# Patient Record
Sex: FEMALE | Race: WHITE | NOT HISPANIC OR LATINO | Employment: FULL TIME | URBAN - METROPOLITAN AREA
[De-identification: names, ages, dates, MRNs, and addresses within clinical notes are randomized per-mention and may not be internally consistent; named-entity substitution may affect disease eponyms.]

---

## 2017-01-02 ENCOUNTER — OFFICE VISIT (OUTPATIENT)
Dept: FAMILY MEDICINE | Facility: CLINIC | Age: 22
End: 2017-01-02
Payer: COMMERCIAL

## 2017-01-02 VITALS
HEART RATE: 100 BPM | WEIGHT: 128.3 LBS | SYSTOLIC BLOOD PRESSURE: 121 MMHG | HEIGHT: 62 IN | DIASTOLIC BLOOD PRESSURE: 77 MMHG | BODY MASS INDEX: 23.61 KG/M2 | TEMPERATURE: 99.3 F

## 2017-01-02 DIAGNOSIS — N92.1 BREAKTHROUGH BLEEDING ON DEPO PROVERA: Primary | ICD-10-CM

## 2017-01-02 DIAGNOSIS — F90.0 ATTENTION DEFICIT HYPERACTIVITY DISORDER (ADHD), PREDOMINANTLY INATTENTIVE TYPE: ICD-10-CM

## 2017-01-02 DIAGNOSIS — A08.4 VIRAL GASTROENTERITIS: ICD-10-CM

## 2017-01-02 PROCEDURE — 99213 OFFICE O/P EST LOW 20 MIN: CPT | Performed by: PHYSICIAN ASSISTANT

## 2017-01-02 RX ORDER — DEXTROAMPHETAMINE SACCHARATE, AMPHETAMINE ASPARTATE, DEXTROAMPHETAMINE SULFATE AND AMPHETAMINE SULFATE 1.25; 1.25; 1.25; 1.25 MG/1; MG/1; MG/1; MG/1
5 TABLET ORAL 2 TIMES DAILY
Qty: 60 TABLET | Refills: 0 | Status: SHIPPED | OUTPATIENT
Start: 2017-02-06 | End: 2017-03-06

## 2017-01-02 NOTE — PROGRESS NOTES
"SUBJECTIVE:                                                    Nargis Ruelas is a 21 year old female who presents to clinic today for the following health issues:    *  Concerns about depo, has been having bleeding and more cramping and she is not due for her next injection till 1/13/2017 - 1/27/2017.    On depo >1 year. Has had unexplained bleeding a handful of times. Last a couple days. Unsure if it was maybe this summer  Last shot Oct 28  Now started bleeding/cramping yesterday - pretty heavy yesterday, now has slowed down - changed pad/tampon twice today  But cramping is worse than usual. Suspect due to combination with bowel cramping  Also having diarrhea/stomach bug  With nausea/vomiting last few days, starting to improve  Did take negative pregnancy test  She has been doing depo closer to 2.5 months due to bleeding near when she is due      Problem list and histories reviewed & adjusted, as indicated.  Additional history: none    Problem list, Medication list, Allergies, and Medical/Social/Surgical histories reviewed in EPIC and updated as appropriate.    ROS:  Other than noted above, general, HEENT, respiratory, cardiac and gastrointestinal systems are negative.     OBJECTIVE:                                                    /77 mmHg  Pulse 100  Temp(Src) 99.3  F (37.4  C) (Tympanic)  Ht 5' 2\" (1.575 m)  Wt 128 lb 4.8 oz (58.196 kg)  BMI 23.46 kg/m2 Body mass index is 23.46 kg/(m^2).   GENERAL: healthy, alert, well nourished, well hydrated, no distress  RESP: lungs clear to auscultation - no rales, no rhonchi, no wheezes  CV: regular rates and rhythm, normal S1 S2, no S3 or S4 and no murmur, no click or rub -  ABDOMEN: soft, no tenderness, no  hepatosplenomegaly, no masses, normal bowel sounds  - female: deferred        ASSESSMENT/PLAN:                                                      ASSESSMENT/PLAN:      ICD-10-CM    1. Breakthrough bleeding on depo provera N92.1 US Pelvic Complete w " Transvaginal   2. Attention deficit hyperactivity disorder (ADHD), predominantly inattentive type F90.0 amphetamine-dextroamphetamine (ADDERALL) 5 MG per tablet   3. Viral gastroenteritis A08.4      Discussed r/o pathology as she has breakthrough bleeding with depo. Patient feels that if she can make sure nothing is wrong then she would like to continue depo, she likes it otherwise and doesn't mind the bleeding very much. She is too nervous to try nexplanon at this point. She is comfortable with this plan.  She is doing well on adderall. She has 1 prescription at pharmacy, 1 more for February is printed out and given to patient to bring to pharmacy.    Patient Instructions   Pelvic ultrasound - For [place] imaging department: call [phone] to schedule     Carol Jennings PA-C   [facility]

## 2017-01-02 NOTE — NURSING NOTE
"Chief Complaint   Patient presents with     Contraception     Having bad cramping and bleeding       Initial /77 mmHg  Pulse 100  Temp(Src) 99.3  F (37.4  C) (Tympanic)  Ht 5' 2\" (1.575 m)  Wt 128 lb 4.8 oz (58.196 kg)  BMI 23.46 kg/m2 Estimated body mass index is 23.46 kg/(m^2) as calculated from the following:    Height as of this encounter: 5' 2\" (1.575 m).    Weight as of this encounter: 128 lb 4.8 oz (58.196 kg).  BP completed using cuff size: regular  Renea Alas CMA  "

## 2017-01-02 NOTE — MR AVS SNAPSHOT
After Visit Summary   1/2/2017    Nargis Ruelas    MRN: [id number]           Patient Information     Date Of Birth          1995        Visit Information        Provider Department      1/2/2017 4:20 PM Carol Jennings PA-C [facility]        Today's Diagnoses     Breakthrough bleeding on depo provera    -  1     Attention deficit hyperactivity disorder (ADHD), predominantly inattentive type           Care Instructions    Pelvic ultrasound - For [place] imaging department: call [phone] to schedule         Follow-ups after your visit        Future tests that were ordered for you today     Open Future Orders        Priority Expected Expires Ordered    US Pelvic Complete w Transvaginal Routine  1/2/2018 1/2/2017            Who to contact     Normal or non-critical lab and imaging results will be communicated to you by [employer], letter or phone within 4 business days after the clinic has received the results. If you do not hear from us within 7 days, please contact the clinic through Tailt or phone. If you have a critical or abnormal lab result, we will notify you by phone as soon as possible.  Submit refill requests through 37coins or call your pharmacy and they will forward the refill request to us. Please allow 3 business days for your refill to be completed.          If you need to speak with a  for additional information , please call: [phone]             Additional Information About Your Visit        37coins Information     [employer] gives you secure access to your electronic health record. If you see a primary care provider, you can also send messages to your care team and make appointments. If you have questions, please call your primary care clinic.  If you do not have a primary care provider, please call [phone] and they will assist you.        Your Vitals Were     Pulse Temperature Height BMI (Body Mass Index)          100 99.3  F (37.4  C)  "(Tympanic) 5' 2\" (1.575 m) 23.46 kg/m2         Blood Pressure from Last 3 Encounters:   01/02/17 121/77   12/08/16 112/59   11/07/16 113/51    Weight from Last 3 Encounters:   01/02/17 128 lb 4.8 oz (58.196 kg)   12/08/16 134 lb 1.6 oz (60.827 kg)   11/07/16 133 lb 4.8 oz (60.464 kg)                 Where to get your medicines      Some of these will need a paper prescription and others can be bought over the counter.  Ask your nurse if you have questions.     Bring a paper prescription for each of these medications    - amphetamine-dextroamphetamine 5 MG per tablet       Primary Care Provider Office Phone #    [facility] [phone]       No address on file        Thank you!     Thank you for choosing [facility]  for your care. Our goal is always to provide you with excellent care. Hearing back from our patients is one way we can continue to improve our services. Please take a few minutes to complete the written survey that you may receive in the mail after your visit with us. Thank you!             Your Updated Medication List - Protect others around you: Learn how to safely use, store and throw away your medicines at www.disposemymeds.org.          This list is accurate as of: 1/2/17  5:43 PM.  Always use your most recent med list.                   Brand Name Dispense Instructions for use    * amphetamine-dextroamphetamine 5 MG per tablet   Start taking on:  1/8/2017    ADDERALL    60 tablet    Take 1 tablet (5 mg) by mouth 2 times daily       * amphetamine-dextroamphetamine 5 MG per tablet   Start taking on:  2/6/2017    ADDERALL    60 tablet    Take 1 tablet (5 mg) by mouth 2 times daily       medroxyPROGESTERone 150 MG/ML injection    DEPO-PROVERA    3 mL    Inject 1 mL (150 mg) into the muscle every 2 months       tretinoin 0.025 % cream    RETIN-A    45 g    Spread a pea size amount into affected area topically at bedtime.  Use sunscreen SPF>20.       * Notice:  This list has 2 " medication(s) that are the same as other medications prescribed for you. Read the directions carefully, and ask your doctor or other care provider to review them with you.

## 2017-01-25 ENCOUNTER — ALLIED HEALTH/NURSE VISIT (OUTPATIENT)
Dept: FAMILY MEDICINE | Facility: CLINIC | Age: 22
End: 2017-01-25
Payer: COMMERCIAL

## 2017-01-25 DIAGNOSIS — Z30.42 ENCOUNTER FOR SURVEILLANCE OF INJECTABLE CONTRACEPTIVE: Primary | ICD-10-CM

## 2017-01-25 PROCEDURE — 99207 ZZC NO CHARGE NURSE ONLY: CPT

## 2017-01-25 PROCEDURE — 96372 THER/PROPH/DIAG INJ SC/IM: CPT

## 2017-01-25 RX ORDER — MEDROXYPROGESTERONE ACETATE 150 MG/ML
150 INJECTION, SUSPENSION INTRAMUSCULAR
Qty: 1 ML | Refills: 4 | OUTPATIENT
Start: 2017-01-25 | End: 2017-12-08

## 2017-03-06 DIAGNOSIS — F90.0 ATTENTION DEFICIT HYPERACTIVITY DISORDER (ADHD), PREDOMINANTLY INATTENTIVE TYPE: ICD-10-CM

## 2017-03-06 RX ORDER — DEXTROAMPHETAMINE SACCHARATE, AMPHETAMINE ASPARTATE, DEXTROAMPHETAMINE SULFATE AND AMPHETAMINE SULFATE 1.25; 1.25; 1.25; 1.25 MG/1; MG/1; MG/1; MG/1
5 TABLET ORAL 2 TIMES DAILY
Qty: 60 TABLET | Refills: 0 | Status: SHIPPED | OUTPATIENT
Start: 2017-03-06 | End: 2017-04-10

## 2017-03-06 NOTE — TELEPHONE ENCOUNTER
Adderall      Last Written Prescription Date: 2/6/17  Last Fill Quantity: 60,  # refills: 0   Last Office Visit with G, [facility] or [facility] prescribing provider: 1/2/17                                             Janessa Heard, PharmD  [facility]  [phone]

## 2017-03-06 NOTE — TELEPHONE ENCOUNTER
Unable to reach [facility].  Phone not in service.  Script walked to pharmacy..Melonie Quinteros

## 2017-04-10 ENCOUNTER — OFFICE VISIT (OUTPATIENT)
Dept: FAMILY MEDICINE | Facility: CLINIC | Age: 22
End: 2017-04-10
Payer: COMMERCIAL

## 2017-04-10 ENCOUNTER — TELEPHONE (OUTPATIENT)
Dept: FAMILY MEDICINE | Facility: CLINIC | Age: 22
End: 2017-04-10

## 2017-04-10 VITALS
HEIGHT: 62 IN | BODY MASS INDEX: 22.36 KG/M2 | HEART RATE: 92 BPM | TEMPERATURE: 97.9 F | DIASTOLIC BLOOD PRESSURE: 78 MMHG | WEIGHT: 121.5 LBS | SYSTOLIC BLOOD PRESSURE: 125 MMHG

## 2017-04-10 DIAGNOSIS — F90.0 ATTENTION DEFICIT HYPERACTIVITY DISORDER (ADHD), PREDOMINANTLY INATTENTIVE TYPE: Primary | ICD-10-CM

## 2017-04-10 DIAGNOSIS — J98.01 ACUTE BRONCHOSPASM: ICD-10-CM

## 2017-04-10 DIAGNOSIS — Z30.42 ENCOUNTER FOR SURVEILLANCE OF INJECTABLE CONTRACEPTIVE: ICD-10-CM

## 2017-04-10 PROCEDURE — 99213 OFFICE O/P EST LOW 20 MIN: CPT | Mod: 25 | Performed by: PHYSICIAN ASSISTANT

## 2017-04-10 PROCEDURE — 96372 THER/PROPH/DIAG INJ SC/IM: CPT | Performed by: PHYSICIAN ASSISTANT

## 2017-04-10 RX ORDER — ALBUTEROL SULFATE 90 UG/1
2 AEROSOL, METERED RESPIRATORY (INHALATION) EVERY 6 HOURS PRN
Qty: 1 INHALER | Refills: 0 | Status: SHIPPED | OUTPATIENT
Start: 2017-04-10 | End: 2017-09-07 | Stop reason: DRUGHIGH

## 2017-04-10 RX ORDER — DEXTROAMPHETAMINE SACCHARATE, AMPHETAMINE ASPARTATE, DEXTROAMPHETAMINE SULFATE AND AMPHETAMINE SULFATE 1.25; 1.25; 1.25; 1.25 MG/1; MG/1; MG/1; MG/1
5 TABLET ORAL 2 TIMES DAILY
Qty: 60 TABLET | Refills: 0 | Status: SHIPPED | OUTPATIENT
Start: 2017-04-10 | End: 2017-08-07

## 2017-04-10 RX ORDER — DEXTROAMPHETAMINE SACCHARATE, AMPHETAMINE ASPARTATE, DEXTROAMPHETAMINE SULFATE AND AMPHETAMINE SULFATE 1.25; 1.25; 1.25; 1.25 MG/1; MG/1; MG/1; MG/1
5 TABLET ORAL 2 TIMES DAILY
Qty: 60 TABLET | Refills: 0 | Status: SHIPPED | OUTPATIENT
Start: 2017-05-10 | End: 2017-08-07

## 2017-04-10 ASSESSMENT — ANXIETY QUESTIONNAIRES
5. BEING SO RESTLESS THAT IT IS HARD TO SIT STILL: NOT AT ALL
GAD7 TOTAL SCORE: 5
6. BECOMING EASILY ANNOYED OR IRRITABLE: SEVERAL DAYS
7. FEELING AFRAID AS IF SOMETHING AWFUL MIGHT HAPPEN: NOT AT ALL
1. FEELING NERVOUS, ANXIOUS, OR ON EDGE: SEVERAL DAYS
3. WORRYING TOO MUCH ABOUT DIFFERENT THINGS: SEVERAL DAYS
2. NOT BEING ABLE TO STOP OR CONTROL WORRYING: SEVERAL DAYS

## 2017-04-10 ASSESSMENT — PATIENT HEALTH QUESTIONNAIRE - PHQ9: 5. POOR APPETITE OR OVEREATING: SEVERAL DAYS

## 2017-04-10 NOTE — PROGRESS NOTES
"  SUBJECTIVE:                                                    Nargis Ruelas is a 22 year old female who presents to clinic today for the following health issues:    Medication Followup of Adderall    Taking Medication as prescribed: yes    Side Effects:  None    Medication Helping Symptoms:  yes     Feels she is doing well with depression/anxiety  Insurance has not covered, she pays out of pocket    Eats lunch, dinner, snacks. 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  Additional history: as documented    Patient Active Problem List   Diagnosis     History of other specified conditions presenting hazards to health     Major depressive disorder, single episode     Childhood hyperkinetic syndrome     Anxiety state     History of syncope     Tobacco abuse     Attention deficit hyperactivity disorder (ADHD), predominantly inattentive type     Chronic pain of both knees     Past Surgical History:   Procedure Laterality Date     TONSILLECTOMY         Social History   Substance Use Topics     Smoking status: Current Some Day Smoker     Packs/day: 0.50     Types: Cigarettes     Smokeless tobacco: Not on file     Alcohol use 0.0 oz/week     0 Standard drinks or equivalent per week      Comment: 4 drinks per month     Family History   Problem Relation Age of Onset     DIABETES Maternal Grandfather      Coronary Artery Disease Maternal " "Grandfather      Other Cancer No family hx of            Reviewed and updated as needed this visit by clinical staff  Tobacco  Allergies  Meds  Problems  Med Hx  Surg Hx  Fam Hx  Soc Hx        Reviewed and updated as needed this visit by Provider  Tobacco  Allergies  Meds  Problems  Med Hx  Surg Hx  Fam Hx  Soc Hx          ROS:  Other than noted above, general, HEENT, respiratory, cardiac and gastrointestinal systems are negative.     OBJECTIVE:                                                    /78  Pulse 92  Temp 97.9  F (36.6  C) (Tympanic)  Ht 5' 2\" (1.575 m)  Wt 121 lb 8 oz (55.1 kg)  BMI 22.22 kg/m2  Body mass index is 22.22 kg/(m^2).  GENERAL: healthy, alert and no distress  HENT: ear canals and TM's normal, nose and mouth without ulcers or lesions  NECK: no adenopathy, no asymmetry, masses, or scars and thyroid normal to palpation  RESP: lungs clear to auscultation - no rales, rhonchi or wheezes  CV: regular rate and rhythm, normal S1 S2, no S3 or S4, no murmur, click or rub, no peripheral edema and peripheral pulses strong  ABDOMEN: soft, nontender, no hepatosplenomegaly, no masses and bowel sounds normal  MS: no gross musculoskeletal defects noted, no edema  PSYCH: Alert and oriented times 3; speech- coherent , normal rate and volume; able to articulate logical thoughts, able to abstract reason, no tangential thoughts, no hallucinations or delusions, affect- normal      ASSESSMENT/PLAN:                                                      ASSESSMENT/PLAN:      ICD-10-CM    1. Attention deficit hyperactivity disorder (ADHD), predominantly inattentive type F90.0 amphetamine-dextroamphetamine (ADDERALL) 5 MG per tablet     amphetamine-dextroamphetamine (ADDERALL) 5 MG per tablet   2. Encounter for surveillance of injectable contraceptive Z30.42 C Medroxyprogesterone inj/1mg     INJECTION INTRAMUSCULAR OR SUB-Q   3. Acute bronchospasm J98.01 albuterol (PROAIR HFA/PROVENTIL HFA/VENTOLIN " HFA) 108 (90 BASE) MCG/ACT Inhaler       Patient Instructions   Albuterol inhaler as needed    Continue adderall 5 mg two times daily    Eat breakfast in the morning    Follow up in 2 months, sooner if needed    Carol Jennings PA-C  [facility]

## 2017-04-10 NOTE — TELEPHONE ENCOUNTER
Prior Authorization Required on: Generic Adderall 5mg  Insurance: [employer]  Insurance Phone: [phone]  Patient ID: [id number]  Please Contact the Pharmacy with Prior Auth Status (approval/denial).   Thank You!    See Mariusz  Pharmacy Technician  [facility]  [phone]

## 2017-04-10 NOTE — LETTER
My Depression Action Plan  Name: Nargis Ruelas   Date of Birth 1995  Date: 4/10/2017    My doctor: Danette Montero   My clinic: [facility]  [address] [facility] [id number] [phone]          GREEN    ZONE   Good Control    What it looks like:     Things are going generally well. You have normal up s and down s. You may even feel depressed from time to time, but bad moods usually last less than a day.   What you need to do:  1. Continue to care for yourself (see self care plan)  2. Check your depression survival kit and update it as needed  3. Follow your physician s recommendations including any medication.  4. Do not stop taking medication unless you consult with your physician first.           YELLOW         ZONE Getting Worse    What it looks like:     Depression is starting to interfere with your life.     It may be hard to get out of bed; you may be starting to isolate yourself from others.    Symptoms of depression are starting to last most all day and this has happened for several days.     You may have suicidal thoughts but they are not constant.   What you need to do:     1. Call your care team, your response to treatment will improve if you keep your care team informed of your progress. Yellow periods are signs an adjustment may need to be made.     2. Continue your self-care, even if you have to fake it!    3. Talk to someone in your support network    4. Open up your depression survival kit           RED    ZONE Medical Alert - Get Help    What it looks like:     Depression is seriously interfering with your life.     You may experience these or other symptoms: You can t get out of bed most days, can t work or engage in other necessary activities, you have trouble taking care of basic hygiene, or basic responsibilities, thoughts of suicide or death that will not go away, self-injurious behavior.     What you need to do:  1. Call your care team and  request a same-day appointment. If they are not available (weekends or after hours) call your local crisis line, emergency room or 911.      Electronically signed by: Carol Jennings, April 10, 2017    Depression Self Care Plan / Survival Kit    Self-Care for Depression  Here s the deal. Your body and mind are really not as separate as most people think.  What you do and think affects how you feel and how you feel influences what you do and think. This means if you do things that people who feel good do, it will help you feel better.  Sometimes this is all it takes.  There is also a place for medication and therapy depending on how severe your depression is, so be sure to consult with your medical provider and/ or Behavioral Health Consultant if your symptoms are worsening or not improving.     In order to better manage my stress, I will:    Exercise  Get some form of exercise, every day. This will help reduce pain and release endorphins, the  feel good  chemicals in your brain. This is almost as good as taking antidepressants!  This is not the same as joining a gym and then never going! (they count on that by the way ) It can be as simple as just going for a walk or doing some gardening, anything that will get you moving.      Hygiene   Maintain good hygiene (Get out of bed in the morning, Make your bed, Brush your teeth, Take a shower, and Get dressed like you were going to work, even if you are unemployed).  If your clothes don't fit try to get ones that do.    Diet  I will strive to eat foods that are good for me, drink plenty of water, and avoid excessive sugar, caffeine, alcohol, and other mood-altering substances.  Some foods that are helpful in depression are: complex carbohydrates, B vitamins, flaxseed, fish or fish oil, fresh fruits and vegetables.    Psychotherapy  I agree to participate in Individual Therapy (if recommended).    Medication  If prescribed medications, I agree to take them.  Missing doses  can result in serious side effects.  I understand that drinking alcohol, or other illicit drug use, may cause potential side effects.  I will not stop my medication abruptly without first discussing it with my provider.    Staying Connected With Others  I will stay in touch with my friends, family members, and my primary care provider/team.    Use your imagination  Be creative.  We all have a creative side; it doesn t matter if it s oil painting, sand castles, or mud pies! This will also kick up the endorphins.    Witness Beauty  (AKA stop and smell the roses) Take a look outside, even in mid-winter. Notice colors, textures. Watch the squirrels and birds.     Service to others  Be of service to others.  There is always someone else in need.  By helping others we can  get out of ourselves  and remember the really important things.  This also provides opportunities for practicing all the other parts of the program.    Humor  Laugh and be silly!  Adjust your TV habits for less news and crime-drama and more comedy.    Control your stress  Try breathing deep, massage therapy, biofeedback, and meditation. Find time to relax each day.     My support system    Clinic Contact:  Phone number:    Contact 1:  Phone number:    Contact 2:  Phone number:    Protestant/:  Phone number:    Therapist:  Phone number:    Local crisis center:    Phone number:    Other community support:  Phone number:

## 2017-04-10 NOTE — MR AVS SNAPSHOT
After Visit Summary   4/10/2017    Nargis Ruelas    MRN: [id number]           Patient Information     Date Of Birth          1995        Visit Information        Provider Department      4/10/2017 3:00 PM Carol Jennings PA-C [facility]        Today's Diagnoses     Contraception    -  1    Acute bronchospasm        Attention deficit hyperactivity disorder (ADHD), predominantly inattentive type          Care Instructions    Albuterol inhaler as needed    Continue adderall 5 mg two times daily    Eat breakfast in the morning    Follow up in 2 months, sooner if needed        Follow-ups after your visit        Who to contact     Normal or non-critical lab and imaging results will be communicated to you by [employer], letter or phone within 4 business days after the clinic has received the results. If you do not hear from us within 7 days, please contact the clinic through wireWAXt or phone. If you have a critical or abnormal lab result, we will notify you by phone as soon as possible.  Submit refill requests through [email or web address] or call your pharmacy and they will forward the refill request to us. Please allow 3 business days for your refill to be completed.          If you need to speak with a  for additional information , please call: [phone]             Additional Information About Your Visit        HydrelisharParachute Information     [email or web address] gives you secure access to your electronic health record. If you see a primary care provider, you can also send messages to your care team and make appointments. If you have questions, please call your primary care clinic.  If you do not have a primary care provider, please call [phone] and they will assist you.        Care EveryWhere ID     This is your Care EveryWhere ID. This could be used by other organizations to access your [place] medical records  [id number]        Your Vitals Were     Pulse Temperature Height BMI (Body Mass Index)  "         92 97.9  F (36.6  C) (Tympanic) 5' 2\" (1.575 m) 22.22 kg/m2         Blood Pressure from Last 3 Encounters:   04/10/17 125/78   01/02/17 121/77   12/08/16 112/59    Weight from Last 3 Encounters:   04/10/17 121 lb 8 oz (55.1 kg)   01/02/17 128 lb 4.8 oz (58.2 kg)   12/08/16 134 lb 1.6 oz (60.8 kg)              We Performed the Following     C Medroxyprogesterone inj/1mg     DEPRESSION ACTION PLAN (DAP)     INJECTION INTRAMUSCULAR OR SUB-Q          Today's Medication Changes          These changes are accurate as of: 4/10/17  3:57 PM.  If you have any questions, ask your nurse or doctor.               Start taking these medicines.        Dose/Directions    albuterol 108 (90 BASE) MCG/ACT Inhaler   Commonly known as:  PROAIR HFA/PROVENTIL HFA/VENTOLIN HFA   Used for:  Acute bronchospasm   Started by:  Carol Jennings PA-C        Dose:  2 puff   Inhale 2 puffs into the lungs every 6 hours as needed for shortness of breath / dyspnea or wheezing   Quantity:  1 Inhaler   Refills:  0            Where to get your medicines      These medications were sent to [facility] [address]   [address] [facility] [address]     Phone:  [phone]     albuterol 108 (90 BASE) MCG/ACT Inhaler         Some of these will need a paper prescription and others can be bought over the counter.  Ask your nurse if you have questions.     Bring a paper prescription for each of these medications     amphetamine-dextroamphetamine 5 MG per tablet    amphetamine-dextroamphetamine 5 MG per tablet                Primary Care Provider Office Phone #    [facility] [phone]       No address on file        Thank you!     Thank you for choosing [facility]  for your care. Our goal is always to provide you with excellent care. Hearing back from our patients is one way we can continue to improve our services. Please take a few minutes to complete the written survey that you may " receive in the mail after your visit with us. Thank you!             Your Updated Medication List - Protect others around you: Learn how to safely use, store and throw away your medicines at www.disposemymeds.org.          This list is accurate as of: 4/10/17  3:57 PM.  Always use your most recent med list.                   Brand Name Dispense Instructions for use    albuterol 108 (90 BASE) MCG/ACT Inhaler    PROAIR HFA/PROVENTIL HFA/VENTOLIN HFA    1 Inhaler    Inhale 2 puffs into the lungs every 6 hours as needed for shortness of breath / dyspnea or wheezing       * amphetamine-dextroamphetamine 5 MG per tablet    ADDERALL    60 tablet    Take 1 tablet (5 mg) by mouth 2 times daily       * amphetamine-dextroamphetamine 5 MG per tablet   Start taking on:  5/10/2017    ADDERALL    60 tablet    Take 1 tablet (5 mg) by mouth 2 times daily       * medroxyPROGESTERone 150 MG/ML injection    DEPO-PROVERA    3 mL    Inject 1 mL (150 mg) into the muscle every 2 months       * medroxyPROGESTERone 150 MG/ML injection    DEPO-PROVERA    1 mL    Inject 1 mL (150 mg) into the muscle every 3 months       tretinoin 0.025 % cream    RETIN-A    45 g    Spread a pea size amount into affected area topically at bedtime.  Use sunscreen SPF>20.       * Notice:  This list has 4 medication(s) that are the same as other medications prescribed for you. Read the directions carefully, and ask your doctor or other care provider to review them with you.

## 2017-04-10 NOTE — PROGRESS NOTES
BLOOD PRESSURE: 125/78    DATE OF LAST PAP or ANNUAL EXAM:   Lab Results   Component Value Date    PAP NIL 07/18/2016     URINE HCG:not indicated    The following medication was given:     MEDICATION: Depo Provera 150mg  ROUTE: IM  SITE: LUQ - Gluteus  : [employer]  LOT #: [id number]  EXPIRATION:11/2019  NEXT INJECTION DUE: June 25-July 10  Provider: Marah Flynn CMA

## 2017-04-10 NOTE — PATIENT INSTRUCTIONS
Albuterol inhaler as needed    Continue adderall 5 mg two times daily    Eat breakfast in the morning    Follow up in 2 months, sooner if needed

## 2017-04-11 ASSESSMENT — ANXIETY QUESTIONNAIRES: GAD7 TOTAL SCORE: 5

## 2017-04-11 ASSESSMENT — PATIENT HEALTH QUESTIONNAIRE - PHQ9: SUM OF ALL RESPONSES TO PHQ QUESTIONS 1-9: 3

## 2017-04-11 NOTE — TELEPHONE ENCOUNTER
[facility] denied prior authorization request.  State medication is excluded for coverage under patient's benefit plan and is not reviewable.    DAJA DEE

## 2017-04-13 ENCOUNTER — RADIANT APPOINTMENT (OUTPATIENT)
Dept: GENERAL RADIOLOGY | Facility: CLINIC | Age: 22
End: 2017-04-13
Attending: NURSE PRACTITIONER
Payer: COMMERCIAL

## 2017-04-13 ENCOUNTER — OFFICE VISIT (OUTPATIENT)
Dept: FAMILY MEDICINE | Facility: CLINIC | Age: 22
End: 2017-04-13
Payer: COMMERCIAL

## 2017-04-13 VITALS
BODY MASS INDEX: 22.28 KG/M2 | WEIGHT: 121.8 LBS | HEART RATE: 92 BPM | TEMPERATURE: 98.2 F | SYSTOLIC BLOOD PRESSURE: 120 MMHG | DIASTOLIC BLOOD PRESSURE: 72 MMHG | OXYGEN SATURATION: 100 %

## 2017-04-13 DIAGNOSIS — R07.89 CHEST TIGHTNESS: ICD-10-CM

## 2017-04-13 DIAGNOSIS — J20.9 ACUTE BRONCHITIS WITH SYMPTOMS > 10 DAYS: Primary | ICD-10-CM

## 2017-04-13 DIAGNOSIS — R06.02 SOB (SHORTNESS OF BREATH): ICD-10-CM

## 2017-04-13 DIAGNOSIS — J41.0 SIMPLE CHRONIC BRONCHITIS (H): ICD-10-CM

## 2017-04-13 DIAGNOSIS — R05.9 COUGH: ICD-10-CM

## 2017-04-13 PROCEDURE — 99213 OFFICE O/P EST LOW 20 MIN: CPT | Performed by: NURSE PRACTITIONER

## 2017-04-13 PROCEDURE — 71020 XR CHEST 2 VW: CPT

## 2017-04-13 RX ORDER — AZITHROMYCIN 250 MG/1
TABLET, FILM COATED ORAL
Qty: 6 TABLET | Refills: 0 | Status: SHIPPED | OUTPATIENT
Start: 2017-04-13 | End: 2017-06-12

## 2017-04-13 NOTE — MR AVS SNAPSHOT
After Visit Summary   4/13/2017    Nargis Ruelas    MRN: [id number]           Patient Information     Date Of Birth          1995        Visit Information        Provider Department      4/13/2017 1:40 PM Sandy Harrington APRN [facility]        Today's Diagnoses     Acute bronchitis with symptoms > 10 days    -  1    Cough        Simple chronic bronchitis (H)        Chest tightness        SOB (shortness of breath)          Care Instructions    Increase rest and fluids. Tylenol and/or Ibuprofen for comfort. Cool mist vaporizer. If your symptoms worsen or do not resolve follow up with your primary care provider in 2 weeks and sooner if needed.        Indications for emergent return to emergency department discussed with patient, who verbalized good understanding and agreement.  Patient understands the limitations of today's evaluation.           Bronchitis, Antibiotic Treatment (Adult)    Bronchitis is an infection of the air passages (bronchial tubes) in your lungs. It often occurs when you have a cold. This illness is contagious during the first few days and is spread through the air by coughing and sneezing, or by direct contact (touching the sick person and then touching your own eyes, nose, or mouth).  Symptoms of bronchitis include cough with mucus (phlegm) and low-grade fever. Bronchitis usually lasts 7 to 14 days. Mild cases can be treated with simple home remedies. More severe infection is treated with an antibiotic.  Home care  Follow these guidelines when caring for yourself at home:    If your symptoms are severe, rest at home for the first 2 to 3 days. When you go back to your usual activities, don't let yourself get too tired.    Do not smoke. Also avoid being exposed to secondhand smoke.    You may use over-the-counter medicines to control fever or pain, unless another medicine was prescribed. (Note: If you have chronic liver or kidney disease or have ever  had a stomach ulcer or gastrointestinal bleeding, talk with your healthcare provider before using these medicines. Also talk to your provider if you are taking medicine to prevent blood clots.) Aspirin should never be given to anyone younger than 18 years of age who is ill with a viral infection or fever. It may cause severe liver or brain damage.    Your appetite may be poor, so a light diet is fine. Avoid dehydration by drinking 6 to 8 glasses of fluids per day (such as water, soft drinks, sports drinks, juices, tea, or soup). Extra fluids will help loosen secretions in the nose and lungs.    Over-the-counter cough, cold, and sore-throat medicines will not shorten the length of the illness, but they may be helpful to reduce symptoms. (Note: Do not use decongestants if you have high blood pressure.)    Finish all antibiotic medicine. Do this even if you are feeling better after only a few days.  Follow-up care  Follow up with your healthcare provider, or as advised. If you had an X-ray or ECG (electrocardiogram), a specialist will review it. You will be notified of any new findings that may affect your care.  Note: If you are age 65 or older, or if you have a chronic lung disease or condition that affects your immune system, or you smoke, talk to your healthcare provider about having pneumococcal vaccinations and a yearly influenza vaccination (flu shot).  When to seek medical advice  Call your healthcare provider right away if any of these occur:    Fever of 100.4 F (38 C) or higher    Coughing up increased amounts of colored sputum    Weakness, drowsiness, headache, facial pain, ear pain, or a stiff neck   Call 911, or get immediate medical care  Contact emergency services right away if any of these occur.    Coughing up blood    Worsening weakness, drowsiness, headache, or stiff neck    Trouble breathing, wheezing, or pain with breathing    1863-5864 [employer]. [address] [facility], [place]  [address]. All rights reserved. This information is not intended as a substitute for professional medical care. Always follow your healthcare professional's instructions.              Follow-ups after your visit        Follow-up notes from your care team     See patient instructions section of the AVS Return in about 2 weeks (around 4/27/2017), or if symptoms worsen or fail to improve, for Follow up with your primary care provider.      Who to contact     If you have questions or need follow up information about today's clinic visit or your schedule please contact [facility] directly at [phone].  Normal or non-critical lab and imaging results will be communicated to you by [employer], letter or phone within 4 business days after the clinic has received the results. If you do not hear from us within 7 days, please contact the clinic through S[employer] or phone. If you have a critical or abnormal lab result, we will notify you by phone as soon as possible.  Submit refill requests through [employer] or call your pharmacy and they will forward the refill request to us. Please allow 3 business days for your refill to be completed.          Additional Information About Your Visit        MyChart Information     [employer] gives you secure access to your electronic health record. If you see a primary care provider, you can also send messages to your care team and make appointments. If you have questions, please call your primary care clinic.  If you do not have a primary care provider, please call [phone] and they will assist you.        Care EveryWhere ID     This is your Care EveryWhere ID. This could be used by other organizations to access your [place] medical records  [id number]        Your Vitals Were     Pulse Temperature Pulse Oximetry BMI (Body Mass Index)          92 98.2  F (36.8  C) (Tympanic) 100% 22.28 kg/m2         Blood Pressure from Last 3 Encounters:   04/13/17 120/72   04/10/17 125/78    01/02/17 121/77    Weight from Last 3 Encounters:   04/13/17 121 lb 12.8 oz (55.2 kg)   04/10/17 121 lb 8 oz (55.1 kg)   01/02/17 128 lb 4.8 oz (58.2 kg)                 Today's Medication Changes          These changes are accurate as of: 4/13/17  2:58 PM.  If you have any questions, ask your nurse or doctor.               Start taking these medicines.        Dose/Directions    azithromycin 250 MG tablet   Commonly known as:  ZITHROMAX Z-ALEX   Used for:  Acute bronchitis with symptoms > 10 days   Started by:  Sandy Harrington APRN CNP        Take 2 tablets on day 1 and then take 1 tablet days 2-5   Quantity:  6 tablet   Refills:  0            Where to get your medicines      These medications were sent to [facility] [address] [address]     Phone:  [phone]     azithromycin 250 MG tablet                Primary Care Provider Office Phone #    [place] [phone]       No address on file        Thank you!     Thank you for choosing [facility]  for your care. Our goal is always to provide you with excellent care. Hearing back from our patients is one way we can continue to improve our services. Please take a few minutes to complete the written survey that you may receive in the mail after your visit with us. Thank you!             Your Updated Medication List - Protect others around you: Learn how to safely use, store and throw away your medicines at www.disposemymeds.org.          This list is accurate as of: 4/13/17  2:58 PM.  Always use your most recent med list.                   Brand Name Dispense Instructions for use    albuterol 108 (90 BASE) MCG/ACT Inhaler    PROAIR HFA/PROVENTIL HFA/VENTOLIN HFA    1 Inhaler    Inhale 2 puffs into the lungs every 6 hours as needed for shortness of breath / dyspnea or wheezing       * amphetamine-dextroamphetamine 5 MG per tablet    ADDERALL    60  tablet    Take 1 tablet (5 mg) by mouth 2 times daily       * amphetamine-dextroamphetamine 5 MG per tablet   Start taking on:  5/10/2017    ADDERALL    60 tablet    Take 1 tablet (5 mg) by mouth 2 times daily       azithromycin 250 MG tablet    ZITHROMAX Z-ALEX    6 tablet    Take 2 tablets on day 1 and then take 1 tablet days 2-5       * medroxyPROGESTERone 150 MG/ML injection    DEPO-PROVERA    3 mL    Inject 1 mL (150 mg) into the muscle every 2 months       * medroxyPROGESTERone 150 MG/ML injection    DEPO-PROVERA    1 mL    Inject 1 mL (150 mg) into the muscle every 3 months       tretinoin 0.025 % cream    RETIN-A    45 g    Spread a pea size amount into affected area topically at bedtime.  Use sunscreen SPF>20.       * Notice:  This list has 4 medication(s) that are the same as other medications prescribed for you. Read the directions carefully, and ask your doctor or other care provider to review them with you.

## 2017-04-13 NOTE — PATIENT INSTRUCTIONS
Increase rest and fluids. Tylenol and/or Ibuprofen for comfort. Cool mist vaporizer. If your symptoms worsen or do not resolve follow up with your primary care provider in 2 weeks and sooner if needed.        Indications for emergent return to emergency department discussed with patient, who verbalized good understanding and agreement.  Patient understands the limitations of today's evaluation.           Bronchitis, Antibiotic Treatment (Adult)    Bronchitis is an infection of the air passages (bronchial tubes) in your lungs. It often occurs when you have a cold. This illness is contagious during the first few days and is spread through the air by coughing and sneezing, or by direct contact (touching the sick person and then touching your own eyes, nose, or mouth).  Symptoms of bronchitis include cough with mucus (phlegm) and low-grade fever. Bronchitis usually lasts 7 to 14 days. Mild cases can be treated with simple home remedies. More severe infection is treated with an antibiotic.  Home care  Follow these guidelines when caring for yourself at home:    If your symptoms are severe, rest at home for the first 2 to 3 days. When you go back to your usual activities, don't let yourself get too tired.    Do not smoke. Also avoid being exposed to secondhand smoke.    You may use over-the-counter medicines to control fever or pain, unless another medicine was prescribed. (Note: If you have chronic liver or kidney disease or have ever had a stomach ulcer or gastrointestinal bleeding, talk with your healthcare provider before using these medicines. Also talk to your provider if you are taking medicine to prevent blood clots.) Aspirin should never be given to anyone younger than 18 years of age who is ill with a viral infection or fever. It may cause severe liver or brain damage.    Your appetite may be poor, so a light diet is fine. Avoid dehydration by drinking 6 to 8 glasses of fluids per day (such as water, soft  drinks, sports drinks, juices, tea, or soup). Extra fluids will help loosen secretions in the nose and lungs.    Over-the-counter cough, cold, and sore-throat medicines will not shorten the length of the illness, but they may be helpful to reduce symptoms. (Note: Do not use decongestants if you have high blood pressure.)    Finish all antibiotic medicine. Do this even if you are feeling better after only a few days.  Follow-up care  Follow up with your healthcare provider, or as advised. If you had an X-ray or ECG (electrocardiogram), a specialist will review it. You will be notified of any new findings that may affect your care.  Note: If you are age 65 or older, or if you have a chronic lung disease or condition that affects your immune system, or you smoke, talk to your healthcare provider about having pneumococcal vaccinations and a yearly influenza vaccination (flu shot).  When to seek medical advice  Call your healthcare provider right away if any of these occur:    Fever of 100.4 F (38 C) or higher    Coughing up increased amounts of colored sputum    Weakness, drowsiness, headache, facial pain, ear pain, or a stiff neck   Call 911, or get immediate medical care  Contact emergency services right away if any of these occur.    Coughing up blood    Worsening weakness, drowsiness, headache, or stiff neck    Trouble breathing, wheezing, or pain with breathing    1412-7928 [employer]. [address] [place] [address]. All rights reserved. This information is not intended as a substitute for professional medical care. Always follow your healthcare professional's instructions.

## 2017-04-13 NOTE — PROGRESS NOTES
SUBJECTIVE:                                                    Nargis Ruelas is a 22 year old female who presents to clinic today for the following health issues:      Walking pneumonia/ Chest tightness       Duration: 2 months plus     Description (location/character/radiation): worried it might be walking pneumonia     Intensity:  mild, moderate    Accompanying signs and symptoms: gets low grade fevers out no where, feel faint a lot, chest tightness, coughing up mucus- dark green, clear jelly, is a smoker, have been feeling off, times of difficulty breathing, has asthma- hasn't been an issue recently, loose bowel movements- had one and was feeling faint during it, wheezing     History (similar episodes/previous evaluation): history of feeling faint and has had many tests for it     Precipitating or alleviating factors: None    Therapies tried and outcome: tylenol            Problem list and histories reviewed & adjusted, as indicated.  Additional history: as documented    Patient Active Problem List   Diagnosis     History of other specified conditions presenting hazards to health     Major depressive disorder, single episode     Childhood hyperkinetic syndrome     Anxiety state     History of syncope     Tobacco abuse     Attention deficit hyperactivity disorder (ADHD), predominantly inattentive type     Chronic pain of both knees     Past Surgical History:   Procedure Laterality Date     TONSILLECTOMY         Social History   Substance Use Topics     Smoking status: Current Some Day Smoker     Packs/day: 0.50     Types: Cigarettes     Smokeless tobacco: Not on file     Alcohol use 0.0 oz/week     0 Standard drinks or equivalent per week      Comment: 4 drinks per month     Family History   Problem Relation Age of Onset     DIABETES Maternal Grandfather      Coronary Artery Disease Maternal Grandfather      Other Cancer No family hx of          Current Outpatient Prescriptions   Medication Sig Dispense Refill      azithromycin (ZITHROMAX Z-ALEX) 250 MG tablet Take 2 tablets on day 1 and then take 1 tablet days 2-5 6 tablet 0     amphetamine-dextroamphetamine (ADDERALL) 5 MG per tablet Take 1 tablet (5 mg) by mouth 2 times daily 60 tablet 0     [START ON 5/10/2017] amphetamine-dextroamphetamine (ADDERALL) 5 MG per tablet Take 1 tablet (5 mg) by mouth 2 times daily 60 tablet 0     medroxyPROGESTERone (DEPO-PROVERA) 150 MG/ML injection Inject 1 mL (150 mg) into the muscle every 3 months 1 mL 4     medroxyPROGESTERone (DEPO-PROVERA) 150 MG/ML injection Inject 1 mL (150 mg) into the muscle every 2 months 3 mL 4     tretinoin (RETIN-A) 0.025 % cream Spread a pea size amount into affected area topically at bedtime.  Use sunscreen SPF>20. 45 g 11     albuterol (PROAIR HFA/PROVENTIL HFA/VENTOLIN HFA) 108 (90 BASE) MCG/ACT Inhaler Inhale 2 puffs into the lungs every 6 hours as needed for shortness of breath / dyspnea or wheezing (Patient not taking: Reported on 4/13/2017) 1 Inhaler 0     No Known Allergies  Labs reviewed in EPIC    Reviewed and updated as needed this visit by clinical staff  Tobacco  Allergies  Meds  Problems  Med Hx  Surg Hx  Fam Hx  Soc Hx        Reviewed and updated as needed this visit by Provider  Allergies  Meds  Problems         ROS:  Constitutional, HEENT, cardiovascular, pulmonary, GI, , musculoskeletal, neuro, skin, endocrine and psych systems are negative, except as otherwise noted.    OBJECTIVE:                                                    /72  Pulse 92  Temp 98.2  F (36.8  C) (Tympanic)  Wt 121 lb 12.8 oz (55.2 kg)  SpO2 100%  BMI 22.28 kg/m2  Body mass index is 22.28 kg/(m^2).  GENERAL: healthy, alert and no distress, nontoxic in appearance  EYES: Eyes grossly normal to inspection, PERRL and conjunctivae and sclerae normal  HENT: ear canals and TM's normal, nose and mouth without ulcers or lesions  NECK: no adenopathy, supple with full ROM  RESP: lungs clear to auscultation  - no rales, rhonchi or wheezes  CV: regular rate and rhythm, normal S1 S2, no S3 or S4, no murmur, click or rub, no peripheral edema   ABDOMEN: soft, nontender, no hepatosplenomegaly, no masses and bowel sounds normal  MS: no gross musculoskeletal defects noted, no edema  No rash    Diagnostic Test ResultsXR CHEST 2 VW 4/13/2017 2:10 PM     COMPARISON: None.     HISTORY: Cough, shortness of breath and chest pain.         IMPRESSION: Cardiac silhouette and pulmonary vasculature are within  normal limits. No focal airspace disease, pleural effusion or  pneumothorax.     ADAIR CONNORS:  No results found for this or any previous visit (from the past 24 hour(s)).     ASSESSMENT/PLAN:                                                      Problem List Items Addressed This Visit     None      Visit Diagnoses     Acute bronchitis with symptoms > 10 days    -  Primary    Relevant Medications    azithromycin (ZITHROMAX Z-ALEX) 250 MG tablet    Cough        Relevant Orders    XR Chest 2 Views    Simple chronic bronchitis (H)        Relevant Orders    XR Chest 2 Views    Chest tightness        Relevant Orders    XR Chest 2 Views    SOB (shortness of breath)        Relevant Orders    XR Chest 2 Views               Patient Instructions   Increase rest and fluids. Tylenol and/or Ibuprofen for comfort. Cool mist vaporizer. If your symptoms worsen or do not resolve follow up with your primary care provider in 2 weeks and sooner if needed.        Indications for emergent return to emergency department discussed with patient, who verbalized good understanding and agreement.  Patient understands the limitations of today's evaluation.           Bronchitis, Antibiotic Treatment (Adult)    Bronchitis is an infection of the air passages (bronchial tubes) in your lungs. It often occurs when you have a cold. This illness is contagious during the first few days and is spread through the air by coughing and sneezing, or by direct contact (touching  the sick person and then touching your own eyes, nose, or mouth).  Symptoms of bronchitis include cough with mucus (phlegm) and low-grade fever. Bronchitis usually lasts 7 to 14 days. Mild cases can be treated with simple home remedies. More severe infection is treated with an antibiotic.  Home care  Follow these guidelines when caring for yourself at home:    If your symptoms are severe, rest at home for the first 2 to 3 days. When you go back to your usual activities, don't let yourself get too tired.    Do not smoke. Also avoid being exposed to secondhand smoke.    You may use over-the-counter medicines to control fever or pain, unless another medicine was prescribed. (Note: If you have chronic liver or kidney disease or have ever had a stomach ulcer or gastrointestinal bleeding, talk with your healthcare provider before using these medicines. Also talk to your provider if you are taking medicine to prevent blood clots.) Aspirin should never be given to anyone younger than 18 years of age who is ill with a viral infection or fever. It may cause severe liver or brain damage.    Your appetite may be poor, so a light diet is fine. Avoid dehydration by drinking 6 to 8 glasses of fluids per day (such as water, soft drinks, sports drinks, juices, tea, or soup). Extra fluids will help loosen secretions in the nose and lungs.    Over-the-counter cough, cold, and sore-throat medicines will not shorten the length of the illness, but they may be helpful to reduce symptoms. (Note: Do not use decongestants if you have high blood pressure.)    Finish all antibiotic medicine. Do this even if you are feeling better after only a few days.  Follow-up care  Follow up with your healthcare provider, or as advised. If you had an X-ray or ECG (electrocardiogram), a specialist will review it. You will be notified of any new findings that may affect your care.  Note: If you are age 65 or older, or if you have a chronic lung disease or  condition that affects your immune system, or you smoke, talk to your healthcare provider about having pneumococcal vaccinations and a yearly influenza vaccination (flu shot).  When to seek medical advice  Call your healthcare provider right away if any of these occur:    Fever of 100.4 F (38 C) or higher    Coughing up increased amounts of colored sputum    Weakness, drowsiness, headache, facial pain, ear pain, or a stiff neck   Call 911, or get immediate medical care  Contact emergency services right away if any of these occur.    Coughing up blood    Worsening weakness, drowsiness, headache, or stiff neck    Trouble breathing, wheezing, or pain with breathing    2107-5446 [employer]. [address] [place] [address], [place] [address]. All rights reserved. This information is not intended as a substitute for professional medical care. Always follow your healthcare professional's instructions.            SUDHEER [facility]

## 2017-04-13 NOTE — NURSING NOTE
"Chief Complaint   Patient presents with     URI     /72  Pulse 92  Temp 98.2  F (36.8  C) (Tympanic)  Wt 121 lb 12.8 oz (55.2 kg)  SpO2 100%  BMI 22.28 kg/m2 Estimated body mass index is 22.28 kg/(m^2) as calculated from the following:    Height as of 4/10/17: 5' 2\" (1.575 m).    Weight as of this encounter: 121 lb 12.8 oz (55.2 kg).  bp completed using cuff size: regular      Health Maintenance that is potentially due pending provider review:  none        "

## 2017-06-12 ENCOUNTER — TELEPHONE (OUTPATIENT)
Dept: FAMILY MEDICINE | Facility: CLINIC | Age: 22
End: 2017-06-12

## 2017-06-12 ENCOUNTER — OFFICE VISIT (OUTPATIENT)
Dept: FAMILY MEDICINE | Facility: CLINIC | Age: 22
End: 2017-06-12
Payer: COMMERCIAL

## 2017-06-12 VITALS
BODY MASS INDEX: 22.82 KG/M2 | DIASTOLIC BLOOD PRESSURE: 68 MMHG | HEIGHT: 62 IN | HEART RATE: 76 BPM | WEIGHT: 124 LBS | SYSTOLIC BLOOD PRESSURE: 116 MMHG | TEMPERATURE: 98.9 F

## 2017-06-12 DIAGNOSIS — F90.0 ATTENTION DEFICIT HYPERACTIVITY DISORDER (ADHD), PREDOMINANTLY INATTENTIVE TYPE: ICD-10-CM

## 2017-06-12 DIAGNOSIS — F41.9 ANXIETY: Primary | ICD-10-CM

## 2017-06-12 PROCEDURE — 99213 OFFICE O/P EST LOW 20 MIN: CPT | Performed by: PHYSICIAN ASSISTANT

## 2017-06-12 RX ORDER — DEXTROAMPHETAMINE SACCHARATE, AMPHETAMINE ASPARTATE, DEXTROAMPHETAMINE SULFATE AND AMPHETAMINE SULFATE 1.25; 1.25; 1.25; 1.25 MG/1; MG/1; MG/1; MG/1
5 TABLET ORAL 2 TIMES DAILY
Qty: 60 TABLET | Refills: 0 | Status: SHIPPED | OUTPATIENT
Start: 2017-08-13 | End: 2017-08-07

## 2017-06-12 RX ORDER — DEXTROAMPHETAMINE SACCHARATE, AMPHETAMINE ASPARTATE, DEXTROAMPHETAMINE SULFATE AND AMPHETAMINE SULFATE 1.25; 1.25; 1.25; 1.25 MG/1; MG/1; MG/1; MG/1
5 TABLET ORAL 2 TIMES DAILY
Qty: 60 TABLET | Refills: 0 | Status: SHIPPED | OUTPATIENT
Start: 2017-06-12 | End: 2017-07-12

## 2017-06-12 RX ORDER — DEXTROAMPHETAMINE SACCHARATE, AMPHETAMINE ASPARTATE, DEXTROAMPHETAMINE SULFATE AND AMPHETAMINE SULFATE 1.25; 1.25; 1.25; 1.25 MG/1; MG/1; MG/1; MG/1
5 TABLET ORAL 2 TIMES DAILY
Qty: 60 TABLET | Refills: 0 | Status: CANCELLED | OUTPATIENT
Start: 2017-06-12

## 2017-06-12 RX ORDER — METHYLPHENIDATE HYDROCHLORIDE 10 MG/1
10 TABLET ORAL 2 TIMES DAILY
Qty: 60 TABLET | Refills: 0 | Status: SHIPPED | OUTPATIENT
Start: 2017-06-12 | End: 2017-06-13

## 2017-06-12 RX ORDER — DEXTROAMPHETAMINE SACCHARATE, AMPHETAMINE ASPARTATE, DEXTROAMPHETAMINE SULFATE AND AMPHETAMINE SULFATE 1.25; 1.25; 1.25; 1.25 MG/1; MG/1; MG/1; MG/1
5 TABLET ORAL 2 TIMES DAILY
Qty: 60 TABLET | Refills: 0 | Status: SHIPPED | OUTPATIENT
Start: 2017-07-13 | End: 2017-08-12

## 2017-06-12 RX ORDER — HYDROXYZINE HYDROCHLORIDE 25 MG/1
25-50 TABLET, FILM COATED ORAL EVERY 6 HOURS PRN
Qty: 60 TABLET | Refills: 1 | Status: SHIPPED | OUTPATIENT
Start: 2017-06-12 | End: 2017-08-07

## 2017-06-12 ASSESSMENT — PATIENT HEALTH QUESTIONNAIRE - PHQ9: 5. POOR APPETITE OR OVEREATING: MORE THAN HALF THE DAYS

## 2017-06-12 ASSESSMENT — ANXIETY QUESTIONNAIRES
2. NOT BEING ABLE TO STOP OR CONTROL WORRYING: SEVERAL DAYS
7. FEELING AFRAID AS IF SOMETHING AWFUL MIGHT HAPPEN: NOT AT ALL
GAD7 TOTAL SCORE: 7
1. FEELING NERVOUS, ANXIOUS, OR ON EDGE: SEVERAL DAYS
6. BECOMING EASILY ANNOYED OR IRRITABLE: SEVERAL DAYS
3. WORRYING TOO MUCH ABOUT DIFFERENT THINGS: SEVERAL DAYS
5. BEING SO RESTLESS THAT IT IS HARD TO SIT STILL: SEVERAL DAYS

## 2017-06-12 NOTE — PATIENT INSTRUCTIONS
Continue adderall 5 mg two times daily    Use hydroxyzine as needed for panic. If not working let me know.    Consider lexapro  Can look into therapy through the [facility]    Follow up in 1-2 months    When you're feeling overwhelmed:  1. Deep breathing from your diaphragm. Put your hand over your belly if that helps. Breathe in through your nose, hold, out through your mouth  2. Muscle tension/relaxation - squeeze your fists/forearms then release them to release tension  3. Grounding yourself. 5-4-3-2-1. 5- things you see, 4- things you feel, 3- things you hear, 2- things you taste/smell, 1- how am I feeling? What's one good thing about myself?  4. Thoughts turn to feelings turn to actions. You'll notice this when a negative thought can make you feel anxious or down, and in turn you act differently.  5. So turn around the negative thought by counteracting it with a positive one. What can the positive side of you say to that negative thought?    Visit www.helpguide.org for stress tools  Visit www.stressremedy.[employer]brain for guided meditation

## 2017-06-12 NOTE — NURSING NOTE
"Chief Complaint   Patient presents with     Recheck Medication     Anxiety       Initial /68 (BP Location: Right arm, Patient Position: Chair, Cuff Size: Adult Regular)  Pulse 76  Temp 98.9  F (37.2  C) (Tympanic)  Ht 5' 2\" (1.575 m)  Wt 124 lb (56.2 kg)  BMI 22.68 kg/m2 Estimated body mass index is 22.68 kg/(m^2) as calculated from the following:    Height as of this encounter: 5' 2\" (1.575 m).    Weight as of this encounter: 124 lb (56.2 kg).  Medication Reconciliation: complete     Arthur Enriquez CMA    "

## 2017-06-12 NOTE — PROGRESS NOTES
"  SUBJECTIVE:                                                    Nargis Ruelas is a 22 year old female who presents to clinic today for the following health issues:    Medication Followup of Adderall 5mg     Taking Medication as prescribed: yes    Side Effects:  None    Medication Helping Symptoms:  yes     Anxiety Follow-Up    Status since last visit: Worsened, she thinks she just needs something for on hand, does not want to take it all the time     Other associated symptoms:None    Complicating factors:   Significant life event: Yes- sometimes get stressed and overwhelmed    Current substance abuse: None  Depression symptoms: No  JERMAIN-7 SCORE 11/7/2016 12/8/2016 4/10/2017   Total Score 13 4 5        GAD7     She would like something to take PRN   Has taken wellbutrin, lexapro. Did not try celexa    Doesn't feel like anxiety is triggered from adderall  Would like to increase adderall sometimes she thinks but financially doesn't work -   Working but some days could work better.     Is having panic attacks, can't really find triggers. However it is circumstantial  Can't really stop them, nothing helps.  \"overwhelming feeling of anxiety\"  Happens about twice a week  She really doesn't feel she can afford therapy now (time, money). Discussed options,s she will let me know    Problem list and histories reviewed & adjusted, as indicated.  Additional history: as documented  According to [facility], last controlled prescription was:  Only adderall prescriptions from me in past year    Reviewed and updated as needed this visit by clinical staff  Tobacco  Allergies  Meds  Problems  Med Hx  Surg Hx  Fam Hx  Soc Hx        Reviewed and updated as needed this visit by Provider  Allergies  Meds  Problems         ROS:  Other than noted above, general, HEENT, respiratory, cardiac, MS, and gastrointestinal systems are negative.     OBJECTIVE:                                                    /68 (BP Location: " "Right arm, Patient Position: Chair, Cuff Size: Adult Regular)  Pulse 76  Temp 98.9  F (37.2  C) (Tympanic)  Ht 5' 2\" (1.575 m)  Wt 124 lb (56.2 kg)  BMI 22.68 kg/m2  Body mass index is 22.68 kg/(m^2).  GENERAL: healthy, alert and no distress  RESP: lungs clear to auscultation - no rales, rhonchi or wheezes  CV: regular rate and rhythm, normal S1 S2, no S3 or S4, no murmur, click or rub, no peripheral edema and peripheral pulses strong  MS: no gross musculoskeletal defects noted, no edema  PSYCH: Alert and oriented times 3; speech- coherent , normal rate and volume; able to articulate logical thoughts, able to abstract reason, no tangential thoughts, no hallucinations or delusions, affect- normal      ASSESSMENT/PLAN:                                                      ASSESSMENT/PLAN:      ICD-10-CM    1. Anxiety F41.9 hydrOXYzine (ATARAX) 25 MG tablet   2. Attention deficit hyperactivity disorder (ADHD), predominantly inattentive type F90.0 amphetamine-dextroamphetamine (ADDERALL) 5 MG per tablet     amphetamine-dextroamphetamine (ADDERALL) 5 MG per tablet     amphetamine-dextroamphetamine (ADDERALL) 5 MG per tablet     methylphenidate (RITALIN) 10 MG tablet     Talked to pharmacy - ran test claim would not coverall adderall IR 10 mg or 5 mg; may work with prior auth    We discussed various medications for anxiety. We discussed daily vs PRN medications. For now she would like to try PRN. We discussed risks/benefits/side effects of hydroxyzine. Also discussed risks/benefits/side effects of ativan/benzos - if hydroxyzine does not work. Discussed concerns for long term use, abuse, etc.  We will follow up and discuss lexapro as next step if needed    Patient Instructions   Continue adderall 5 mg two times daily    Use hydroxyzine as needed for panic. If not working let me know.    Consider lexapro  Can look into therapy through the [facility]    Follow up in 1-2 months    When you're feeling overwhelmed:  1. Deep " breathing from your diaphragm. Put your hand over your belly if that helps. Breathe in through your nose, hold, out through your mouth  2. Muscle tension/relaxation - squeeze your fists/forearms then release them to release tension  3. Grounding yourself. 5-4-3-2-1. 5- things you see, 4- things you feel, 3- things you hear, 2- things you taste/smell, 1- how am I feeling? What's one good thing about myself?  4. Thoughts turn to feelings turn to actions. You'll notice this when a negative thought can make you feel anxious or down, and in turn you act differently.  5. So turn around the negative thought by counteracting it with a positive one. What can the positive side of you say to that negative thought?    Visit www.helpguide.org for stress tools  Visit www.stressremedy.GotaCopy for guided meditation     Carol Jennings PA-C  [facility]

## 2017-06-12 NOTE — MR AVS SNAPSHOT
After Visit Summary   6/12/2017    Nargis Ruelas    MRN: [id number]           Patient Information     Date Of Birth          1995        Visit Information        Provider Department      6/12/2017 3:20 PM Carol Jennings PA-C [facility]        Today's Diagnoses     Anxiety    -  1    Attention deficit hyperactivity disorder (ADHD), predominantly inattentive type          Care Instructions    Continue adderall 5 mg two times daily    Use hydroxyzine as needed for panic. If not working let me know.    Consider lexapro  Can look into therapy through the [facility]    Follow up in 1-2 months    When you're feeling overwhelmed:  1. Deep breathing from your diaphragm. Put your hand over your belly if that helps. Breathe in through your nose, hold, out through your mouth  2. Muscle tension/relaxation - squeeze your fists/forearms then release them to release tension  3. Grounding yourself. 5-4-3-2-1. 5- things you see, 4- things you feel, 3- things you hear, 2- things you taste/smell, 1- how am I feeling? What's one good thing about myself?  4. Thoughts turn to feelings turn to actions. You'll notice this when a negative thought can make you feel anxious or down, and in turn you act differently.  5. So turn around the negative thought by counteracting it with a positive one. What can the positive side of you say to that negative thought?    Visit www.helpguide.org for stress tools  Visit www.stressremedy.Urbful for guided meditation           Follow-ups after your visit        Who to contact     Normal or non-critical lab and imaging results will be communicated to you by TrelliSofthart, letter or phone within 4 business days after the clinic has received the results. If you do not hear from us within 7 days, please contact the clinic through Mobile Tracing Servicest or phone. If you have a critical or abnormal lab result, we will notify you by phone as soon as possible.  Submit refill requests through [employer] or call  "your pharmacy and they will forward the refill request to us. Please allow 3 business days for your refill to be completed.          If you need to speak with a  for additional information , please call: [phone]             Additional Information About Your Visit        avVentahart Information     [employer] gives you secure access to your electronic health record. If you see a primary care provider, you can also send messages to your care team and make appointments. If you have questions, please call your primary care clinic.  If you do not have a primary care provider, please call [phone] and they will assist you.        Care EveryWhere ID     This is your Care EveryWhere ID. This could be used by other organizations to access your [facility] medical records  [id number]        Your Vitals Were     Pulse Temperature Height BMI (Body Mass Index)          76 98.9  F (37.2  C) (Tympanic) 5' 2\" (1.575 m) 22.68 kg/m2         Blood Pressure from Last 3 Encounters:   06/12/17 116/68   04/13/17 120/72   04/10/17 125/78    Weight from Last 3 Encounters:   06/12/17 124 lb (56.2 kg)   04/13/17 121 lb 12.8 oz (55.2 kg)   04/10/17 121 lb 8 oz (55.1 kg)              Today, you had the following     No orders found for display         Today's Medication Changes          These changes are accurate as of: 6/12/17  4:36 PM.  If you have any questions, ask your nurse or doctor.               Start taking these medicines.        Dose/Directions    hydrOXYzine 25 MG tablet   Commonly known as:  ATARAX   Used for:  Anxiety   Started by:  Carol Jennings PA-C        Dose:  25-50 mg   Take 1-2 tablets (25-50 mg) by mouth every 6 hours as needed for anxiety   Quantity:  60 tablet   Refills:  1         These medicines have changed or have updated prescriptions.        Dose/Directions    * amphetamine-dextroamphetamine 5 MG per tablet   Commonly known as:  ADDERALL   This may have changed:  Another medication with the " same name was added. Make sure you understand how and when to take each.   Used for:  Attention deficit hyperactivity disorder (ADHD), predominantly inattentive type   Changed by:  Carol Jennings PA-C        Dose:  5 mg   Take 1 tablet (5 mg) by mouth 2 times daily   Quantity:  60 tablet   Refills:  0       * amphetamine-dextroamphetamine 5 MG per tablet   Commonly known as:  ADDERALL   This may have changed:  Another medication with the same name was added. Make sure you understand how and when to take each.   Used for:  Attention deficit hyperactivity disorder (ADHD), predominantly inattentive type   Changed by:  Carol Jennings PA-C        Dose:  5 mg   Take 1 tablet (5 mg) by mouth 2 times daily   Quantity:  60 tablet   Refills:  0       * amphetamine-dextroamphetamine 5 MG per tablet   Commonly known as:  ADDERALL   This may have changed:  You were already taking a medication with the same name, and this prescription was added. Make sure you understand how and when to take each.   Used for:  Attention deficit hyperactivity disorder (ADHD), predominantly inattentive type   Changed by:  Carol Jennings PA-C        Dose:  5 mg   Take 1 tablet (5 mg) by mouth 2 times daily   Quantity:  60 tablet   Refills:  0       * amphetamine-dextroamphetamine 5 MG per tablet   Commonly known as:  ADDERALL   This may have changed:  You were already taking a medication with the same name, and this prescription was added. Make sure you understand how and when to take each.   Used for:  Attention deficit hyperactivity disorder (ADHD), predominantly inattentive type   Changed by:  Carol Jennings PA-C        Dose:  5 mg   Start taking on:  7/13/2017   Take 1 tablet (5 mg) by mouth 2 times daily   Quantity:  60 tablet   Refills:  0       * amphetamine-dextroamphetamine 5 MG per tablet   Commonly known as:  ADDERALL   This may have changed:  You were already taking a medication with the same name, and this prescription  was added. Make sure you understand how and when to take each.   Used for:  Attention deficit hyperactivity disorder (ADHD), predominantly inattentive type   Changed by:  Carol Jennings PA-C        Dose:  5 mg   Start taking on:  8/13/2017   Take 1 tablet (5 mg) by mouth 2 times daily   Quantity:  60 tablet   Refills:  0       * Notice:  This list has 5 medication(s) that are the same as other medications prescribed for you. Read the directions carefully, and ask your doctor or other care provider to review them with you.         Where to get your medicines      These medications were sent to [facility] [place], [place] - [address]  [address] [address] [place] [address]     Phone:  [phone]     hydrOXYzine 25 MG tablet         Some of these will need a paper prescription and others can be bought over the counter.  Ask your nurse if you have questions.     Bring a paper prescription for each of these medications     amphetamine-dextroamphetamine 5 MG per tablet    amphetamine-dextroamphetamine 5 MG per tablet    amphetamine-dextroamphetamine 5 MG per tablet                Primary Care Provider Office Phone #    [facility] [phone]       No address on file        Thank you!     Thank you for choosing [facility]  for your care. Our goal is always to provide you with excellent care. Hearing back from our patients is one way we can continue to improve our services. Please take a few minutes to complete the written survey that you may receive in the mail after your visit with us. Thank you!             Your Updated Medication List - Protect others around you: Learn how to safely use, store and throw away your medicines at www.disposemymeds.org.          This list is accurate as of: 6/12/17  4:36 PM.  Always use your most recent med list.                   Brand Name Dispense Instructions for use    albuterol 108 (90 BASE) MCG/ACT Inhaler    PROAIR HFA/PROVENTIL  HFA/VENTOLIN HFA    1 Inhaler    Inhale 2 puffs into the lungs every 6 hours as needed for shortness of breath / dyspnea or wheezing       * amphetamine-dextroamphetamine 5 MG per tablet    ADDERALL    60 tablet    Take 1 tablet (5 mg) by mouth 2 times daily       * amphetamine-dextroamphetamine 5 MG per tablet    ADDERALL    60 tablet    Take 1 tablet (5 mg) by mouth 2 times daily       * amphetamine-dextroamphetamine 5 MG per tablet    ADDERALL    60 tablet    Take 1 tablet (5 mg) by mouth 2 times daily       * amphetamine-dextroamphetamine 5 MG per tablet   Start taking on:  7/13/2017    ADDERALL    60 tablet    Take 1 tablet (5 mg) by mouth 2 times daily       * amphetamine-dextroamphetamine 5 MG per tablet   Start taking on:  8/13/2017    ADDERALL    60 tablet    Take 1 tablet (5 mg) by mouth 2 times daily       hydrOXYzine 25 MG tablet    ATARAX    60 tablet    Take 1-2 tablets (25-50 mg) by mouth every 6 hours as needed for anxiety       * medroxyPROGESTERone 150 MG/ML injection    DEPO-PROVERA    3 mL    Inject 1 mL (150 mg) into the muscle every 2 months       * medroxyPROGESTERone 150 MG/ML injection    DEPO-PROVERA    1 mL    Inject 1 mL (150 mg) into the muscle every 3 months       tretinoin 0.025 % cream    RETIN-A    45 g    Spread a pea size amount into affected area topically at bedtime.  Use sunscreen SPF>20.       * Notice:  This list has 7 medication(s) that are the same as other medications prescribed for you. Read the directions carefully, and ask your doctor or other care provider to review them with you.

## 2017-06-12 NOTE — TELEPHONE ENCOUNTER
Prior Authorization Required on: adderall 5mg tablets   Insurance: advance PCS   Insurance Phone: [phone]  Patient ID: [id number]  Please Contact the Pharmacy with Prior Auth Status (approval/denial).     Patient paid out of pocket so When submitting PA please date for 6/12/17     Thank You!    Jade Arevalo  Pharmacy Technician  [facility]  [phone]    Marah  NOTE: Nargis was not comfortable switching to Ritalin so we filled the Adderall 5mg and deleted the Ritalin rx so it is NOT on file in pharmacy. She paid out of pocket today for the adderall.

## 2017-06-13 ASSESSMENT — ANXIETY QUESTIONNAIRES: GAD7 TOTAL SCORE: 7

## 2017-06-19 NOTE — TELEPHONE ENCOUNTER
Insurance has denied Adderall 5 mg tabs.  This class of medications is excluded from patient's plan and is not eligible for coverage or review.    DAJA DEE

## 2017-07-15 ENCOUNTER — TELEPHONE (OUTPATIENT)
Dept: FAMILY MEDICINE | Facility: CLINIC | Age: 22
End: 2017-07-15

## 2017-07-16 NOTE — TELEPHONE ENCOUNTER
Patient is scheduled for depo shot on 7/17. Last shot was 4/10/17. She is past due for her shot. She is scheduled to see a nurse only for the depo shot. If this is not okay, please call patient to discuss.    Phuong JAVED  Central Scheduler

## 2017-07-17 ENCOUNTER — ALLIED HEALTH/NURSE VISIT (OUTPATIENT)
Dept: FAMILY MEDICINE | Facility: CLINIC | Age: 22
End: 2017-07-17
Payer: COMMERCIAL

## 2017-07-17 DIAGNOSIS — Z30.42 ENCOUNTER FOR SURVEILLANCE OF INJECTABLE CONTRACEPTIVE: Primary | ICD-10-CM

## 2017-07-17 LAB — BETA HCG QUAL IFA URINE: NEGATIVE

## 2017-07-17 PROCEDURE — 96372 THER/PROPH/DIAG INJ SC/IM: CPT

## 2017-07-17 PROCEDURE — 84703 CHORIONIC GONADOTROPIN ASSAY: CPT | Performed by: PHYSICIAN ASSISTANT

## 2017-07-17 PROCEDURE — 99207 ZZC NO CHARGE NURSE ONLY: CPT

## 2017-07-17 NOTE — TELEPHONE ENCOUNTER
Spoke with Arthur the CMA and she reviewed and said it would be ok just that patient would need to take a pregnancy test prior to depo shot, will address when patient comes in, so it is ok.  MARGUERITE Medina

## 2017-08-07 ENCOUNTER — OFFICE VISIT (OUTPATIENT)
Dept: FAMILY MEDICINE | Facility: CLINIC | Age: 22
End: 2017-08-07
Payer: COMMERCIAL

## 2017-08-07 VITALS
SYSTOLIC BLOOD PRESSURE: 123 MMHG | DIASTOLIC BLOOD PRESSURE: 80 MMHG | HEIGHT: 62 IN | WEIGHT: 118 LBS | HEART RATE: 102 BPM | TEMPERATURE: 99.3 F | BODY MASS INDEX: 21.71 KG/M2

## 2017-08-07 DIAGNOSIS — F32.1 MODERATE SINGLE CURRENT EPISODE OF MAJOR DEPRESSIVE DISORDER (H): ICD-10-CM

## 2017-08-07 DIAGNOSIS — F41.0 PANIC ATTACK: ICD-10-CM

## 2017-08-07 DIAGNOSIS — F90.0 ATTENTION DEFICIT HYPERACTIVITY DISORDER (ADHD), PREDOMINANTLY INATTENTIVE TYPE: Primary | ICD-10-CM

## 2017-08-07 PROCEDURE — 99214 OFFICE O/P EST MOD 30 MIN: CPT | Performed by: PHYSICIAN ASSISTANT

## 2017-08-07 RX ORDER — DEXTROAMPHETAMINE SACCHARATE, AMPHETAMINE ASPARTATE, DEXTROAMPHETAMINE SULFATE AND AMPHETAMINE SULFATE 1.25; 1.25; 1.25; 1.25 MG/1; MG/1; MG/1; MG/1
5 TABLET ORAL 2 TIMES DAILY
Qty: 60 TABLET | Refills: 0 | Status: SHIPPED | OUTPATIENT
Start: 2017-08-07 | End: 2017-09-06

## 2017-08-07 RX ORDER — LORAZEPAM 0.5 MG/1
0.5 TABLET ORAL EVERY 8 HOURS PRN
Qty: 20 TABLET | Refills: 0 | Status: SHIPPED | OUTPATIENT
Start: 2017-08-07 | End: 2017-09-07

## 2017-08-07 RX ORDER — DEXTROAMPHETAMINE SACCHARATE, AMPHETAMINE ASPARTATE, DEXTROAMPHETAMINE SULFATE AND AMPHETAMINE SULFATE 1.25; 1.25; 1.25; 1.25 MG/1; MG/1; MG/1; MG/1
5 TABLET ORAL 2 TIMES DAILY
Qty: 60 TABLET | Refills: 0 | Status: SHIPPED | OUTPATIENT
Start: 2017-09-07 | End: 2017-09-07 | Stop reason: DRUGHIGH

## 2017-08-07 RX ORDER — FLUOXETINE 10 MG/1
10 CAPSULE ORAL DAILY
Qty: 60 CAPSULE | Refills: 0 | Status: SHIPPED | OUTPATIENT
Start: 2017-08-07 | End: 2017-09-07

## 2017-08-07 RX ORDER — DEXTROAMPHETAMINE SACCHARATE, AMPHETAMINE ASPARTATE, DEXTROAMPHETAMINE SULFATE AND AMPHETAMINE SULFATE 1.25; 1.25; 1.25; 1.25 MG/1; MG/1; MG/1; MG/1
5 TABLET ORAL 2 TIMES DAILY
Qty: 60 TABLET | Refills: 0 | Status: SHIPPED | OUTPATIENT
Start: 2017-10-08 | End: 2017-09-07 | Stop reason: DRUGHIGH

## 2017-08-07 ASSESSMENT — ANXIETY QUESTIONNAIRES
GAD7 TOTAL SCORE: 15
3. WORRYING TOO MUCH ABOUT DIFFERENT THINGS: NEARLY EVERY DAY
5. BEING SO RESTLESS THAT IT IS HARD TO SIT STILL: SEVERAL DAYS
2. NOT BEING ABLE TO STOP OR CONTROL WORRYING: NEARLY EVERY DAY
1. FEELING NERVOUS, ANXIOUS, OR ON EDGE: NEARLY EVERY DAY
7. FEELING AFRAID AS IF SOMETHING AWFUL MIGHT HAPPEN: SEVERAL DAYS
6. BECOMING EASILY ANNOYED OR IRRITABLE: MORE THAN HALF THE DAYS

## 2017-08-07 ASSESSMENT — PATIENT HEALTH QUESTIONNAIRE - PHQ9
5. POOR APPETITE OR OVEREATING: MORE THAN HALF THE DAYS
SUM OF ALL RESPONSES TO PHQ QUESTIONS 1-9: 19

## 2017-08-07 NOTE — PROGRESS NOTES
"SUBJECTIVE:                                                    Nargis Ruelas is a 22 year old female who presents to clinic today for the following health issues:    Medication Followup of Adderall 5 mg    Taking Medication as prescribed: yes    Side Effects:  None    Medication Helping Symptoms: No needing to adjust, and get on some anxiety medication     Work is stressful, they don't have enough help  No SI/HI - states she would never hurt herself. Sometimes ays it to herself sometimes when she's angry  In past has tried wellbutrin (side effect), lexapro (Not sure lexapro helped at the time)  Money is a big concern  Didn't try hydroxyzine, insurance wouldn't cover  - I spoke to pharmacy: $48 for 60. Ativan is $7. Needs to hit $400 deductible.  - prozac and celexa are cheaper, zoloft more expensive    Would really like to treat the depression most, feels down  Also upset easily, frequent panic attacks  She gets more depression from the anxiety,    Problem list and histories reviewed & adjusted, as indicated.  Additional history: none    ROS:  Other than noted above, general, HEENT, respiratory, cardiac, MS, and gastrointestinal systems are negative.     OBJECTIVE:                                                    /80 (BP Location: Right arm, Patient Position: Sitting, Cuff Size: Adult Regular)  Pulse 102  Temp 99.3  F (37.4  C) (Tympanic)  Ht 5' 2\" (1.575 m)  Wt 118 lb (53.5 kg)  BMI 21.58 kg/m2 Body mass index is 21.58 kg/(m^2).   GENERAL: healthy, alert, well nourished, well hydrated, no distress  PSYCH: Alert and oriented times 3; speech- coherent , normal rate and volume; able to articulate logical thoughts, able to abstract reason, no tangential thoughts, no hallucinations or delusions, affect- POSITIVE tearful at times       ASSESSMENT/PLAN:                                                      ASSESSMENT/PLAN:      ICD-10-CM    1. Attention deficit hyperactivity disorder (ADHD), predominantly " inattentive type F90.0 amphetamine-dextroamphetamine (ADDERALL) 5 MG per tablet     amphetamine-dextroamphetamine (ADDERALL) 5 MG per tablet     amphetamine-dextroamphetamine (ADDERALL) 5 MG per tablet   2. Moderate single current episode of major depressive disorder (H) F32.1 FLUoxetine (PROZAC) 10 MG capsule     FLUoxetine (PROZAC) 20 MG capsule   3. Panic attack F41.0 LORazepam (ATIVAN) 0.5 MG tablet     Encouraged therapy again. She finds journaling really helps  Patient interested in starting prozac. Discussed potential risks/benefits/side effects including black box warning of SI. Discussed most benefit from dual treatment with medication and therapy, and need for close follow up.     Patient Instructions   Start prozac. Take 10 mg for 1 week then increase to 20 mg. If 10 mg is too expensive, start at 20 mg  Let me know if side effects.  Follow up in 1 month, sooner if needed. Call to set up E-visit or telephone visit if needed.    Look online for cognitive behavioral therapy  Www.helpguide.org    Carol Jennings PA-C   [facility]

## 2017-08-07 NOTE — NURSING NOTE
"Chief Complaint   Patient presents with     Anxiety       Initial /80 (BP Location: Right arm, Patient Position: Sitting, Cuff Size: Adult Regular)  Pulse 102  Temp 99.3  F (37.4  C) (Tympanic)  Ht 5' 2\" (1.575 m)  Wt 118 lb (53.5 kg)  BMI 21.58 kg/m2 Estimated body mass index is 21.58 kg/(m^2) as calculated from the following:    Height as of this encounter: 5' 2\" (1.575 m).    Weight as of this encounter: 118 lb (53.5 kg).  Medication Reconciliation: complete   Renea Alas CMA  "

## 2017-08-07 NOTE — PATIENT INSTRUCTIONS
Start prozac. Take 10 mg for 1 week then increase to 20 mg. If 10 mg is too expensive, start at 20 mg  Let me know if side effects.  Follow up in 1 month, sooner if needed. Call to set up E-visit or telephone visit if needed.    Look online for cognitive behavioral therapy  Www.helpCirca.org    MY DEPRESSION SURVIVAL KIT    If you feel you are a danger to yourself or others, and you have a plan to hurt yourself: please call 911, go to ER, or call suicide hotline 1-824.290.4010    My Support System    My Support System     Friend 1     Friend 2     Friend 3     Family 1     Family 2     Christian/     Therapist     24 Hr Suicide prevention hotline Toll free 4-295-722-TALK(4160)        Resource         A  feel good  movie: (something that makes you laugh, gives you hope).  Power music: (tunes that make you feel energized).   A list of things that make you feel good and relaxed (buying flowers, getting a massage, playing with puppies or kittens )      DEPRESSION: PATIENT SELF CARE PLAN  Here are some suggestions that have helped others with depression and may be helpful for you.    Medication    Take your medication as prescribed.  Missing doses can result in serious side effects. Do not stop your medication abruptly without first discussing it with your provider.  If you have side effects, or do not like the medication call your provider.    Get Quality Sleep    Control your stress    Try breathing deep, Take a deep breath while counting to 4, hold your breath for 3 and then slowly let it out counting to 4.      Read reputable self-help books and websites   www.helpguide.org  http://www.helpguide.org/mental/stress_relief_meditation_yoga_relaxation.htm  Several websites, such as MoodLightwave Power, Blue MaxxAthlete, and NUVIA, have been shown in randomized trials to help improve depressive symptoms    Hygiene   Maintain good hygiene (Get out of bed in the morning, Make your bed, brush your teeth, shave, take a shower,  "and get dressed like you were going to work, even if you are unemployed).  If your clothes don't fit try to get ones that do.    Exercise  Get some form of exercise, every day. This will help reduce pain and release endorphins, the  feel good  chemicals in your brain. This is almost as good as taking antidepressants! The hardest part about exercising is getting started.  It can be as simple as just going for a walk or doing some gardening, dancing, biking, anything that will get you moving.  It is easy to find excuses not to exercise especially when you are depressed, so make a point of at least starting to exercise no matter what.     Staying Connected With Others  Stay in touch with friends, family members, and your primary care provider/team.  Have a \"check in\" with someone every day.  Take turns talking about the best and worst part of your day, and something you are proud of you did that day.    Get out of the house regularly, such as a simple trip to the library, grocery store, take in a presentation at your [employer], community education, or go to a school event such as high school basketball game or play.    Use your imagination Be creative.  We all have a creative side; it doesn t matter if it s oil painting, sand castles, or mud pies! This will also kick up the endorphins.      Witness Beauty  (AKA stop and smell the roses) Take a look outside, even in mid-winter. Notice colors, textures. Watch the squirrels and birds.  Notice and look for the goodness in others.      Counseling  [facility] [phone]    Service to others  Be of service to others.  There is always someone else in need.  By helping others we can  get out of ourselves  and remember the really important things.  This also provides opportunities for practicing all the other parts of the program.    Humor  Laugh and be silly!  Adjust your TV habits for less news and crime-drama and more comedy.    Diet  Some foods that are " helpful in depression are: fish or fish oil, fresh fruits and vegetables. Blueberries and other berries are comfort foods that are good for you.      Consider writing in a journal    Journaling can improve mood by allowing you to express pain, anger, fear or other emotions.     Join a support group  Connecting with others facing similar challenges can help you cope. Local support groups for depression are available in many communities, and support groups for depression are also offered online.     Don't make important decisions when you're down  Avoid decision making when you're feeling very depressed, since you may not be thinking clearly.

## 2017-08-07 NOTE — MR AVS SNAPSHOT
After Visit Summary   8/7/2017    Nargis Ruelas    MRN: [id number]           Patient Information     Date Of Birth          1995        Visit Information        Provider Department      8/7/2017 3:20 PM Carol Jennings PA-C [facility]        Today's Diagnoses     Attention deficit hyperactivity disorder (ADHD), predominantly inattentive type    -  1    Moderate single current episode of major depressive disorder (H)        Panic attack          Care Instructions    Start prozac. Take 10 mg for 1 week then increase to 20 mg. If 10 mg is too expensive, start at 20 mg  Let me know if side effects.  Follow up in 1 month, sooner if needed. Call to set up E-visit or telephone visit if needed.    Look online for cognitive behavioral therapy  Www.helpPodclass.org    MY DEPRESSION SURVIVAL KIT    If you feel you are a danger to yourself or others, and you have a plan to hurt yourself: please call 911, go to ER, or call suicide hotline 1-977.655.5410    My Support System    My Support System     Friend 1     Friend 2     Friend 3     Family 1     Family 2     Islam/     Therapist     24 Hr Suicide prevention hotline Toll free 8-868-992-TALK(1973)        Resource         A  feel good  movie: (something that makes you laugh, gives you hope).  Power music: (tunes that make you feel energized).   A list of things that make you feel good and relaxed (buying flowers, getting a massage, playing with puppies or kittens )      DEPRESSION: PATIENT SELF CARE PLAN  Here are some suggestions that have helped others with depression and may be helpful for you.    Medication    Take your medication as prescribed.  Missing doses can result in serious side effects. Do not stop your medication abruptly without first discussing it with your provider.  If you have side effects, or do not like the medication call your provider.    Get Quality Sleep    Control your stress    Try breathing deep, Take  "a deep breath while counting to 4, hold your breath for 3 and then slowly let it out counting to 4.      Read reputable self-help books and websites   www.helpguide.org  http://www.helpguide.org/mental/stress_relief_meditation_yoga_relaxation.htm  Several websites, such as [employer], and [place], have been shown in randomized trials to help improve depressive symptoms    Hygiene   Maintain good hygiene (Get out of bed in the morning, Make your bed, brush your teeth, shave, take a shower, and get dressed like you were going to work, even if you are unemployed).  If your clothes don't fit try to get ones that do.    Exercise  Get some form of exercise, every day. This will help reduce pain and release endorphins, the  feel good  chemicals in your brain. This is almost as good as taking antidepressants! The hardest part about exercising is getting started.  It can be as simple as just going for a walk or doing some gardening, dancing, biking, anything that will get you moving.  It is easy to find excuses not to exercise especially when you are depressed, so make a point of at least starting to exercise no matter what.     Staying Connected With Others  Stay in touch with friends, family members, and your primary care provider/team.  Have a \"check in\" with someone every day.  Take turns talking about the best and worst part of your day, and something you are proud of you did that day.    Get out of the house regularly, such as a simple trip to the library, grocery store, take in a presentation at your [employer], community education, or go to a school event such as high school basketball game or play.    Use your imagination Be creative.  We all have a creative side; it doesn t matter if it s oil painting, sand castles, or mud pies! This will also kick up the endorphins.      Witness Beauty  (AKA stop and smell the roses) Take a look outside, even in mid-winter. Notice colors, textures. Watch the squirrels and birds. "  Notice and look for the goodness in others.      Counseling  [facility] [phone]    Service to others  Be of service to others.  There is always someone else in need.  By helping others we can  get out of ourselves  and remember the really important things.  This also provides opportunities for practicing all the other parts of the program.    Humor  Laugh and be silly!  Adjust your TV habits for less news and crime-drama and more comedy.    Diet  Some foods that are helpful in depression are: fish or fish oil, fresh fruits and vegetables. Blueberries and other berries are comfort foods that are good for you.      Consider writing in a journal    Journaling can improve mood by allowing you to express pain, anger, fear or other emotions.     Join a support group  Connecting with others facing similar challenges can help you cope. Local support groups for depression are available in many communities, and support groups for depression are also offered online.     Don't make important decisions when you're down  Avoid decision making when you're feeling very depressed, since you may not be thinking clearly.            Follow-ups after your visit        Who to contact     Normal or non-critical lab and imaging results will be communicated to you by Granify[employer], letter or phone within 4 business days after the clinic has received the results. If you do not hear from us within 7 days, please contact the clinic through [employer] or phone. If you have a critical or abnormal lab result, we will notify you by phone as soon as possible.  Submit refill requests through [employer] or call your pharmacy and they will forward the refill request to us. Please allow 3 business days for your refill to be completed.          If you need to speak with a  for additional information , please call: [phone]             Additional Information About Your Visit        Zatt[employer]ka Information     Z[employer]ka gives you  "secure access to your electronic health record. If you see a primary care provider, you can also send messages to your care team and make appointments. If you have questions, please call your primary care clinic.  If you do not have a primary care provider, please call [phone] and they will assist you.        Care EveryWhere ID     This is your Care EveryWhere ID. This could be used by other organizations to access your [facility] medical records  [id number]        Your Vitals Were     Pulse Temperature Height BMI (Body Mass Index)          102 99.3  F (37.4  C) (Tympanic) 5' 2\" (1.575 m) 21.58 kg/m2         Blood Pressure from Last 3 Encounters:   08/07/17 123/80   06/12/17 116/68   04/13/17 120/72    Weight from Last 3 Encounters:   08/07/17 118 lb (53.5 kg)   06/12/17 124 lb (56.2 kg)   04/13/17 121 lb 12.8 oz (55.2 kg)              Today, you had the following     No orders found for display         Today's Medication Changes          These changes are accurate as of: 8/7/17  4:12 PM.  If you have any questions, ask your nurse or doctor.               Start taking these medicines.        Dose/Directions    * FLUoxetine 10 MG capsule   Commonly known as:  PROzac   Used for:  Moderate single current episode of major depressive disorder (H)   Started by:  Carol Jennings PA-C        Dose:  10 mg   Take 1 capsule (10 mg) by mouth daily For 1 week then take 20 mg (2 capsules) daily   Quantity:  60 capsule   Refills:  0       * FLUoxetine 20 MG capsule   Commonly known as:  PROzac   Used for:  Moderate single current episode of major depressive disorder (H)   Started by:  Carol Jennings PA-C        Dose:  20 mg   Take 1 capsule (20 mg) by mouth daily   Quantity:  30 capsule   Refills:  1       LORazepam 0.5 MG tablet   Commonly known as:  ATIVAN   Used for:  Panic attack   Started by:  Carol Jennings PA-C        Dose:  0.5 mg   Take 1 tablet (0.5 mg) by mouth every 8 hours as needed for anxiety "   Quantity:  20 tablet   Refills:  0       * Notice:  This list has 2 medication(s) that are the same as other medications prescribed for you. Read the directions carefully, and ask your doctor or other care provider to review them with you.      These medicines have changed or have updated prescriptions.        Dose/Directions    * amphetamine-dextroamphetamine 5 MG per tablet   Commonly known as:  ADDERALL   This may have changed:  Another medication with the same name was added. Make sure you understand how and when to take each.   Used for:  Attention deficit hyperactivity disorder (ADHD), predominantly inattentive type   Changed by:  Carol Jennings PA-C        Dose:  5 mg   Take 1 tablet (5 mg) by mouth 2 times daily   Quantity:  60 tablet   Refills:  0       * amphetamine-dextroamphetamine 5 MG per tablet   Commonly known as:  ADDERALL   This may have changed:  Another medication with the same name was added. Make sure you understand how and when to take each.   Used for:  Attention deficit hyperactivity disorder (ADHD), predominantly inattentive type   Changed by:  Carol Jennings PA-C        Dose:  5 mg   Take 1 tablet (5 mg) by mouth 2 times daily   Quantity:  60 tablet   Refills:  0       * amphetamine-dextroamphetamine 5 MG per tablet   Commonly known as:  ADDERALL   This may have changed:  You were already taking a medication with the same name, and this prescription was added. Make sure you understand how and when to take each.   Used for:  Attention deficit hyperactivity disorder (ADHD), predominantly inattentive type   Changed by:  Carol Jennings PA-C        Dose:  5 mg   Start taking on:  9/7/2017   Take 1 tablet (5 mg) by mouth 2 times daily   Quantity:  60 tablet   Refills:  0       * amphetamine-dextroamphetamine 5 MG per tablet   Commonly known as:  ADDERALL   This may have changed:  You were already taking a medication with the same name, and this prescription was added. Make sure you  understand how and when to take each.   Used for:  Attention deficit hyperactivity disorder (ADHD), predominantly inattentive type   Changed by:  Carol Jennings PA-C        Dose:  5 mg   Start taking on:  10/8/2017   Take 1 tablet (5 mg) by mouth 2 times daily   Quantity:  60 tablet   Refills:  0       * Notice:  This list has 4 medication(s) that are the same as other medications prescribed for you. Read the directions carefully, and ask your doctor or other care provider to review them with you.      Stop taking these medicines if you haven't already. Please contact your care team if you have questions.     hydrOXYzine 25 MG tablet   Commonly known as:  ATARAX   Stopped by:  Carol Jennings PA-C                Where to get your medicines      These medications were sent to [facility] [place], [place] - [address]  [address] [facility] [address], [facility] [address]     Phone:  [phone]     FLUoxetine 10 MG capsule    FLUoxetine 20 MG capsule         Some of these will need a paper prescription and others can be bought over the counter.  Ask your nurse if you have questions.     Bring a paper prescription for each of these medications     amphetamine-dextroamphetamine 5 MG per tablet    amphetamine-dextroamphetamine 5 MG per tablet    amphetamine-dextroamphetamine 5 MG per tablet    LORazepam 0.5 MG tablet                Primary Care Provider Office Phone #    [facility] [phone]       No address on file        Equal Access to Services     CHARLEEN DARNELL AH: Hadii keith acevedo hadasho Soomaali, waaxda luqadaha, qaybta kaalmada adeegyada, beatris pickens. So [facility] [phone].    ATENCIÓN: Si habla español, tiene a garcia disposición servicios gratuitos de asistencia lingüística. Llame al [phone].    We comply with applicable federal civil rights laws and [place] laws. We do not discriminate on the basis of race, color, national origin, age, disability sex, sexual  orientation or gender identity.            Thank you!     Thank you for choosing [facility]  for your care. Our goal is always to provide you with excellent care. Hearing back from our patients is one way we can continue to improve our services. Please take a few minutes to complete the written survey that you may receive in the mail after your visit with us. Thank you!             Your Updated Medication List - Protect others around you: Learn how to safely use, store and throw away your medicines at www.disposemymeds.org.          This list is accurate as of: 8/7/17  4:12 PM.  Always use your most recent med list.                   Brand Name Dispense Instructions for use Diagnosis    albuterol 108 (90 BASE) MCG/ACT Inhaler    PROAIR HFA/PROVENTIL HFA/VENTOLIN HFA    1 Inhaler    Inhale 2 puffs into the lungs every 6 hours as needed for shortness of breath / dyspnea or wheezing    Acute bronchospasm       * amphetamine-dextroamphetamine 5 MG per tablet    ADDERALL    60 tablet    Take 1 tablet (5 mg) by mouth 2 times daily    Attention deficit hyperactivity disorder (ADHD), predominantly inattentive type       * amphetamine-dextroamphetamine 5 MG per tablet    ADDERALL    60 tablet    Take 1 tablet (5 mg) by mouth 2 times daily    Attention deficit hyperactivity disorder (ADHD), predominantly inattentive type       * amphetamine-dextroamphetamine 5 MG per tablet   Start taking on:  9/7/2017    ADDERALL    60 tablet    Take 1 tablet (5 mg) by mouth 2 times daily    Attention deficit hyperactivity disorder (ADHD), predominantly inattentive type       * amphetamine-dextroamphetamine 5 MG per tablet   Start taking on:  10/8/2017    ADDERALL    60 tablet    Take 1 tablet (5 mg) by mouth 2 times daily    Attention deficit hyperactivity disorder (ADHD), predominantly inattentive type       * FLUoxetine 10 MG capsule    PROzac    60 capsule    Take 1 capsule (10 mg) by mouth daily For 1 week then take 20 mg (2  capsules) daily    Moderate single current episode of major depressive disorder (H)       * FLUoxetine 20 MG capsule    PROzac    30 capsule    Take 1 capsule (20 mg) by mouth daily    Moderate single current episode of major depressive disorder (H)       LORazepam 0.5 MG tablet    ATIVAN    20 tablet    Take 1 tablet (0.5 mg) by mouth every 8 hours as needed for anxiety    Panic attack       medroxyPROGESTERone 150 MG/ML injection    DEPO-PROVERA    1 mL    Inject 1 mL (150 mg) into the muscle every 3 months    Encounter for surveillance of injectable contraceptive       tretinoin 0.025 % cream    RETIN-A    45 g    Spread a pea size amount into affected area topically at bedtime.  Use sunscreen SPF>20.    Acne       * Notice:  This list has 6 medication(s) that are the same as other medications prescribed for you. Read the directions carefully, and ask your doctor or other care provider to review them with you.

## 2017-08-08 ASSESSMENT — ANXIETY QUESTIONNAIRES: GAD7 TOTAL SCORE: 15

## 2017-09-07 ENCOUNTER — OFFICE VISIT (OUTPATIENT)
Dept: FAMILY MEDICINE | Facility: CLINIC | Age: 22
End: 2017-09-07
Payer: COMMERCIAL

## 2017-09-07 VITALS
DIASTOLIC BLOOD PRESSURE: 75 MMHG | SYSTOLIC BLOOD PRESSURE: 116 MMHG | TEMPERATURE: 99 F | HEART RATE: 109 BPM | WEIGHT: 119.5 LBS | HEIGHT: 62 IN | BODY MASS INDEX: 21.99 KG/M2

## 2017-09-07 DIAGNOSIS — F41.1 ANXIETY STATE: ICD-10-CM

## 2017-09-07 DIAGNOSIS — F90.0 ATTENTION DEFICIT HYPERACTIVITY DISORDER (ADHD), PREDOMINANTLY INATTENTIVE TYPE: Primary | ICD-10-CM

## 2017-09-07 DIAGNOSIS — F32.1 MODERATE SINGLE CURRENT EPISODE OF MAJOR DEPRESSIVE DISORDER (H): ICD-10-CM

## 2017-09-07 DIAGNOSIS — F41.0 PANIC ATTACK: ICD-10-CM

## 2017-09-07 PROCEDURE — 99214 OFFICE O/P EST MOD 30 MIN: CPT | Performed by: PHYSICIAN ASSISTANT

## 2017-09-07 RX ORDER — DEXTROAMPHETAMINE SACCHARATE, AMPHETAMINE ASPARTATE, DEXTROAMPHETAMINE SULFATE AND AMPHETAMINE SULFATE 5; 5; 5; 5 MG/1; MG/1; MG/1; MG/1
10 TABLET ORAL 2 TIMES DAILY
Qty: 30 TABLET | Refills: 0 | Status: SHIPPED | OUTPATIENT
Start: 2017-09-07 | End: 2017-10-06

## 2017-09-07 RX ORDER — LORAZEPAM 0.5 MG/1
.5-1 TABLET ORAL EVERY 8 HOURS PRN
Qty: 20 TABLET | Refills: 0 | Status: SHIPPED | OUTPATIENT
Start: 2017-09-07 | End: 2017-10-06

## 2017-09-07 ASSESSMENT — ANXIETY QUESTIONNAIRES
3. WORRYING TOO MUCH ABOUT DIFFERENT THINGS: SEVERAL DAYS
1. FEELING NERVOUS, ANXIOUS, OR ON EDGE: NEARLY EVERY DAY
6. BECOMING EASILY ANNOYED OR IRRITABLE: MORE THAN HALF THE DAYS
7. FEELING AFRAID AS IF SOMETHING AWFUL MIGHT HAPPEN: SEVERAL DAYS
5. BEING SO RESTLESS THAT IT IS HARD TO SIT STILL: SEVERAL DAYS
2. NOT BEING ABLE TO STOP OR CONTROL WORRYING: MORE THAN HALF THE DAYS
GAD7 TOTAL SCORE: 11

## 2017-09-07 ASSESSMENT — PATIENT HEALTH QUESTIONNAIRE - PHQ9
5. POOR APPETITE OR OVEREATING: SEVERAL DAYS
SUM OF ALL RESPONSES TO PHQ QUESTIONS 1-9: 7

## 2017-09-07 NOTE — MR AVS SNAPSHOT
After Visit Summary   9/7/2017    Nargis Ruelas    MRN: [id number]           Patient Information     Date Of Birth          1995        Visit Information        Provider Department      9/7/2017 11:20 AM Carol Jennings PA-C [facility]        Today's Diagnoses     Attention deficit hyperactivity disorder (ADHD), predominantly inattentive type    -  1    Moderate single current episode of major depressive disorder (H)        Panic attack        Anxiety state          Care Instructions    Continue prozac 20 mg daily  Ativan - use 1 tablet, 2 tablets (=1mg) if a bad panic attack.   Adderall: increase dose to 1/2 tablet (10 mg) two times daily   Follow up in 1 month, sooner if needed -  E-visit of telephone visit if that works better    - Thoughts turn to feelings turn to actions. You'll notice this when a negative thought can make you feel anxious or down, and in turn you act differently. So turn around the negative thought by counteracting it with a positive one. What can the positive side of you say to that negative thought?  - Think: when I worry I feel like I'm accomplishing something but I'm not. When is a good time to worry, when is a bad time?            Follow-ups after your visit        Who to contact     Normal or non-critical lab and imaging results will be communicated to you by MyChart, letter or phone within 4 business days after the clinic has received the results. If you do not hear from us within 7 days, please contact the clinic through MyChart or phone. If you have a critical or abnormal lab result, we will notify you by phone as soon as possible.  Submit refill requests through [employer] or call your pharmacy and they will forward the refill request to us. Please allow 3 business days for your refill to be completed.          If you need to speak with a  for additional information , please call: [phone]             Additional Information About Your  "Visit        Sara [facility] Information     [employer] My [employer] gives you secure access to your electronic health record. If you see a primary care provider, you can also send messages to your care team and make appointments. If you have questions, please call your primary care clinic.  If you do not have a primary care provider, please call [phone] and they will assist you.        Care EveryWhere ID     This is your Care EveryWhere ID. This could be used by other organizations to access your [facility] medical records  [id number]        Your Vitals Were     Pulse Temperature Height BMI (Body Mass Index)          109 99  F (37.2  C) (Tympanic) 5' 2\" (1.575 m) 21.86 kg/m2         Blood Pressure from Last 3 Encounters:   09/07/17 116/75   08/07/17 123/80   06/12/17 116/68    Weight from Last 3 Encounters:   09/07/17 119 lb 8 oz (54.2 kg)   08/07/17 118 lb (53.5 kg)   06/12/17 124 lb (56.2 kg)              Today, you had the following     No orders found for display         Today's Medication Changes          These changes are accurate as of: 9/7/17 12:31 PM.  If you have any questions, ask your nurse or doctor.               Start taking these medicines.        Dose/Directions    amphetamine-dextroamphetamine 20 MG per tablet   Commonly known as:  ADDERALL   Used for:  Attention deficit hyperactivity disorder (ADHD), predominantly inattentive type   Replaces:  amphetamine-dextroamphetamine 5 MG per tablet   Started by:  Carol Jennings PA-C        Dose:  10 mg   Take 0.5 tablets (10 mg) by mouth 2 times daily   Quantity:  30 tablet   Refills:  0         These medicines have changed or have updated prescriptions.        Dose/Directions    FLUoxetine 20 MG capsule   Commonly known as:  PROzac   This may have changed:    - medication strength  - how much to take  - additional instructions   Used for:  Moderate single current episode of major depressive disorder (H)   Changed by:  Carol Jennings PA-C        Dose:  20 mg   Take 1 " capsule (20 mg) by mouth daily   Quantity:  30 capsule   Refills:  1       LORazepam 0.5 MG tablet   Commonly known as:  ATIVAN   This may have changed:  how much to take   Used for:  Panic attack   Changed by:  Carol Jennings PA-C        Dose:  0.5-1 mg   Take 1-2 tablets (0.5-1 mg) by mouth every 8 hours as needed for anxiety   Quantity:  20 tablet   Refills:  0         Stop taking these medicines if you haven't already. Please contact your care team if you have questions.     albuterol 108 (90 BASE) MCG/ACT Inhaler   Commonly known as:  PROAIR HFA/PROVENTIL HFA/VENTOLIN HFA   Stopped by:  Carol Jennings PA-C           amphetamine-dextroamphetamine 5 MG per tablet   Commonly known as:  ADDERALL   Replaced by:  amphetamine-dextroamphetamine 20 MG per tablet   Stopped by:  Carol Jennings PA-C                Where to get your medicines      These medications were sent to [facility] [place], [place] - [address]  [address] [address] [facility] [address]     Phone:  [phone]     FLUoxetine 20 MG capsule         Some of these will need a paper prescription and others can be bought over the counter.  Ask your nurse if you have questions.     Bring a paper prescription for each of these medications     amphetamine-dextroamphetamine 20 MG per tablet    LORazepam 0.5 MG tablet                Primary Care Provider Office Phone #    [facility] [phone]       No address on file        Equal Access to Services     CHARLEEN DARNELL AH: Hadii keith ku hadasho Soomaali, waaxda luqadaha, qaybta kaalmada adeegyada, beatris britton haylisa pearson . So [facility] [phone].    ATENCIÓN: Si habla español, tiene a garcia disposición servicios gratuitos de asistencia lingüística. Domingo al [phone].    We comply with applicable federal civil rights laws and [place] laws. We do not discriminate on the basis of race, color, national origin, age, disability sex, sexual orientation or gender  identity.            Thank you!     Thank you for choosing [facility]  for your care. Our goal is always to provide you with excellent care. Hearing back from our patients is one way we can continue to improve our services. Please take a few minutes to complete the written survey that you may receive in the mail after your visit with us. Thank you!             Your Updated Medication List - Protect others around you: Learn how to safely use, store and throw away your medicines at www.disposemymeds.org.          This list is accurate as of: 9/7/17 12:31 PM.  Always use your most recent med list.                   Brand Name Dispense Instructions for use Diagnosis    amphetamine-dextroamphetamine 20 MG per tablet    ADDERALL    30 tablet    Take 0.5 tablets (10 mg) by mouth 2 times daily    Attention deficit hyperactivity disorder (ADHD), predominantly inattentive type       FLUoxetine 20 MG capsule    PROzac    30 capsule    Take 1 capsule (20 mg) by mouth daily    Moderate single current episode of major depressive disorder (H)       LORazepam 0.5 MG tablet    ATIVAN    20 tablet    Take 1-2 tablets (0.5-1 mg) by mouth every 8 hours as needed for anxiety    Panic attack       medroxyPROGESTERone 150 MG/ML injection    DEPO-PROVERA    1 mL    Inject 1 mL (150 mg) into the muscle every 3 months    Encounter for surveillance of injectable contraceptive       tretinoin 0.025 % cream    RETIN-A    45 g    Spread a pea size amount into affected area topically at bedtime.  Use sunscreen SPF>20.    Acne

## 2017-09-07 NOTE — PATIENT INSTRUCTIONS
Continue prozac 20 mg daily  Ativan - use 1 tablet, 2 tablets (=1mg) if a bad panic attack.   Adderall: increase dose to 1/2 tablet (10 mg) two times daily   Follow up in 1 month, sooner if needed -  E-visit of telephone visit if that works better    - Thoughts turn to feelings turn to actions. You'll notice this when a negative thought can make you feel anxious or down, and in turn you act differently. So turn around the negative thought by counteracting it with a positive one. What can the positive side of you say to that negative thought?  - Think: when I worry I feel like I'm accomplishing something but I'm not. When is a good time to worry, when is a bad time?

## 2017-09-07 NOTE — PROGRESS NOTES
"SUBJECTIVE:                                                    Nargis Ruelas is a 22 year old female who presents to clinic today for the following health issues:    Medication Followup of Adderall    Taking Medication as prescribed: yes    Side Effects:  None    Medication Helping Symptoms:  Talk about increasing dose of adderall     She feels anxiety/depression is doing better. They hired more people at work so that is going better.  She was down, upset easily, panic attacks    Ativan: was using more frequently, now starting to use less. She does feel that the prozac is slowly starting to help anxiety. \"not so discombobulated\"  prozac 20 mg about 3 weeks    She takes adderall 5 mg two times daily - has had issues with insurance coverage. They will cover IR adderall 10 mg.  She has been cutting 10 mg tablets in half per pharmacy as they denied 5 mg tablets in June    She notices that it wears off too early in the afternoon, even with twice daily dosing. Both wear off too early.  Takes at 6:30, wearing off after 4 hours. Takes at lunch, lasts til mid afternoon    Problem list and histories reviewed & adjusted, as indicated.  Additional history:   According to [facility] Database, regular fills from myself only    I spoke to pharmacy due to difficulties with insurance coverage.  Patient is paying out of pocket with discount card for adderall. She pays about $37/month for 1/2 10 mg tablets (=30 tablets), would double if she started taking 10 mg two times daily. If she does 20mg 1/2 tablets it would be $37.  They will cover:  Methylphenidate IR 5/10 mg = $12.78/month  adderall 5 two times daily = 10 two times daily ritalin based on pharmacy conversion. He recommended try this dose first as they are different medications  strattera 40 mg = $90    Vital Signs 4/10/2017 4/13/2017 6/12/2017 8/7/2017 9/7/2017   Weight (LB) 121 lb 8 oz 121 lb 12.8 oz 124 lb 118 lb 119 lb 8 oz       ROS:  Other than noted above, general, HEENT, " "respiratory, cardiac, MS, and gastrointestinal systems are negative.     OBJECTIVE:                                                    /75 (BP Location: Right arm, Patient Position: Sitting, Cuff Size: Adult Regular)  Pulse 109  Temp 99  F (37.2  C) (Tympanic)  Ht 5' 2\" (1.575 m)  Wt 119 lb 8 oz (54.2 kg)  BMI 21.86 kg/m2 Body mass index is 21.86 kg/(m^2).   GENERAL: healthy, alert, well nourished, well hydrated, no distress  RESP: lungs clear to auscultation - no rales, no rhonchi, no wheezes  CV: regular rates and rhythm, normal S1 S2, no S3 or S4 and no murmur, no click or rub -  PSYCH: Alert and oriented times 3; speech- coherent , normal rate and volume; able to articulate logical thoughts, able to abstract reason, no tangential thoughts, no hallucinations or delusions, affect- normal       ASSESSMENT/PLAN:                                                      ASSESSMENT/PLAN:      ICD-10-CM    1. Attention deficit hyperactivity disorder (ADHD), predominantly inattentive type F90.0 amphetamine-dextroamphetamine (ADDERALL) 20 MG per tablet   2. Moderate single current episode of major depressive disorder (H) F32.1 FLUoxetine (PROZAC) 20 MG capsule   3. Panic attack F41.0 LORazepam (ATIVAN) 0.5 MG tablet   4. Anxiety state F41.1      We discussed medication changes. She would like to continue adderall despite price concern, at higher dose, rather than switch to ritalin. She feels overall adderall works well and she is hesitant to change. However will consider ritalin in future for price decrease.    Patient Instructions   Continue prozac 20 mg daily  Ativan - use 1 tablet, 2 tablets (=1mg) if a bad panic attack.   Adderall: increase dose to 1/2 tablet (10 mg) two times daily   Follow up in 1 month, sooner if needed -  E-visit of telephone visit if that works better    - Thoughts turn to feelings turn to actions. You'll notice this when a negative thought can make you feel anxious or down, and in turn " you act differently. So turn around the negative thought by counteracting it with a positive one. What can the positive side of you say to that negative thought?  - Think: when I worry I feel like I'm accomplishing something but I'm not. When is a good time to worry, when is a bad time?    Carol Jennings PA-C   [facility]

## 2017-09-08 ASSESSMENT — ANXIETY QUESTIONNAIRES: GAD7 TOTAL SCORE: 11

## 2017-09-22 ENCOUNTER — ALLIED HEALTH/NURSE VISIT (OUTPATIENT)
Dept: FAMILY MEDICINE | Facility: CLINIC | Age: 22
End: 2017-09-22
Payer: COMMERCIAL

## 2017-09-22 DIAGNOSIS — Z30.42 ENCOUNTER FOR SURVEILLANCE OF INJECTABLE CONTRACEPTIVE: Primary | ICD-10-CM

## 2017-09-22 PROCEDURE — 99207 ZZC NO CHARGE NURSE ONLY: CPT

## 2017-09-22 PROCEDURE — 96372 THER/PROPH/DIAG INJ SC/IM: CPT

## 2017-10-06 DIAGNOSIS — F41.0 PANIC ATTACK: ICD-10-CM

## 2017-10-06 DIAGNOSIS — F90.0 ATTENTION DEFICIT HYPERACTIVITY DISORDER (ADHD), PREDOMINANTLY INATTENTIVE TYPE: ICD-10-CM

## 2017-10-06 RX ORDER — DEXTROAMPHETAMINE SACCHARATE, AMPHETAMINE ASPARTATE, DEXTROAMPHETAMINE SULFATE AND AMPHETAMINE SULFATE 5; 5; 5; 5 MG/1; MG/1; MG/1; MG/1
10 TABLET ORAL 2 TIMES DAILY
Qty: 30 TABLET | Refills: 0 | Status: SHIPPED | OUTPATIENT
Start: 2017-10-06 | End: 2017-11-05

## 2017-10-06 RX ORDER — LORAZEPAM 0.5 MG/1
.5-1 TABLET ORAL EVERY 8 HOURS PRN
Qty: 20 TABLET | Refills: 0 | Status: SHIPPED | OUTPATIENT
Start: 2017-10-06 | End: 2017-12-11

## 2017-10-06 NOTE — TELEPHONE ENCOUNTER
No early refills - following with 1 provider  Will refill today and forward to PCP so she is aware  Maria Elena

## 2017-10-06 NOTE — TELEPHONE ENCOUNTER
Adderall and Ativan      Last Written Prescription Date:  09/07/2017  Last Fill Quantity: 30 and 20,   # refills: 0  Last Office Visit with [facility] or [facility] prescribing provider: 09/07/2017  Future Office visit:       Routing refill request to provider for review/approval because:  Drug not on the [facility], [facility] or [facility] refill protocol or controlled substance    See Mariusz  Pharmacy Technician, [facility]  Phone: [phone]  Fax: [phone]

## 2017-10-06 NOTE — TELEPHONE ENCOUNTER
Brought both script to pharmacy    See Mariusz  Pharmacy Technician, [place]  [facility]  Phone: [phone]  Fax: [phone]

## 2017-10-30 ENCOUNTER — OFFICE VISIT (OUTPATIENT)
Dept: FAMILY MEDICINE | Facility: CLINIC | Age: 22
End: 2017-10-30
Payer: COMMERCIAL

## 2017-10-30 VITALS
WEIGHT: 125.7 LBS | SYSTOLIC BLOOD PRESSURE: 129 MMHG | BODY MASS INDEX: 23.13 KG/M2 | TEMPERATURE: 98.9 F | HEART RATE: 177 BPM | DIASTOLIC BLOOD PRESSURE: 83 MMHG | HEIGHT: 62 IN

## 2017-10-30 DIAGNOSIS — M76.52 PATELLAR TENDINITIS, LEFT KNEE: ICD-10-CM

## 2017-10-30 DIAGNOSIS — M70.52 PATELLAR BURSITIS OF LEFT KNEE: ICD-10-CM

## 2017-10-30 DIAGNOSIS — R00.0 TACHYCARDIA: ICD-10-CM

## 2017-10-30 DIAGNOSIS — E04.9 THYROID ENLARGEMENT: Primary | ICD-10-CM

## 2017-10-30 DIAGNOSIS — E06.9 THYROIDITIS: ICD-10-CM

## 2017-10-30 PROCEDURE — 84443 ASSAY THYROID STIM HORMONE: CPT | Performed by: FAMILY MEDICINE

## 2017-10-30 PROCEDURE — 86800 THYROGLOBULIN ANTIBODY: CPT | Performed by: FAMILY MEDICINE

## 2017-10-30 PROCEDURE — 99214 OFFICE O/P EST MOD 30 MIN: CPT | Performed by: FAMILY MEDICINE

## 2017-10-30 PROCEDURE — 86376 MICROSOMAL ANTIBODY EACH: CPT | Performed by: FAMILY MEDICINE

## 2017-10-30 PROCEDURE — 36415 COLL VENOUS BLD VENIPUNCTURE: CPT | Performed by: FAMILY MEDICINE

## 2017-10-30 RX ORDER — HYDROCODONE BITARTRATE AND ACETAMINOPHEN 5; 325 MG/1; MG/1
1 TABLET ORAL EVERY 6 HOURS PRN
Qty: 15 TABLET | Refills: 0 | Status: SHIPPED | OUTPATIENT
Start: 2017-10-30 | End: 2017-12-08

## 2017-10-30 RX ORDER — ATENOLOL 25 MG/1
25 TABLET ORAL DAILY
Qty: 60 TABLET | Refills: 1 | Status: SHIPPED | OUTPATIENT
Start: 2017-10-30 | End: 2018-10-10

## 2017-10-30 NOTE — PROGRESS NOTES
SUBJECTIVE:                                                    Nargis Ruelas is a 22 year old female who presents to clinic today for the following health issues:      Fell Saturday put a lot of pressure on left knee, has history of runners knee, still very painful.               Symptoms: cc Present Absent Comment   Fever/Chills  x     Fatigue  x     Headache  x     Muscle or Body  Aches   x    Eye Irritation   x    Sneezing  x     Nasal Cali/Drg  x  mild   Sinus Pressure/Pain   x    Dental pain   x    Sore Throat   x    Swollen Glands   x    Ear Pain/Fullness   x    Cough  x  Intermittently    Wheeze  x     Chest Discomfort  x     Shortness of breath x      Abdominal pain  x  Nausea feeling   Emesis    x    Diarrhea  x  may   Other         Symptom duration:  1 week.   SOB, going up the stairs, even eating, cold sweats, chills for about a week.    Symptom severity:     Treatments tried:  tylenol,    Contacts:  yes, at work, she works at an assisted living.    She feels like she is having a harder time catching her breath and she is having an intermittent cough that is sometimes productive.   She has not felt anything like this before she has a hard time falling asleep   Has a history of  Asthma she has had sports induced and she has used an inhaler  She also smokes 1/2 ppd   About 2 weeks ago she was smoking a ppd  She also has been having pain in the left knee on the patella   She has been taking aleve and ibu and she still has pain   Had fallen directly on the kneecap       [facility]  Patient Active Problem List   Diagnosis     History of other specified conditions presenting hazards to health     Major depressive disorder, single episode     Childhood hyperkinetic syndrome     Anxiety state     History of syncope     Tobacco abuse     Attention deficit hyperactivity disorder (ADHD), predominantly inattentive type     Chronic pain of both knees     ROS: Constitutional, HEENT, cardiovascular, respiratory, GI, , and  "skin are otherwise negative except as noted above.    PHYSICAL EXAM:    /83 (BP Location: Right arm, Patient Position: Chair, Cuff Size: Adult Regular)  Pulse 177  Temp 98.9  F (37.2  C) (Tympanic)  Ht 5' 2\" (1.575 m)  Wt 125 lb 11.2 oz (57 kg)  BMI 22.99 kg/m2  GENERAL: Active, alert and no distress.  EYES: PERRL/EOMI.  Bilateral sclera/conjunctiva clear.  HEENT: Audible congestion with out nasal discharge.  TMs gray and translucent.  Oral mucosa moist and pink.  Posterior pharynx with increased erythema. Uvula midline.  NECK: Supple with full range of motion.  Bilateral shotty anterior cervical nodes. Enlarged thyroid bilaterally mildly tender   CV: tachycardic with normal rhythm no murmur  LUNGS: Clear to auscultation.  ABD: Soft, nontender, nondistended. No HSM or masses palpated.  SKIN:  No rash. Warm, pink. Capillary refill less than 2 seconds.  Ms tender over left patella and with distraction. Intact ligaments minor abrasion no effusion FROM  Painful with full extension and full flexion   1. Thyroid enlargement    - TSH with free T4 reflex  - Thyroid peroxidase antibody  - Anti thyroglobulin antibody  - atenolol (TENORMIN) 25 MG tablet; Take 1 tablet (25 mg) by mouth daily May increase to 2 tablets in 2-3 days if tolerated  Dispense: 60 tablet; Refill: 1  - ranitidine (ZANTAC) 150 MG tablet; Take 1 tablet (150 mg) by mouth 2 times daily  Dispense: 60 tablet; Refill: 1    2. Tachycardia    - TSH with free T4 reflex  - Thyroid peroxidase antibody  - Anti thyroglobulin antibody  - atenolol (TENORMIN) 25 MG tablet; Take 1 tablet (25 mg) by mouth daily May increase to 2 tablets in 2-3 days if tolerated  Dispense: 60 tablet; Refill: 1    3. Thyroiditis    - atenolol (TENORMIN) 25 MG tablet; Take 1 tablet (25 mg) by mouth daily May increase to 2 tablets in 2-3 days if tolerated  Dispense: 60 tablet; Refill: 1  - ranitidine (ZANTAC) 150 MG tablet; Take 1 tablet (150 mg) by mouth 2 times daily  Dispense: 60 " tablet; Refill: 1    4. Patellar tendinitis, left knee    - HYDROcodone-acetaminophen (NORCO) 5-325 MG per tablet; Take 1 tablet by mouth every 6 hours as needed for moderate to severe pain  Dispense: 15 tablet; Refill: 0    5. Patellar bursitis of left knee    - HYDROcodone-acetaminophen (NORCO) 5-325 MG per tablet; Take 1 tablet by mouth every 6 hours as needed for moderate to severe pain  Dispense: 15 tablet; Refill: 0  Will send labs. If neg please recheck in 1 week s  rec ice for the patella and some stretches and rest. Recheck 1 week.   Esme Diallo M.D.  [facility]

## 2017-10-30 NOTE — NURSING NOTE
"Chief Complaint   Patient presents with     Breathing Problem       Initial /83 (BP Location: Right arm, Patient Position: Chair, Cuff Size: Adult Regular)  Pulse 177  Temp 98.9  F (37.2  C) (Tympanic)  Ht 5' 2\" (1.575 m)  Wt 125 lb 11.2 oz (57 kg)  BMI 22.99 kg/m2 Estimated body mass index is 22.99 kg/(m^2) as calculated from the following:    Height as of this encounter: 5' 2\" (1.575 m).    Weight as of this encounter: 125 lb 11.2 oz (57 kg).  Medication Reconciliation: complete   Mei Monterroso CMA    "

## 2017-10-30 NOTE — MR AVS SNAPSHOT
"              After Visit Summary   10/30/2017    Nargis Ruelas    MRN: [id number]           Patient Information     Date Of Birth          1995        Visit Information        Provider Department      10/30/2017 3:30 PM Esme Diallo MD [facility]        Today's Diagnoses     Thyroid enlargement    -  1    Tachycardia        Thyroiditis        Patellar tendinitis, left knee        Patellar bursitis of left knee          Care Instructions    I will have your labs back tomorrow then we can decide what tests are done next           Follow-ups after your visit        Who to contact     Normal or non-critical lab and imaging results will be communicated to you by W[employer], letter or phone within 4 business days after the clinic has received the results. If you do not hear from us within 7 days, please contact the clinic through [employer] or phone. If you have a critical or abnormal lab result, we will notify you by phone as soon as possible.  Submit refill requests through [employer] or call your pharmacy and they will forward the refill request to us. Please allow 3 business days for your refill to be completed.          If you need to speak with a  for additional information , please call: [phone]             Additional Information About Your Visit        [employer] Information     [employer] gives you secure access to your electronic health record. If you see a primary care provider, you can also send messages to your care team and make appointments. If you have questions, please call your primary care clinic.  If you do not have a primary care provider, please call [phone] and they will assist you.        Care EveryWhere ID     This is your Care EveryWhere ID. This could be used by other organizations to access your [place] medical records  [id number]        Your Vitals Were     Pulse Temperature Height BMI (Body Mass Index)          177 98.9  F (37.2  C) (Tympanic) 5' 2\" (1.575 " m) 22.99 kg/m2         Blood Pressure from Last 3 Encounters:   10/30/17 129/83   09/07/17 116/75   08/07/17 123/80    Weight from Last 3 Encounters:   10/30/17 125 lb 11.2 oz (57 kg)   09/07/17 119 lb 8 oz (54.2 kg)   08/07/17 118 lb (53.5 kg)              We Performed the Following     Anti thyroglobulin antibody     Thyroid peroxidase antibody     TSH with free T4 reflex          Today's Medication Changes          These changes are accurate as of: 10/30/17  4:47 PM.  If you have any questions, ask your nurse or doctor.               Start taking these medicines.        Dose/Directions    atenolol 25 MG tablet   Commonly known as:  TENORMIN   Used for:  Thyroid enlargement, Tachycardia, Thyroiditis   Started by:  Esme Diallo MD        Dose:  25 mg   Take 1 tablet (25 mg) by mouth daily May increase to 2 tablets in 2-3 days if tolerated   Quantity:  60 tablet   Refills:  1       HYDROcodone-acetaminophen 5-325 MG per tablet   Commonly known as:  NORCO   Used for:  Patellar tendinitis, left knee, Patellar bursitis of left knee   Started by:  Esme Diallo MD        Dose:  1 tablet   Take 1 tablet by mouth every 6 hours as needed for moderate to severe pain   Quantity:  15 tablet   Refills:  0       ranitidine 150 MG tablet   Commonly known as:  ZANTAC   Used for:  Thyroiditis, Thyroid enlargement   Started by:  Esme Diallo MD        Dose:  150 mg   Take 1 tablet (150 mg) by mouth 2 times daily   Quantity:  60 tablet   Refills:  1            Where to get your medicines      These medications were sent [place] [facility] [place] [address]  [address] [place] [address]     Phone:  [phone]     atenolol 25 MG tablet    ranitidine 150 MG tablet         Some of these will need a paper prescription and others can be bought over the counter.  Ask your nurse if you have questions.     Bring a paper prescription for each of these medications      HYDROcodone-acetaminophen 5-325 MG per tablet                Primary Care Provider Office Phone # Fax #    [facility] [phone] [address] [facility] [address]        Equal Access to Services     CHARLEEN DARNELL : Urmila acevedo magalyo Soeleazarali, waaxda luqadaha, qaybta kaalmada adeegyada, beatris gomezn james cavazos laDonallisa pickens. So [facility] [phone].    ATENCIÓN: Si habla español, tiene a garcia disposición servicios gratuitos de asistencia lingüística. Llame al [phone].    We comply with applicable federal civil rights laws and [place] laws. We do not discriminate on the basis of race, color, national origin, age, disability, sex, sexual orientation, or gender identity.            Thank you!     Thank you for choosing [facility]  for your care. Our goal is always to provide you with excellent care. Hearing back from our patients is one way we can continue to improve our services. Please take a few minutes to complete the written survey that you may receive in the mail after your visit with us. Thank you!             Your Updated Medication List - Protect others around you: Learn how to safely use, store and throw away your medicines at www.disposemymeds.org.          This list is accurate as of: 10/30/17  4:47 PM.  Always use your most recent med list.                   Brand Name Dispense Instructions for use Diagnosis    amphetamine-dextroamphetamine 20 MG per tablet    ADDERALL    30 tablet    Take 0.5 tablets (10 mg) by mouth 2 times daily    Attention deficit hyperactivity disorder (ADHD), predominantly inattentive type       atenolol 25 MG tablet    TENORMIN    60 tablet    Take 1 tablet (25 mg) by mouth daily May increase to 2 tablets in 2-3 days if tolerated    Thyroid enlargement, Tachycardia, Thyroiditis       FLUoxetine 20 MG capsule    PROzac    30 capsule    Take 1 capsule (20 mg) by mouth daily    Moderate single current episode of major depressive  disorder (H)       HYDROcodone-acetaminophen 5-325 MG per tablet    NORCO    15 tablet    Take 1 tablet by mouth every 6 hours as needed for moderate to severe pain    Patellar tendinitis, left knee, Patellar bursitis of left knee       LORazepam 0.5 MG tablet    ATIVAN    20 tablet    Take 1-2 tablets (0.5-1 mg) by mouth every 8 hours as needed for anxiety    Panic attack       medroxyPROGESTERone 150 MG/ML injection    DEPO-PROVERA    1 mL    Inject 1 mL (150 mg) into the muscle every 3 months    Encounter for surveillance of injectable contraceptive       ranitidine 150 MG tablet    ZANTAC    60 tablet    Take 1 tablet (150 mg) by mouth 2 times daily    Thyroiditis, Thyroid enlargement       tretinoin 0.025 % cream    RETIN-A    45 g    Spread a pea size amount into affected area topically at bedtime.  Use sunscreen SPF>20.    Acne

## 2017-10-31 LAB
THYROGLOB AB SERPL IA-ACNC: <20 IU/ML (ref 0–40)
THYROPEROXIDASE AB SERPL-ACNC: <10 IU/ML
TSH SERPL DL<=0.005 MIU/L-ACNC: 0.89 MU/L (ref 0.4–4)

## 2017-11-01 ENCOUNTER — MYC MEDICAL ADVICE (OUTPATIENT)
Dept: FAMILY MEDICINE | Facility: CLINIC | Age: 22
End: 2017-11-01

## 2017-11-03 ENCOUNTER — TELEPHONE (OUTPATIENT)
Dept: FAMILY MEDICINE | Facility: CLINIC | Age: 22
End: 2017-11-03

## 2017-11-03 NOTE — TELEPHONE ENCOUNTER
Patient states she felt out of the blue at work and took pulse and it was below 60 twice, was lightheaded today at 11:30 and got better after she ate. Patient states not having chest pain but once in a while feels a sharpness of pain and felt this way last night but not now. B/P at work was 120/71, was nauseated and has had a cough with green mucous. Thyroid is still enlarged. Patient is able to swallow and notices when she eats she feels like she is choking and is happening more frequently. Patient is not having shortness of breath but has shortness of breath 4/7 days. Patient states she is very cold and needing a blanket to warm up. Advised to go to the ER for an evaluation due to symptoms of shortness of breath and choking when eating. Routing to provider to review.  MARGUERITE Medina

## 2017-11-03 NOTE — TELEPHONE ENCOUNTER
Reason for call:  Patient reporting a symptom    Symptom or request: Nargis LEFT MESSAGE:  She also left message on 11/2/17, however she did not leave a spelling of her name, birthdate or phone number to call back so she was not call back yesterday.  She wanted to speak with someone about symptoms she is having.  Please call and assess. Thank you..Melonie Quinteros    Duration (how long have symptoms been present): did not specify in message    Have you been treated for this before? Not known    Additional comments: none    Phone Number patient can be reached at:  Home number on file [phone] (home)    Best Time:  Did not specify    Can we leave a detailed message on this number:  did not specify    Call taken on 11/3/2017 at 1:26 PM by Melonie Quinteros

## 2017-11-05 ENCOUNTER — MYC REFILL (OUTPATIENT)
Dept: FAMILY MEDICINE | Facility: CLINIC | Age: 22
End: 2017-11-05

## 2017-11-05 DIAGNOSIS — M76.52 PATELLAR TENDINITIS, LEFT KNEE: ICD-10-CM

## 2017-11-05 DIAGNOSIS — M70.52 PATELLAR BURSITIS OF LEFT KNEE: ICD-10-CM

## 2017-11-05 DIAGNOSIS — F32.1 MODERATE SINGLE CURRENT EPISODE OF MAJOR DEPRESSIVE DISORDER (H): ICD-10-CM

## 2017-11-05 DIAGNOSIS — F90.0 ATTENTION DEFICIT HYPERACTIVITY DISORDER (ADHD), PREDOMINANTLY INATTENTIVE TYPE: ICD-10-CM

## 2017-11-05 RX ORDER — HYDROCODONE BITARTRATE AND ACETAMINOPHEN 5; 325 MG/1; MG/1
1 TABLET ORAL EVERY 6 HOURS PRN
Qty: 15 TABLET | Refills: 0 | Status: CANCELLED | OUTPATIENT
Start: 2017-11-05

## 2017-11-06 DIAGNOSIS — F32.1 MODERATE SINGLE CURRENT EPISODE OF MAJOR DEPRESSIVE DISORDER (H): ICD-10-CM

## 2017-11-06 NOTE — TELEPHONE ENCOUNTER
I would like to side effects Nargis back. SHe should decrease the metorpolol to 1/2 tablet. I am not going to refill the hydrocodone. I will leave the adderall for Maria Elena. Esme Diallo M.D.'

## 2017-11-07 DIAGNOSIS — M70.52 PATELLAR BURSITIS OF LEFT KNEE: ICD-10-CM

## 2017-11-07 DIAGNOSIS — M76.52 PATELLAR TENDINITIS, LEFT KNEE: ICD-10-CM

## 2017-11-07 RX ORDER — HYDROCODONE BITARTRATE AND ACETAMINOPHEN 5; 325 MG/1; MG/1
1 TABLET ORAL EVERY 6 HOURS PRN
Qty: 15 TABLET | Refills: 0 | Status: CANCELLED | OUTPATIENT
Start: 2017-11-07

## 2017-11-07 RX ORDER — DEXTROAMPHETAMINE SACCHARATE, AMPHETAMINE ASPARTATE, DEXTROAMPHETAMINE SULFATE AND AMPHETAMINE SULFATE 5; 5; 5; 5 MG/1; MG/1; MG/1; MG/1
10 TABLET ORAL 2 TIMES DAILY
Qty: 30 TABLET | Refills: 0 | Status: SHIPPED | OUTPATIENT
Start: 2017-11-07 | End: 2017-12-08

## 2017-11-07 NOTE — TELEPHONE ENCOUNTER
NORCO       Last Written Prescription Date:  10/30/2017  Last Fill Quantity: 15,   # refills: 0  Future Office visit:    Next 5 appointments (look out 90 days)     Nov 10, 2017  9:30 AM CST   Office Visit with Esme Diallo MD   [facility] ([facility])    [address] [facility] [address] [phone]                   Routing refill request to provider for review/approval because:  Drug not on the [facility], [facility] or  Health refill protocol or controlled substance      See Mariusz  Pharmacy Technician, [facility]  Phone: [phone]  Fax: [phone]

## 2017-11-07 NOTE — TELEPHONE ENCOUNTER
Message from FlightCaster:  Original authorizing provider: Esme Diallo MD    Nargis Ruelas would like a refill of the following medications:  HYDROcodone-acetaminophen (NORCO) 5-325 MG per tablet [Esme Diallo MD]    [place] [facility] [place] - [address]    Comment:  I am still having some pretty bad knee pain , the medication seemed to accommodate the pain !    Medication renewals requested in this message routed to other providers:  FLUoxetine (PROZAC) 20 MG capsule [Carol Jennings PA-C]  amphetamine-dextroamphetamine (ADDERALL) 20 MG per tablet [Maria Elena Banegas PA-C]

## 2017-11-07 NOTE — TELEPHONE ENCOUNTER
Per 11/1/17 MyChart encounter; Patient was sent a MyChart note on 11/7/17 advising her to be seen.  Navin Del Toro RN

## 2017-11-07 NOTE — TELEPHONE ENCOUNTER
Spoke with heather and appointment was made.    Script walked to [facility] for Adderall. Thank you..Melonie Quinteros

## 2017-11-07 NOTE — TELEPHONE ENCOUNTER
Message from Peel:  Original authorizing provider: Carol Jennings PA-C    Nargis Ruelas would like a refill of the following medications:  FLUoxetine (PROZAC) 20 MG capsule [Carol Jennings PA-C]    Preferred pharmacy: [facility] [place]  [place], [place] - [address]    Comment:  I am still having some pretty bad knee pain , the medication seemed to accommodate the pain !    Medication renewals requested in this message routed to other providers:  HYDROcodone-acetaminophen (NORCO) 5-325 MG per tablet [Esme Diallo MD]  amphetamine-dextroamphetamine (ADDERALL) 20 MG per tablet [Maria Elena Banegas PA-C]

## 2017-11-07 NOTE — TELEPHONE ENCOUNTER
Message from Linki:  Original authorizing provider: NASREEN Nokeri Ruelas would like a refill of the following medications:  amphetamine-dextroamphetamine (ADDERALL) 20 MG per tablet [Maria Elena Banegas PA-C]    Preferred pharmacy: [facility], [place] - [address]    Comment:  I am still having some pretty bad knee pain , the medication seemed to accommodate the pain !    Medication renewals requested in this message routed to other providers:  HYDROcodone-acetaminophen (NORCO) 5-325 MG per tablet [Esme Diallo MD]  FLUoxetine (PROZAC) 20 MG capsule [Carol Jennings PA-C]

## 2017-11-15 ENCOUNTER — OFFICE VISIT (OUTPATIENT)
Dept: FAMILY MEDICINE | Facility: CLINIC | Age: 22
End: 2017-11-15

## 2017-11-15 VITALS
HEIGHT: 62 IN | BODY MASS INDEX: 22.5 KG/M2 | DIASTOLIC BLOOD PRESSURE: 62 MMHG | HEART RATE: 69 BPM | WEIGHT: 122.3 LBS | SYSTOLIC BLOOD PRESSURE: 108 MMHG | TEMPERATURE: 98.8 F

## 2017-11-15 DIAGNOSIS — Z53.9 ERRONEOUS ENCOUNTER--DISREGARD: Primary | ICD-10-CM

## 2017-11-15 NOTE — NURSING NOTE
"Chief Complaint   Patient presents with     RECHECK       Initial /62 (BP Location: Right arm, Patient Position: Sitting, Cuff Size: Adult Regular)  Pulse 69  Temp 98.8  F (37.1  C) (Tympanic)  Ht 5' 2\" (1.575 m)  Wt 122 lb 4.8 oz (55.5 kg)  BMI 22.37 kg/m2 Estimated body mass index is 22.37 kg/(m^2) as calculated from the following:    Height as of this encounter: 5' 2\" (1.575 m).    Weight as of this encounter: 122 lb 4.8 oz (55.5 kg).  Medication Reconciliation: complete   Shalini Ambrose CMA    "

## 2017-11-15 NOTE — PROGRESS NOTES
SUBJECTIVE:                                                    Nargis Ruelas is a 22 year old female who presents to clinic today for the following health issues:    Chief Complaint   Patient presents with     RECHECK     **Here for a check up. Would not explain more.      Problem list and histories reviewed & adjusted, as indicated.  Additional history: ***    {HIST REVIEW/ LINKS 2:207563}    ROS:  {ROS COMP:042447}    OBJECTIVE:                                                    There were no vitals taken for this visit. There is no height or weight on file to calculate BMI.   {EXAM CHOICES:101056}       ASSESSMENT/PLAN:                                                      No diagnosis found.   reports that she has been smoking Cigarettes.  She has been smoking about 0.50 packs per day. She has never used smokeless tobacco.  {Tobacco Cessation needed for ACO -- Delete if patient is a non-smoker:934334}    {Weight Management Plan needed for ACO:421012}    ***  [facility]

## 2017-11-15 NOTE — MR AVS SNAPSHOT
"              After Visit Summary   11/15/2017    Nargis Ruelas    MRN: [id number]           Patient Information     Date Of Birth          1995        Visit Information        Provider Department      11/15/2017 3:30 PM Esme Diallo MD [facility]        Today's Diagnoses     ERRONEOUS ENCOUNTER--DISREGARD    -  1       Follow-ups after your visit        Who to contact     Normal or non-critical lab and imaging results will be communicated to you by [employer], letter or phone within 4 business days after the clinic has received the results. If you do not hear from us within 7 days, please contact the clinic through [employer] or phone. If you have a critical or abnormal lab result, we will notify you by phone as soon as possible.  Submit refill requests through [employer] or call your pharmacy and they will forward the refill request to us. Please allow 3 business days for your refill to be completed.          If you need to speak with a  for additional information , please call: [phone]             Additional Information About Your Visit        [employer] Information     [employer] gives you secure access to your electronic health record. If you see a primary care provider, you can also send messages to your care team and make appointments. If you have questions, please call your primary care clinic.  If you do not have a primary care provider, please call [phone] and they will assist you.        Care EveryWhere ID     This is your Care EveryWhere ID. This could be used by other organizations to access your [place] medical records  [id number]        Your Vitals Were     Pulse Temperature Height BMI (Body Mass Index)          69 98.8  F (37.1  C) (Tympanic) 5' 2\" (1.575 m) 22.37 kg/m2         Blood Pressure from Last 3 Encounters:   11/15/17 108/62   10/30/17 129/83   09/07/17 116/75    Weight from Last 3 Encounters:   11/15/17 122 lb 4.8 oz (55.5 kg)   10/30/17 125 lb 11.2 oz (57 " kg)   09/07/17 119 lb 8 oz (54.2 kg)              Today, you had the following     No orders found for display       Primary Care Provider Office Phone # Fax #    [facility] [phone] [address] [address]        Equal Access to Services     CHARLEEN DARNELL : Hadii aad ku hadasho Soomaali, waaxda luqadaha, qaybta kaalmada adeegyada, waxay idiin hayaan ademarlyn macy lasoheila pickens. So [facility] [phone].    ATENCIÓN: Si habla español, tiene a garcia disposición servicios gratuitos de asistencia lingüística. Jrame al [phone].    We comply with applicable federal civil rights laws and [place] laws. We do not discriminate on the basis of race, color, national origin, age, disability, sex, sexual orientation, or gender identity.            Thank you!     Thank you for choosing [facility]  for your care. Our goal is always to provide you with excellent care. Hearing back from our patients is one way we can continue to improve our services. Please take a few minutes to complete the written survey that you may receive in the mail after your visit with us. Thank you!             Your Updated Medication List - Protect others around you: Learn how to safely use, store and throw away your medicines at www.disposemymeds.org.          This list is accurate as of: 11/15/17  4:27 PM.  Always use your most recent med list.                   Brand Name Dispense Instructions for use Diagnosis    amphetamine-dextroamphetamine 20 MG per tablet    ADDERALL    30 tablet    Take 0.5 tablets (10 mg) by mouth 2 times daily    Attention deficit hyperactivity disorder (ADHD), predominantly inattentive type       atenolol 25 MG tablet    TENORMIN    60 tablet    Take 1 tablet (25 mg) by mouth daily May increase to 2 tablets in 2-3 days if tolerated    Thyroid enlargement, Tachycardia, Thyroiditis       FLUoxetine 20 MG capsule    PROzac    30 capsule    Take 1 capsule (20 mg) by mouth daily     Moderate single current episode of major depressive disorder (H)       HYDROcodone-acetaminophen 5-325 MG per tablet    NORCO    15 tablet    Take 1 tablet by mouth every 6 hours as needed for moderate to severe pain    Patellar tendinitis, left knee, Patellar bursitis of left knee       LORazepam 0.5 MG tablet    ATIVAN    20 tablet    Take 1-2 tablets (0.5-1 mg) by mouth every 8 hours as needed for anxiety    Panic attack       medroxyPROGESTERone 150 MG/ML injection    DEPO-PROVERA    1 mL    Inject 1 mL (150 mg) into the muscle every 3 months    Encounter for surveillance of injectable contraceptive       ranitidine 150 MG tablet    ZANTAC    60 tablet    Take 1 tablet (150 mg) by mouth 2 times daily    Thyroiditis, Thyroid enlargement       tretinoin 0.025 % cream    RETIN-A    45 g    Spread a pea size amount into affected area topically at bedtime.  Use sunscreen SPF>20.    Acne

## 2017-11-20 ENCOUNTER — MYC REFILL (OUTPATIENT)
Dept: FAMILY MEDICINE | Facility: CLINIC | Age: 22
End: 2017-11-20

## 2017-11-20 DIAGNOSIS — F90.0 ATTENTION DEFICIT HYPERACTIVITY DISORDER (ADHD), PREDOMINANTLY INATTENTIVE TYPE: ICD-10-CM

## 2017-11-20 RX ORDER — DEXTROAMPHETAMINE SACCHARATE, AMPHETAMINE ASPARTATE, DEXTROAMPHETAMINE SULFATE AND AMPHETAMINE SULFATE 5; 5; 5; 5 MG/1; MG/1; MG/1; MG/1
10 TABLET ORAL 2 TIMES DAILY
Qty: 30 TABLET | Refills: 0 | Status: CANCELLED | OUTPATIENT
Start: 2017-11-20

## 2017-11-20 NOTE — TELEPHONE ENCOUNTER
Message from [employer]:  Original authorizing provider: NASREEN Nogh Suraj would like a refill of the following medications:  amphetamine-dextroamphetamine (ADDERALL) 20 MG per tablet [Maria Elena Banegas PA-C]    Preferred pharmacy: [facility] [place]  [place], [place] - [address] [address]    Comment:  My prescription went missing. I have no idea what happened to it ! My work is doing an investigation because I think they went missing there .

## 2017-11-20 NOTE — TELEPHONE ENCOUNTER
Message from [employer]:  Original authorizing provider: NASREEN No would like a refill of the following medications:  amphetamine-dextroamphetamine (ADDERALL) 20 MG per tablet [Maria Elena Banegas PA-C]    Preferred pharmacy: [facility] [place], [place] - [address] [address]    Comment:  I had sent in a request but i ended up finding them !

## 2017-11-22 ENCOUNTER — TELEPHONE (OUTPATIENT)
Dept: FAMILY MEDICINE | Facility: CLINIC | Age: 22
End: 2017-11-22

## 2017-11-22 DIAGNOSIS — F32.9 MAJOR DEPRESSIVE DISORDER, SINGLE EPISODE: Primary | ICD-10-CM

## 2017-11-22 NOTE — TELEPHONE ENCOUNTER
Panel Management Review      Patient has the following on her problem list:     Depression / Dysthymia review    Measure:  Needs PHQ-9 score of 4 or less during index window.  Administer PHQ-9 and if score is 5 or more, send encounter to provider for next steps.    5 - 7 month window range: 3-19    PHQ-9 SCORE 4/10/2017 8/7/2017 9/7/2017   Total Score 3 19 7       If PHQ-9 recheck is 5 or more, route to provider for next steps.    Patient is due for:  DAP, should have another PHQ9 because her score is not at goal.        Composite cancer screening  Chart review shows that this patient is due/due soon for the following None  Summary:    Patient is due/failing the following:   DAP, PHQ9    Action needed:   Patient needs to do PHQ9 because score is not at goal.  Generate DAP for patient to view on [employer]    Type of outreach:    Sent [employer] message.    Questions for provider review:    None                                                                                                                                    Kristel SANDERS       Chart routed to none .

## 2017-11-22 NOTE — LETTER
My Depression Action Plan  Name: Nargis Ruelas   Date of Birth 1995  Date: 11/22/2017    My doctor: Danette Montero   My clinic: [facility] [place]  [address] [facility] [address] [phone]          GREEN    ZONE   Good Control    What it looks like:     Things are going generally well. You have normal up s and down s. You may even feel depressed from time to time, but bad moods usually last less than a day.   What you need to do:  1. Continue to care for yourself (see self care plan)  2. Check your depression survival kit and update it as needed  3. Follow your physician s recommendations including any medication.  4. Do not stop taking medication unless you consult with your physician first.           YELLOW         ZONE Getting Worse    What it looks like:     Depression is starting to interfere with your life.     It may be hard to get out of bed; you may be starting to isolate yourself from others.    Symptoms of depression are starting to last most all day and this has happened for several days.     You may have suicidal thoughts but they are not constant.   What you need to do:     1. Call your care team, your response to treatment will improve if you keep your care team informed of your progress. Yellow periods are signs an adjustment may need to be made.     2. Continue your self-care, even if you have to fake it!    3. Talk to someone in your support network    4. Open up your depression survival kit           RED    ZONE Medical Alert - Get Help    What it looks like:     Depression is seriously interfering with your life.     You may experience these or other symptoms: You can t get out of bed most days, can t work or engage in other necessary activities, you have trouble taking care of basic hygiene, or basic responsibilities, thoughts of suicide or death that will not go away, self-injurious behavior.     What you need to do:  1. Call your care team and request  a same-day appointment. If they are not available (weekends or after hours) call your local crisis line, emergency room or 911.          Depression Self Care Plan / Survival Kit    Self-Care for Depression  Here s the deal. Your body and mind are really not as separate as most people think.  What you do and think affects how you feel and how you feel influences what you do and think. This means if you do things that people who feel good do, it will help you feel better.  Sometimes this is all it takes.  There is also a place for medication and therapy depending on how severe your depression is, so be sure to consult with your medical provider and/ or Behavioral Health Consultant if your symptoms are worsening or not improving.     In order to better manage my stress, I will:    Exercise  Get some form of exercise, every day. This will help reduce pain and release endorphins, the  feel good  chemicals in your brain. This is almost as good as taking antidepressants!  This is not the same as joining a gym and then never going! (they count on that by the way ) It can be as simple as just going for a walk or doing some gardening, anything that will get you moving.      Hygiene   Maintain good hygiene (Get out of bed in the morning, Make your bed, Brush your teeth, Take a shower, and Get dressed like you were going to work, even if you are unemployed).  If your clothes don't fit try to get ones that do.    Diet  I will strive to eat foods that are good for me, drink plenty of water, and avoid excessive sugar, caffeine, alcohol, and other mood-altering substances.  Some foods that are helpful in depression are: complex carbohydrates, B vitamins, flaxseed, fish or fish oil, fresh fruits and vegetables.    Psychotherapy  I agree to participate in Individual Therapy (if recommended).    Medication  If prescribed medications, I agree to take them.  Missing doses can result in serious side effects.  I understand that drinking  alcohol, or other illicit drug use, may cause potential side effects.  I will not stop my medication abruptly without first discussing it with my provider.    Staying Connected With Others  I will stay in touch with my friends, family members, and my primary care provider/team.    Use your imagination  Be creative.  We all have a creative side; it doesn t matter if it s oil painting, sand castles, or mud pies! This will also kick up the endorphins.    Witness Beauty  (AKA stop and smell the roses) Take a look outside, even in mid-winter. Notice colors, textures. Watch the squirrels and birds.     Service to others  Be of service to others.  There is always someone else in need.  By helping others we can  get out of ourselves  and remember the really important things.  This also provides opportunities for practicing all the other parts of the program.    Humor  Laugh and be silly!  Adjust your TV habits for less news and crime-drama and more comedy.    Control your stress  Try breathing deep, massage therapy, biofeedback, and meditation. Find time to relax each day.     My support system    Clinic Contact:  Phone number:    Contact 1:  Phone number:    Contact 2:  Phone number:    Catholic/:  Phone number:    Therapist:  Phone number:    Local crisis center:    Phone number:    Other community support:  Phone number:

## 2017-11-27 NOTE — TELEPHONE ENCOUNTER
This has been over two weeks.. Is this a denial?    See Mariusz  Pharmacy Technician, [place]  [facility]  Phone: [phone]  Fax: [phone]

## 2017-12-08 ENCOUNTER — OFFICE VISIT (OUTPATIENT)
Dept: FAMILY MEDICINE | Facility: CLINIC | Age: 22
End: 2017-12-08
Payer: COMMERCIAL

## 2017-12-08 VITALS
HEART RATE: 86 BPM | TEMPERATURE: 99 F | DIASTOLIC BLOOD PRESSURE: 72 MMHG | SYSTOLIC BLOOD PRESSURE: 118 MMHG | BODY MASS INDEX: 22.63 KG/M2 | WEIGHT: 123.7 LBS

## 2017-12-08 DIAGNOSIS — M76.52 PATELLAR TENDINITIS, LEFT KNEE: ICD-10-CM

## 2017-12-08 DIAGNOSIS — F90.0 ATTENTION DEFICIT HYPERACTIVITY DISORDER (ADHD), PREDOMINANTLY INATTENTIVE TYPE: Primary | ICD-10-CM

## 2017-12-08 DIAGNOSIS — Z30.42 ENCOUNTER FOR SURVEILLANCE OF INJECTABLE CONTRACEPTIVE: ICD-10-CM

## 2017-12-08 DIAGNOSIS — G89.29 CHRONIC PAIN OF BOTH KNEES: ICD-10-CM

## 2017-12-08 DIAGNOSIS — M70.52 PATELLAR BURSITIS OF LEFT KNEE: ICD-10-CM

## 2017-12-08 DIAGNOSIS — L03.012 PARONYCHIA OF FINGER OF LEFT HAND: ICD-10-CM

## 2017-12-08 DIAGNOSIS — Z11.3 SCREEN FOR STD (SEXUALLY TRANSMITTED DISEASE): ICD-10-CM

## 2017-12-08 DIAGNOSIS — F32.1 MODERATE SINGLE CURRENT EPISODE OF MAJOR DEPRESSIVE DISORDER (H): ICD-10-CM

## 2017-12-08 DIAGNOSIS — E04.9 THYROID ENLARGEMENT: ICD-10-CM

## 2017-12-08 DIAGNOSIS — M25.562 CHRONIC PAIN OF BOTH KNEES: ICD-10-CM

## 2017-12-08 DIAGNOSIS — M25.561 CHRONIC PAIN OF BOTH KNEES: ICD-10-CM

## 2017-12-08 LAB
T4 FREE SERPL-MCNC: 0.82 NG/DL (ref 0.76–1.46)
TSH SERPL DL<=0.005 MIU/L-ACNC: 0.19 MU/L (ref 0.4–4)

## 2017-12-08 PROCEDURE — 84443 ASSAY THYROID STIM HORMONE: CPT | Performed by: PHYSICIAN ASSISTANT

## 2017-12-08 PROCEDURE — 36415 COLL VENOUS BLD VENIPUNCTURE: CPT | Performed by: PHYSICIAN ASSISTANT

## 2017-12-08 PROCEDURE — 84439 ASSAY OF FREE THYROXINE: CPT | Performed by: PHYSICIAN ASSISTANT

## 2017-12-08 PROCEDURE — 99214 OFFICE O/P EST MOD 30 MIN: CPT | Mod: 25 | Performed by: PHYSICIAN ASSISTANT

## 2017-12-08 PROCEDURE — 96372 THER/PROPH/DIAG INJ SC/IM: CPT | Performed by: PHYSICIAN ASSISTANT

## 2017-12-08 PROCEDURE — 87491 CHLMYD TRACH DNA AMP PROBE: CPT | Performed by: PHYSICIAN ASSISTANT

## 2017-12-08 PROCEDURE — 87591 N.GONORRHOEAE DNA AMP PROB: CPT | Performed by: PHYSICIAN ASSISTANT

## 2017-12-08 RX ORDER — DEXTROAMPHETAMINE SACCHARATE, AMPHETAMINE ASPARTATE, DEXTROAMPHETAMINE SULFATE AND AMPHETAMINE SULFATE 5; 5; 5; 5 MG/1; MG/1; MG/1; MG/1
10 TABLET ORAL 2 TIMES DAILY
Qty: 30 TABLET | Refills: 0 | Status: SHIPPED | OUTPATIENT
Start: 2017-12-08 | End: 2017-12-08

## 2017-12-08 RX ORDER — DEXTROAMPHETAMINE SACCHARATE, AMPHETAMINE ASPARTATE, DEXTROAMPHETAMINE SULFATE AND AMPHETAMINE SULFATE 5; 5; 5; 5 MG/1; MG/1; MG/1; MG/1
10 TABLET ORAL 2 TIMES DAILY
Qty: 30 TABLET | Refills: 0 | Status: SHIPPED | OUTPATIENT
Start: 2018-02-08 | End: 2018-03-02

## 2017-12-08 RX ORDER — HYDROCODONE BITARTRATE AND ACETAMINOPHEN 5; 325 MG/1; MG/1
1 TABLET ORAL EVERY 6 HOURS PRN
Qty: 15 TABLET | Refills: 0 | Status: SHIPPED | OUTPATIENT
Start: 2017-12-08 | End: 2018-05-09

## 2017-12-08 RX ORDER — MEDROXYPROGESTERONE ACETATE 150 MG/ML
150 INJECTION, SUSPENSION INTRAMUSCULAR
COMMUNITY
End: 2018-10-10

## 2017-12-08 RX ORDER — DEXTROAMPHETAMINE SACCHARATE, AMPHETAMINE ASPARTATE, DEXTROAMPHETAMINE SULFATE AND AMPHETAMINE SULFATE 5; 5; 5; 5 MG/1; MG/1; MG/1; MG/1
10 TABLET ORAL 2 TIMES DAILY
Qty: 30 TABLET | Refills: 0 | Status: SHIPPED | OUTPATIENT
Start: 2018-01-08 | End: 2018-03-02

## 2017-12-08 RX ORDER — DEXTROAMPHETAMINE SACCHARATE, AMPHETAMINE ASPARTATE, DEXTROAMPHETAMINE SULFATE AND AMPHETAMINE SULFATE 5; 5; 5; 5 MG/1; MG/1; MG/1; MG/1
10 TABLET ORAL 2 TIMES DAILY
Qty: 30 TABLET | Refills: 0 | Status: SHIPPED | OUTPATIENT
Start: 2017-12-08 | End: 2018-03-02

## 2017-12-08 RX ORDER — MEDROXYPROGESTERONE ACETATE 150 MG/ML
150 INJECTION, SUSPENSION INTRAMUSCULAR
Qty: 1 ML | Refills: 4 | OUTPATIENT
Start: 2017-12-08 | End: 2018-10-10

## 2017-12-08 ASSESSMENT — ANXIETY QUESTIONNAIRES
GAD7 TOTAL SCORE: 6
2. NOT BEING ABLE TO STOP OR CONTROL WORRYING: SEVERAL DAYS
1. FEELING NERVOUS, ANXIOUS, OR ON EDGE: SEVERAL DAYS
5. BEING SO RESTLESS THAT IT IS HARD TO SIT STILL: SEVERAL DAYS
7. FEELING AFRAID AS IF SOMETHING AWFUL MIGHT HAPPEN: NOT AT ALL
6. BECOMING EASILY ANNOYED OR IRRITABLE: SEVERAL DAYS
3. WORRYING TOO MUCH ABOUT DIFFERENT THINGS: SEVERAL DAYS

## 2017-12-08 ASSESSMENT — PATIENT HEALTH QUESTIONNAIRE - PHQ9
SUM OF ALL RESPONSES TO PHQ QUESTIONS 1-9: 6
5. POOR APPETITE OR OVEREATING: SEVERAL DAYS

## 2017-12-08 NOTE — PATIENT INSTRUCTIONS
Thyroid ultrasound: For [facility] ([facility], [facility]) imaging departments: call [phone] to schedule     Soak your finger  Let me know if not improving    Continue current medications - prozac, adderall    Follow up with ortho / sports medicine

## 2017-12-08 NOTE — PROGRESS NOTES
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    Significant life event: Yes-  Working alot     Current substance abuse: None    PHQ-9 Score and MyChart F/U Questions 4/10/2017 8/7/2017 9/7/2017   Total Score 3 19 7   Q9: Suicide Ideation Not at all Not at all Not at all     JERMAIN-7 SCORE 6/12/2017 8/7/2017 9/7/2017   Total Score 7 15 11     PHQ-9  English  PHQ-9   Any Language  GAD7  Suicide Assessment Five-step Evaluation and Treatment (SAFE-T)    She has been working long hours; staff will not show up, issues with boss  Very stressful as she is in charge of schedules  She is thinking about new job  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 These have improved except sometimes trouble sleeping. She gets heart pounding with panic attack. Denies symptoms such as palpitations, bowel changes, sweating.    Fell on left knee in October. She has had some knee pain before but this is a flare.  Really bad yesterday, had to miss work  She would like refill on norco - pain with working.    Problem list and histories reviewed & adjusted, as indicated.  Additional history: none    Patient Active Problem List   Diagnosis     History of other specified conditions presenting hazards to health     Major depressive disorder, single episode     Childhood hyperkinetic syndrome     Anxiety state     History of syncope     Tobacco abuse     Attention deficit hyperactivity disorder (ADHD), predominantly inattentive type     Chronic pain of both knees     Past Surgical History:   Procedure Laterality Date      TONSILLECTOMY         Social History   Substance Use Topics     Smoking status: Current Some Day Smoker     Packs/day: 0.50     Types: Cigarettes     Smokeless tobacco: Never Used     Alcohol use 0.0 oz/week     0 Standard drinks or equivalent per week      Comment: 4 drinks per month     Family History   Problem Relation Age of Onset     DIABETES Maternal Grandfather      Coronary Artery Disease Maternal Grandfather      Other Cancer No family hx of              ROS:Other than noted above, general, HEENT, respiratory, cardiac, MS, and gastrointestinal systems are negative.     OBJECTIVE:                                                    /72  Pulse 86  Temp 99  F (37.2  C) (Tympanic)  Wt 123 lb 11.2 oz (56.1 kg)  BMI 22.63 kg/m2 Body mass index is 22.63 kg/(m^2).   GENERAL: healthy, alert, well nourished, well hydrated, no distress  NECK: no tenderness, no adenopathy, no asymmetry, no masses, no stiffness; thyroid- POSITIVE enlarged, nontender, no nodules palpated  RESP: lungs clear to auscultation - no rales, no rhonchi, no wheezes  CV: regular rates and rhythm, normal S1 S2, no S3 or S4 and no murmur, no click or rub -  ABDOMEN: soft, no tenderness, no  hepatosplenomegaly, no masses, normal bowel sounds  MS: extremities- no gross deformities noted, no edema  Knee Exam: Inspection: No effusion bilaterally   Tender: generalized bilateral tenderness   Active Range of Motion: full flexion, pain with flexion, full extension, pain with extension  Strength: full  Special tests: normal Valgus stress test, normal Varus, negative Lachman's test, negative Jessica's, no apprehension with lateral stress of the patella, normal anterior and posterior drawer, normal medial and lateral ligaments      ASSESSMENT/PLAN:                                                      ASSESSMENT/PLAN:      ICD-10-CM    1. Attention deficit hyperactivity disorder (ADHD), predominantly inattentive type F90.0  amphetamine-dextroamphetamine (ADDERALL) 20 MG per tablet     amphetamine-dextroamphetamine (ADDERALL) 20 MG per tablet     amphetamine-dextroamphetamine (ADDERALL) 20 MG per tablet     T4 free     DISCONTINUED: amphetamine-dextroamphetamine (ADDERALL) 20 MG per tablet     DISCONTINUED: amphetamine-dextroamphetamine (ADDERALL) 20 MG per tablet     DISCONTINUED: amphetamine-dextroamphetamine (ADDERALL) 20 MG per tablet   2. Moderate single current episode of major depressive disorder (H) F32.1 FLUoxetine (PROZAC) 20 MG capsule   3. Patellar tendinitis, left knee M76.52    4. Patellar bursitis of left knee M70.52 HYDROcodone-acetaminophen (NORCO) 5-325 MG per tablet     ORTHO  REFERRAL   5. Encounter for surveillance of injectable contraceptive Z30.42 medroxyPROGESTERone (DEPO-PROVERA) 150 MG/ML injection     Medroxyprogesterone inj/1mg (Depo Provera J-Code)   6. Chronic pain of both knees M25.561 HYDROcodone-acetaminophen (NORCO) 5-325 MG per tablet    M25.562 ORTHO  REFERRAL    G89.29    7. Thyroid enlargement E04.9 TSH with free T4 reflex     US Thyroid   8. Screen for STD (sexually transmitted disease) Z11.3 Chlamydia trachomatis PCR     Neisseria gonorrhoeae PCR   9. Paronychia of finger of left hand L03.012      Left 3rd finger paronychia - will let us know if worsening signs such as spreading erythema/swelling.  Knees: she does not want to see physical therapy now, did not like it last year. She would like ortho referral. She deferred x-ray today. Did refill norco, discussed risks/side effects, not good for long term use.  Still has enlarged thryoid, will recheck TSH, symptoms are resolved. Recommended u/s. Suspect viral thyroiditis.    Patient Instructions   Thyroid ultrasound: For [facility] imaging departments: call [phone] to schedule     Soak your finger  Let me know if not improving    Continue current medications - prozac, adderall    Follow up with  ortho / sports medicine    30 minutes was spent face to face with the patient today discussing history, diagnosis, and follow-up plan as noted above. Over 50% of the visit was spent in counseling and coordination of care. Total Visit Time: 30 minutes.     Carol Jennings PA-C   [facility]

## 2017-12-08 NOTE — LETTER
[facility]  [address]  [facility] [address]  Phone: [phone]    December 8, 2017        Nargis Ruelas  [address] [place] [address]          To whom it may concern:    RE: Nargis Ruelas    Patient was seen and treated today at our clinic.  Patient may return to work 12/9/17 with the following:    When the patient returns to work, the following restrictions apply until 12/15/17:    Bend: Not at all (0 hours)  Squat: Not at all (0 hours)  Walk/Stand: Occasionally (1-3 hours)  Lift, carry, push, and pull no more than: 11-20 lbs.     Please contact me for questions or concerns.      Sincerely,        Carol Jennings PA-C

## 2017-12-08 NOTE — NURSING NOTE
"Chief Complaint   Patient presents with     Recheck Medication       Initial /72  Pulse 86  Temp 99  F (37.2  C) (Tympanic)  Wt 123 lb 11.2 oz (56.1 kg)  BMI 22.63 kg/m2 Estimated body mass index is 22.63 kg/(m^2) as calculated from the following:    Height as of 11/15/17: 5' 2\" (1.575 m).    Weight as of this encounter: 123 lb 11.2 oz (56.1 kg).  Medication Reconciliation: complete   Renea Alas CMA  "

## 2017-12-08 NOTE — MR AVS SNAPSHOT
After Visit Summary   12/8/2017    Nargis Ruelas    MRN: [id number]           Patient Information     Date Of Birth          1995        Visit Information        Provider Department      12/8/2017 11:00 AM Carol Jennings PA-C [facility]        Today's Diagnoses     Attention deficit hyperactivity disorder (ADHD), predominantly inattentive type    -  1    Moderate single current episode of major depressive disorder (H)        Patellar tendinitis, left knee        Patellar bursitis of left knee        Encounter for surveillance of injectable contraceptive        Chronic pain of both knees        Thyroid enlargement          Care Instructions    Thyroid ultrasound: For [facility] ([facility], [facility]) imaging departments: call [phone] to schedule     Soak your finger  Let me know if not improving    Continue current medications - prozac, adderall    Follow up with ortho / sports medicine          Follow-ups after your visit        Additional Services     ORTHO  REFERRAL       [facility] is referring you to the Orthopedic  Services at [facility].       The  Representative will assist you in the coordination of your Orthopedic and Musculoskeletal Care as prescribed by your physician.    The  Representative will call you within 1 business day to help schedule your appointment, or you may contact the  Representative at:    All areas ~ [phone]     Type of Referral : Non Surgical       Timeframe requested: within 1 week    Coverage of these services is subject to the terms and limitations of your health insurance plan.  Please call member services at your health plan with any benefit or coverage questions.      If X-rays, CT or MRI's have been performed, please contact the facility where they were done to arrange for , prior to your scheduled appointment.  Please bring this referral  request to your appointment and present it to your specialist.                  Future tests that were ordered for you today     Open Future Orders        Priority Expected Expires Ordered    US Thyroid Routine  12/8/2018 12/8/2017            Who to contact     Normal or non-critical lab and imaging results will be communicated to you by Network [employer] Interaction[employer], letter or phone within 4 business days after the clinic has received the results. If you do not hear from us within 7 days, please contact the clinic through [employer] or phone. If you have a critical or abnormal lab result, we will notify you by phone as soon as possible.  Submit refill requests through Dr. TATTOFF or call your pharmacy and they will forward the refill request to us. Please allow 3 business days for your refill to be completed.          If you need to speak with a  for additional information , please call: [phone]             Additional Information About Your Visit        Dr. TATTOFF Information     Dr. TATTOFF gives you secure access to your electronic health record. If you see a primary care provider, you can also send messages to your care team and make appointments. If you have questions, please call your primary care clinic.  If you do not have a primary care provider, please call [phone] and they will assist you.        Care EveryWhere ID     This is your Care EveryWhere ID. This could be used by other organizations to access your [facility] medical records  [id number]        Your Vitals Were     Pulse Temperature BMI (Body Mass Index)             86 99  F (37.2  C) (Tympanic) 22.63 kg/m2          Blood Pressure from Last 3 Encounters:   12/08/17 118/72   11/15/17 108/62   10/30/17 129/83    Weight from Last 3 Encounters:   12/08/17 123 lb 11.2 oz (56.1 kg)   11/15/17 122 lb 4.8 oz (55.5 kg)   10/30/17 125 lb 11.2 oz (57 kg)              We Performed the Following     ORTHO  REFERRAL     TSH with free T4 reflex          Today's  Medication Changes          These changes are accurate as of: 12/8/17 12:27 PM.  If you have any questions, ask your nurse or doctor.               These medicines have changed or have updated prescriptions.        Dose/Directions    * amphetamine-dextroamphetamine 20 MG per tablet   Commonly known as:  ADDERALL   This may have changed:  Another medication with the same name was added. Make sure you understand how and when to take each.   Used for:  Attention deficit hyperactivity disorder (ADHD), predominantly inattentive type   Changed by:  Maria Elena Banegas PA-C        Dose:  10 mg   Take 0.5 tablets (10 mg) by mouth 2 times daily   Quantity:  30 tablet   Refills:  0       * amphetamine-dextroamphetamine 20 MG per tablet   Commonly known as:  ADDERALL   This may have changed:  You were already taking a medication with the same name, and this prescription was added. Make sure you understand how and when to take each.   Used for:  Attention deficit hyperactivity disorder (ADHD), predominantly inattentive type   Changed by:  Carol Jennings PA-C        Dose:  10 mg   Take 0.5 tablets (10 mg) by mouth 2 times daily   Quantity:  30 tablet   Refills:  0       * amphetamine-dextroamphetamine 20 MG per tablet   Commonly known as:  ADDERALL   This may have changed:  You were already taking a medication with the same name, and this prescription was added. Make sure you understand how and when to take each.   Used for:  Attention deficit hyperactivity disorder (ADHD), predominantly inattentive type   Changed by:  Carol Jennings PA-C        Dose:  10 mg   Take 0.5 tablets (10 mg) by mouth 2 times daily   Quantity:  30 tablet   Refills:  0       * amphetamine-dextroamphetamine 20 MG per tablet   Commonly known as:  ADDERALL   This may have changed:  You were already taking a medication with the same name, and this prescription was added. Make sure you understand how and when to take each.   Used for:   Attention deficit hyperactivity disorder (ADHD), predominantly inattentive type   Changed by:  Carol Jennings PA-C        Dose:  10 mg   Take 0.5 tablets (10 mg) by mouth 2 times daily   Quantity:  30 tablet   Refills:  0       atenolol 25 MG tablet   Commonly known as:  TENORMIN   This may have changed:  additional instructions   Used for:  Thyroid enlargement, Tachycardia, Thyroiditis        Dose:  25 mg   Take 1 tablet (25 mg) by mouth daily May increase to 2 tablets in 2-3 days if tolerated   Quantity:  60 tablet   Refills:  1       * Notice:  This list has 4 medication(s) that are the same as other medications prescribed for you. Read the directions carefully, and ask your doctor or other care provider to review them with you.         Where to get your medicines      These medications were sent to [facility] [address] [facility]  [address] [facility] [address]     Phone:  [phone]     FLUoxetine 20 MG capsule         Some of these will need a paper prescription and others can be bought over the counter.  Ask your nurse if you have questions.     Bring a paper prescription for each of these medications     amphetamine-dextroamphetamine 20 MG per tablet    amphetamine-dextroamphetamine 20 MG per tablet    amphetamine-dextroamphetamine 20 MG per tablet    HYDROcodone-acetaminophen 5-325 MG per tablet       You don't need a prescription for these medications     medroxyPROGESTERone 150 MG/ML injection                Primary Care Provider Office Phone # Fax #    [facility] [phone] [address] [facility] [address]        Equal Access to Services     BRANDON DARNELL : Hadii keith ku hadasho Soomaali, waaxda luqadaha, qaybta kaalmada adeegyada, beatris pickens. So [facility] [phone].    ATENCIÓN: Si habla español, tiene a garcia disposición servicios gratuitos de asistencia lingüística. Llame al [phone].    We  comply with applicable federal civil rights laws and [place] laws. We do not discriminate on the basis of race, color, national origin, age, disability, sex, sexual orientation, or gender identity.            Thank you!     Thank you for choosing [facility]  for your care. Our goal is always to provide you with excellent care. Hearing back from our patients is one way we can continue to improve our services. Please take a few minutes to complete the written survey that you may receive in the mail after your visit with us. Thank you!             Your Updated Medication List - Protect others around you: Learn how to safely use, store and throw away your medicines at www.disposemymeds.org.          This list is accurate as of: 12/8/17 12:27 PM.  Always use your most recent med list.                   Brand Name Dispense Instructions for use Diagnosis    * amphetamine-dextroamphetamine 20 MG per tablet    ADDERALL    30 tablet    Take 0.5 tablets (10 mg) by mouth 2 times daily    Attention deficit hyperactivity disorder (ADHD), predominantly inattentive type       * amphetamine-dextroamphetamine 20 MG per tablet    ADDERALL    30 tablet    Take 0.5 tablets (10 mg) by mouth 2 times daily    Attention deficit hyperactivity disorder (ADHD), predominantly inattentive type       * amphetamine-dextroamphetamine 20 MG per tablet    ADDERALL    30 tablet    Take 0.5 tablets (10 mg) by mouth 2 times daily    Attention deficit hyperactivity disorder (ADHD), predominantly inattentive type       * amphetamine-dextroamphetamine 20 MG per tablet    ADDERALL    30 tablet    Take 0.5 tablets (10 mg) by mouth 2 times daily    Attention deficit hyperactivity disorder (ADHD), predominantly inattentive type       atenolol 25 MG tablet    TENORMIN    60 tablet    Take 1 tablet (25 mg) by mouth daily May increase to 2 tablets in 2-3 days if tolerated    Thyroid enlargement, Tachycardia, Thyroiditis       FLUoxetine 20 MG capsule     PROzac    30 capsule    Take 1 capsule (20 mg) by mouth daily    Moderate single current episode of major depressive disorder (H)       HYDROcodone-acetaminophen 5-325 MG per tablet    NORCO    15 tablet    Take 1 tablet by mouth every 6 hours as needed for moderate to severe pain    Patellar bursitis of left knee, Chronic pain of both knees       LORazepam 0.5 MG tablet    ATIVAN    20 tablet    Take 1-2 tablets (0.5-1 mg) by mouth every 8 hours as needed for anxiety    Panic attack       medroxyPROGESTERone 150 MG/ML injection    DEPO-PROVERA    1 mL    Inject 1 mL (150 mg) into the muscle every 3 months    Encounter for surveillance of injectable contraceptive       ranitidine 150 MG tablet    ZANTAC    60 tablet    Take 1 tablet (150 mg) by mouth 2 times daily    Thyroiditis, Thyroid enlargement       tretinoin 0.025 % cream    RETIN-A    45 g    Spread a pea size amount into affected area topically at bedtime.  Use sunscreen SPF>20.    Acne       * Notice:  This list has 4 medication(s) that are the same as other medications prescribed for you. Read the directions carefully, and ask your doctor or other care provider to review them with you.

## 2017-12-09 ASSESSMENT — ANXIETY QUESTIONNAIRES: GAD7 TOTAL SCORE: 6

## 2017-12-11 ENCOUNTER — MYC REFILL (OUTPATIENT)
Dept: FAMILY MEDICINE | Facility: CLINIC | Age: 22
End: 2017-12-11

## 2017-12-11 DIAGNOSIS — F41.0 PANIC ATTACK: ICD-10-CM

## 2017-12-11 LAB
C TRACH DNA SPEC QL NAA+PROBE: NEGATIVE
N GONORRHOEA DNA SPEC QL NAA+PROBE: NEGATIVE
SPECIMEN SOURCE: NORMAL
SPECIMEN SOURCE: NORMAL

## 2017-12-11 RX ORDER — LORAZEPAM 0.5 MG/1
.5-1 TABLET ORAL EVERY 8 HOURS PRN
Qty: 20 TABLET | Refills: 0 | Status: SHIPPED | OUTPATIENT
Start: 2017-12-11 | End: 2018-03-02

## 2017-12-11 NOTE — TELEPHONE ENCOUNTER
I have not seen patient since 3/2016, however she has seen several providers in this clinic for refills of pain meds, ativan, and adderall.   I would need to see her again to refill the ativan unless there is someone who has seen her more recently who would be willing to do this (she has seen Marah several times)  I would advise that she find one provider for all the medications    Maria Elena

## 2017-12-11 NOTE — TELEPHONE ENCOUNTER
Message from T[employer]:  Original authorizing provider: NASREEN No would like a refill of the following medications:  LORazepam (ATIVAN) 0.5 MG tablet [Maria Elena Banegas PA-C]    Preferred pharmacy: [facility] [place]  [place], [place] - [address] [address]    Comment:

## 2017-12-11 NOTE — TELEPHONE ENCOUNTER
I will refill. Please tell patient she needs to see me before she needs another refill.  Has she made the appointment for ortho?  Will sign and place at [facility] desk.  Carol Jennings PA-C

## 2017-12-12 NOTE — TELEPHONE ENCOUNTER
LEFT MESSAGE of need for appointment before any further refills.  Script walked to [facility]..Melonie Quinteros

## 2018-01-04 ENCOUNTER — TELEPHONE (OUTPATIENT)
Dept: FAMILY MEDICINE | Facility: CLINIC | Age: 23
End: 2018-01-04

## 2018-01-04 NOTE — TELEPHONE ENCOUNTER
I received a note from pharmacy that patient is requesting early refill of adderall (1/5 instead of 1/8).  Please see which doses she would like and why she would like an early refill.  Carol Jennings PA-C

## 2018-01-07 ENCOUNTER — MYC REFILL (OUTPATIENT)
Dept: FAMILY MEDICINE | Facility: CLINIC | Age: 23
End: 2018-01-07

## 2018-01-07 DIAGNOSIS — F32.1 MODERATE SINGLE CURRENT EPISODE OF MAJOR DEPRESSIVE DISORDER (H): ICD-10-CM

## 2018-01-07 DIAGNOSIS — F90.0 ATTENTION DEFICIT HYPERACTIVITY DISORDER (ADHD), PREDOMINANTLY INATTENTIVE TYPE: ICD-10-CM

## 2018-01-07 RX ORDER — DEXTROAMPHETAMINE SACCHARATE, AMPHETAMINE ASPARTATE, DEXTROAMPHETAMINE SULFATE AND AMPHETAMINE SULFATE 5; 5; 5; 5 MG/1; MG/1; MG/1; MG/1
10 TABLET ORAL 2 TIMES DAILY
Qty: 30 TABLET | Refills: 0 | Status: CANCELLED | OUTPATIENT
Start: 2018-01-07

## 2018-01-08 NOTE — TELEPHONE ENCOUNTER
Message from ALENTYt:  Original authorizing provider: Carol Jennings PA-C    Nargis Ruelas would like a refill of the following medications:  FLUoxetine (PROZAC) 20 MG capsule [Carol Jennings PA-C]    Preferred pharmacy: [facility] [place]  [place], [place] - [address] [address]    Comment:

## 2018-01-08 NOTE — TELEPHONE ENCOUNTER
Message from Oddslifehart:  Original authorizing provider: Carol Jennings PA-C    Nargis Ruelas would like a refill of the following medications:  amphetamine-dextroamphetamine (ADDERALL) 20 MG per tablet [Carol Jennings PA-C]    Preferred pharmacy: [facility] [place], [place] - [address] [address]    Comment:

## 2018-01-12 ENCOUNTER — MYC REFILL (OUTPATIENT)
Dept: FAMILY MEDICINE | Facility: CLINIC | Age: 23
End: 2018-01-12

## 2018-01-12 DIAGNOSIS — F32.1 MODERATE SINGLE CURRENT EPISODE OF MAJOR DEPRESSIVE DISORDER (H): ICD-10-CM

## 2018-01-12 NOTE — TELEPHONE ENCOUNTER
Message from VidBidt:  Original authorizing provider: Carol Jennings PA-C    Nargis Ruelas would like a refill of the following medications:  FLUoxetine (PROZAC) 20 MG capsule [Carol Jennings PA-C]    Preferred pharmacy: [facility] [place], [place] - [address] [address]    Comment:  Unfortunately I was unable to afford it at that time but I do now have the money to refill and get it.

## 2018-01-12 NOTE — TELEPHONE ENCOUNTER
Prescription approved per [facility] Refill Protocol.  Shivani Flores RN

## 2018-02-08 ENCOUNTER — MYC REFILL (OUTPATIENT)
Dept: FAMILY MEDICINE | Facility: CLINIC | Age: 23
End: 2018-02-08

## 2018-02-08 DIAGNOSIS — F90.0 ATTENTION DEFICIT HYPERACTIVITY DISORDER (ADHD), PREDOMINANTLY INATTENTIVE TYPE: ICD-10-CM

## 2018-02-08 RX ORDER — DEXTROAMPHETAMINE SACCHARATE, AMPHETAMINE ASPARTATE, DEXTROAMPHETAMINE SULFATE AND AMPHETAMINE SULFATE 5; 5; 5; 5 MG/1; MG/1; MG/1; MG/1
10 TABLET ORAL 2 TIMES DAILY
Qty: 30 TABLET | Refills: 0 | Status: CANCELLED | OUTPATIENT
Start: 2018-02-08

## 2018-02-09 NOTE — TELEPHONE ENCOUNTER
"According to [facility] Database, last controlled prescription was:  1/8/18 adderall from myself  I confirmed with pharmacy that they have the 2/8/18 prescription ready for her.  I sent mychart to patient.  \"Renny Barillas,  I just talked to pharmacy, they have a prescription ready for you already. You are not due for a refill til next month.   How have you been feeling? How are the medications going for you?  You are also due for a repeat thyroid test.  Marah Jennings PA-C \"  "

## 2018-02-09 NOTE — TELEPHONE ENCOUNTER
Message from MyChart:  Original authorizing provider: Carol Jennings PA-C    Nargis Ruelas would like a refill of the following medications:  amphetamine-dextroamphetamine (ADDERALL) 20 MG per tablet [Carol Jennings PA-C]  amphetamine-dextroamphetamine (ADDERALL) 20 MG per tablet [Carol Jennings PA-C]  amphetamine-dextroamphetamine (ADDERALL) 20 MG per tablet [Carol Jennings PA-C]    Preferred pharmacy: [facility] [place], [place] - [address] [address]    Comment:

## 2018-03-02 ENCOUNTER — OFFICE VISIT (OUTPATIENT)
Dept: FAMILY MEDICINE | Facility: CLINIC | Age: 23
End: 2018-03-02
Payer: COMMERCIAL

## 2018-03-02 VITALS
BODY MASS INDEX: 24.29 KG/M2 | HEART RATE: 77 BPM | WEIGHT: 132 LBS | TEMPERATURE: 99 F | HEIGHT: 62 IN | SYSTOLIC BLOOD PRESSURE: 129 MMHG | DIASTOLIC BLOOD PRESSURE: 83 MMHG

## 2018-03-02 DIAGNOSIS — F32.1 MODERATE SINGLE CURRENT EPISODE OF MAJOR DEPRESSIVE DISORDER (H): Primary | ICD-10-CM

## 2018-03-02 DIAGNOSIS — Z30.42 SURVEILLANCE FOR DEPO-PROVERA CONTRACEPTION: ICD-10-CM

## 2018-03-02 DIAGNOSIS — E04.9 THYROID ENLARGEMENT: ICD-10-CM

## 2018-03-02 DIAGNOSIS — F41.0 PANIC ATTACK: ICD-10-CM

## 2018-03-02 DIAGNOSIS — F90.0 ATTENTION DEFICIT HYPERACTIVITY DISORDER (ADHD), PREDOMINANTLY INATTENTIVE TYPE: ICD-10-CM

## 2018-03-02 PROCEDURE — 84443 ASSAY THYROID STIM HORMONE: CPT | Performed by: PHYSICIAN ASSISTANT

## 2018-03-02 PROCEDURE — 84439 ASSAY OF FREE THYROXINE: CPT | Performed by: PHYSICIAN ASSISTANT

## 2018-03-02 PROCEDURE — 36415 COLL VENOUS BLD VENIPUNCTURE: CPT | Performed by: PHYSICIAN ASSISTANT

## 2018-03-02 PROCEDURE — 96372 THER/PROPH/DIAG INJ SC/IM: CPT | Performed by: PHYSICIAN ASSISTANT

## 2018-03-02 PROCEDURE — 99214 OFFICE O/P EST MOD 30 MIN: CPT | Mod: 25 | Performed by: PHYSICIAN ASSISTANT

## 2018-03-02 RX ORDER — FLUOXETINE 40 MG/1
40 CAPSULE ORAL DAILY
Qty: 30 CAPSULE | Refills: 1 | Status: SHIPPED | OUTPATIENT
Start: 2018-03-02 | End: 2018-05-09

## 2018-03-02 RX ORDER — DEXTROAMPHETAMINE SACCHARATE, AMPHETAMINE ASPARTATE, DEXTROAMPHETAMINE SULFATE AND AMPHETAMINE SULFATE 5; 5; 5; 5 MG/1; MG/1; MG/1; MG/1
10 TABLET ORAL 2 TIMES DAILY
Qty: 30 TABLET | Refills: 0 | Status: SHIPPED | OUTPATIENT
Start: 2018-03-09 | End: 2018-05-09

## 2018-03-02 RX ORDER — DEXTROAMPHETAMINE SACCHARATE, AMPHETAMINE ASPARTATE, DEXTROAMPHETAMINE SULFATE AND AMPHETAMINE SULFATE 5; 5; 5; 5 MG/1; MG/1; MG/1; MG/1
10 TABLET ORAL 2 TIMES DAILY
Qty: 30 TABLET | Refills: 0 | Status: SHIPPED | OUTPATIENT
Start: 2018-04-09 | End: 2018-07-11

## 2018-03-02 RX ORDER — LORAZEPAM 0.5 MG/1
.5-1 TABLET ORAL EVERY 8 HOURS PRN
Qty: 20 TABLET | Refills: 0 | Status: SHIPPED | OUTPATIENT
Start: 2018-03-02 | End: 2018-05-09

## 2018-03-02 ASSESSMENT — ANXIETY QUESTIONNAIRES
7. FEELING AFRAID AS IF SOMETHING AWFUL MIGHT HAPPEN: SEVERAL DAYS
1. FEELING NERVOUS, ANXIOUS, OR ON EDGE: MORE THAN HALF THE DAYS
2. NOT BEING ABLE TO STOP OR CONTROL WORRYING: MORE THAN HALF THE DAYS
6. BECOMING EASILY ANNOYED OR IRRITABLE: SEVERAL DAYS
GAD7 TOTAL SCORE: 9
3. WORRYING TOO MUCH ABOUT DIFFERENT THINGS: MORE THAN HALF THE DAYS
5. BEING SO RESTLESS THAT IT IS HARD TO SIT STILL: NOT AT ALL

## 2018-03-02 ASSESSMENT — PATIENT HEALTH QUESTIONNAIRE - PHQ9: 5. POOR APPETITE OR OVEREATING: SEVERAL DAYS

## 2018-03-02 NOTE — PROGRESS NOTES
SUBJECTIVE:                                                    Nargis Ruelas is a 23 year old female who presents to clinic today for the following health issues:    Medication Followup of Prozac and talk about anxiety medication    Taking Medication as prescribed: yes    Side Effects:  None    Medication Helping Symptoms:  Would like to talk about different options for anxiety.      *  Recheck thyroid   *  Talk about medications prescribed for thyroid  Needs thyroid u/s. She says she started atenolol October but didn't refill    Broke up with boyfriend of 3 years about 1.5 months ago. She called in to work, then they fired her. Was living with boyfriend, had to move   Now working at [employer] in [place], early [email or web address] shift, is much better situation   lives with best friend who is with her today  Overall she is doing okay    Depression - improved  allt he time anxiety, some panic attacks  Trouble falling and staying asleep    Voice feels deeper, Thyroid feels enlarged  Hands are sweaty  Has depo - no periods  bMs stable    Component      Latest Ref Rng & Units 10/30/2017 12/8/2017   TSH      0.40 - 4.00 mU/L 0.89 0.19 (L)   T4 Free      0.76 - 1.46 ng/dL  0.82         Problem list and histories reviewed & adjusted, as indicated.  Additional history: none    Patient Active Problem List   Diagnosis     History of other specified conditions presenting hazards to health     Major depressive disorder, single episode     Childhood hyperkinetic syndrome     Anxiety state     History of syncope     Tobacco abuse     Attention deficit hyperactivity disorder (ADHD), predominantly inattentive type     Chronic pain of both knees     Past Surgical History:   Procedure Laterality Date     TONSILLECTOMY         Social History   Substance Use Topics     Smoking status: Current Some Day Smoker     Packs/day: 0.50     Types: Cigarettes     Smokeless tobacco: Never Used     Alcohol use 0.0 oz/week     0 Standard drinks or equivalent  "per week      Comment: 4 drinks per month     Family History   Problem Relation Age of Onset     DIABETES Maternal Grandfather      Coronary Artery Disease Maternal Grandfather      Other Cancer No family hx of          Labs reviewed in EPIC    ROS:  Other than noted above, general, HEENT, respiratory, cardiac, MS, and gastrointestinal systems are negative.     OBJECTIVE:                                                    /83 (BP Location: Right arm, Patient Position: Sitting, Cuff Size: Adult Regular)  Pulse 77  Temp 99  F (37.2  C) (Tympanic)  Ht 5' 2\" (1.575 m)  Wt 132 lb (59.9 kg)  BMI 24.14 kg/m2 Body mass index is 24.14 kg/(m^2).   GENERAL: healthy, alert, well nourished, well hydrated, no distress  HENT: ear canals- normal; TMs- normal; Nose- normal; Mouth- no ulcers, no lesions  NECK: no tenderness, no adenopathy, no asymmetry, no masses, no stiffness; thyroid- enlarged, nontender, no nodules palpated  RESP: lungs clear to auscultation - no rales, no rhonchi, no wheezes  CV: regular rates and rhythm, normal S1 S2, no S3 or S4 and no murmur, no click or rub -  ABDOMEN: soft, no tenderness, no  hepatosplenomegaly, no masses, normal bowel sounds  PSYCH: Alert and oriented times 3; speech- coherent , normal rate and volume; able to articulate logical thoughts, able to abstract reason, no tangential thoughts, no hallucinations or delusions, affect- normal       ASSESSMENT/PLAN:                                                      ASSESSMENT/PLAN:      ICD-10-CM    1. Moderate single current episode of major depressive disorder (H) F32.1 FLUoxetine (PROZAC) 40 MG capsule   2. Thyroid enlargement E04.9 **TSH with free T4 reflex FUTURE 1yr   3. Panic attack F41.0 LORazepam (ATIVAN) 0.5 MG tablet   4. Attention deficit hyperactivity disorder (ADHD), predominantly inattentive type F90.0 amphetamine-dextroamphetamine (ADDERALL) 20 MG per tablet     amphetamine-dextroamphetamine (ADDERALL) 20 MG per tablet "   5. Surveillance for Depo-Provera contraception Z30.42 Medroxyprogesterone inj/1mg (Depo Provera J-Code)     INJECTION INTRAMUSCULAR OR SUB-Q     Recommended that patient have ultrasound of thyroid performed.    Patient Instructions     Increase prozac to 40 mg daily  Let me know in a few weeks how you're doing    Good Sleep Hygiene: handout    Carol Jennings PA-C   [facility]

## 2018-03-02 NOTE — NURSING NOTE
"Chief Complaint   Patient presents with     Recheck Medication       Initial /83  Pulse 77  Temp 99  F (37.2  C) (Tympanic)  Ht 5' 2\" (1.575 m)  Wt 132 lb (59.9 kg)  BMI 24.14 kg/m2 Estimated body mass index is 24.14 kg/(m^2) as calculated from the following:    Height as of this encounter: 5' 2\" (1.575 m).    Weight as of this encounter: 132 lb (59.9 kg).  Medication Reconciliation: complete   Renea Alas CMA  "

## 2018-03-02 NOTE — PATIENT INSTRUCTIONS
Increase prozac to 40 mg daily  Let me know in a few weeks how you're doing    Good Sleep Hygiene:  ?Sleep as long as necessary to feel rested (usually 7 to 8 hours for adults) and then get out of bed  ?Maintain a regular sleep schedule  ?Try not to force sleep  ?Avoid caffeinated beverages after lunch  ?Avoid alcohol near bedtime (eg, late afternoon and evening)  ?Avoid smoking or other nicotine intake, particularly during the evening  ?Adjust the bedroom environment as needed to decrease stimuli (eg, reduce ambient light, turn off the television or radio)  ?Resolve concerns or worries before bedtime  ?Exercise regularly for at least 20 minutes, preferably more than four to five hours prior to bedtime  ?Avoid daytime naps, especially if they are longer than 20 to 30 minutes or occur late in the day    Stimulus control therapy rules   1. Go to bed only when sleepy.   2. Do not watch television, read, eat, or worry while in bed. Use bed only for sleep and sex.   3. Get out of bed if unable to fall asleep within twenty minutes and go to another room. Return to bed only when sleepy. Repeat this step as many times as necessary throughout the night.   4. Set an alarm clock to wake up at a fixed time each morning including weekends.   5. Do not take a nap during the day.   Progressive muscle relaxation: tensing and relaxing each muscle group  Deep breathing and abdominal breathing    Visit helpguide.org for insomnia tips    Other strategies:  Tell yourself that you are going to fall a sleep, Sleep all night and wake rested in the morning, Tell this to yourself at least 8 times before going to bed. Do your deep breathing Breath in to the count of 3, hold your breath to the count of 3 and out to the count of 3. Repeat this 2-3 times.   If you wake during the night repeat this.   You can use over the counter Melatonin for sleep for the next couple of weeks and then as needed. Deep breathing, breathe in to the count of 3,  hold it for the count of 3, then let out to the count of 3. Repeat this 3-4 times. If you are still not sleeping then tighten the muscles from the toes to the head. Hold and then release in the reverse order.

## 2018-03-02 NOTE — MR AVS SNAPSHOT
After Visit Summary   3/2/2018    Nargis Ruelas    MRN: [id number]           Patient Information     Date Of Birth          1995        Visit Information        Provider Department      3/2/2018 1:20 PM Carol Jennings PA-C [facility]        Today's Diagnoses     Thyroid enlargement        Panic attack        Moderate single current episode of major depressive disorder (H)        Attention deficit hyperactivity disorder (ADHD), predominantly inattentive type          Care Instructions    Increase prozac to 40 mg daily  Let me know in a few weeks how you're doing    Good Sleep Hygiene:  ?Sleep as long as necessary to feel rested (usually 7 to 8 hours for adults) and then get out of bed  ?Maintain a regular sleep schedule  ?Try not to force sleep  ?Avoid caffeinated beverages after lunch  ?Avoid alcohol near bedtime (eg, late afternoon and evening)  ?Avoid smoking or other nicotine intake, particularly during the evening  ?Adjust the bedroom environment as needed to decrease stimuli (eg, reduce ambient light, turn off the television or radio)  ?Resolve concerns or worries before bedtime  ?Exercise regularly for at least 20 minutes, preferably more than four to five hours prior to bedtime  ?Avoid daytime naps, especially if they are longer than 20 to 30 minutes or occur late in the day    Stimulus control therapy rules   1. Go to bed only when sleepy.   2. Do not watch television, read, eat, or worry while in bed. Use bed only for sleep and sex.   3. Get out of bed if unable to fall asleep within twenty minutes and go to another room. Return to bed only when sleepy. Repeat this step as many times as necessary throughout the night.   4. Set an alarm clock to wake up at a fixed time each morning including weekends.   5. Do not take a nap during the day.   Progressive muscle relaxation: tensing and relaxing each muscle group  Deep breathing and abdominal breathing    Visit helpguide.org for  insomnia tips    Other strategies:  Tell yourself that you are going to fall a sleep, Sleep all night and wake rested in the morning, Tell this to yourself at least 8 times before going to bed. Do your deep breathing Breath in to the count of 3, hold your breath to the count of 3 and out to the count of 3. Repeat this 2-3 times.   If you wake during the night repeat this.   You can use over the counter Melatonin for sleep for the next couple of weeks and then as needed. Deep breathing, breathe in to the count of 3, hold it for the count of 3, then let out to the count of 3. Repeat this 3-4 times. If you are still not sleeping then tighten the muscles from the toes to the head. Hold and then release in the reverse order.              Follow-ups after your visit        Who to contact     Normal or non-critical lab and imaging results will be communicated to you by [employer], letter or phone within 4 business days after the clinic has received the results. If you do not hear from us within 7 days, please contact the clinic through [employer] or phone. If you have a critical or abnormal lab result, we will notify you by phone as soon as possible.  Submit refill requests through [employer] or call your pharmacy and they will forward the refill request to us. Please allow 3 business days for your refill to be completed.          If you need to speak with a  for additional information , please call: [phone]             Additional Information About Your Visit        [employer] Information     [employer] gives you secure access to your electronic health record. If you see a primary care provider, you can also send messages to your care team and make appointments. If you have questions, please call your primary care clinic.  If you do not have a primary care provider, please call [phone] and they will assist you.        Care EveryWhere ID     This is your Care EveryWhere ID. This could be used by other  "organizations to access your [place] medical records  [id number]        Your Vitals Were     Pulse Temperature Height BMI (Body Mass Index)          77 99  F (37.2  C) (Tympanic) 5' 2\" (1.575 m) 24.14 kg/m2         Blood Pressure from Last 3 Encounters:   03/02/18 129/83   12/08/17 118/72   11/15/17 108/62    Weight from Last 3 Encounters:   03/02/18 132 lb (59.9 kg)   12/08/17 123 lb 11.2 oz (56.1 kg)   11/15/17 122 lb 4.8 oz (55.5 kg)              We Performed the Following     **TSH with free T4 reflex FUTURE 1yr          Today's Medication Changes          These changes are accurate as of 3/2/18  2:20 PM.  If you have any questions, ask your nurse or doctor.               These medicines have changed or have updated prescriptions.        Dose/Directions    * amphetamine-dextroamphetamine 20 MG per tablet   Commonly known as:  ADDERALL   This may have changed:  Another medication with the same name was added. Make sure you understand how and when to take each.   Used for:  Attention deficit hyperactivity disorder (ADHD), predominantly inattentive type   Changed by:  Carol Jennings PA-C        Dose:  10 mg   Start taking on:  3/9/2018   Take 0.5 tablets (10 mg) by mouth 2 times daily   Quantity:  30 tablet   Refills:  0       * amphetamine-dextroamphetamine 20 MG per tablet   Commonly known as:  ADDERALL   This may have changed:  You were already taking a medication with the same name, and this prescription was added. Make sure you understand how and when to take each.   Used for:  Attention deficit hyperactivity disorder (ADHD), predominantly inattentive type   Changed by:  Carol Jennings PA-C        Dose:  10 mg   Start taking on:  4/9/2018   Take 0.5 tablets (10 mg) by mouth 2 times daily   Quantity:  30 tablet   Refills:  0       atenolol 25 MG tablet   Commonly known as:  TENORMIN   This may have changed:  additional instructions   Used for:  Thyroid enlargement, Tachycardia, Thyroiditis        " Dose:  25 mg   Take 1 tablet (25 mg) by mouth daily May increase to 2 tablets in 2-3 days if tolerated   Quantity:  60 tablet   Refills:  1       FLUoxetine 40 MG capsule   Commonly known as:  PROzac   This may have changed:    - medication strength  - how much to take   Used for:  Moderate single current episode of major depressive disorder (H)   Changed by:  Carol Jennings PA-C        Dose:  40 mg   Take 1 capsule (40 mg) by mouth daily   Quantity:  30 capsule   Refills:  1       * Notice:  This list has 2 medication(s) that are the same as other medications prescribed for you. Read the directions carefully, and ask your doctor or other care provider to review them with you.         Where to get your medicines      These medications were sent to [facility] [address]  [address] [facility] [address]     Phone:  [phone]     FLUoxetine 40 MG capsule         Some of these will need a paper prescription and others can be bought over the counter.  Ask your nurse if you have questions.     Bring a paper prescription for each of these medications     amphetamine-dextroamphetamine 20 MG per tablet    amphetamine-dextroamphetamine 20 MG per tablet    LORazepam 0.5 MG tablet                Primary Care Provider Office Phone # Fax #    [facility] [phone] [address] [facility] [address]        Equal Access to Services     CHARLEEN DARNELL AH: Hadii keith mckenzieo Soomaali, waaxda luqadaha, qaybta kaalmada adeegyada, beatris britton haylisa pickens. So [facility] [phone].    ATENCIÓN: Si habla español, tiene a garcia disposición servicios gratuitos de asistencia lingüística. Llame al [phone].    We comply with applicable federal civil rights laws and [place] laws. We do not discriminate on the basis of race, color, national origin, age, disability, sex, sexual orientation, or gender identity.            Thank you!      Thank you for choosing [facility]  for your care. Our goal is always to provide you with excellent care. Hearing back from our patients is one way we can continue to improve our services. Please take a few minutes to complete the written survey that you may receive in the mail after your visit with us. Thank you!             Your Updated Medication List - Protect others around you: Learn how to safely use, store and throw away your medicines at www.disposemymeds.org.          This list is accurate as of 3/2/18  2:20 PM.  Always use your most recent med list.                   Brand Name Dispense Instructions for use Diagnosis    * amphetamine-dextroamphetamine 20 MG per tablet   Start taking on:  3/9/2018    ADDERALL    30 tablet    Take 0.5 tablets (10 mg) by mouth 2 times daily    Attention deficit hyperactivity disorder (ADHD), predominantly inattentive type       * amphetamine-dextroamphetamine 20 MG per tablet   Start taking on:  4/9/2018    ADDERALL    30 tablet    Take 0.5 tablets (10 mg) by mouth 2 times daily    Attention deficit hyperactivity disorder (ADHD), predominantly inattentive type       atenolol 25 MG tablet    TENORMIN    60 tablet    Take 1 tablet (25 mg) by mouth daily May increase to 2 tablets in 2-3 days if tolerated    Thyroid enlargement, Tachycardia, Thyroiditis       FLUoxetine 40 MG capsule    PROzac    30 capsule    Take 1 capsule (40 mg) by mouth daily    Moderate single current episode of major depressive disorder (H)       HYDROcodone-acetaminophen 5-325 MG per tablet    NORCO    15 tablet    Take 1 tablet by mouth every 6 hours as needed for moderate to severe pain    Patellar bursitis of left knee, Chronic pain of both knees       LORazepam 0.5 MG tablet    ATIVAN    20 tablet    Take 1-2 tablets (0.5-1 mg) by mouth every 8 hours as needed for anxiety    Panic attack       * DEPO-PROVERA 150 MG/ML injection   Generic drug:  medroxyPROGESTERone      Inject 1 mL (150 mg)  into the muscle every 3 months        * medroxyPROGESTERone 150 MG/ML injection    DEPO-PROVERA    1 mL    Inject 1 mL (150 mg) into the muscle every 3 months    Encounter for surveillance of injectable contraceptive       ranitidine 150 MG tablet    ZANTAC    60 tablet    Take 1 tablet (150 mg) by mouth 2 times daily    Thyroiditis, Thyroid enlargement       * Notice:  This list has 4 medication(s) that are the same as other medications prescribed for you. Read the directions carefully, and ask your doctor or other care provider to review them with you.

## 2018-03-03 LAB
T4 FREE SERPL-MCNC: 1.09 NG/DL (ref 0.76–1.46)
TSH SERPL DL<=0.005 MIU/L-ACNC: 0.25 MU/L (ref 0.4–4)

## 2018-03-03 ASSESSMENT — ANXIETY QUESTIONNAIRES: GAD7 TOTAL SCORE: 9

## 2018-03-03 ASSESSMENT — PATIENT HEALTH QUESTIONNAIRE - PHQ9: SUM OF ALL RESPONSES TO PHQ QUESTIONS 1-9: 5

## 2018-03-21 ENCOUNTER — OFFICE VISIT (OUTPATIENT)
Dept: FAMILY MEDICINE | Facility: CLINIC | Age: 23
End: 2018-03-21
Payer: COMMERCIAL

## 2018-03-21 VITALS
OXYGEN SATURATION: 100 % | DIASTOLIC BLOOD PRESSURE: 74 MMHG | WEIGHT: 132 LBS | RESPIRATION RATE: 14 BRPM | BODY MASS INDEX: 24.29 KG/M2 | SYSTOLIC BLOOD PRESSURE: 135 MMHG | HEART RATE: 88 BPM | TEMPERATURE: 97.5 F | HEIGHT: 62 IN

## 2018-03-21 DIAGNOSIS — R07.0 THROAT PAIN: ICD-10-CM

## 2018-03-21 DIAGNOSIS — J01.00 ACUTE NON-RECURRENT MAXILLARY SINUSITIS: Primary | ICD-10-CM

## 2018-03-21 DIAGNOSIS — R09.82 POSTNASAL DRIP: ICD-10-CM

## 2018-03-21 LAB
DEPRECATED S PYO AG THROAT QL EIA: NORMAL
SPECIMEN SOURCE: NORMAL

## 2018-03-21 PROCEDURE — 99213 OFFICE O/P EST LOW 20 MIN: CPT | Performed by: FAMILY MEDICINE

## 2018-03-21 PROCEDURE — 87081 CULTURE SCREEN ONLY: CPT | Performed by: FAMILY MEDICINE

## 2018-03-21 PROCEDURE — 87880 STREP A ASSAY W/OPTIC: CPT | Performed by: FAMILY MEDICINE

## 2018-03-21 RX ORDER — FLUTICASONE PROPIONATE 50 MCG
1-2 SPRAY, SUSPENSION (ML) NASAL DAILY
Qty: 1 BOTTLE | Refills: 0 | Status: SHIPPED | OUTPATIENT
Start: 2018-03-21 | End: 2018-05-09

## 2018-03-21 NOTE — PATIENT INSTRUCTIONS
Sinusitis (No Antibiotics)    The sinuses are air-filled spaces within the bones of the face. They connect to the inside of the nose. Sinusitis is an inflammation of the tissue lining the sinus cavity. Sinus inflammation can occur during a cold. It can also be due to allergies to pollens and other particles in the air. It can cause symptoms such as sinus congestion, headache, sore throat, facial swelling and fullness. It may also cause a low-grade fever. No infection is present, and no antibiotic treatment is needed.  Home care    Drink plenty of water, hot tea, and other liquids. This may help thin mucus. It also may promote sinus drainage.    Heat may help soothe painful areas of the face. Use a towel soaked in hot water. Or,  the shower and direct the hot spray onto your face. Using a vaporizer along with a menthol rub at night may also help.     An expectorant containing guaifenesin may help thin the mucus and promote drainage from the sinuses.    Over-the-counter decongestants may be used unless a similar medicine was prescribed. Nasal sprays work the fastest. Use one that contains phenylephrine or oxymetazoline. First blow the nose gently. Then use the spray. Do not use these medicines more often than directed on the label or symptoms may get worse. You may also use tablets containing pseudoephedrine. Avoid products that combine ingredients, because side effects may be increased. Read labels. You can also ask the pharmacist for help. (NOTE: Persons with high blood pressure should not use decongestants. They can raise blood pressure.)    Over-the-counter antihistamines may help if allergies contributed to your sinusitis.      Use acetaminophen or ibuprofen to control pain, unless another pain medicine was prescribed. (If you have chronic liver or kidney disease or ever had a stomach ulcer, talk with your doctor before using these medicines. Aspirin should never be used in anyone under 18 years of age  who is ill with a fever. It may cause severe liver damage.)    Use nasal rinses or irrigation as instructed by your health care provider.    Don't smoke. This can worsen symptoms.  Follow-up care  Follow up with your healthcare provider or our staff if you are not improving within the next week.  When to seek medical advice  Call your healthcare provider if any of these occur:    Green or yellow discharge from the nose or into the throat    Facial pain or headache becoming more severe    Stiff neck    Unusual drowsiness or confusion    Swelling of the forehead or eyelids    Vision problems, including blurred or double vision    Fever of 100.4 F (38 C) or higher, or as directed by your healthcare provider    Seizure    Breathing problems    Symptoms not resolving within 10 days  Date Last Reviewed: 4/13/2015 2000-2017 [employer]. [address] [place] [address], [place] [address]. All rights reserved. This information is not intended as a substitute for professional medical care. Always follow your healthcare professional's instructions.

## 2018-03-21 NOTE — MR AVS SNAPSHOT
After Visit Summary   3/21/2018    Nargis Ruelas    MRN: [id number]           Patient Information     Date Of Birth          1995        Visit Information        Provider Department      3/21/2018 2:00 PM Daily Chicas MD [facility]        Today's Diagnoses     Acute non-recurrent maxillary sinusitis    -  1    Postnasal drip        Throat pain          Care Instructions      Sinusitis (No Antibiotics)    The sinuses are air-filled spaces within the bones of the face. They connect to the inside of the nose. Sinusitis is an inflammation of the tissue lining the sinus cavity. Sinus inflammation can occur during a cold. It can also be due to allergies to pollens and other particles in the air. It can cause symptoms such as sinus congestion, headache, sore throat, facial swelling and fullness. It may also cause a low-grade fever. No infection is present, and no antibiotic treatment is needed.  Home care    Drink plenty of water, hot tea, and other liquids. This may help thin mucus. It also may promote sinus drainage.    Heat may help soothe painful areas of the face. Use a towel soaked in hot water. Or,  the shower and direct the hot spray onto your face. Using a vaporizer along with a menthol rub at night may also help.     An expectorant containing guaifenesin may help thin the mucus and promote drainage from the sinuses.    Over-the-counter decongestants may be used unless a similar medicine was prescribed. Nasal sprays work the fastest. Use one that contains phenylephrine or oxymetazoline. First blow the nose gently. Then use the spray. Do not use these medicines more often than directed on the label or symptoms may get worse. You may also use tablets containing pseudoephedrine. Avoid products that combine ingredients, because side effects may be increased. Read labels. You can also ask the pharmacist for help. (NOTE: Persons with high blood pressure should not use  decongestants. They can raise blood pressure.)    Over-the-counter antihistamines may help if allergies contributed to your sinusitis.      Use acetaminophen or ibuprofen to control pain, unless another pain medicine was prescribed. (If you have chronic liver or kidney disease or ever had a stomach ulcer, talk with your doctor before using these medicines. Aspirin should never be used in anyone under 18 years of age who is ill with a fever. It may cause severe liver damage.)    Use nasal rinses or irrigation as instructed by your health care provider.    Don't smoke. This can worsen symptoms.  Follow-up care  Follow up with your healthcare provider or our staff if you are not improving within the next week.  When to seek medical advice  Call your healthcare provider if any of these occur:    Green or yellow discharge from the nose or into the throat    Facial pain or headache becoming more severe    Stiff neck    Unusual drowsiness or confusion    Swelling of the forehead or eyelids    Vision problems, including blurred or double vision    Fever of 100.4 F (38 C) or higher, or as directed by your healthcare provider    Seizure    Breathing problems    Symptoms not resolving within 10 days  Date Last Reviewed: 4/13/2015 2000-2017 [employer]. [address] [place] [address]. All rights reserved. This information is not intended as a substitute for professional medical care. Always follow your healthcare professional's instructions.                Follow-ups after your visit        Follow-up notes from your care team     Return if symptoms worsen or fail to improve.      Who to contact     Normal or non-critical lab and imaging results will be communicated to you by MyChart, letter or phone within 4 business days after the clinic has received the results. If you do not hear from us within 7 days, please contact the clinic through MyChart or phone. If you have a critical or abnormal lab  "result, we will notify you by phone as soon as possible.  Submit refill requests through [employer] or call your pharmacy and they will forward the refill request to us. Please allow 3 business days for your refill to be completed.          If you need to speak with a  for additional information , please call: [phone]             Additional Information About Your Visit        [employer] Information     [employer] gives you secure access to your electronic health record. If you see a primary care provider, you can also send messages to your care team and make appointments. If you have questions, please call your primary care clinic.  If you do not have a primary care provider, please call [phone] and they will assist you.        Care EveryWhere ID     This is your Care EveryWhere ID. This could be used by other organizations to access your [place] medical records  [id number]        Your Vitals Were     Pulse Temperature Respirations Height Pulse Oximetry BMI (Body Mass Index)    88 97.5  F (36.4  C) (Oral) 14 5' 1.73\" (1.568 m) 100% 24.35 kg/m2       Blood Pressure from Last 3 Encounters:   03/21/18 135/74   03/02/18 129/83   12/08/17 118/72    Weight from Last 3 Encounters:   03/21/18 132 lb (59.9 kg)   03/02/18 132 lb (59.9 kg)   12/08/17 123 lb 11.2 oz (56.1 kg)              We Performed the Following     Beta strep group A culture     Strep, Rapid Screen          Today's Medication Changes          These changes are accurate as of 3/21/18  2:36 PM.  If you have any questions, ask your nurse or doctor.               Start taking these medicines.        Dose/Directions    fluticasone 50 MCG/ACT spray   Commonly known as:  FLONASE   Used for:  Acute non-recurrent maxillary sinusitis   Started by:  Daily Chicas MD        Dose:  1-2 spray   Spray 1-2 sprays into both nostrils daily   Quantity:  1 Bottle   Refills:  0            Where to get your medicines      These medications were sent to " [id number] - [place], [place] - [address]  [address] [facility], [facility] [address]     Phone:  [phone]     fluticasone 50 MCG/ACT spray                Primary Care Provider Office Phone # Fax #    [facility] [phone] [address] [facility] [address]        Equal Access to Services     CHARLEEN DARNELL : Hadii aad ku hadasho Soomaali, waaxda luqadaha, qaybta kaalmada adeegyada, waxay idiin hayaan adeeg kharash la'aan ah. So [facility] [phone].    ATENCIÓN: Si habla español, tiene a garcia disposición servicios gratuitos de asistencia lingüística. Llame al [phone].    We comply with applicable federal civil rights laws and [place] laws. We do not discriminate on the basis of race, color, national origin, age, disability, sex, sexual orientation, or gender identity.            Thank you!     Thank you for choosing [facility]  for your care. Our goal is always to provide you with excellent care. Hearing back from our patients is one way we can continue to improve our services. Please take a few minutes to complete the written survey that you may receive in the mail after your visit with us. Thank you!             Your Updated Medication List - Protect others around you: Learn how to safely use, store and throw away your medicines at www.disposemymeds.org.          This list is accurate as of 3/21/18  2:36 PM.  Always use your most recent med list.                   Brand Name Dispense Instructions for use Diagnosis    * amphetamine-dextroamphetamine 20 MG per tablet    ADDERALL    30 tablet    Take 0.5 tablets (10 mg) by mouth 2 times daily    Attention deficit hyperactivity disorder (ADHD), predominantly inattentive type       * amphetamine-dextroamphetamine 20 MG per tablet   Start taking on:  4/9/2018    ADDERALL    30 tablet    Take 0.5 tablets (10 mg) by mouth 2 times daily    Attention deficit hyperactivity disorder (ADHD), predominantly inattentive  type       atenolol 25 MG tablet    TENORMIN    60 tablet    Take 1 tablet (25 mg) by mouth daily May increase to 2 tablets in 2-3 days if tolerated    Thyroid enlargement, Tachycardia, Thyroiditis       FLUoxetine 40 MG capsule    PROzac    30 capsule    Take 1 capsule (40 mg) by mouth daily    Moderate single current episode of major depressive disorder (H)       fluticasone 50 MCG/ACT spray    FLONASE    1 Bottle    Spray 1-2 sprays into both nostrils daily    Acute non-recurrent maxillary sinusitis       HYDROcodone-acetaminophen 5-325 MG per tablet    NORCO    15 tablet    Take 1 tablet by mouth every 6 hours as needed for moderate to severe pain    Patellar bursitis of left knee, Chronic pain of both knees       LORazepam 0.5 MG tablet    ATIVAN    20 tablet    Take 1-2 tablets (0.5-1 mg) by mouth every 8 hours as needed for anxiety    Panic attack       * DEPO-PROVERA 150 MG/ML injection   Generic drug:  medroxyPROGESTERone      Inject 1 mL (150 mg) into the muscle every 3 months        * medroxyPROGESTERone 150 MG/ML injection    DEPO-PROVERA    1 mL    Inject 1 mL (150 mg) into the muscle every 3 months    Encounter for surveillance of injectable contraceptive       ranitidine 150 MG tablet    ZANTAC    60 tablet    Take 1 tablet (150 mg) by mouth 2 times daily    Thyroiditis, Thyroid enlargement       * Notice:  This list has 4 medication(s) that are the same as other medications prescribed for you. Read the directions carefully, and ask your doctor or other care provider to review them with you.

## 2018-03-21 NOTE — PROGRESS NOTES
"  SUBJECTIVE:  Nargis Ruelas is a 23 year old female who presents with the following concerns;              Symptoms: cc Present Absent Comment   Fever/Chills  X  99.9    Fatigue  X     Muscle Aches   X    Eye Irritation   X    Sneezing  X     Nasal Cali/Drg  X     Sinus Pressure/Pain  X  Only in the mornings   Loss of smell   X    Dental pain   X    Sore Throat  X  Feels like something is stuck in throat   Swollen Glands  X     Ear Pain/Fullness  X     Cough  X     Wheeze   X    Chest Pain  X  With coughing   Shortness of breath  X     Rash   X    Other   X Sweaty heads     Symptom duration:  started one weeks ago   Sympom severity:  moderate   Treatments tried:  cough drops, tea with honey, OTC cough medication.    Contacts:  Room mates son had strep about one month ago.        Medications updated and reviewed.  Current Outpatient Prescriptions   Medication     fluticasone (FLONASE) 50 MCG/ACT spray     LORazepam (ATIVAN) 0.5 MG tablet     FLUoxetine (PROZAC) 40 MG capsule     amphetamine-dextroamphetamine (ADDERALL) 20 MG per tablet     medroxyPROGESTERone (DEPO-PROVERA) 150 MG/ML injection     [START ON 4/9/2018] amphetamine-dextroamphetamine (ADDERALL) 20 MG per tablet     HYDROcodone-acetaminophen (NORCO) 5-325 MG per tablet     medroxyPROGESTERone (DEPO-PROVERA) 150 MG/ML injection     atenolol (TENORMIN) 25 MG tablet     ranitidine (ZANTAC) 150 MG tablet     No current facility-administered medications for this visit.        Past, family and surgical history is updated and reviewed in the record.    ROS:  Other than noted above, general, HEENT, respiratory, cardiac and gastrointestinal systems are negative.    OBJECTIVE:  /74  Pulse 88  Temp 97.5  F (36.4  C) (Oral)  Resp 14  Ht 5' 1.73\" (1.568 m)  Wt 132 lb (59.9 kg)  SpO2 100%  BMI 24.35 kg/m2  GENERAL:  Alert, no acute distress  EYES:  PERRL, EOM normal, conjunctiva and lids normal  HEENT:  Ears and TMs normal, oral mucosa and posterior " oropharynx normal. Bilateral boggy turbinate hypertrophy with maxillary sinus tenderness.   RESP:  Lungs clear to auscultation.  CV: normal rate, regular rhythm, no murmur or gallop.    DATA  Reviewed and discussed with patient prior to discharge.  Results for orders placed or performed in visit on 03/21/18   Strep, Rapid Screen   Result Value Ref Range    Specimen Description Throat     Rapid Strep A Screen       NEGATIVE: No Group A streptococcal antigen detected by immunoassay, await culture report.       Assessment/Plan:   Nargis was seen today for pharyngitis.    Diagnoses and all orders for this visit:    Acute non-recurrent maxillary sinusitis, likely viral  -     fluticasone (FLONASE) 50 MCG/ACT spray; Spray 1-2 sprays into both nostrils daily    Postnasal drip  -     fluticasone (FLONASE) 50 MCG/ACT spray; Spray 1-2 sprays into both nostrils daily    Throat pain  -     Strep, Rapid Screen  -     Beta strep group A culture  Throat lozenges or sprays (such as Chloraseptic) help reduce pain. Gargling with warm salt water will also reduce throat pain. Dissolve 1/2 teaspoon of salt in 1 glass of warm water. This may be useful just before meals.        Patient education and Handout with home care instructions given.       Follow up if symptoms fail to improve in 1 week or worsen.      The patient was in agreement with the plan today and had no questions or concerns prior to leaving the clinic.    Daily Chicas M.D    [facility]

## 2018-03-22 LAB
BACTERIA SPEC CULT: NORMAL
SPECIMEN SOURCE: NORMAL

## 2018-05-09 ENCOUNTER — OFFICE VISIT (OUTPATIENT)
Dept: FAMILY MEDICINE | Facility: CLINIC | Age: 23
End: 2018-05-09
Payer: COMMERCIAL

## 2018-05-09 VITALS
HEART RATE: 66 BPM | WEIGHT: 129.8 LBS | BODY MASS INDEX: 23.95 KG/M2 | TEMPERATURE: 98.1 F | SYSTOLIC BLOOD PRESSURE: 115 MMHG | DIASTOLIC BLOOD PRESSURE: 72 MMHG

## 2018-05-09 DIAGNOSIS — F32.1 MODERATE SINGLE CURRENT EPISODE OF MAJOR DEPRESSIVE DISORDER (H): ICD-10-CM

## 2018-05-09 DIAGNOSIS — F90.0 ATTENTION DEFICIT HYPERACTIVITY DISORDER (ADHD), PREDOMINANTLY INATTENTIVE TYPE: Primary | ICD-10-CM

## 2018-05-09 DIAGNOSIS — E04.9 THYROID ENLARGEMENT: ICD-10-CM

## 2018-05-09 DIAGNOSIS — F41.0 PANIC ATTACK: ICD-10-CM

## 2018-05-09 DIAGNOSIS — Z23 NEED FOR TDAP VACCINATION: ICD-10-CM

## 2018-05-09 LAB
T4 FREE SERPL-MCNC: 0.93 NG/DL (ref 0.76–1.46)
TSH SERPL DL<=0.005 MIU/L-ACNC: 0.32 MU/L (ref 0.4–4)

## 2018-05-09 PROCEDURE — 90715 TDAP VACCINE 7 YRS/> IM: CPT | Performed by: PHYSICIAN ASSISTANT

## 2018-05-09 PROCEDURE — 84443 ASSAY THYROID STIM HORMONE: CPT | Performed by: PHYSICIAN ASSISTANT

## 2018-05-09 PROCEDURE — 84439 ASSAY OF FREE THYROXINE: CPT | Performed by: PHYSICIAN ASSISTANT

## 2018-05-09 PROCEDURE — 99214 OFFICE O/P EST MOD 30 MIN: CPT | Mod: 25 | Performed by: PHYSICIAN ASSISTANT

## 2018-05-09 PROCEDURE — 36415 COLL VENOUS BLD VENIPUNCTURE: CPT | Performed by: PHYSICIAN ASSISTANT

## 2018-05-09 PROCEDURE — 90471 IMMUNIZATION ADMIN: CPT | Performed by: PHYSICIAN ASSISTANT

## 2018-05-09 RX ORDER — LORAZEPAM 0.5 MG/1
.5-1 TABLET ORAL EVERY 8 HOURS PRN
Qty: 20 TABLET | Refills: 0 | Status: SHIPPED | OUTPATIENT
Start: 2018-05-09 | End: 2018-07-18

## 2018-05-09 RX ORDER — DEXTROAMPHETAMINE SACCHARATE, AMPHETAMINE ASPARTATE, DEXTROAMPHETAMINE SULFATE AND AMPHETAMINE SULFATE 5; 5; 5; 5 MG/1; MG/1; MG/1; MG/1
10 TABLET ORAL 2 TIMES DAILY
Qty: 30 TABLET | Refills: 0 | Status: SHIPPED | OUTPATIENT
Start: 2018-06-09 | End: 2018-07-09

## 2018-05-09 RX ORDER — DEXTROAMPHETAMINE SACCHARATE, AMPHETAMINE ASPARTATE, DEXTROAMPHETAMINE SULFATE AND AMPHETAMINE SULFATE 5; 5; 5; 5 MG/1; MG/1; MG/1; MG/1
10 TABLET ORAL 2 TIMES DAILY
Qty: 30 TABLET | Refills: 0 | Status: SHIPPED | OUTPATIENT
Start: 2018-07-09 | End: 2018-07-11

## 2018-05-09 RX ORDER — DEXTROAMPHETAMINE SACCHARATE, AMPHETAMINE ASPARTATE, DEXTROAMPHETAMINE SULFATE AND AMPHETAMINE SULFATE 5; 5; 5; 5 MG/1; MG/1; MG/1; MG/1
10 TABLET ORAL 2 TIMES DAILY
Qty: 30 TABLET | Refills: 0 | Status: CANCELLED | OUTPATIENT
Start: 2018-05-09

## 2018-05-09 RX ORDER — DEXTROAMPHETAMINE SACCHARATE, AMPHETAMINE ASPARTATE, DEXTROAMPHETAMINE SULFATE AND AMPHETAMINE SULFATE 5; 5; 5; 5 MG/1; MG/1; MG/1; MG/1
10 TABLET ORAL 2 TIMES DAILY
Qty: 30 TABLET | Refills: 0 | Status: SHIPPED | OUTPATIENT
Start: 2018-05-09 | End: 2018-06-08

## 2018-05-09 RX ORDER — FLUOXETINE 40 MG/1
40 CAPSULE ORAL DAILY
Qty: 90 CAPSULE | Refills: 1 | Status: SHIPPED | OUTPATIENT
Start: 2018-05-09 | End: 2018-07-18

## 2018-05-09 ASSESSMENT — ANXIETY QUESTIONNAIRES
1. FEELING NERVOUS, ANXIOUS, OR ON EDGE: SEVERAL DAYS
2. NOT BEING ABLE TO STOP OR CONTROL WORRYING: NOT AT ALL
6. BECOMING EASILY ANNOYED OR IRRITABLE: SEVERAL DAYS
GAD7 TOTAL SCORE: 3
7. FEELING AFRAID AS IF SOMETHING AWFUL MIGHT HAPPEN: SEVERAL DAYS
3. WORRYING TOO MUCH ABOUT DIFFERENT THINGS: NOT AT ALL
5. BEING SO RESTLESS THAT IT IS HARD TO SIT STILL: NOT AT ALL

## 2018-05-09 ASSESSMENT — PATIENT HEALTH QUESTIONNAIRE - PHQ9: 5. POOR APPETITE OR OVEREATING: NOT AT ALL

## 2018-05-09 NOTE — PATIENT INSTRUCTIONS
Ultrasound of thyroid: For [facility] ([facility], [facility]) imaging departments: call [phone] to schedule     Continue current medications without change

## 2018-05-09 NOTE — MR AVS SNAPSHOT
After Visit Summary   5/9/2018    Nargis Ruelas    MRN: [id number]           Patient Information     Date Of Birth          1995        Visit Information        Provider Department      5/9/2018 10:00 AM Carol Jennings PA-C [facility]        Today's Diagnoses     Attention deficit hyperactivity disorder (ADHD), predominantly inattentive type        Moderate single current episode of major depressive disorder (H)        Panic attack          Care Instructions    Ultrasound of thyroid: For [facility] ([facility]) imaging departments: call [phone] to schedule     Continue current medications without change          Follow-ups after your visit        Who to contact     Normal or non-critical lab and imaging results will be communicated to you by [employer], letter or phone within 4 business days after the clinic has received the results. If you do not hear from us within 7 days, please contact the clinic through [employer] or phone. If you have a critical or abnormal lab result, we will notify you by phone as soon as possible.  Submit refill requests through [employer] or call your pharmacy and they will forward the refill request to us. Please allow 3 business days for your refill to be completed.          If you need to speak with a  for additional information , please call: [phone]             Additional Information About Your Visit        [employer] Information     [employer] gives you secure access to your electronic health record. If you see a primary care provider, you can also send messages to your care team and make appointments. If you have questions, please call your primary care clinic.  If you do not have a primary care provider, please call [phone] and they will assist you.        Care EveryWhere ID     This is your Care EveryWhere ID. This could be used by other organizations to access your [facility] medical records  [id number]         Your Vitals Were     Pulse Temperature BMI (Body Mass Index)             66 98.1  F (36.7  C) (Tympanic) 23.95 kg/m2          Blood Pressure from Last 3 Encounters:   05/09/18 115/72   03/21/18 135/74   03/02/18 129/83    Weight from Last 3 Encounters:   05/09/18 129 lb 12.8 oz (58.9 kg)   03/21/18 132 lb (59.9 kg)   03/02/18 132 lb (59.9 kg)              Today, you had the following     No orders found for display         Today's Medication Changes          These changes are accurate as of 5/9/18 10:55 AM.  If you have any questions, ask your nurse or doctor.               These medicines have changed or have updated prescriptions.        Dose/Directions    * amphetamine-dextroamphetamine 20 MG per tablet   Commonly known as:  ADDERALL   This may have changed:  Another medication with the same name was added. Make sure you understand how and when to take each.   Used for:  Attention deficit hyperactivity disorder (ADHD), predominantly inattentive type   Changed by:  Carol Jennings PA-C        Dose:  10 mg   Take 0.5 tablets (10 mg) by mouth 2 times daily   Quantity:  30 tablet   Refills:  0       * amphetamine-dextroamphetamine 20 MG per tablet   Commonly known as:  ADDERALL   This may have changed:  You were already taking a medication with the same name, and this prescription was added. Make sure you understand how and when to take each.   Used for:  Attention deficit hyperactivity disorder (ADHD), predominantly inattentive type   Changed by:  Carol Jennings PA-C        Dose:  10 mg   Take 0.5 tablets (10 mg) by mouth 2 times daily   Quantity:  30 tablet   Refills:  0       * amphetamine-dextroamphetamine 20 MG per tablet   Commonly known as:  ADDERALL   This may have changed:  You were already taking a medication with the same name, and this prescription was added. Make sure you understand how and when to take each.   Used for:  Attention deficit hyperactivity disorder (ADHD), predominantly inattentive  type   Changed by:  Carol Jennings PA-C        Dose:  10 mg   Start taking on:  6/9/2018   Take 0.5 tablets (10 mg) by mouth 2 times daily   Quantity:  30 tablet   Refills:  0       * amphetamine-dextroamphetamine 20 MG per tablet   Commonly known as:  ADDERALL   This may have changed:  You were already taking a medication with the same name, and this prescription was added. Make sure you understand how and when to take each.   Used for:  Attention deficit hyperactivity disorder (ADHD), predominantly inattentive type   Changed by:  Carol Jennings PA-C        Dose:  10 mg   Start taking on:  7/9/2018   Take 0.5 tablets (10 mg) by mouth 2 times daily   Quantity:  30 tablet   Refills:  0       * Notice:  This list has 4 medication(s) that are the same as other medications prescribed for you. Read the directions carefully, and ask your doctor or other care provider to review them with you.         Where to get your medicines      These medications were sent to [facility] [address] [facility]  [address] [facility] [facility] [address] [address]     Phone:  [phone]     FLUoxetine 40 MG capsule         Some of these will need a paper prescription and others can be bought over the counter.  Ask your nurse if you have questions.     Bring a paper prescription for each of these medications     amphetamine-dextroamphetamine 20 MG per tablet    amphetamine-dextroamphetamine 20 MG per tablet    amphetamine-dextroamphetamine 20 MG per tablet    LORazepam 0.5 MG tablet                Primary Care Provider Office Phone # Fax #    [facility] [phone] [address] [facility] [address]        Equal Access to Services     [facility]: Hadii keith mckenzieo Sobeau, waaxda luqadaha, qaybta kaalmada adeegyajadyn, beatris pickens. [facility] [phone].    ATENCIÓN: Si habla español, tiene a garcia disposición servicios gratuitos de asistencia  ling[address] al [phone].    We comply with applicable federal civil rights laws and [place] laws. We do not discriminate on the basis of race, color, national origin, age, disability, sex, sexual orientation, or gender identity.            Thank you!     Thank you for choosing [facility]  for your care. Our goal is always to provide you with excellent care. Hearing back from our patients is one way we can continue to improve our services. Please take a few minutes to complete the written survey that you may receive in the mail after your visit with us. Thank you!             Your Updated Medication List - Protect others around you: Learn how to safely use, store and throw away your medicines at www.disposemymeds.org.          This list is accurate as of 5/9/18 10:55 AM.  Always use your most recent med list.                   Brand Name Dispense Instructions for use Diagnosis    * amphetamine-dextroamphetamine 20 MG per tablet    ADDERALL    30 tablet    Take 0.5 tablets (10 mg) by mouth 2 times daily    Attention deficit hyperactivity disorder (ADHD), predominantly inattentive type       * amphetamine-dextroamphetamine 20 MG per tablet    ADDERALL    30 tablet    Take 0.5 tablets (10 mg) by mouth 2 times daily    Attention deficit hyperactivity disorder (ADHD), predominantly inattentive type       * amphetamine-dextroamphetamine 20 MG per tablet   Start taking on:  6/9/2018    ADDERALL    30 tablet    Take 0.5 tablets (10 mg) by mouth 2 times daily    Attention deficit hyperactivity disorder (ADHD), predominantly inattentive type       * amphetamine-dextroamphetamine 20 MG per tablet   Start taking on:  7/9/2018    ADDERALL    30 tablet    Take 0.5 tablets (10 mg) by mouth 2 times daily    Attention deficit hyperactivity disorder (ADHD), predominantly inattentive type       atenolol 25 MG tablet    TENORMIN    60 tablet    Take 1 tablet (25 mg) by mouth daily May increase to 2 tablets in 2-3  days if tolerated    Thyroid enlargement, Tachycardia, Thyroiditis       FLUoxetine 40 MG capsule    PROzac    90 capsule    Take 1 capsule (40 mg) by mouth daily    Moderate single current episode of major depressive disorder (H)       LORazepam 0.5 MG tablet    ATIVAN    20 tablet    Take 1-2 tablets (0.5-1 mg) by mouth every 8 hours as needed for anxiety    Panic attack       * DEPO-PROVERA 150 MG/ML injection   Generic drug:  medroxyPROGESTERone      Inject 1 mL (150 mg) into the muscle every 3 months        * medroxyPROGESTERone 150 MG/ML injection    DEPO-PROVERA    1 mL    Inject 1 mL (150 mg) into the muscle every 3 months    Encounter for surveillance of injectable contraceptive       * Notice:  This list has 6 medication(s) that are the same as other medications prescribed for you. Read the directions carefully, and ask your doctor or other care provider to review them with you.

## 2018-05-09 NOTE — PROGRESS NOTES
"SUBJECTIVE:                                                    Nargis Ruelas is a 23 year old female who presents to clinic today for the following health issues:    Medication Followup of Adderall 20 mg, Prozac    Taking Medication as prescribed: yes    Side Effects:  None    Medication Helping Symptoms:  yes     *  She is still bleeding, doing depo injection   She doesn't have \"full blown periods\", but has bleeding from time to time  2 weeks ago, stopped a couple days ago  Depo about 3 years  This does not bother her, defers pelvic u/s    At last visit: increased prozac to 40 mg  This is working well    She quit job, which feels good, 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  Additional history: none  According to [facility] Database, last controlled prescription was:  Regular fills from this clinic    Patient Active Problem List   Diagnosis     History of other specified conditions presenting hazards to health     Major depressive disorder, single episode     Childhood hyperkinetic syndrome     Anxiety state     History of syncope     Tobacco abuse     Attention deficit hyperactivity disorder (ADHD), predominantly inattentive type     Chronic pain of both knees     Past Surgical History:   Procedure Laterality Date     TONSILLECTOMY         Social History   Substance Use Topics     Smoking status: Current Some Day Smoker     Packs/day: 0.50     Types: Cigarettes     Smokeless tobacco: Never Used     Alcohol use 0.0 oz/week     0 Standard drinks or equivalent per week      Comment: 4 drinks per month     Family History   Problem Relation Age of Onset     DIABETES Maternal Grandfather      Coronary Artery Disease Maternal Grandfather      CEREBROVASCULAR " DISEASE Maternal Grandfather      Depression Mother      Anxiety Disorder Mother      Other Cancer No family hx of          Labs reviewed in EPIC    ROS:  Other than noted above, general, HEENT, respiratory, cardiac, MS, and gastrointestinal systems are negative.     OBJECTIVE:                                                    /72 (BP Location: Right arm, Patient Position: Sitting, Cuff Size: Adult Regular)  Pulse 66  Temp 98.1  F (36.7  C) (Tympanic)  Wt 129 lb 12.8 oz (58.9 kg)  BMI 23.95 kg/m2 Body mass index is 23.95 kg/(m^2).   GENERAL: healthy, alert, well nourished, well hydrated, no distress  HENT: ear canals- normal; TMs- normal; Nose- normal; Mouth- no ulcers, no lesions  NECK: no tenderness, no adenopathy, no asymmetry, no masses, no stiffness; thyroid- no nodules, does appear somewhat enlarged  RESP: lungs clear to auscultation - no rales, no rhonchi, no wheezes  CV: regular rates and rhythm, normal S1 S2, no S3 or S4 and no murmur, no click or rub -  ABDOMEN: soft, no tenderness, no  hepatosplenomegaly, no masses, normal bowel sounds  PSYCH: Alert and oriented times 3; speech- coherent , normal rate and volume; able to articulate logical thoughts, able to abstract reason, no tangential thoughts, no hallucinations or delusions, affect- normal       ASSESSMENT/PLAN:                                                      ASSESSMENT/PLAN:      ICD-10-CM    1. Attention deficit hyperactivity disorder (ADHD), predominantly inattentive type F90.0 amphetamine-dextroamphetamine (ADDERALL) 20 MG per tablet     amphetamine-dextroamphetamine (ADDERALL) 20 MG per tablet     amphetamine-dextroamphetamine (ADDERALL) 20 MG per tablet   2. Moderate single current episode of major depressive disorder (H) F32.1 FLUoxetine (PROZAC) 40 MG capsule   3. Panic attack F41.0 LORazepam (ATIVAN) 0.5 MG tablet   4. Thyroid enlargement E04.9 TSH with free T4 reflex     T4 free   5. Need for Tdap vaccination Z23 TDAP VACCINE  (ADACEL)     VACCINE ADMINISTRATION, INITIAL       Patient Instructions   Ultrasound of thyroid: For [facility] ([facility]) imaging departments: call [phone] to schedule     Continue current medications without change    Carol Jennings PA-C   [facility]

## 2018-05-10 ENCOUNTER — RADIANT APPOINTMENT (OUTPATIENT)
Dept: ULTRASOUND IMAGING | Facility: CLINIC | Age: 23
End: 2018-05-10
Attending: PHYSICIAN ASSISTANT
Payer: COMMERCIAL

## 2018-05-10 DIAGNOSIS — E04.9 THYROID ENLARGEMENT: ICD-10-CM

## 2018-05-10 PROCEDURE — 76536 US EXAM OF HEAD AND NECK: CPT

## 2018-05-10 ASSESSMENT — PATIENT HEALTH QUESTIONNAIRE - PHQ9: SUM OF ALL RESPONSES TO PHQ QUESTIONS 1-9: 5

## 2018-05-10 ASSESSMENT — ANXIETY QUESTIONNAIRES: GAD7 TOTAL SCORE: 3

## 2018-06-01 RX ORDER — DEXTROAMPHETAMINE SACCHARATE, AMPHETAMINE ASPARTATE, DEXTROAMPHETAMINE SULFATE AND AMPHETAMINE SULFATE 5; 5; 5; 5 MG/1; MG/1; MG/1; MG/1
10 TABLET ORAL 2 TIMES DAILY
Qty: 30 TABLET | Refills: 0 | OUTPATIENT
Start: 2018-06-01

## 2018-06-24 ENCOUNTER — TRANSFERRED RECORDS (OUTPATIENT)
Dept: HEALTH INFORMATION MANAGEMENT | Facility: CLINIC | Age: 23
End: 2018-06-24

## 2018-06-25 ENCOUNTER — TELEPHONE (OUTPATIENT)
Dept: FAMILY MEDICINE | Facility: CLINIC | Age: 23
End: 2018-06-25

## 2018-06-26 NOTE — TELEPHONE ENCOUNTER
Please call patient - received records from [place] that she was in the ER for cutting herself. I recommend she follow up here in clinic for a recheck.  Carol Jennings PA-C

## 2018-07-11 ENCOUNTER — OFFICE VISIT (OUTPATIENT)
Dept: FAMILY MEDICINE | Facility: CLINIC | Age: 23
End: 2018-07-11
Payer: COMMERCIAL

## 2018-07-11 VITALS
SYSTOLIC BLOOD PRESSURE: 115 MMHG | DIASTOLIC BLOOD PRESSURE: 75 MMHG | BODY MASS INDEX: 24.51 KG/M2 | WEIGHT: 133.2 LBS | HEIGHT: 62 IN | TEMPERATURE: 98.5 F | HEART RATE: 77 BPM

## 2018-07-11 DIAGNOSIS — R56.9 SEIZURES (H): Primary | ICD-10-CM

## 2018-07-11 DIAGNOSIS — F90.0 ATTENTION DEFICIT HYPERACTIVITY DISORDER (ADHD), PREDOMINANTLY INATTENTIVE TYPE: ICD-10-CM

## 2018-07-11 LAB
ALBUMIN SERPL-MCNC: 3.8 G/DL (ref 3.4–5)
ALP SERPL-CCNC: 76 U/L (ref 40–150)
ALT SERPL W P-5'-P-CCNC: 25 U/L (ref 0–50)
ANION GAP SERPL CALCULATED.3IONS-SCNC: 10 MMOL/L (ref 3–14)
AST SERPL W P-5'-P-CCNC: 19 U/L (ref 0–45)
BASOPHILS # BLD AUTO: 0 10E9/L (ref 0–0.2)
BASOPHILS NFR BLD AUTO: 0.4 %
BILIRUB SERPL-MCNC: 0.2 MG/DL (ref 0.2–1.3)
BUN SERPL-MCNC: 11 MG/DL (ref 7–30)
CALCIUM SERPL-MCNC: 9 MG/DL (ref 8.5–10.1)
CHLORIDE SERPL-SCNC: 106 MMOL/L (ref 94–109)
CO2 SERPL-SCNC: 23 MMOL/L (ref 20–32)
CREAT SERPL-MCNC: 0.71 MG/DL (ref 0.52–1.04)
DIFFERENTIAL METHOD BLD: NORMAL
EOSINOPHIL # BLD AUTO: 0.3 10E9/L (ref 0–0.7)
EOSINOPHIL NFR BLD AUTO: 5.1 %
ERYTHROCYTE [DISTWIDTH] IN BLOOD BY AUTOMATED COUNT: 12.9 % (ref 10–15)
GFR SERPL CREATININE-BSD FRML MDRD: >90 ML/MIN/1.7M2
GLUCOSE SERPL-MCNC: 88 MG/DL (ref 70–99)
HCT VFR BLD AUTO: 37.8 % (ref 35–47)
HGB BLD-MCNC: 12.6 G/DL (ref 11.7–15.7)
LYMPHOCYTES # BLD AUTO: 2.1 10E9/L (ref 0.8–5.3)
LYMPHOCYTES NFR BLD AUTO: 31.7 %
MCH RBC QN AUTO: 30.9 PG (ref 26.5–33)
MCHC RBC AUTO-ENTMCNC: 33.3 G/DL (ref 31.5–36.5)
MCV RBC AUTO: 93 FL (ref 78–100)
MONOCYTES # BLD AUTO: 0.6 10E9/L (ref 0–1.3)
MONOCYTES NFR BLD AUTO: 9.4 %
NEUTROPHILS # BLD AUTO: 3.6 10E9/L (ref 1.6–8.3)
NEUTROPHILS NFR BLD AUTO: 53.4 %
PLATELET # BLD AUTO: 352 10E9/L (ref 150–450)
POTASSIUM SERPL-SCNC: 4 MMOL/L (ref 3.4–5.3)
PROT SERPL-MCNC: 7.4 G/DL (ref 6.8–8.8)
RBC # BLD AUTO: 4.08 10E12/L (ref 3.8–5.2)
SODIUM SERPL-SCNC: 139 MMOL/L (ref 133–144)
WBC # BLD AUTO: 6.7 10E9/L (ref 4–11)

## 2018-07-11 PROCEDURE — 36415 COLL VENOUS BLD VENIPUNCTURE: CPT | Performed by: PHYSICIAN ASSISTANT

## 2018-07-11 PROCEDURE — 99214 OFFICE O/P EST MOD 30 MIN: CPT | Performed by: PHYSICIAN ASSISTANT

## 2018-07-11 PROCEDURE — 85025 COMPLETE CBC W/AUTO DIFF WBC: CPT | Performed by: PHYSICIAN ASSISTANT

## 2018-07-11 PROCEDURE — 80053 COMPREHEN METABOLIC PANEL: CPT | Performed by: PHYSICIAN ASSISTANT

## 2018-07-11 RX ORDER — DEXTROAMPHETAMINE SACCHARATE, AMPHETAMINE ASPARTATE, DEXTROAMPHETAMINE SULFATE AND AMPHETAMINE SULFATE 5; 5; 5; 5 MG/1; MG/1; MG/1; MG/1
10 TABLET ORAL 2 TIMES DAILY
Qty: 30 TABLET | Refills: 0 | Status: SHIPPED | OUTPATIENT
Start: 2018-07-11 | End: 2018-08-09

## 2018-07-11 ASSESSMENT — PATIENT HEALTH QUESTIONNAIRE - PHQ9: 5. POOR APPETITE OR OVEREATING: NOT AT ALL

## 2018-07-11 ASSESSMENT — ANXIETY QUESTIONNAIRES
2. NOT BEING ABLE TO STOP OR CONTROL WORRYING: SEVERAL DAYS
GAD7 TOTAL SCORE: 3
1. FEELING NERVOUS, ANXIOUS, OR ON EDGE: MORE THAN HALF THE DAYS
6. BECOMING EASILY ANNOYED OR IRRITABLE: NOT AT ALL
5. BEING SO RESTLESS THAT IT IS HARD TO SIT STILL: NOT AT ALL
7. FEELING AFRAID AS IF SOMETHING AWFUL MIGHT HAPPEN: NOT AT ALL
3. WORRYING TOO MUCH ABOUT DIFFERENT THINGS: NOT AT ALL

## 2018-07-11 NOTE — PATIENT INSTRUCTIONS
Make appointment with neurology  Do not drive until this appointment   Follow up in a few weeks for a recheck, sooner if needed

## 2018-07-11 NOTE — PROGRESS NOTES
SUBJECTIVE:   Nargis Ruelas is a 23 year old female who presents to clinic today for the following health issues:    ED/UC Followup:    Facility:  [facility]  Date of visit: 6/24/2018  Reason for visit: Suicide Attempt(cutting of both wrists  Current Status: improved     She states it was a bad day. She had been drinking too much alcohol. She was very anxious and took 2 of her mom's ativan pills. They take the same dose. They argued, she did make 2 small lacerations to wrists. They healed well.   She denies any SI, thoughts of watning to hurt herself. Her anxieyt and depression are okay. She does not feel any danger toward herself or thoughts she would be better off dead. She denies other abuse of this. She denies drinking alcohol recently or frequently.    ED/UC Followup:    Facility:  [facility]  Date of visit: 7/6/2018  Reason for visit: Seizure, hitting head  Current Status: improved     She had seziures growing up. Has been years since once.  Not diagnosed with epilepsy in the past, has not been on medications. Unknown etiology  Glued laceration on head, hurts a little  She denies concussion symptoms. She denies seizure like activity. She denies new neurological symptoms   She is not sure what triggered. She has not made neuro appointment.    ED Course / MDM  Patient is a 23 y.o. old female who presents to the ED for evaluation of seizure. Differential diagnosis includes seizure, pseudoseizure, syncope, seizure secondary to electrolyte abnormality, head trauma, or mass. No history to support alcohol withdrawal seizure. CT head negative. EKG shows mildly prolonged QTc at 494 ms and abnormal P-axis, present on prior. BMP and CBC initially notable for platelets of 10,000. Discussed with hematology and simultaneously ordered repeat lab.   Discussed with hematology. 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 He recommends admission and starting Decadron 40 mg x4 days as well  "as IVIG 1 gram/kg.   Decadron 40 mg PO given, and I discussed the patient with the admitting hospitalist, who agreed to admission. However, repeat showed normal platelets at 318,000 (drawn prior to decadron). I discussed this with the patient, who agrees to close follow up with PCP within the next week for repeat lab draw.         Problem list and histories reviewed & adjusted, as indicated.  Additional history: as documented  According to [facility] Database, regular fills from this clinic    Patient Active Problem List   Diagnosis     History of other specified conditions presenting hazards to health     Major depressive disorder, single episode     Childhood hyperkinetic syndrome     Anxiety state     History of syncope     Tobacco abuse     Attention deficit hyperactivity disorder (ADHD), predominantly inattentive type     Chronic pain of both knees     Past Surgical History:   Procedure Laterality Date     TONSILLECTOMY         Social History   Substance Use Topics     Smoking status: Current Some Day Smoker     Packs/day: 0.50     Types: Cigarettes     Smokeless tobacco: Never Used     Alcohol use 0.0 oz/week     0 Standard drinks or equivalent per week      Comment: 4 drinks per month     Family History   Problem Relation Age of Onset     Diabetes Maternal Grandfather      Coronary Artery Disease Maternal Grandfather      Cerebrovascular Disease Maternal Grandfather      Depression Mother      Anxiety Disorder Mother      Other Cancer No family hx of          Labs reviewed in EPIC    Reviewed and updated as needed this visit by clinical staff  Tobacco  Allergies  Meds  Soc Hx      Reviewed and updated as needed this visit by Provider  Tobacco  Meds  Soc Hx        ROS:  Other than noted above, general, HEENT, respiratory, cardiac, MS, and gastrointestinal systems are negative.     OBJECTIVE:     /75  Pulse 77  Temp 98.5  F (36.9  C) (Tympanic)  Ht 5' 2.21\" (1.58 m)  Wt 133 lb 3.2 oz (60.4 kg)  BMI " 24.2 kg/m2  Body mass index is 24.2 kg/(m^2).  GENERAL: healthy, alert and no distress  EYES: Eyes grossly normal to inspection, PERRL and conjunctivae and sclerae normal  HENT: ear canals and TM's normal, nose and mouth without ulcers or lesions  NECK: no adenopathy, no asymmetry, masses, or scars and thyroid normal to palpation  RESP: lungs clear to auscultation - no rales, rhonchi or wheezes  CV: regular rate and rhythm, normal S1 S2, no S3 or S4, no murmur, click or rub, no peripheral edema and peripheral pulses strong  ABDOMEN: soft, nontender, no hepatosplenomegaly, no masses and bowel sounds normal  MS: no gross musculoskeletal defects noted, no edema  PSYCH: Alert and oriented times 3; speech- coherent , normal rate and volume; able to articulate logical thoughts, able to abstract reason, no tangential thoughts, no hallucinations or delusions, affect- normal  NEURO: Normal strength and tone, sensory exam grossly normal, mentation intact and speech normal. Reflexes equal and symmetric.  CN 2-12 grossly intact. Romberg negative. Heel/toe walk normal. proprioception normal. PERRLA, EOMs intact.     ASSESSMENT/PLAN:     ASSESSMENT/PLAN:      ICD-10-CM    1. Seizures (H) R56.9 NEUROLOGY ADULT REFERRAL     CBC with platelets differential     Comprehensive metabolic panel   2. Attention deficit hyperactivity disorder (ADHD), predominantly inattentive type F90.0 amphetamine-dextroamphetamine (ADDERALL) 20 MG per tablet     Patient arrived to appointment late and had to go to work shortly after. Confusing picture, unknown etiology.  Red flag signs to be seen urgently were discussed.  Urged making neuro appointment  Recommended close follow up here.    Patient Instructions   Make appointment with neurology  Do not drive until this appointment   Follow up in a few weeks for a recheck, sooner if needed    Carol Jennings PA-C  [facility]

## 2018-07-11 NOTE — MR AVS SNAPSHOT
After Visit Summary   7/11/2018    Nargis Ruelas    MRN: [id number]           Patient Information     Date Of Birth          1995        Visit Information        Provider Department      7/11/2018 9:00 AM Carol Jennings PA-C [facility] [place]        Today's Diagnoses     Seizures (H)    -  1    Attention deficit hyperactivity disorder (ADHD), predominantly inattentive type          Care Instructions    Make appointment with neurology  Do not drive until this appointment   Follow up in a few weeks for a recheck, sooner if needed          Follow-ups after your visit        Additional Services     NEUROLOGY ADULT REFERRAL       Your provider has referred you for the following:   Consult at [facility]: [facility] [phone]   [email or web address]  Lizz [phone]   [email or web address]    Please be aware that coverage of these services is subject to the terms and limitations of your health insurance plan.  Call member services at your health plan with any benefit or coverage questions.      Please bring the following with you to your appointment:    (1) Any X-Rays, CTs or MRIs which have been performed.  Contact the facility where they were done to arrange for  prior to your scheduled appointment.    (2) List of current medications  (3) This referral request   (4) Any documents/labs given to you for this referral                  Who to contact     Normal or non-critical lab and imaging results will be communicated to you by MyChart, letter or phone within 4 business days after the clinic has received the results. If you do not hear from us within 7 days, please contact the clinic through MyChart or phone. If you have a critical or abnormal lab result, we will notify you by phone as soon as possible.  Submit refill requests through [email or web address] or call your pharmacy and they will forward the refill request to us. Please allow 3 business days  for your refill to be completed.          If you need to speak with a  for additional information , please call: [phone]             Additional Information About Your Visit        algranohart Information     [employer] gives you secure access to your electronic health record. If you see a primary care provider, you can also send messages to your care team and make appointments. If you have questions, please call your primary care clinic.  If you do not have a primary care provider, please call [phone] and they will assist you.        Care EveryWhere ID     This is your Care EveryWhere ID. This could be used by other organizations to access your [place] medical records  [id number]         Blood Pressure from Last 3 Encounters:   05/09/18 115/72   03/21/18 135/74   03/02/18 129/83    Weight from Last 3 Encounters:   05/09/18 129 lb 12.8 oz (58.9 kg)   03/21/18 132 lb (59.9 kg)   03/02/18 132 lb (59.9 kg)              We Performed the Following     CBC with platelets differential     Comprehensive metabolic panel     NEUROLOGY ADULT REFERRAL          Today's Medication Changes          These changes are accurate as of 7/11/18  9:34 AM.  If you have any questions, ask your nurse or doctor.               These medicines have changed or have updated prescriptions.        Dose/Directions    amphetamine-dextroamphetamine 20 MG per tablet   Commonly known as:  ADDERALL   This may have changed:  Another medication with the same name was removed. Continue taking this medication, and follow the directions you see here.   Used for:  Attention deficit hyperactivity disorder (ADHD), predominantly inattentive type   Changed by:  Carol Jennings PA-C        Dose:  10 mg   Take 0.5 tablets (10 mg) by mouth 2 times daily   Quantity:  30 tablet   Refills:  0            Where to get your medicines      Some of these will need a paper prescription and others can be bought over the counter.  Ask your nurse if you  have questions.     Bring a paper prescription for each of these medications     amphetamine-dextroamphetamine 20 MG per tablet                Primary Care Provider Office Phone # Fax #    [facility] [phone] [address] [address]        Equal Access to Services     CHARLEEN DARNELL : Hadii aad ku hadritesho Soomaali, waaxda luqadaha, qaybta kaalmada adeegyada, waxay idiin hayaan adeeg macy lili pickens. So [facility] [phone].    ATENCIÓN: Si habla español, tiene a garcia disposición servicios gratuitos de asistencia lingüística. Llame al [phone].    We comply with applicable federal civil rights laws and [place] laws. We do not discriminate on the basis of race, color, national origin, age, disability, sex, sexual orientation, or gender identity.            Thank you!     Thank you for choosing [facility]  for your care. Our goal is always to provide you with excellent care. Hearing back from our patients is one way we can continue to improve our services. Please take a few minutes to complete the written survey that you may receive in the mail after your visit with us. Thank you!             Your Updated Medication List - Protect others around you: Learn how to safely use, store and throw away your medicines at www.disposemymeds.org.          This list is accurate as of 7/11/18  9:34 AM.  Always use your most recent med list.                   Brand Name Dispense Instructions for use Diagnosis    amphetamine-dextroamphetamine 20 MG per tablet    ADDERALL    30 tablet    Take 0.5 tablets (10 mg) by mouth 2 times daily    Attention deficit hyperactivity disorder (ADHD), predominantly inattentive type       atenolol 25 MG tablet    TENORMIN    60 tablet    Take 1 tablet (25 mg) by mouth daily May increase to 2 tablets in 2-3 days if tolerated    Thyroid enlargement, Tachycardia, Thyroiditis       FLUoxetine 40 MG capsule    PROzac    90 capsule    Take 1 capsule  (40 mg) by mouth daily    Moderate single current episode of major depressive disorder (H)       LORazepam 0.5 MG tablet    ATIVAN    20 tablet    Take 1-2 tablets (0.5-1 mg) by mouth every 8 hours as needed for anxiety    Panic attack       * DEPO-PROVERA 150 MG/ML injection   Generic drug:  medroxyPROGESTERone      Inject 1 mL (150 mg) into the muscle every 3 months        * medroxyPROGESTERone 150 MG/ML injection    DEPO-PROVERA    1 mL    Inject 1 mL (150 mg) into the muscle every 3 months    Encounter for surveillance of injectable contraceptive       * Notice:  This list has 2 medication(s) that are the same as other medications prescribed for you. Read the directions carefully, and ask your doctor or other care provider to review them with you.

## 2018-07-12 ASSESSMENT — PATIENT HEALTH QUESTIONNAIRE - PHQ9: SUM OF ALL RESPONSES TO PHQ QUESTIONS 1-9: 4

## 2018-07-12 ASSESSMENT — ANXIETY QUESTIONNAIRES: GAD7 TOTAL SCORE: 3

## 2018-07-18 ENCOUNTER — MYC REFILL (OUTPATIENT)
Dept: FAMILY MEDICINE | Facility: CLINIC | Age: 23
End: 2018-07-18

## 2018-07-18 DIAGNOSIS — F41.0 PANIC ATTACK: ICD-10-CM

## 2018-07-18 DIAGNOSIS — F32.1 MODERATE SINGLE CURRENT EPISODE OF MAJOR DEPRESSIVE DISORDER (H): ICD-10-CM

## 2018-07-18 NOTE — TELEPHONE ENCOUNTER
Routing refill request to provider for review/approval because:  Drug not on the [facility] refill protocol or controlled substance  [facility]  RN/Ruben Campos

## 2018-07-18 NOTE — TELEPHONE ENCOUNTER
Message from MyChart:  Original authorizing provider: Carol Jennings PA-C    Nargis Ruelas would like a refill of the following medications:  FLUoxetine (PROZAC) 40 MG capsule [Carol Jennings PA-C]  LORazepam (ATIVAN) 0.5 MG tablet [Carol Jennings PA-C]    Preferred pharmacy: [facility] [place], [place] - [address] [address]    Comment:

## 2018-07-19 RX ORDER — LORAZEPAM 0.5 MG/1
.5-1 TABLET ORAL EVERY 8 HOURS PRN
Qty: 20 TABLET | Refills: 0 | Status: SHIPPED | OUTPATIENT
Start: 2018-07-19 | End: 2019-03-14

## 2018-07-19 RX ORDER — FLUOXETINE 40 MG/1
40 CAPSULE ORAL DAILY
Qty: 90 CAPSULE | Refills: 1 | Status: SHIPPED | OUTPATIENT
Start: 2018-07-19 | End: 2019-04-22 | Stop reason: ALTCHOICE

## 2018-07-19 NOTE — TELEPHONE ENCOUNTER
Refilled x1. Please call patient -  When is patient's neuro appointment? Needs to follow up here after that appointment or in next couple weeks for a recheck.  Carol Jennings PA-C

## 2018-07-19 NOTE — TELEPHONE ENCOUNTER
Nargis notified regarding script and need for an appointment via Fara.  Script walked to [facility]..Melonie Quinteros

## 2018-08-09 ENCOUNTER — MYC REFILL (OUTPATIENT)
Dept: FAMILY MEDICINE | Facility: CLINIC | Age: 23
End: 2018-08-09

## 2018-08-09 DIAGNOSIS — F90.0 ATTENTION DEFICIT HYPERACTIVITY DISORDER (ADHD), PREDOMINANTLY INATTENTIVE TYPE: ICD-10-CM

## 2018-08-09 RX ORDER — DEXTROAMPHETAMINE SACCHARATE, AMPHETAMINE ASPARTATE, DEXTROAMPHETAMINE SULFATE AND AMPHETAMINE SULFATE 5; 5; 5; 5 MG/1; MG/1; MG/1; MG/1
10 TABLET ORAL 2 TIMES DAILY
Qty: 30 TABLET | Refills: 0 | OUTPATIENT
Start: 2018-08-09

## 2018-08-09 RX ORDER — DEXTROAMPHETAMINE SACCHARATE, AMPHETAMINE ASPARTATE, DEXTROAMPHETAMINE SULFATE AND AMPHETAMINE SULFATE 5; 5; 5; 5 MG/1; MG/1; MG/1; MG/1
10 TABLET ORAL 2 TIMES DAILY
Qty: 30 TABLET | Refills: 0 | Status: SHIPPED | OUTPATIENT
Start: 2018-08-09 | End: 2018-10-10

## 2018-08-09 NOTE — TELEPHONE ENCOUNTER
Message from uberlifet:  Original authorizing provider: Carol Jennings PA-C    Nargis Ruelas would like a refill of the following medications:  amphetamine-dextroamphetamine (ADDERALL) 20 MG per tablet [Carol Jennings PA-C]    Preferred pharmacy: [facility] [place], [place] - [address] [address]    Comment:  I know it s not due to be filled til the 11th but I m going out of town tomorrow so I was hoping to get it today perhaps, I will make an appointment with Carol when I get back from up [place] . I would greatly appreciate it .

## 2018-08-09 NOTE — TELEPHONE ENCOUNTER
Nargis notified via [employer].  Script walked to [place]..Melonie Quinteros

## 2018-08-09 NOTE — TELEPHONE ENCOUNTER
Routing refill request to provider for review/approval because:  Drug not on the [facility] refill protocol   Med requested twice    Mary Jo WILCOX RN

## 2018-08-09 NOTE — TELEPHONE ENCOUNTER
Will sign and place at [facility] desk.  According to [facility] Database, last controlled prescription was:  From myself  Carol Jennings PA-C

## 2018-08-09 NOTE — TELEPHONE ENCOUNTER
Routing refill request to provider for review/approval because:  Drug not on the [facility] refill protocol   Mary Jo WILCOX RN

## 2018-08-09 NOTE — TELEPHONE ENCOUNTER
Message from Bsmarkhart:  Original authorizing provider: Carol Jennings PA-C    Nargis Ruelas would like a refill of the following medications:  amphetamine-dextroamphetamine (ADDERALL) 20 MG per tablet [Carol Jennings PA-C]    Preferred pharmacy: [facility] [place], [place] - [address] [address]    Comment:

## 2018-09-17 ENCOUNTER — APPOINTMENT (OUTPATIENT)
Dept: GENERAL RADIOLOGY | Facility: CLINIC | Age: 23
End: 2018-09-17
Attending: PHYSICIAN ASSISTANT

## 2018-09-17 ENCOUNTER — HOSPITAL ENCOUNTER (EMERGENCY)
Facility: CLINIC | Age: 23
Discharge: HOME | End: 2018-09-17
Attending: PHYSICIAN ASSISTANT | Admitting: PHYSICIAN ASSISTANT

## 2018-09-17 VITALS
DIASTOLIC BLOOD PRESSURE: 79 MMHG | SYSTOLIC BLOOD PRESSURE: 119 MMHG | TEMPERATURE: 98.3 F | RESPIRATION RATE: 16 BRPM | OXYGEN SATURATION: 96 %

## 2018-09-17 DIAGNOSIS — S61.411A LACERATION OF RIGHT HAND WITHOUT FOREIGN BODY, INITIAL ENCOUNTER: ICD-10-CM

## 2018-09-17 PROCEDURE — 12001 RPR S/N/AX/GEN/TRNK 2.5CM/<: CPT | Performed by: PHYSICIAN ASSISTANT

## 2018-09-17 PROCEDURE — G0463 HOSPITAL OUTPT CLINIC VISIT: HCPCS | Performed by: PHYSICIAN ASSISTANT

## 2018-09-17 PROCEDURE — 12001 RPR S/N/AX/GEN/TRNK 2.5CM/<: CPT | Mod: Z6 | Performed by: PHYSICIAN ASSISTANT

## 2018-09-17 PROCEDURE — 99213 OFFICE O/P EST LOW 20 MIN: CPT | Mod: 25 | Performed by: PHYSICIAN ASSISTANT

## 2018-09-17 PROCEDURE — 73130 X-RAY EXAM OF HAND: CPT | Mod: RT

## 2018-09-17 RX ORDER — DEXTROAMPHETAMINE SACCHARATE, AMPHETAMINE ASPARTATE, DEXTROAMPHETAMINE SULFATE AND AMPHETAMINE SULFATE 2.5; 2.5; 2.5; 2.5 MG/1; MG/1; MG/1; MG/1
10 TABLET ORAL 2 TIMES DAILY
COMMUNITY
End: 2018-10-10

## 2018-09-17 ASSESSMENT — ENCOUNTER SYMPTOMS: WOUND: 1

## 2018-09-17 NOTE — DISCHARGE INSTRUCTIONS
After care instructions:  Keep wound clean and dry for the next 24-48 hours  Sutures out in 10-12 days  Signs of infection discussed today  Apply anti-bacterial ointment for 2-3 days  May return to work as long as wound is kept clean and dry  Discussed the probability of scarring  Active range of motion exercises encouraged  Tetanus up to date.     Follow up with PCP or return to care in 10-12 days.    Return to clinic sooner or go to Emergency Room if symptoms worsen or change or signs of infection occur (redness, red streaking, warmth, increased pain, increased swelling, purulent drainage, or fevers occur.)    May use acetaminophen, ibuprofen prn.    Patient voiced understanding of instructions given.

## 2018-09-17 NOTE — ED AVS SNAPSHOT
[facility] Emergency Department    [address] [address]    Phone:  [phone]    Fax:  [phone]                                       Nargis Ruelas   MRN: [id number]    Department:  [facility] Emergency Department   Date of Visit:  9/17/2018           Patient Information     Date Of Birth          1995        Your diagnoses for this visit were:     Laceration of right hand without foreign body, initial encounter        You were seen by Shalini Elias PA-C.      Follow-up Information     Follow up In 11 days.    Why:  For suture removal        Follow up with [facility] Emergency Department.    Specialty:  EMERGENCY MEDICINE    Why:  If symptoms worsen, As needed    Contact information:    [address] [place] [address] [phone]    Additional information:    The medical center is located at   [address]. (between [address] and   [address] in [place], [place] miles north   of [place]).        Discharge Instructions       After care instructions:  Keep wound clean and dry for the next 24-48 hours  Sutures out in 10-12 days  Signs of infection discussed today  Apply anti-bacterial ointment for 2-3 days  May return to work as long as wound is kept clean and dry  Discussed the probability of scarring  Active range of motion exercises encouraged  Tetanus up to date.     Follow up with PCP or return to care in 10-12 days.    Return to clinic sooner or go to Emergency Room if symptoms worsen or change or signs of infection occur (redness, red streaking, warmth, increased pain, increased swelling, purulent drainage, or fevers occur.)    May use acetaminophen, ibuprofen prn.    Patient voiced understanding of instructions given.            24 Hour Appointment Hotline       To make an appointment at any [facility], call [phone] ([phone]). If you don't have a family doctor or clinic, we will help you find one. [facility]  are conveniently located to serve the needs of you and your family.             Review of your medicines      Our records show that you are taking the medicines listed below. If these are incorrect, please call your family doctor or clinic.        Dose / Directions Last dose taken    ADDERALL 10 MG per tablet   Dose:  10 mg   Generic drug:  amphetamine-dextroamphetamine        Take 10 mg by mouth 2 times daily   Refills:  0                Procedures and tests performed during your visit     XR Hand Right G/E 3 Views      Orders Needing Specimen Collection     None      Pending Results     No orders found from 9/15/2018 to 9/18/2018.            Pending Culture Results     No orders found from 9/15/2018 to 9/18/2018.            Pending Results Instructions     If you had any lab results that were not finalized at the time of your Discharge, you can call the ED Lab Result RN at [phone]. You will be contacted by this team for any positive Lab results or changes in treatment. The nurses are available 7 days a week from 10A to 6:30P.  You can leave a message 24 hours per day and they will return your call.        Test Results From Your Hospital Stay        9/17/2018  1:22 PM      Narrative     RIGHT HAND THREE OR MORE VIEWS  9/17/2018 12:59 PM     HISTORY:  Patient punched a mirror at home today. Rule out fracture or  retained glass pieces.     COMPARISON: None.        Impression     IMPRESSION: Bones are normally aligned. No acute fracture.    CYNDIE BANKS MD                Thank you for choosing [facility]       Thank you for choosing [facility] for your care. Our goal is always to provide you with excellent care. Hearing back from our patients is one way we can continue to improve our services. Please take a few minutes to complete the written survey that you may receive in the mail after you visit with us. Thank you!        Ditech Communicationshart Information     [employer] lets you send messages to your doctor, view your test  "results, renew your prescriptions, schedule appointments and more. To sign up, go to [email or web address] . Click on \"Log in\" on the left side of the screen, which will take you to the Welcome page. Then click on \"Sign up Now\" on the right side of the page.     You will be asked to enter the access code listed below, as well as some personal information. Please follow the directions to create your username and password.     Your access code is: [email or web address]  Expires: 2018  1:34 PM     Your access code will  in 90 days. If you need help or a new code, please call your [place] clinic or [phone].        Care EveryWhere ID     This is your Care EveryWhere ID. This could be used by other organizations to access your [place] medical records  [id number]        Equal Access to Services     CHARLEEN DARNELL : Urmila Early, shara mak, mark vincent, beatris pearson . So [facility] [phone].    ATENCIÓN: Si habla español, tiene a garcia disposición servicios gratuitos de asistencia lingüística. Llame al [phone].    We comply with applicable federal civil rights laws and [place] laws. We do not discriminate on the basis of race, color, national origin, age, disability, sex, sexual orientation, or gender identity.            After Visit Summary       This is your record. Keep this with you and show to your community pharmacist(s) and doctor(s) at your next visit.                  "

## 2018-09-17 NOTE — ED AVS SNAPSHOT
[facility] Emergency Department    [address] [address]    Phone:  [phone]    Fax:  [phone]                                       Nargis Ruelas   MRN: [id number]    Department:  [facility] Emergency Department   Date of Visit:  9/17/2018           After Visit Summary Signature Page     I have received my discharge instructions, and my questions have been answered. I have discussed any challenges I see with this plan with the nurse or doctor.    ..........................................................................................................................................  Patient/Patient Representative Signature      ..........................................................................................................................................  Patient Representative Print Name and Relationship to Patient    ..................................................               ................................................  Date                                   Time    ..........................................................................................................................................  Reviewed by Signature/Title    ...................................................              ..............................................  Date                                               Time          22EPIC Rev 08/18

## 2018-09-17 NOTE — ED PROVIDER NOTES
History     Chief Complaint   Patient presents with     Laceration     rt hand on glass just pta     HPI    Nargis Ruelas is a 23 year old female who presents to the clinic with a laceration on the right hand sustained 2.5 hours ago.  This is a non-work related and self-inflicted injury.  Mechanism of injury: patient states it was supposed to be a joke and she punched a mirror in her bedroom causing it to break and cut her right hand.    Associated symptoms: Denies numbness, weakness, or loss of function  Last tetanus booster within 5 years: yes    Patient complaining of right hand pain, laceration to the hand over the 5th MCP joint and other small superficial abrasions noted across the knuckles of the right hand. Patient is able to make a fist, but very painful.   Patient denies thoughts of hurting herself or other.     Problem list, Medication list, Allergies, and Medical/Social/Surgical histories reviewed in [facility] and updated as appropriate.            Problem List:    There are no active problems to display for this patient.       Past Medical History:    History reviewed. No pertinent past medical history.    Past Surgical History:    History reviewed. No pertinent surgical history.    Family History:    No family history on file.    Social History:  Marital Status:  Single [1]  Social History   Substance Use Topics     Smoking status: Never Smoker     Smokeless tobacco: Never Used     Alcohol use No        Medications:      amphetamine-dextroamphetamine (ADDERALL) 10 MG per tablet         Review of Systems   Musculoskeletal:        Right hand pain.    Skin: Positive for wound (laceration to the right hand. ).   All other systems reviewed and are negative.      Physical Exam   BP: 119/79  Heart Rate: 112  Temp: 98.3  F (36.8  C)  Resp: 16  SpO2: 96 %      Physical Exam   Constitutional: She is oriented to person, place, and time. She appears well-developed and well-nourished. No distress.    Cardiovascular: Intact distal pulses.    Musculoskeletal:        Right wrist: Normal.        Right hand: She exhibits decreased range of motion (with complete fist due to pain in 5th MCP joint and with laceration. ), tenderness (over Metacarpals and 5th MCP joint. ), bony tenderness (over 5th MCP joint. ) and laceration. She exhibits normal two-point discrimination, normal capillary refill, no deformity and no swelling. Normal sensation noted. Normal strength noted.   Neurological: She is alert and oriented to person, place, and time. She has normal strength. No sensory deficit. GCS eye subscore is 4. GCS verbal subscore is 5. GCS motor subscore is 6.   Skin: Skin is warm. Laceration (2.5 cm crescent shape laceration over the lateral aspect of the MCP joint of the 5th finger. macerated puncture wound directly over the 5th MCP joint. ) noted. She is not diaphoretic.   Psychiatric: She has a normal mood and affect. Her behavior is normal. Judgment and thought content normal.       ED Course     ED Course     Laceration repair  Date/Time: 9/17/2018 1:53 PM  Performed by: TRUONG BENSON  Authorized by: TRUONG BENSON   Consent: Verbal consent obtained.  Risks and benefits: risks, benefits and alternatives were discussed  Consent given by: patient  Patient identity confirmed: verbally with patient  Body area: upper extremity  Location details: right hand  Laceration length: 2.5 cm  Foreign bodies: no foreign bodies  Tendon involvement: none  Nerve involvement: none  Vascular damage: no  Anesthesia: local infiltration    Anesthesia:  Local Anesthetic: lidocaine 1% without epinephrine  Anesthetic total: 3 mL    Sedation:  Patient sedated: no  Preparation: Patient was prepped and draped in the usual sterile fashion.  Irrigation solution: saline (hibiclens)  Irrigation method: syringe  Amount of cleaning: standard  Debridement: none  Degree of undermining: none  Skin closure: 5-0 nylon  Number of sutures:  5  Technique: simple  Approximation: close  Approximation difficulty: simple  Dressing: antibiotic ointment (dressing )  Patient tolerance: Patient tolerated the procedure well with no immediate complications                    Critical Care time:  none               Results for orders placed or performed during the hospital encounter of 09/17/18 (from the past 24 hour(s))   XR Hand Right G/E 3 Views    Narrative    RIGHT HAND THREE OR MORE VIEWS  9/17/2018 12:59 PM     HISTORY:  Patient punched a mirror at home today. Rule out fracture or  retained glass pieces.     COMPARISON: None.      Impression    IMPRESSION: Bones are normally aligned. No acute fracture.    CYNDIE BANKS MD       Medications - No data to display    Assessments & Plan (with Medical Decision Making)     I have reviewed the nursing notes.    I have reviewed the findings, diagnosis, plan and need for follow up with the patient.    After care instructions:  Keep wound clean and dry for the next 24-48 hours  Sutures out in 10-12 days  Signs of infection discussed today  Apply anti-bacterial ointment for 2-3 days  May return to work as long as wound is kept clean and dry  Discussed the probability of scarring  Active range of motion exercises encouraged  Tetanus up to date.     Follow up with PCP or return to care in 10-12 days.    Return to clinic sooner or go to Emergency Room if symptoms worsen or change or signs of infection occur (redness, red streaking, warmth, increased pain, increased swelling, purulent drainage, or fevers occur.)    May use acetaminophen, ibuprofen prn.    Discharge Medication List as of 9/17/2018  1:34 PM          Final diagnoses:   Laceration of right hand without foreign body, initial encounter       9/17/2018   [facility] EMERGENCY DEPARTMENT     Shalini Elias PA-C  09/17/18 [id number]

## 2018-09-18 ENCOUNTER — HOSPITAL ENCOUNTER (EMERGENCY)
Facility: CLINIC | Age: 23
Discharge: HOME | End: 2018-09-18
Attending: PHYSICIAN ASSISTANT | Admitting: PHYSICIAN ASSISTANT

## 2018-09-18 VITALS
OXYGEN SATURATION: 100 % | SYSTOLIC BLOOD PRESSURE: 126 MMHG | HEART RATE: 105 BPM | BODY MASS INDEX: 23.62 KG/M2 | WEIGHT: 130 LBS | TEMPERATURE: 97.8 F | RESPIRATION RATE: 18 BRPM | DIASTOLIC BLOOD PRESSURE: 85 MMHG

## 2018-09-18 DIAGNOSIS — Z51.89 ENCOUNTER FOR WOUND RE-CHECK: Primary | ICD-10-CM

## 2018-09-18 PROCEDURE — 99213 OFFICE O/P EST LOW 20 MIN: CPT | Mod: Z6 | Performed by: PHYSICIAN ASSISTANT

## 2018-09-18 PROCEDURE — G0463 HOSPITAL OUTPT CLINIC VISIT: HCPCS | Performed by: PHYSICIAN ASSISTANT

## 2018-09-18 ASSESSMENT — ENCOUNTER SYMPTOMS
FEVER: 0
NEUROLOGICAL NEGATIVE: 1
CHILLS: 0
WOUND: 1
CONSTITUTIONAL NEGATIVE: 1

## 2018-09-18 NOTE — ED PROVIDER NOTES
History     Chief Complaint   Patient presents with     Laceration     stitches yesterday. pain today     HPI  Nargis Ruelas is a 23 year old female who presents with complaints of right hand pain since yesterday.  Patient was evaluated yesterday after she punched a mirror and sustained a laceration to her right hand.  Patient had sutures placed at that time.  X-rays of her hand from that visit were negative for foreign body or fracture.  Patient states she has had persistent pain in her hand since her injury.  Patient reports alternating Tylenol and Ibuprofen for her pain without improvement.  Denies fevers or chills.        Problem List:    Patient Active Problem List    Diagnosis Date Noted     Chronic pain of both knees 11/22/2016     Priority: Medium     Tobacco abuse 10/11/2015     Priority: Medium     History of syncope 10/06/2015     Priority: Medium     History of other specified conditions presenting hazards to health 04/10/2015     Priority: Medium     Major depressive disorder, single episode 04/10/2015     Priority: Medium     Anxiety state 04/10/2015     Priority: Medium     Attention deficit hyperactivity disorder (ADHD), predominantly inattentive type 11/09/2010     Priority: Medium     Childhood hyperkinetic syndrome 02/24/2010     Priority: Medium        Past Medical History:    Past Medical History:   Diagnosis Date     Depressive disorder      Uncomplicated asthma        Past Surgical History:    Past Surgical History:   Procedure Laterality Date     TONSILLECTOMY         Family History:    Family History   Problem Relation Age of Onset     Diabetes Maternal Grandfather      Coronary Artery Disease Maternal Grandfather      Cerebrovascular Disease Maternal Grandfather      Depression Mother      Anxiety Disorder Mother      Other Cancer No family hx of        Social History:  Marital Status:  Single [1]  Social History   Substance Use Topics     Smoking status: Never Smoker     Smokeless  tobacco: Never Used     Alcohol use No      Comment: 4 drinks per month        Medications:      amphetamine-dextroamphetamine (ADDERALL) 10 MG per tablet   amphetamine-dextroamphetamine (ADDERALL) 20 MG per tablet   atenolol (TENORMIN) 25 MG tablet   FLUoxetine (PROZAC) 40 MG capsule   LORazepam (ATIVAN) 0.5 MG tablet   medroxyPROGESTERone (DEPO-PROVERA) 150 MG/ML injection   medroxyPROGESTERone (DEPO-PROVERA) 150 MG/ML injection         Review of Systems   Constitutional: Negative.  Negative for chills and fever.   Musculoskeletal:        Right hand pain   Skin: Positive for wound.   Neurological: Negative.    All other systems reviewed and are negative.      Physical Exam   BP: 126/85  Pulse: 105  Temp: 97.8  F (36.6  C)  Resp: 18  Weight: 59 kg (130 lb)  SpO2: 100 %      Physical Exam   Constitutional: She appears well-developed and well-nourished. No distress.   HENT:   Head: Normocephalic and atraumatic.   Eyes: Conjunctivae are normal.   Cardiovascular: Intact distal pulses.    Pulmonary/Chest: Effort normal.   Musculoskeletal: Normal range of motion.        Right wrist: Normal.        Right hand: She exhibits tenderness and laceration. She exhibits normal range of motion, no bony tenderness, normal capillary refill, no deformity and no swelling. Normal sensation noted. Normal strength noted.   Approximately 2.5 cm linear laceration adjacent to patient's fifth MCP joint with sutures in place.  No surrounding erythema, warmth, induration, fluctuance, or drainage.  The area is mildly tender to palpation.  Patient is able to move her fingers without difficulties.  No lymphangitic streaking.  There is an abrasion overlying her right fourth MCP joint.  No signs of infection around this wound.   Neurological: She is alert. She has normal strength. No sensory deficit.   Skin: Skin is warm and dry.       ED Course     ED Course     Procedures    No results found for this or any previous visit (from the past 24  hour(s)).    Medications - No data to display    Assessments & Plan (with Medical Decision Making)     Pt is a 23 year old female who presents with complaints of right hand pain since yesterday.  Patient was evaluated in the urgent care yesterday after she punched a mirror and sustained a laceration to her right hand.  Patient had sutures placed at that time.  X-rays of her hand from that visit were negative for foreign body or fracture.  Patient states she has had persistent pain in her hand since her injury.  Patient reports alternating Tylenol and Ibuprofen for her pain without improvement.  Denies fevers or chills.  Pt is afebrile on arrival.  Exam as above.  Patient appears to have a laceration with sutures in place on exam.  There is no evidence of infection or compartment syndrome.  I reviewed images from yesterday that again were negative for fracture or foreign body in her hand.  Encouraged continued symptomatic treatments at home.  No indication for narcotics at this time.  Return precautions were reviewed.  Hand-outs were provided.    Patient was instructed to follow-up with PCP if no improvement in 5-7 days for continued care and management or sooner if new or worsening symptoms.  She is to return to the ED for persistent and/or worsening symptoms.  Patient expressed understanding of the diagnosis and plan and was discharged home in good condition.I     have reviewed the nursing notes.    I have reviewed the findings, diagnosis, plan and need for follow up with the patient.    Discharge Medication List as of 9/18/2018  6:06 PM          Final diagnoses:   Encounter for wound re-check       9/18/2018   [facility] EMERGENCY DEPARTMENT     Evelyn Alexandra PA-C  09/18/18 [id number]

## 2018-09-18 NOTE — DISCHARGE INSTRUCTIONS
Wound Check, No Infection  Your wound is healing as expected. There are no signs of infection.   Home care  Continue to care for your wound as directed.    Cover your wound with a bandage unless your healthcare provider tells you not to.    Gently clean your wound with soap and water when you shower.     Unless told otherwise, avoid swimming and taking tub baths until your wound has healed.  Follow-up care  Follow up with your healthcare provider as advised.    If you have sutures or staples, return as directed to have them removed. If they are not taken out on time, they may be harder to remove and scarring may be worse. Infection may develop.    If surgical tape strips were used, you can remove them yourself if they have not fallen off by 10 days after they were applied.   When to seek medical advice  Call your healthcare provider right away if any of these occur:    Fever of 100.4 F (38 C) or higher, or as directed by your health care provider    Symptoms of a wound infection, including:  ? Redness or swelling around the wound  ? Warmth coming from the wound  ? New or worsening pain  ? Red streaks around the wound  ? Draining pus  Date Last Reviewed: 8/24/2015 2000-2017 [employer]. [address] [place] [address], [place] [address]. All rights reserved. This information is not intended as a substitute for professional medical care. Always follow your healthcare professional's instructions.

## 2018-09-18 NOTE — ED AVS SNAPSHOT
[facility] Emergency Department    [address] [facility] [address]    Phone:  [phone]    Fax:  [phone]                                       Nargis Ruelas   MRN: [id number]    Department:  [facility] Emergency Department   Date of Visit:  9/18/2018           After Visit Summary Signature Page     I have received my discharge instructions, and my questions have been answered. I have discussed any challenges I see with this plan with the nurse or doctor.    ..........................................................................................................................................  Patient/Patient Representative Signature      ..........................................................................................................................................  Patient Representative Print Name and Relationship to Patient    ..................................................               ................................................  Date                                   Time    ..........................................................................................................................................  Reviewed by Signature/Title    ...................................................              ..............................................  Date                                               Time          22EPIC Rev 08/18

## 2018-09-18 NOTE — ED AVS SNAPSHOT
[facility] Emergency Department    [address] [facility] [address]    Phone:  [phone]    Fax:  [phone]                                       Nargis Ruelas   MRN: [id number]    Department:  [facility] Emergency Department   Date of Visit:  9/18/2018           Patient Information     Date Of Birth          1995        Your diagnoses for this visit were:     Encounter for wound re-check        You were seen by Evelyn Alexandra PA-C.      Follow-up Information     Call Carol Jennings PA-C.    Specialty:  Physician Assistant    Why:  as scheduled for suture removal in 7-10 days    Contact information:    [address] [facility] [id number] [phone]          Follow up with [facility] Emergency Department.    Specialty:  EMERGENCY MEDICINE    Why:  As needed, If symptoms worsen    Contact information:    [address] [facility] [id number] [phone]    Additional information:    The medical center is located at   [address] [place] [address]. (between [facility] and   [facility] in [place], [place] miles north   of [place]).        Discharge Instructions         Wound Check, No Infection  Your wound is healing as expected. There are no signs of infection.   Home care  Continue to care for your wound as directed.    Cover your wound with a bandage unless your healthcare provider tells you not to.    Gently clean your wound with soap and water when you shower.     Unless told otherwise, avoid swimming and taking tub baths until your wound has healed.  Follow-up care  Follow up with your healthcare provider as advised.    If you have sutures or staples, return as directed to have them removed. If they are not taken out on time, they may be harder to remove and scarring may be worse. Infection may develop.    If surgical tape strips were used, you can remove them yourself if they have not fallen off by 10 days after they were applied.   When to seek medical advice  Call  your healthcare provider right away if any of these occur:    Fever of 100.4 F (38 C) or higher, or as directed by your health care provider    Symptoms of a wound infection, including:  ? Redness or swelling around the wound  ? Warmth coming from the wound  ? New or worsening pain  ? Red streaks around the wound  ? Draining pus  Date Last Reviewed: 8/24/2015 2000-2017 [employer]. [address] [place] [address], [place] [address]. All rights reserved. This information is not intended as a substitute for professional medical care. Always follow your healthcare professional's instructions.          24 Hour Appointment Hotline       To make an appointment at any [facility], call [phone] ([phone]). If you don't have a family doctor or clinic, we will help you find one. [facility] clinics are conveniently located to serve the needs of you and your family.             Review of your medicines      Our records show that you are taking the medicines listed below. If these are incorrect, please call your family doctor or clinic.        Dose / Directions Last dose taken    * ADDERALL 10 MG per tablet   Dose:  10 mg   Generic drug:  amphetamine-dextroamphetamine        Take 10 mg by mouth 2 times daily   Refills:  0        * amphetamine-dextroamphetamine 20 MG per tablet   Commonly known as:  ADDERALL   Dose:  10 mg   Quantity:  30 tablet        Take 0.5 tablets (10 mg) by mouth 2 times daily   Refills:  0        atenolol 25 MG tablet   Commonly known as:  TENORMIN   Dose:  25 mg   Quantity:  60 tablet        Take 1 tablet (25 mg) by mouth daily May increase to 2 tablets in 2-3 days if tolerated   Refills:  1        FLUoxetine 40 MG capsule   Commonly known as:  PROzac   Dose:  40 mg   Quantity:  90 capsule        Take 1 capsule (40 mg) by mouth daily   Refills:  1        LORazepam 0.5 MG tablet   Commonly known as:  ATIVAN   Dose:  0.5-1 mg   Quantity:  20 tablet        Take 1-2 tablets (0.5-1 mg)  by mouth every 8 hours as needed for anxiety   Refills:  0        * DEPO-PROVERA 150 MG/ML injection   Dose:  150 mg   Generic drug:  medroxyPROGESTERone        Inject 1 mL (150 mg) into the muscle every 3 months   Refills:  0        * medroxyPROGESTERone 150 MG/ML injection   Commonly known as:  DEPO-PROVERA   Dose:  150 mg   Quantity:  1 mL        Inject 1 mL (150 mg) into the muscle every 3 months   Refills:  4        * Notice:  This list has 4 medication(s) that are the same as other medications prescribed for you. Read the directions carefully, and ask your doctor or other care provider to review them with you.            Orders Needing Specimen Collection     None      Pending Results     No orders found from 9/16/2018 to 9/19/2018.            Pending Culture Results     No orders found from 9/16/2018 to 9/19/2018.            Pending Results Instructions     If you had any lab results that were not finalized at the time of your Discharge, you can call the ED Lab Result RN at [phone]. You will be contacted by this team for any positive Lab results or changes in treatment. The nurses are available 7 days a week from 10A to 6:30P.  You can leave a message 24 hours per day and they will return your call.        Test Results From Your Hospital Stay               Thank you for choosing [facility]       Thank you for choosing [facility] for your care. Our goal is always to provide you with excellent care. Hearing back from our patients is one way we can continue to improve our services. Please take a few minutes to complete the written survey that you may receive in the mail after you visit with us. Thank you!        [facility] Information     [employer] gives you secure access to your electronic health record. If you see a primary care provider, you can also send messages to your care team and make appointments. If you have questions, please call your primary care clinic.  If you do not have a primary care provider,  please call [phone] and they will assist you.        Care EveryWhere ID     This is your Care EveryWhere ID. This could be used by other organizations to access your [facility] medical records  [id number]        Equal Access to Services     CHARLEEN DARNELL : Urmila Early, warichard paredesadaha, qaybta kaalmada melisa, beatris pickens. So [facility] [phone].    ATENCIÓN: Si habla español, tiene a garcia disposición servicios gratuitos de asistencia lingüística. Llame al [phone].    We comply with applicable federal civil rights laws and [place] laws. We do not discriminate on the basis of race, color, national origin, age, disability, sex, sexual orientation, or gender identity.            After Visit Summary       This is your record. Keep this with you and show to your community pharmacist(s) and doctor(s) at your next visit.

## 2018-10-09 ENCOUNTER — NURSE TRIAGE (OUTPATIENT)
Dept: NURSING | Facility: CLINIC | Age: 23
End: 2018-10-09

## 2018-10-09 DIAGNOSIS — F90.0 ATTENTION DEFICIT HYPERACTIVITY DISORDER (ADHD), PREDOMINANTLY INATTENTIVE TYPE: ICD-10-CM

## 2018-10-09 RX ORDER — DEXTROAMPHETAMINE SACCHARATE, AMPHETAMINE ASPARTATE, DEXTROAMPHETAMINE SULFATE AND AMPHETAMINE SULFATE 5; 5; 5; 5 MG/1; MG/1; MG/1; MG/1
10 TABLET ORAL 2 TIMES DAILY
Qty: 30 TABLET | Refills: 0 | Status: CANCELLED | OUTPATIENT
Start: 2018-10-09

## 2018-10-09 NOTE — TELEPHONE ENCOUNTER
Patient needs a refill of her amphetamine-dextroamphetamine (ADDERALL) 20 MG per tablet   today as she is going out of town.  She uses [facility].  You may reach her at [phone] to let her know when you have received this message.  Routed to [facility]  Cielo Valera RN  [facility]

## 2018-10-09 NOTE — TELEPHONE ENCOUNTER
Patient needs a refill of her amphetamine-dextroamphetamine (ADDERALL) 20 MG per tablet   today as she is going out of town.  She uses [facility].  You may reach her at [phone] to let her know when you have received this message.  Routed to [facility] triage pool  Cielo Valera RN  [facility]      Reason for Disposition    Caller requesting a NON-URGENT new prescription or refill and triager unable to refill per unit policy    Protocols used: MEDICATION QUESTION CALL-ADULT-

## 2018-10-10 ENCOUNTER — OFFICE VISIT (OUTPATIENT)
Dept: FAMILY MEDICINE | Facility: CLINIC | Age: 23
End: 2018-10-10

## 2018-10-10 VITALS
HEIGHT: 62 IN | HEART RATE: 85 BPM | SYSTOLIC BLOOD PRESSURE: 114 MMHG | DIASTOLIC BLOOD PRESSURE: 76 MMHG | TEMPERATURE: 98.1 F | BODY MASS INDEX: 24.66 KG/M2 | WEIGHT: 134 LBS

## 2018-10-10 DIAGNOSIS — F90.0 ATTENTION DEFICIT HYPERACTIVITY DISORDER (ADHD), PREDOMINANTLY INATTENTIVE TYPE: ICD-10-CM

## 2018-10-10 PROCEDURE — 99213 OFFICE O/P EST LOW 20 MIN: CPT | Performed by: PHYSICIAN ASSISTANT

## 2018-10-10 RX ORDER — DEXTROAMPHETAMINE SACCHARATE, AMPHETAMINE ASPARTATE, DEXTROAMPHETAMINE SULFATE AND AMPHETAMINE SULFATE 5; 5; 5; 5 MG/1; MG/1; MG/1; MG/1
10 TABLET ORAL 2 TIMES DAILY
Qty: 30 TABLET | Refills: 0 | Status: SHIPPED | OUTPATIENT
Start: 2018-12-10 | End: 2019-01-14

## 2018-10-10 RX ORDER — DEXTROAMPHETAMINE SACCHARATE, AMPHETAMINE ASPARTATE, DEXTROAMPHETAMINE SULFATE AND AMPHETAMINE SULFATE 5; 5; 5; 5 MG/1; MG/1; MG/1; MG/1
10 TABLET ORAL 2 TIMES DAILY
Qty: 30 TABLET | Refills: 0 | Status: SHIPPED | OUTPATIENT
Start: 2018-10-10 | End: 2019-03-14

## 2018-10-10 RX ORDER — DEXTROAMPHETAMINE SACCHARATE, AMPHETAMINE ASPARTATE, DEXTROAMPHETAMINE SULFATE AND AMPHETAMINE SULFATE 5; 5; 5; 5 MG/1; MG/1; MG/1; MG/1
10 TABLET ORAL 2 TIMES DAILY
Qty: 30 TABLET | Refills: 0 | Status: SHIPPED | OUTPATIENT
Start: 2018-11-09 | End: 2019-02-11

## 2018-10-10 ASSESSMENT — ANXIETY QUESTIONNAIRES
7. FEELING AFRAID AS IF SOMETHING AWFUL MIGHT HAPPEN: NOT AT ALL
2. NOT BEING ABLE TO STOP OR CONTROL WORRYING: SEVERAL DAYS
5. BEING SO RESTLESS THAT IT IS HARD TO SIT STILL: SEVERAL DAYS
GAD7 TOTAL SCORE: 5
6. BECOMING EASILY ANNOYED OR IRRITABLE: SEVERAL DAYS
1. FEELING NERVOUS, ANXIOUS, OR ON EDGE: SEVERAL DAYS
3. WORRYING TOO MUCH ABOUT DIFFERENT THINGS: SEVERAL DAYS

## 2018-10-10 ASSESSMENT — PATIENT HEALTH QUESTIONNAIRE - PHQ9: 5. POOR APPETITE OR OVEREATING: NOT AT ALL

## 2018-10-10 NOTE — MR AVS SNAPSHOT
After Visit Summary   10/10/2018    Nargis Ruelas    MRN: [id number]           Patient Information     Date Of Birth          1995        Visit Information        Provider Department      10/10/2018 1:20 PM Carol Jennings PA-C [facility]        Today's Diagnoses     Attention deficit hyperactivity disorder (ADHD), predominantly inattentive type          Care Instructions    Continue current medications  Follow up in 2-3 months, sooner if needed    Make appointment for neurology  Consult at [facility]: [facility] [phone]   [email or web address]  Lizz [phone]   [email or web address]      Look online for CBT - cognitive behavioral therapy  When you're feeling overwhelmed:  Thoughts turn to feelings turn to actions. You'll notice this when a negative thought can make you feel anxious or down, and in turn you act differently.  So turn around the negative thought by counteracting it with a positive one. What can the positive side of you say to that negative thought?  Think: when I worry I feel like I'm accomplishing something but I'm not. When is a good time to worry, when is a bad time?  online programs guided by professional coaches to help you turn healthy anxiety management into a habit  https://Remicalm/anxiety/?utm_source=adaa    listen to free webinars for managing mental health issues:  https://www.adaa.org/living-with-anxiety/ask-and-learn/webinars            Follow-ups after your visit        Follow-up notes from your care team     Return in about 2 months (around 12/10/2018).      Who to contact     Normal or non-critical lab and imaging results will be communicated to you by MyChart, letter or phone within 4 business days after the clinic has received the results. If you do not hear from us within 7 days, please contact the clinic through MyChart or phone. If you have a critical or abnormal lab result, we will  "notify you by phone as soon as possible.  Submit refill requests through [employer] or call your pharmacy and they will forward the refill request to us. Please allow 3 business days for your refill to be completed.          If you need to speak with a  for additional information , please call: [phone]             Additional Information About Your Visit        [employer] Information     [employer] gives you secure access to your electronic health record. If you see a primary care provider, you can also send messages to your care team and make appointments. If you have questions, please call your primary care clinic.  If you do not have a primary care provider, please call [phone] and they will assist you.        Care EveryWhere ID     This is your Care EveryWhere ID. This could be used by other organizations to access your [facility] medical records  [id number]        Your Vitals Were     Pulse Temperature Height Last Period BMI (Body Mass Index)       85 98.1  F (36.7  C) (Tympanic) 5' 1.73\" (1.568 m) 10/09/2018 24.72 kg/m2        Blood Pressure from Last 3 Encounters:   10/10/18 114/76   09/18/18 126/85   09/17/18 119/79    Weight from Last 3 Encounters:   10/10/18 134 lb (60.8 kg)   09/18/18 130 lb (59 kg)   07/11/18 133 lb 3.2 oz (60.4 kg)              Today, you had the following     No orders found for display         Today's Medication Changes          These changes are accurate as of 10/10/18  1:50 PM.  If you have any questions, ask your nurse or doctor.               These medicines have changed or have updated prescriptions.        Dose/Directions    * amphetamine-dextroamphetamine 20 MG per tablet   Commonly known as:  ADDERALL   This may have changed:  You were already taking a medication with the same name, and this prescription was added. Make sure you understand how and when to take each.   Used for:  Attention deficit hyperactivity disorder (ADHD), predominantly inattentive type "   Changed by:  Carol Jennings PA-C        Dose:  10 mg   Take 0.5 tablets (10 mg) by mouth 2 times daily   Quantity:  30 tablet   Refills:  0       * amphetamine-dextroamphetamine 20 MG per tablet   Commonly known as:  ADDERALL   This may have changed:  You were already taking a medication with the same name, and this prescription was added. Make sure you understand how and when to take each.   Used for:  Attention deficit hyperactivity disorder (ADHD), predominantly inattentive type   Changed by:  Carol Jennings PA-C        Dose:  10 mg   Start taking on:  11/9/2018   Take 0.5 tablets (10 mg) by mouth 2 times daily   Quantity:  30 tablet   Refills:  0       * amphetamine-dextroamphetamine 20 MG per tablet   Commonly known as:  ADDERALL   This may have changed:  These instructions start on 12/10/2018. If you are unsure what to do until then, ask your doctor or other care provider.   Used for:  Attention deficit hyperactivity disorder (ADHD), predominantly inattentive type   Changed by:  Carol Jennings PA-C        Dose:  10 mg   Start taking on:  12/10/2018   Take 0.5 tablets (10 mg) by mouth 2 times daily   Quantity:  30 tablet   Refills:  0       * Notice:  This list has 3 medication(s) that are the same as other medications prescribed for you. Read the directions carefully, and ask your doctor or other care provider to review them with you.         Where to get your medicines      Some of these will need a paper prescription and others can be bought over the counter.  Ask your nurse if you have questions.     Bring a paper prescription for each of these medications     amphetamine-dextroamphetamine 20 MG per tablet    amphetamine-dextroamphetamine 20 MG per tablet    amphetamine-dextroamphetamine 20 MG per tablet                Primary Care Provider Office Phone # Fax #    Carol Jennings PA-C [phone] [id number]  [facility] [address]        Equal Access to Services      CHARLEEN DARNELL : Hadii aad ku jr Soeleazarali, waaxda luqadaha, qaybta kaalmada adeyesica, beatris reba patriciadiogenes jamilmasonrohan pearson . So [facility] [phone].    ATENCIÓN: Si habla español, tiene a garcia disposición servicios gratuitos de asistencia lingüística. Llame al [phone].    We comply with applicable federal civil rights laws and [place] laws. We do not discriminate on the basis of race, color, national origin, age, disability, sex, sexual orientation, or gender identity.            Thank you!     Thank you for choosing [facility]  for your care. Our goal is always to provide you with excellent care. Hearing back from our patients is one way we can continue to improve our services. Please take a few minutes to complete the written survey that you may receive in the mail after your visit with us. Thank you!             Your Updated Medication List - Protect others around you: Learn how to safely use, store and throw away your medicines at www.disposemymeds.org.          This list is accurate as of 10/10/18  1:50 PM.  Always use your most recent med list.                   Brand Name Dispense Instructions for use Diagnosis    * amphetamine-dextroamphetamine 20 MG per tablet    ADDERALL    30 tablet    Take 0.5 tablets (10 mg) by mouth 2 times daily    Attention deficit hyperactivity disorder (ADHD), predominantly inattentive type       * amphetamine-dextroamphetamine 20 MG per tablet   Start taking on:  11/9/2018    ADDERALL    30 tablet    Take 0.5 tablets (10 mg) by mouth 2 times daily    Attention deficit hyperactivity disorder (ADHD), predominantly inattentive type       * amphetamine-dextroamphetamine 20 MG per tablet   Start taking on:  12/10/2018    ADDERALL    30 tablet    Take 0.5 tablets (10 mg) by mouth 2 times daily    Attention deficit hyperactivity disorder (ADHD), predominantly inattentive type       FLUoxetine 40 MG capsule    PROzac    90 capsule    Take 1 capsule (40 mg) by mouth  daily    Moderate single current episode of major depressive disorder (H)       LORazepam 0.5 MG tablet    ATIVAN    20 tablet    Take 1-2 tablets (0.5-1 mg) by mouth every 8 hours as needed for anxiety    Panic attack       * Notice:  This list has 3 medication(s) that are the same as other medications prescribed for you. Read the directions carefully, and ask your doctor or other care provider to review them with you.

## 2018-10-10 NOTE — PATIENT INSTRUCTIONS
Continue current medications  Follow up in 2-3 months, sooner if needed    Make appointment for neurology  Consult at [facility]: [facility] [phone]   [email or web address]  Lizz [phone]   [email or web address]      Look online for CBT - cognitive behavioral therapy  When you're feeling overwhelmed:  Thoughts turn to feelings turn to actions. You'll notice this when a negative thought can make you feel anxious or down, and in turn you act differently.  So turn around the negative thought by counteracting it with a positive one. What can the positive side of you say to that negative thought?  Think: when I worry I feel like I'm accomplishing something but I'm not. When is a good time to worry, when is a bad time?  online programs guided by professional coaches to help you turn healthy anxiety management into a habit  https://[employer]/anxiety/?utm_source=adaamerica    listen to free webinars for managing mental health issues:  https://www.adaa.org/living-with-anxiety/ask-and-learn/webinars

## 2018-10-10 NOTE — PROGRESS NOTES
"  SUBJECTIVE:   Nargis Ruelas is a 23 year old female who presents to clinic today for the following health issues:    Depression and Anxiety Follow-Up    Status since last visit: Little improvement     Other associated symptoms:None    Complicating factors:     Significant life event: Yes-  Still not working     Current substance abuse: None    PHQ 5/9/2018 7/11/2018 10/10/2018   PHQ-9 Total Score 5 4 4   Q9: Suicide Ideation Not at all Not at all Not at all     JERMAIN-7 SCORE 5/9/2018 7/11/2018 10/10/2018   Total Score 3 3 5     PHQ-9  English  PHQ-9   Any Language  JERMAIN-7  Suicide Assessment Five-step Evaluation and Treatment (SAFE-T)    Amount of exercise or physical activity: 2-3 days/week for an average of 30-45 minutes    Problems taking medications regularly: No    Medication side effects: none    Diet: regular (no restrictions)        Medication Followup of Adderall 20 mg(taking 0.5 tablets 2 times daily    Taking Medication as prescribed: yes    Side Effects:  None    Medication Helping Symptoms:  yes       Has not had any seizure like or neurological symptoms since ED visit a few months ago    She is looking at several job options in [place], does not have insurance now      Problem list and histories reviewed & adjusted, as indicated.  Additional history: as documented  According to [facility], last controlled prescription was:  As expected. adderall 9/11/18, lorazepam 8/9/18 from myself      Reviewed and updated as needed this visit by clinical staff  Tobacco  Allergies  Meds  Problems  Med Hx  Surg Hx  Fam Hx  Soc Hx        Reviewed and updated as needed this visit by Provider  Allergies  Meds  Problems         ROS:  Not asked    OBJECTIVE:     /76  Pulse 85  Temp 98.1  F (36.7  C) (Tympanic)  Ht 5' 1.73\" (1.568 m)  Wt 134 lb (60.8 kg)  LMP 10/09/2018  BMI 24.72 kg/m2  Body mass index is 24.72 kg/(m^2).  GENERAL APPEARANCE: healthy, alert and no distress  RESP: lungs " clear to auscultation - no rales, rhonchi or wheezes  CV: regular rates and rhythm, normal S1 S2, no S3 or S4 and no murmur, click or rub  PSYCH: Alert and oriented times 3; speech- coherent , normal rate and volume; able to articulate logical thoughts, able to abstract reason, no tangential thoughts, no hallucinations or delusions, affect- normal     ASSESSMENT/PLAN:     ASSESSMENT/PLAN:      ICD-10-CM    1. Attention deficit hyperactivity disorder (ADHD), predominantly inattentive type F90.0 amphetamine-dextroamphetamine (ADDERALL) 20 MG per tablet     amphetamine-dextroamphetamine (ADDERALL) 20 MG per tablet     amphetamine-dextroamphetamine (ADDERALL) 20 MG per tablet     Her anxiety is improved - she has been working on retraining her thoughts, trying to take control of the anxiety.   She feels medications are working, she is doing well    Patient Instructions   Continue current medications  Follow up in 2-3 months, sooner if needed    Make appointment for neurology  Consult at [facility]: [facility] [phone]   [email or web address]  Lizz [phone]   [email or web address]      Look online for CBT - cognitive behavioral therapy    Carol Jennings PA-C  [facility]

## 2018-10-11 ASSESSMENT — PATIENT HEALTH QUESTIONNAIRE - PHQ9: SUM OF ALL RESPONSES TO PHQ QUESTIONS 1-9: 4

## 2018-10-11 ASSESSMENT — ANXIETY QUESTIONNAIRES: GAD7 TOTAL SCORE: 5

## 2018-11-08 ENCOUNTER — MYC REFILL (OUTPATIENT)
Dept: FAMILY MEDICINE | Facility: CLINIC | Age: 23
End: 2018-11-08

## 2018-11-08 DIAGNOSIS — F90.0 ATTENTION DEFICIT HYPERACTIVITY DISORDER (ADHD), PREDOMINANTLY INATTENTIVE TYPE: ICD-10-CM

## 2018-11-08 RX ORDER — DEXTROAMPHETAMINE SACCHARATE, AMPHETAMINE ASPARTATE, DEXTROAMPHETAMINE SULFATE AND AMPHETAMINE SULFATE 5; 5; 5; 5 MG/1; MG/1; MG/1; MG/1
10 TABLET ORAL 2 TIMES DAILY
Qty: 30 TABLET | Refills: 0 | OUTPATIENT
Start: 2018-11-08

## 2018-11-08 NOTE — TELEPHONE ENCOUNTER
Called [facility] and confirmed there is a script on file dated for tomorrow.  [employer] message sent to ami WILCOX RN

## 2018-11-08 NOTE — TELEPHONE ENCOUNTER
Message from [employer]:  Original authorizing provider: NASREEN Hickey would like a refill of the following medications:  amphetamine-dextroamphetamine (ADDERALL) 20 MG per tablet [Carol Jennings PA-C]    Preferred pharmacy: [facility], [place] - [address]    Comment:  The 10th is on a Saturday, could I pick the prescription up tomorrow September 9th 2018 Would greatly appreciate it !

## 2018-12-03 ENCOUNTER — MYC MEDICAL ADVICE (OUTPATIENT)
Dept: FAMILY MEDICINE | Facility: CLINIC | Age: 23
End: 2018-12-03

## 2018-12-03 DIAGNOSIS — G89.29 CHRONIC PAIN OF BOTH KNEES: ICD-10-CM

## 2018-12-03 DIAGNOSIS — M25.561 CHRONIC PAIN OF BOTH KNEES: ICD-10-CM

## 2018-12-03 DIAGNOSIS — M70.52 PATELLAR BURSITIS OF LEFT KNEE: ICD-10-CM

## 2018-12-03 DIAGNOSIS — M25.562 CHRONIC PAIN OF BOTH KNEES: ICD-10-CM

## 2018-12-03 RX ORDER — HYDROCODONE BITARTRATE AND ACETAMINOPHEN 5; 325 MG/1; MG/1
1 TABLET ORAL EVERY 8 HOURS PRN
Qty: 5 TABLET | Refills: 0 | Status: SHIPPED | OUTPATIENT
Start: 2018-12-03 | End: 2019-01-14

## 2018-12-03 NOTE — TELEPHONE ENCOUNTER
Nargis notified via [employer].  Script walked to [facility]..Melonie Quinteros

## 2019-01-14 ENCOUNTER — MYC REFILL (OUTPATIENT)
Dept: FAMILY MEDICINE | Facility: CLINIC | Age: 24
End: 2019-01-14

## 2019-01-14 DIAGNOSIS — M25.562 CHRONIC PAIN OF BOTH KNEES: ICD-10-CM

## 2019-01-14 DIAGNOSIS — M25.561 CHRONIC PAIN OF BOTH KNEES: ICD-10-CM

## 2019-01-14 DIAGNOSIS — F90.0 ATTENTION DEFICIT HYPERACTIVITY DISORDER (ADHD), PREDOMINANTLY INATTENTIVE TYPE: ICD-10-CM

## 2019-01-14 DIAGNOSIS — G89.29 CHRONIC PAIN OF BOTH KNEES: ICD-10-CM

## 2019-01-14 DIAGNOSIS — M70.52 PATELLAR BURSITIS OF LEFT KNEE: ICD-10-CM

## 2019-01-14 RX ORDER — DEXTROAMPHETAMINE SACCHARATE, AMPHETAMINE ASPARTATE, DEXTROAMPHETAMINE SULFATE AND AMPHETAMINE SULFATE 5; 5; 5; 5 MG/1; MG/1; MG/1; MG/1
10 TABLET ORAL 2 TIMES DAILY
Qty: 30 TABLET | Refills: 0 | Status: SHIPPED | OUTPATIENT
Start: 2019-01-14 | End: 2019-03-14

## 2019-01-14 RX ORDER — HYDROCODONE BITARTRATE AND ACETAMINOPHEN 5; 325 MG/1; MG/1
1 TABLET ORAL EVERY 8 HOURS PRN
Qty: 5 TABLET | Refills: 0 | Status: SHIPPED | OUTPATIENT
Start: 2019-01-14 | End: 2019-05-29

## 2019-01-14 NOTE — TELEPHONE ENCOUNTER
Nargis notified via [employer].  Script walked to [facility]..Melonie Quinteros

## 2019-01-14 NOTE — TELEPHONE ENCOUNTER
Refilled x1 but please tell patient that she is due for follow up in clinic  Signed and placed at station  desk.  Carol Jennings PA-C

## 2019-02-11 ENCOUNTER — MYC MEDICAL ADVICE (OUTPATIENT)
Dept: FAMILY MEDICINE | Facility: CLINIC | Age: 24
End: 2019-02-11

## 2019-02-12 NOTE — TELEPHONE ENCOUNTER
Nargis notified via [employer].  Script walked to [facility]..Melonie Quinteros

## 2019-03-04 ENCOUNTER — OFFICE VISIT (OUTPATIENT)
Dept: FAMILY MEDICINE | Facility: CLINIC | Age: 24
End: 2019-03-04

## 2019-03-04 ENCOUNTER — NURSE TRIAGE (OUTPATIENT)
Dept: NURSING | Facility: CLINIC | Age: 24
End: 2019-03-04

## 2019-03-04 VITALS
HEART RATE: 112 BPM | WEIGHT: 134.2 LBS | OXYGEN SATURATION: 100 % | HEIGHT: 62 IN | SYSTOLIC BLOOD PRESSURE: 112 MMHG | TEMPERATURE: 97.9 F | DIASTOLIC BLOOD PRESSURE: 62 MMHG | BODY MASS INDEX: 24.69 KG/M2 | RESPIRATION RATE: 14 BRPM

## 2019-03-04 DIAGNOSIS — R10.11 RUQ ABDOMINAL PAIN: Primary | ICD-10-CM

## 2019-03-04 DIAGNOSIS — R10.31 RLQ ABDOMINAL PAIN: ICD-10-CM

## 2019-03-04 LAB
BASOPHILS # BLD AUTO: 0 10E9/L (ref 0–0.2)
BASOPHILS NFR BLD AUTO: 0.2 %
DIFFERENTIAL METHOD BLD: ABNORMAL
EOSINOPHIL # BLD AUTO: 0.1 10E9/L (ref 0–0.7)
EOSINOPHIL NFR BLD AUTO: 1.2 %
ERYTHROCYTE [DISTWIDTH] IN BLOOD BY AUTOMATED COUNT: 14 % (ref 10–15)
HCT VFR BLD AUTO: 35.9 % (ref 35–47)
HGB BLD-MCNC: 11.8 G/DL (ref 11.7–15.7)
LYMPHOCYTES # BLD AUTO: 1.4 10E9/L (ref 0.8–5.3)
LYMPHOCYTES NFR BLD AUTO: 12.6 %
MCH RBC QN AUTO: 29.1 PG (ref 26.5–33)
MCHC RBC AUTO-ENTMCNC: 32.9 G/DL (ref 31.5–36.5)
MCV RBC AUTO: 88 FL (ref 78–100)
MONOCYTES # BLD AUTO: 1.4 10E9/L (ref 0–1.3)
MONOCYTES NFR BLD AUTO: 13 %
NEUTROPHILS # BLD AUTO: 7.9 10E9/L (ref 1.6–8.3)
NEUTROPHILS NFR BLD AUTO: 73 %
PLATELET # BLD AUTO: 341 10E9/L (ref 150–450)
RBC # BLD AUTO: 4.06 10E12/L (ref 3.8–5.2)
WBC # BLD AUTO: 10.9 10E9/L (ref 4–11)

## 2019-03-04 PROCEDURE — 36415 COLL VENOUS BLD VENIPUNCTURE: CPT | Performed by: NURSE PRACTITIONER

## 2019-03-04 PROCEDURE — 99213 OFFICE O/P EST LOW 20 MIN: CPT | Performed by: NURSE PRACTITIONER

## 2019-03-04 PROCEDURE — 85025 COMPLETE CBC W/AUTO DIFF WBC: CPT | Performed by: NURSE PRACTITIONER

## 2019-03-04 RX ORDER — CIPROFLOXACIN 500 MG/1
500 TABLET, FILM COATED ORAL 2 TIMES DAILY
Qty: 20 TABLET | Refills: 0 | Status: SHIPPED | OUTPATIENT
Start: 2019-03-04 | End: 2019-03-14

## 2019-03-04 ASSESSMENT — MIFFLIN-ST. JEOR: SCORE: 1306.49

## 2019-03-04 ASSESSMENT — PAIN SCALES - GENERAL: PAINLEVEL: EXTREME PAIN (8)

## 2019-03-04 NOTE — PROGRESS NOTES
SUBJECTIVE:   Nargis Ruelas is a 24 year old female who presents to clinic today for the following health issues:    CHEST PAIN     Onset: This morning    Description:   Location:  right side  Character: sharp and achey  Radiation: states that the pain seems to be spreading  Duration: constant, intensity waxing and waning    Intensity: moderate    Progression of Symptoms:  worsening    Accompanying Signs & Symptoms:  Shortness of breath: YES  Sweating: no  Nausea/vomiting: YES- nausea  Lightheadedness: no  Palpitations: no  Fever/Chills: no, 100.5 fever 2 days ago, resolved after taking tylenol  Cough: no  Heartburn: no    History:   Family history of heart disease YES- MGF  Tobacco use: no, nothing regular    Precipitating factors:   Worse with exertion: YES, with certain movements and activiites  Worse with deep breaths :  YES and with coughing/clearing throat  Related to food: no    Alleviating factors:  Sitting with chest and abdomen stretched out seems to be the most comfortable position       Therapies Tried and outcome: Heat, ice - no improvement       Right upper abdominal pain .   Will have a pinching pain .      Problem list and histories reviewed & adjusted, as indicated.  Additional history: as documented    Patient Active Problem List   Diagnosis     History of other specified conditions presenting hazards to health     Major depressive disorder, single episode     Childhood hyperkinetic syndrome     Anxiety state     History of syncope     Tobacco abuse     Attention deficit hyperactivity disorder (ADHD), predominantly inattentive type     Chronic pain of both knees     Past Surgical History:   Procedure Laterality Date     TONSILLECTOMY         Social History     Tobacco Use     Smoking status: Never Smoker     Smokeless tobacco: Never Used   Substance Use Topics     Alcohol use: No     Alcohol/week: 0.0 oz     Comment: 4 drinks per month     Family History   Problem Relation Age of Onset      Diabetes Maternal Grandfather      Coronary Artery Disease Maternal Grandfather      Cerebrovascular Disease Maternal Grandfather      Depression Mother      Anxiety Disorder Mother      Other Cancer No family hx of          Current Outpatient Medications   Medication Sig Dispense Refill     amphetamine-dextroamphetamine (ADDERALL) 20 MG per tablet Take 0.5 tablets (10 mg) by mouth 2 times daily 30 tablet 0     amphetamine-dextroamphetamine (ADDERALL) 20 MG tablet Take 0.5 tablets (10 mg) by mouth 2 times daily 30 tablet 0     amphetamine-dextroamphetamine (ADDERALL) 20 MG tablet Take 0.5 tablets (10 mg) by mouth 2 times daily 30 tablet 0     FLUoxetine (PROZAC) 40 MG capsule Take 1 capsule (40 mg) by mouth daily 90 capsule 1     HYDROcodone-acetaminophen (NORCO) 5-325 MG tablet Take 1 tablet by mouth every 8 hours as needed for moderate to severe pain 5 tablet 0     LORazepam (ATIVAN) 0.5 MG tablet Take 1-2 tablets (0.5-1 mg) by mouth every 8 hours as needed for anxiety 20 tablet 0     No Known Allergies  BP Readings from Last 3 Encounters:   03/04/19 112/62   10/10/18 114/76   09/18/18 126/85    Wt Readings from Last 3 Encounters:   03/04/19 60.9 kg (134 lb 3.2 oz)   10/10/18 60.8 kg (134 lb)   09/18/18 59 kg (130 lb)                    Reviewed and updated as needed this visit by clinical staff  Tobacco  Allergies  Meds  Med Hx  Surg Hx  Fam Hx  Soc Hx      Reviewed and updated as needed this visit by Provider         ROS:  CONSTITUTIONAL: NEGATIVE for fever, chills, change in weight  ENT/MOUTH: NEGATIVE for ear, mouth and throat problems  RESP: NEGATIVE for significant cough or SOB  CV: NEGATIVE for chest pain, palpitations or peripheral edema  GI: POSITIVE for abdominal pain RUQ and woke with the pain.   The pain is a sharp , pinching pain,   Has gotten a little worse as the day goes   Hasn't eaten today.     The pain is taking her mind off of everything else.   Has not been eating any greasy food .    No  "known family hx of gallbladder   Bowels are normal.    Does have some nausea.  No vomiting    + diarrhea   That is odorous. Has had this for  1-2 days.    Will have  Abdominal cramping and then  A diarrhea stools.        OBJECTIVE:     /62 (BP Location: Right arm, Patient Position: Chair, Cuff Size: Adult Regular)   Pulse 112   Temp 97.9  F (36.6  C) (Oral)   Resp 14   Ht 1.566 m (5' 1.65\")   Wt 60.9 kg (134 lb 3.2 oz)   LMP 02/27/2019 (Exact Date)   SpO2 100%   BMI 24.82 kg/m    Body mass index is 24.82 kg/m .   GENERAL: fatigued    HENT: ear canals and TM's normal, nose and mouth without ulcers or lesions  NECK: no adenopathy, no asymmetry, masses, or scars and thyroid normal to palpation  RESP: lungs clear to auscultation - no rales, rhonchi or wheezes  CV: regular rate and rhythm, normal S1 S2, no S3 or S4, no murmur, click or rub, no peripheral edema and peripheral pulses strong  ABDOMEN: tenderness epigastric, RUQ, RLQ and does have some guarding and does have  + rebound tenderness. , no organomegaly or masses, bowel sounds normal and but does also have RUQ pain with palpation and does have  Rebound tenderness     Diagnostic Test Results:  Results for orders placed or performed in visit on 03/04/19 (from the past 24 hour(s))   CBC with platelets and differential   Result Value Ref Range    WBC 10.9 4.0 - 11.0 10e9/L    RBC Count 4.06 3.8 - 5.2 10e12/L    Hemoglobin 11.8 11.7 - 15.7 g/dL    Hematocrit 35.9 35.0 - 47.0 %    MCV 88 78 - 100 fl    MCH 29.1 26.5 - 33.0 pg    MCHC 32.9 31.5 - 36.5 g/dL    RDW 14.0 10.0 - 15.0 %    Platelet Count 341 150 - 450 10e9/L    % Neutrophils 73.0 %    % Lymphocytes 12.6 %    % Monocytes 13.0 %    % Eosinophils 1.2 %    % Basophils 0.2 %    Absolute Neutrophil 7.9 1.6 - 8.3 10e9/L    Absolute Lymphocytes 1.4 0.8 - 5.3 10e9/L    Absolute Monocytes 1.4 (H) 0.0 - 1.3 10e9/L    Absolute Eosinophils 0.1 0.0 - 0.7 10e9/L    Absolute Basophils 0.0 0.0 - 0.2 10e9/L    " Diff Method Automated Method        ASSESSMENT/PLAN:     ASSESSMENT/PLAN:      ICD-10-CM    1. RUQ abdominal pain R10.11 CBC with platelets and differential     ciprofloxacin (CIPRO) 500 MG tablet   2. RLQ abdominal pain R10.31 CBC with platelets and differential     ciprofloxacin (CIPRO) 500 MG tablet       Patient Instructions   Your abdominal pain is a little worrisome for appenditis   And gallbladder    Stay hydrated   Eat very light /soft foods.   No red meat,  No gas producing vegetables.     Start the antibiotic today - get both doses in today      If the abdominal pain gets worse go to the  ER.                              TEZ ALFRED NP, APRN CNP  [facility]

## 2019-03-04 NOTE — TELEPHONE ENCOUNTER
"Nargis with new onset (overnight) of sensation of lower right rib cage area \"pinch\" with inspiration.  Was febrile 2 days ago, now afebrile.  No resp symptoms, no cough, etc and denies SOB.  Transferred to scheduling.        Reason for Disposition    Nursing judgment or information in reference    Additional Information    Negative: Taking a deep breath makes pain worse    Protocols used: NO GUIDELINE AVAILABLE - SICK ADULT CALL-ADULT-, CHEST PAIN-ADULT-AH      "

## 2019-03-04 NOTE — PATIENT INSTRUCTIONS
Your abdominal pain is a little worrisome for appenditis   And gallbladder    Stay hydrated   Eat very light /soft foods.   No red meat,  No gas producing vegetables.     Start the antibiotic today - get both doses in today      If the abdominal pain gets worse go to the  ER.

## 2019-03-04 NOTE — NURSING NOTE
"Initial /62 (BP Location: Right arm, Patient Position: Chair, Cuff Size: Adult Regular)   Pulse 112   Temp 97.9  F (36.6  C) (Oral)   Resp 14   Ht 1.566 m (5' 1.65\")   Wt 60.9 kg (134 lb 3.2 oz)   LMP 02/27/2019 (Exact Date)   SpO2 100%   BMI 24.82 kg/m   Estimated body mass index is 24.82 kg/m  as calculated from the following:    Height as of this encounter: 1.566 m (5' 1.65\").    Weight as of this encounter: 60.9 kg (134 lb 3.2 oz). .    Jacklyn Merida CMA ([facility])    "

## 2019-03-14 ENCOUNTER — OFFICE VISIT (OUTPATIENT)
Dept: FAMILY MEDICINE | Facility: CLINIC | Age: 24
End: 2019-03-14

## 2019-03-14 VITALS
HEIGHT: 62 IN | RESPIRATION RATE: 14 BRPM | TEMPERATURE: 97.8 F | OXYGEN SATURATION: 98 % | SYSTOLIC BLOOD PRESSURE: 112 MMHG | WEIGHT: 137.9 LBS | HEART RATE: 87 BPM | DIASTOLIC BLOOD PRESSURE: 71 MMHG | BODY MASS INDEX: 25.38 KG/M2

## 2019-03-14 DIAGNOSIS — F90.0 ATTENTION DEFICIT HYPERACTIVITY DISORDER (ADHD), PREDOMINANTLY INATTENTIVE TYPE: ICD-10-CM

## 2019-03-14 DIAGNOSIS — F41.0 PANIC ATTACK: ICD-10-CM

## 2019-03-14 DIAGNOSIS — F32.1 CURRENT MODERATE EPISODE OF MAJOR DEPRESSIVE DISORDER WITHOUT PRIOR EPISODE (H): Primary | ICD-10-CM

## 2019-03-14 PROCEDURE — 99214 OFFICE O/P EST MOD 30 MIN: CPT | Performed by: PHYSICIAN ASSISTANT

## 2019-03-14 RX ORDER — DEXTROAMPHETAMINE SACCHARATE, AMPHETAMINE ASPARTATE, DEXTROAMPHETAMINE SULFATE AND AMPHETAMINE SULFATE 5; 5; 5; 5 MG/1; MG/1; MG/1; MG/1
10 TABLET ORAL 2 TIMES DAILY
Qty: 30 TABLET | Refills: 0 | Status: SHIPPED | OUTPATIENT
Start: 2019-03-14 | End: 2019-05-29

## 2019-03-14 RX ORDER — DEXTROAMPHETAMINE SACCHARATE, AMPHETAMINE ASPARTATE, DEXTROAMPHETAMINE SULFATE AND AMPHETAMINE SULFATE 5; 5; 5; 5 MG/1; MG/1; MG/1; MG/1
10 TABLET ORAL 2 TIMES DAILY
Qty: 30 TABLET | Refills: 0 | Status: SHIPPED | OUTPATIENT
Start: 2019-05-14 | End: 2019-03-14

## 2019-03-14 RX ORDER — LORAZEPAM 0.5 MG/1
.5-1 TABLET ORAL EVERY 8 HOURS PRN
Qty: 20 TABLET | Refills: 0 | Status: SHIPPED | OUTPATIENT
Start: 2019-03-14 | End: 2019-03-14

## 2019-03-14 RX ORDER — DEXTROAMPHETAMINE SACCHARATE, AMPHETAMINE ASPARTATE, DEXTROAMPHETAMINE SULFATE AND AMPHETAMINE SULFATE 5; 5; 5; 5 MG/1; MG/1; MG/1; MG/1
10 TABLET ORAL 2 TIMES DAILY
Qty: 30 TABLET | Refills: 0 | Status: SHIPPED | OUTPATIENT
Start: 2019-05-14 | End: 2019-05-29

## 2019-03-14 RX ORDER — DEXTROAMPHETAMINE SACCHARATE, AMPHETAMINE ASPARTATE, DEXTROAMPHETAMINE SULFATE AND AMPHETAMINE SULFATE 5; 5; 5; 5 MG/1; MG/1; MG/1; MG/1
10 TABLET ORAL 2 TIMES DAILY
Qty: 30 TABLET | Refills: 0 | Status: SHIPPED | OUTPATIENT
Start: 2019-03-14 | End: 2019-03-14

## 2019-03-14 RX ORDER — DEXTROAMPHETAMINE SACCHARATE, AMPHETAMINE ASPARTATE, DEXTROAMPHETAMINE SULFATE AND AMPHETAMINE SULFATE 5; 5; 5; 5 MG/1; MG/1; MG/1; MG/1
10 TABLET ORAL 2 TIMES DAILY
Qty: 30 TABLET | Refills: 0 | Status: SHIPPED | OUTPATIENT
Start: 2019-04-14 | End: 2019-03-14

## 2019-03-14 RX ORDER — LORAZEPAM 0.5 MG/1
.5-1 TABLET ORAL EVERY 8 HOURS PRN
Qty: 20 TABLET | Refills: 0 | Status: SHIPPED | OUTPATIENT
Start: 2019-03-14 | End: 2019-09-25

## 2019-03-14 RX ORDER — DEXTROAMPHETAMINE SACCHARATE, AMPHETAMINE ASPARTATE, DEXTROAMPHETAMINE SULFATE AND AMPHETAMINE SULFATE 5; 5; 5; 5 MG/1; MG/1; MG/1; MG/1
10 TABLET ORAL 2 TIMES DAILY
Qty: 30 TABLET | Refills: 0 | Status: SHIPPED | OUTPATIENT
Start: 2019-04-14 | End: 2019-05-29

## 2019-03-14 ASSESSMENT — ANXIETY QUESTIONNAIRES
1. FEELING NERVOUS, ANXIOUS, OR ON EDGE: SEVERAL DAYS
2. NOT BEING ABLE TO STOP OR CONTROL WORRYING: SEVERAL DAYS
7. FEELING AFRAID AS IF SOMETHING AWFUL MIGHT HAPPEN: SEVERAL DAYS
3. WORRYING TOO MUCH ABOUT DIFFERENT THINGS: SEVERAL DAYS
GAD7 TOTAL SCORE: 7
5. BEING SO RESTLESS THAT IT IS HARD TO SIT STILL: SEVERAL DAYS
6. BECOMING EASILY ANNOYED OR IRRITABLE: SEVERAL DAYS

## 2019-03-14 ASSESSMENT — MIFFLIN-ST. JEOR: SCORE: 1323.27

## 2019-03-14 ASSESSMENT — PATIENT HEALTH QUESTIONNAIRE - PHQ9
SUM OF ALL RESPONSES TO PHQ QUESTIONS 1-9: 8
5. POOR APPETITE OR OVEREATING: SEVERAL DAYS

## 2019-03-14 NOTE — PROGRESS NOTES
SUBJECTIVE:   Nargis Ruelas is a 24 year old female who presents to clinic today for the following health issues:    ADHD Follow-Up (Adult)  Concerns: None, states things are going well on current dose  Changes since last visit: Stable  Taking controlled (daily) medications as prescribed: Yes  Sleep: no problems  Adult ADHD Self-Reporting form given to patient?:  Yes  Currently in counseling: No    Medication Benefits:   Controlled symptoms: Attention span and Distractability  Uncontrolled symptoms:  None    Medication Side Effects:  Reports:  none  Sleep Problems? no  ++++++++++++++++++++++++++++++++++++++++++++++++    *  Would like to have Lorazepam refilled today    She stopped prozac. Anxiety has worsened  In the past lexapro, celexa, prozac. She thinks maybe they didn't work well?  Her friend is on zoloft, and she has heard good things    She has been overwhelmed with anxiety.  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 She doesn't have sex drive so she is not sexually active much, does use condoms when they do. She does not want to start birth control again at this point    PHQ-9 ([employer]) 3/14/2019   PHQ-9 Total Score 8     JERMAIN-7   [employer], 2002; Used with Permission) 3/14/2019   JERMAIN-7 Total Score 7       Problem list and histories reviewed & adjusted, as indicated.  Additional history: as documented  According to [facility], last controlled prescription was:  Regular fills from this clinic. Lorazepam last 8/9/19    Patient Active Problem List   Diagnosis     History of other specified conditions presenting hazards to health     Major depressive disorder, single episode     Childhood hyperkinetic syndrome     Anxiety state     History of syncope     Tobacco abuse     Attention deficit hyperactivity disorder (ADHD), predominantly inattentive type     Chronic pain of both knees     Past Surgical History:   Procedure Laterality Date     TONSILLECTOMY        "  Social History     Tobacco Use     Smoking status: Never Smoker     Smokeless tobacco: Never Used   Substance Use Topics     Alcohol use: No     Alcohol/week: 0.0 oz     Comment: 4 drinks per month     Family History   Problem Relation Age of Onset     Diabetes Maternal Grandfather      Coronary Artery Disease Maternal Grandfather      Cerebrovascular Disease Maternal Grandfather      Depression Mother      Anxiety Disorder Mother      Other Cancer No family hx of          Labs reviewed in EPIC    Reviewed and updated as needed this visit by clinical staff  Tobacco  Allergies  Meds       Reviewed and updated as needed this visit by Provider         ROS:  Other than noted above, general, HEENT, respiratory, cardiac, MS, and gastrointestinal systems are negative.     OBJECTIVE:     /71   Pulse 87   Temp 97.8  F (36.6  C) (Tympanic)   Resp (P) 14   Ht 1.566 m (5' 1.65\")   Wt 62.6 kg (137 lb 14.4 oz)   LMP 02/27/2019 (Exact Date)   SpO2 (P) 98%   BMI 25.51 kg/m    Body mass index is 25.51 kg/m .  GENERAL: healthy, alert and no distress  RESP: lungs clear to auscultation - no rales, rhonchi or wheezes  CV: regular rate and rhythm, normal S1 S2, no S3 or S4, no murmur, click or rub, no peripheral edema and peripheral pulses strong  ABDOMEN: soft, nontender, no hepatosplenomegaly, no masses and bowel sounds normal  MS: no gross musculoskeletal defects noted, no edema  PSYCH: Alert and oriented times 3; speech- coherent , normal rate and volume; able to articulate logical thoughts, able to abstract reason, no tangential thoughts, no hallucinations or delusions, affect- anxious, tearful    ASSESSMENT/PLAN:     ASSESSMENT/PLAN:      ICD-10-CM    1. Current moderate episode of major depressive disorder without prior episode (H) F32.1 sertraline (ZOLOFT) 50 MG tablet     MENTAL HEALTH REFERRAL  - Adult; Outpatient Treatment; Individual/Couples/Family/Group Therapy/Health Psychology; Other: Behavior[facility] [phone]; We will contact you to schedule the appointment or please call with any questi...   2. Attention deficit hyperactivity disorder (ADHD), predominantly inattentive type F90.0 amphetamine-dextroamphetamine (ADDERALL) 20 MG tablet     amphetamine-dextroamphetamine (ADDERALL) 20 MG tablet     amphetamine-dextroamphetamine (ADDERALL) 20 MG tablet     DISCONTINUED: amphetamine-dextroamphetamine (ADDERALL) 20 MG tablet     DISCONTINUED: amphetamine-dextroamphetamine (ADDERALL) 20 MG tablet     DISCONTINUED: amphetamine-dextroamphetamine (ADDERALL) 20 MG tablet   3. Panic attack F41.0 MENTAL HEALTH REFERRAL  - Adult; Outpatient Treatment; Individual/Couples/Family/Group Therapy/Health Psychology; Other: [facility] [phone]; We will contact you to schedule the appointment or please call with any questi...     LORazepam (ATIVAN) 0.5 MG tablet     DISCONTINUED: LORazepam (ATIVAN) 0.5 MG tablet     She agrees talking with therapist would be helpful as well as restarting zoloft. Discussed potential risks/benefits/side effects including black box warning of SI. Discussed most benefit from dual treatment with medication and therapy, and need for close follow up.    Note: had to reprint prescriptions due to printer issue    Patient Instructions   Start zoloft: Take 0.5 tablets (25 mg) by mouth daily for 7 days, THEN 1 tablet (50 mg) daily for 14 days, THEN 2 tablets (100 mg) daily   Follow up in 1 month - can do a telephone visit    3 adderall prescriptions were given to you today  Use the lorazepam sparingly as needed    Make appointment with therapist to talk through things - look for CBT (cognitive behavioral therapy) for anxiety    Carol Jennings PA-C  [facility]

## 2019-03-14 NOTE — PROGRESS NOTES
Adult ADHD Self-Report Scale (ASRS-v1.1) Symptom Checklist Instructions    The questions below are designed to stimulate dialogue between you and your patients and to help confirm if they may be suffering from the symptoms of attention-deficit/hyperactivity disorder (ADHD).    Description:  The Symptom Checklist is an instrument consisting of the 18 DSM-IV-TR criteria.   Six of the 18 questions were found to be the most predictive of symptoms consistent with ADHD.   These six questions are the basis for the ASRS v1.1 Screener and are also Part A of the Symptom Checklist.  Part B of the Symptom Checklist contains the remaining 12 questions.    Instructions:    Symptoms:    1.  Ask the patient to complete both Part A and Part B of the Symptom Checklist by marking an X in the box that most closely represents the frequency of occurrence of each of the symptoms.    2.  Score Part A.   If four or more marks appear in the darkly shaded boxes within Part A then the patient has symptoms highly consistent with ADHD in adults and further investigation is warranted.    3.  The frequency scores on Part B provide additional cues and can serve as further probes into the patient's symptoms.   Pay particular attention to marks appearing in the dark shaded boxes.   The frequency-based response is more sensitive with certain questions.  It has been found that the six questions in Part A are the most predictive of the disorder and are best for use as a screening instrument.    Impairments    1.  Review the entire Symptom Checklist with your patient and evaluate the level of impairment associated with the symptom.    2.  Consider work/school, social and family settings.    3.  Symptom frequency is often associated with symptom severity, therefore the Symptom Checklist may also aid in the assessment of impairments.  If your patients have frequent symptoms, you may want to ask them to describe how these problems have affected the ability  to work, take care of things at home, or get along with other people such as their spouse/significant other.    History    1.   Assess the presence of these symptoms or similar symptoms in childhood.  Adults who have ADHD need not have been formally diagnosed in childhood.  In evaluating a patient's history, look for evidence of early-appearing and long-standing problems with attention or self-control.   Some significant symptoms should have been present in childhood, but full symptomatology is not necessary.    Adult ADHD Self-Report Scale (ASRS-v1.1) Symptom Checklist    Patient Name:_____________________________  Today's date: _________________    Please answer the questions below, rating yourself on each of the criteria shown using the scale on the right side of the page.  As you answer each questions, place an 'X' in the box that best describes how you have felt and conducted yourself over the past 6 mos.   Please give this completed checklist to your healthcare professional to discuss during today's appointment.                                                                                                    Some-                                                                        Never   Rarely      times       Often  Very Often  1. How often do you have trouble wrapping up the final details of a project,  once the challenging parts have been done?   x     2. How often do you have difficulty getting things in order when you have to do a task that requires organization?   x     3. How often do you have problems remembering appointments or obligations?   x     4. When you have a task that requires a lot of thought, how often do you avoid or delay getting started?    x    5. How often do you fidget or squirm with your hands or feet when you have to sit down for a long time?     x   6. How often do you feel overly active and compelled to do things, like you were driven by a motor? x         Part A                                                         7. How often do you make careless mistakes when you have to work on a boring or difficult project?   x     8. How often do you have difficulty keeping your attention when you are doing boring or repetitive work?   x     9. How often do you have difficulty concentrating on what people say to you, even when they are speaking to you directly?  x      10. How often do you misplace or have difficulty finding things at home or at work?  x      11. How often are you distracted by activity or noise around you?   x     12. How often do you leave your seat in meetings or other situations in which you are expected to remain seated?      x    13. How often do you feel restless or fidgety?     x     14. How often do you have difficulty unwinding and relaxing when you have time to yourself?   x     15. How often do you find yourself talking too much when you are in social situations?   x     16. When you're in a conversation, how often do you find yourself finishing the sentences of the people you are talking to, before they can finish them themselves?   x     17. How often do you have difficulty waiting your turn in situations when turn-taking is required?   x     18. How often do you interrupt others when they are busy?   x     Part B

## 2019-03-14 NOTE — PATIENT INSTRUCTIONS
Start zoloft: Take 0.5 tablets (25 mg) by mouth daily for 7 days, THEN 1 tablet (50 mg) daily for 14 days, THEN 2 tablets (100 mg) daily   Follow up in 1 month - can do a telephone visit    3 adderall prescriptions were given to you today  Use the lorazepam sparingly as needed    Make appointment with therapist to talk through things - look for CBT (cognitive behavioral therapy) for anxiety

## 2019-03-15 ASSESSMENT — ANXIETY QUESTIONNAIRES: GAD7 TOTAL SCORE: 7

## 2019-04-12 ENCOUNTER — MYC MEDICAL ADVICE (OUTPATIENT)
Dept: FAMILY MEDICINE | Facility: CLINIC | Age: 24
End: 2019-04-12

## 2019-04-19 DIAGNOSIS — F32.1 CURRENT MODERATE EPISODE OF MAJOR DEPRESSIVE DISORDER WITHOUT PRIOR EPISODE (H): ICD-10-CM

## 2019-04-22 ENCOUNTER — MYC MEDICAL ADVICE (OUTPATIENT)
Dept: FAMILY MEDICINE | Facility: CLINIC | Age: 24
End: 2019-04-22

## 2019-04-22 NOTE — TELEPHONE ENCOUNTER
Please see what dose patient is taking. She is due for follow up. Okay to refill - at dose she is taking.  Carol Jennings PA-C

## 2019-04-22 NOTE — TELEPHONE ENCOUNTER
"sertraline (ZOLOFT) 50 MG tablet      Last Written Prescription Date:  3/14/19  Last Fill Quantity: 46,   # refills: 0  Last Office Visit: 3/14/19  Future Office visit:       Requested Prescriptions   Pending Prescriptions Disp Refills     sertraline (ZOLOFT) 50 MG tablet 46 tablet 0     Sig: Take 0.5 tablets (25 mg) by mouth daily for 7 days, THEN 1 tablet (50 mg) daily for 14 days, THEN 2 tablets (100 mg) daily for 14 days.       SSRIs Protocol Failed - 4/19/2019  5:00 PM        Failed - PHQ-9 score less than 5 in past 6 months     Please review last PHQ-9 score.           Passed - Medication is active on med list        Passed - Patient is age 18 or older        Passed - No active pregnancy on record        Passed - No positive pregnancy test in last 12 months        Passed - Recent (6 mo) or future (30 days) visit within the authorizing provider's specialty     Patient had office visit in the last 6 months or has a visit in the next 30 days with authorizing provider or within the authorizing provider's specialty.  See \"Patient Info\" tab in inbasket, or \"Choose Columns\" in Meds & Orders section of the refill encounter.              "

## 2019-04-22 NOTE — TELEPHONE ENCOUNTER
**This refill requires provider completion and is not appropriate for RN review per RN refill guidelines.**  PH-Q9 needs to be less than 5 to approve medication on RN Refill protocol pt's score is 8.  Jeannine Cook RN

## 2019-04-22 NOTE — TELEPHONE ENCOUNTER
"I have attempted to contact this patient by phone with the following results: no answer \"the number you are trying to call is not reachable\". Per Carol Jennings, ok to fill at the 2 tablets a day with a note she needs to be seen in clinic.    Jeannine Cook RN      "

## 2019-05-21 ENCOUNTER — MYC MEDICAL ADVICE (OUTPATIENT)
Dept: FAMILY MEDICINE | Facility: CLINIC | Age: 24
End: 2019-05-21

## 2019-05-22 ENCOUNTER — TELEPHONE (OUTPATIENT)
Dept: FAMILY MEDICINE | Facility: CLINIC | Age: 24
End: 2019-05-22

## 2019-05-22 DIAGNOSIS — F32.1 CURRENT MODERATE EPISODE OF MAJOR DEPRESSIVE DISORDER WITHOUT PRIOR EPISODE (H): ICD-10-CM

## 2019-05-22 NOTE — TELEPHONE ENCOUNTER
Routing refill request to provider for review/approval because:  Patient was instructed on 1/21/19 in a response to her MyChart question to make an evisit.  Navin Del Toro RN

## 2019-05-22 NOTE — TELEPHONE ENCOUNTER
"SERTRALINE HCL 50MG TABS      Last Written Prescription Date:  4/22/19  Last Fill Quantity: 60,   # refills: 0  Last Office Visit: 3/14/19  Future Office visit:       Requested Prescriptions   Pending Prescriptions Disp Refills     sertraline (ZOLOFT) 50 MG tablet [Pharmacy Med Name: SERTRALINE HCL 50MG TABS] 60 tablet 0     Sig: TAKE TWO TABLETS BY MOUTH ONCE DAILY, NEED FOLLOW-UP APPOINTMENT FOR THIS MEDICATION       SSRIs Protocol Failed - 5/22/2019 12:25 PM        Failed - PHQ-9 score less than 5 in past 6 months     Please review last PHQ-9 score.           Passed - Medication is active on med list        Passed - Patient is age 18 or older        Passed - No active pregnancy on record        Passed - No positive pregnancy test in last 12 months        Passed - Recent (6 mo) or future (30 days) visit within the authorizing provider's specialty     Patient had office visit in the last 6 months or has a visit in the next 30 days with authorizing provider or within the authorizing provider's specialty.  See \"Patient Info\" tab in inbasket, or \"Choose Columns\" in Meds & Orders section of the refill encounter.              "

## 2019-05-28 ENCOUNTER — MYC MEDICAL ADVICE (OUTPATIENT)
Dept: FAMILY MEDICINE | Facility: CLINIC | Age: 24
End: 2019-05-28

## 2019-05-29 ENCOUNTER — OFFICE VISIT (OUTPATIENT)
Dept: FAMILY MEDICINE | Facility: CLINIC | Age: 24
End: 2019-05-29
Payer: MEDICAID

## 2019-05-29 VITALS
BODY MASS INDEX: 27.27 KG/M2 | HEART RATE: 86 BPM | SYSTOLIC BLOOD PRESSURE: 117 MMHG | WEIGHT: 148.2 LBS | HEIGHT: 62 IN | DIASTOLIC BLOOD PRESSURE: 66 MMHG | TEMPERATURE: 98.4 F

## 2019-05-29 DIAGNOSIS — F32.1 CURRENT MODERATE EPISODE OF MAJOR DEPRESSIVE DISORDER WITHOUT PRIOR EPISODE (H): Primary | ICD-10-CM

## 2019-05-29 DIAGNOSIS — J45.20 MILD INTERMITTENT ASTHMA WITHOUT COMPLICATION: ICD-10-CM

## 2019-05-29 DIAGNOSIS — F90.0 ATTENTION DEFICIT HYPERACTIVITY DISORDER (ADHD), PREDOMINANTLY INATTENTIVE TYPE: ICD-10-CM

## 2019-05-29 PROCEDURE — 99213 OFFICE O/P EST LOW 20 MIN: CPT | Performed by: PHYSICIAN ASSISTANT

## 2019-05-29 RX ORDER — DEXTROAMPHETAMINE SACCHARATE, AMPHETAMINE ASPARTATE, DEXTROAMPHETAMINE SULFATE AND AMPHETAMINE SULFATE 5; 5; 5; 5 MG/1; MG/1; MG/1; MG/1
10 TABLET ORAL 2 TIMES DAILY
Qty: 30 TABLET | Refills: 0 | Status: SHIPPED | OUTPATIENT
Start: 2019-08-14 | End: 2019-09-25

## 2019-05-29 RX ORDER — DEXTROAMPHETAMINE SACCHARATE, AMPHETAMINE ASPARTATE, DEXTROAMPHETAMINE SULFATE AND AMPHETAMINE SULFATE 5; 5; 5; 5 MG/1; MG/1; MG/1; MG/1
10 TABLET ORAL 2 TIMES DAILY
Qty: 30 TABLET | Refills: 0 | Status: SHIPPED | OUTPATIENT
Start: 2019-06-14 | End: 2019-07-24

## 2019-05-29 RX ORDER — SERTRALINE HYDROCHLORIDE 100 MG/1
100 TABLET, FILM COATED ORAL DAILY
Qty: 90 TABLET | Refills: 0 | Status: SHIPPED | OUTPATIENT
Start: 2019-05-29 | End: 2019-08-22

## 2019-05-29 RX ORDER — DEXTROAMPHETAMINE SACCHARATE, AMPHETAMINE ASPARTATE, DEXTROAMPHETAMINE SULFATE AND AMPHETAMINE SULFATE 5; 5; 5; 5 MG/1; MG/1; MG/1; MG/1
10 TABLET ORAL 2 TIMES DAILY
Qty: 30 TABLET | Refills: 0 | Status: SHIPPED | OUTPATIENT
Start: 2019-07-12 | End: 2019-08-22

## 2019-05-29 ASSESSMENT — ANXIETY QUESTIONNAIRES
3. WORRYING TOO MUCH ABOUT DIFFERENT THINGS: SEVERAL DAYS
5. BEING SO RESTLESS THAT IT IS HARD TO SIT STILL: NOT AT ALL
2. NOT BEING ABLE TO STOP OR CONTROL WORRYING: NOT AT ALL
1. FEELING NERVOUS, ANXIOUS, OR ON EDGE: SEVERAL DAYS
7. FEELING AFRAID AS IF SOMETHING AWFUL MIGHT HAPPEN: NOT AT ALL
GAD7 TOTAL SCORE: 2
6. BECOMING EASILY ANNOYED OR IRRITABLE: NOT AT ALL

## 2019-05-29 ASSESSMENT — PATIENT HEALTH QUESTIONNAIRE - PHQ9
5. POOR APPETITE OR OVEREATING: NOT AT ALL
SUM OF ALL RESPONSES TO PHQ QUESTIONS 1-9: 3

## 2019-05-29 ASSESSMENT — MIFFLIN-ST. JEOR: SCORE: 1369.99

## 2019-05-29 NOTE — PATIENT INSTRUCTIONS
Continue current medications  Let me know if your new insurance will cover the extended release adderall better and we can switch to that  When you have insurance, call for refill of albuterol inhaler  Try the OTC antihistamine for now

## 2019-05-29 NOTE — PROGRESS NOTES
SUBJECTIVE:                                                    Nargis Ruelas is a 24 year old female who presents to clinic today for the following health issues:    Depression and Anxiety Follow-Up    How are you doing with your depression since your last visit? Improved, things are going much better since last visit    How are you doing with your anxiety since your last visit?  Improved      Are you having other symptoms that might be associated with depression or anxiety? No    Have you had a significant life event? OTHER: Still has not found a job     Do you have any concerns with your use of alcohol or other drugs? No    Social History     Tobacco Use     Smoking status: Never Smoker     Smokeless tobacco: Never Used   Substance Use Topics     Alcohol use: No     Alcohol/week: 0.0 oz     Comment: 4 drinks per month     Drug use: No     PHQ 10/10/2018 3/14/2019 5/29/2019   PHQ-9 Total Score 4 8 3   Q9: Thoughts of better off dead/self-harm past 2 weeks Not at all Not at all Not at all     JERMAIN-7 SCORE 10/10/2018 3/14/2019 5/29/2019   Total Score 5 7 2     Suicide Assessment Five-step Evaluation and Treatment (SAFE-T)    Amount of exercise or physical activity: 2-3 days/week for an average of 30-45 minutes    Problems taking medications regularly: No    Medication side effects: none    Diet: regular (no restrictions)    * allergies: scored poorly on ACT but she feels her symptoms are mainly cough not wheezing/tightness/sob and are related to allergies  She does not feel she needs albuterol at this point, cannot afford prescription, and declines me to prescribe it at this time    Weight is up. Feels she is eating pretty well, possibly eating more now that she is not busy working. Moved in with boyfriend in [place], going well. 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  Did apply for insurance through the state 1 month ago, waiting to hear back      Problem  "list and histories reviewed & adjusted, as indicated.  Additional history: none    Patient Active Problem List   Diagnosis     History of other specified conditions presenting hazards to health     Major depressive disorder, single episode     Childhood hyperkinetic syndrome     Anxiety state     History of syncope     Tobacco abuse     Attention deficit hyperactivity disorder (ADHD), predominantly inattentive type     Chronic pain of both knees     Asthma     Past Surgical History:   Procedure Laterality Date     TONSILLECTOMY         Social History     Tobacco Use     Smoking status: Never Smoker     Smokeless tobacco: Never Used   Substance Use Topics     Alcohol use: No     Alcohol/week: 0.0 oz     Comment: 4 drinks per month     Family History   Problem Relation Age of Onset     Diabetes Maternal Grandfather      Coronary Artery Disease Maternal Grandfather      Cerebrovascular Disease Maternal Grandfather      Depression Mother      Anxiety Disorder Mother      Other Cancer No family hx of            ROS:  Other than noted above, general, HEENT, respiratory, cardiac, MS, and gastrointestinal systems are negative.     OBJECTIVE:                                                    /66   Pulse 86   Temp 98.4  F (36.9  C) (Tympanic)   Ht 1.566 m (5' 1.65\")   Wt 67.2 kg (148 lb 3.2 oz)   LMP 05/24/2019 (Exact Date)   BMI 27.41 kg/m   Body mass index is 27.41 kg/m .   GENERAL: healthy, alert, well nourished, well hydrated, no distress  RESP: lungs clear to auscultation - no rales, no rhonchi, no wheezes  CV: regular rates and rhythm, normal S1 S2, no S3 or S4 and no murmur, no click or rub -  PSYCH: Alert and oriented times 3; speech- coherent , normal rate and volume; able to articulate logical thoughts, able to abstract reason, no tangential thoughts, no hallucinations or delusions, affect- normal       ASSESSMENT/PLAN:                                                      ASSESSMENT/PLAN:      ICD-10-CM "    1. Current moderate episode of major depressive disorder without prior episode (H) F32.1 sertraline (ZOLOFT) 100 MG tablet   2. Attention deficit hyperactivity disorder (ADHD), predominantly inattentive type F90.0 amphetamine-dextroamphetamine (ADDERALL) 20 MG tablet     amphetamine-dextroamphetamine (ADDERALL) 20 MG tablet     amphetamine-dextroamphetamine (ADDERALL) 20 MG tablet   3. Mild intermittent asthma without complication J45.20 Asthma Action Plan (AAP)       Patient Instructions   Continue current medications  Let me know if your new insurance will cover the extended release adderall better and we can switch to that  When you have insurance, call for refill of albuterol inhaler  Try the OTC antihistamine for now     reports that she has never smoked. She has never used smokeless tobacco.    Carol Jennings PA-C   [facility]

## 2019-05-29 NOTE — LETTER
My Asthma Action Plan  Name: Nargis Ruelas   YOB: 1995  Date: 5/29/2019   My doctor: Carol Jennings PA-C   My clinic: [facility]        My Control Medicine: None  My Rescue Medicine: Albuterol (Proair/Ventolin/Proventil) inhaler     My Asthma Severity: intermittent  Avoid your asthma triggers: pollens               GREEN ZONE   Good Control    I feel good    No cough or wheeze    Can work, sleep and play without asthma symptoms       Take your asthma control medicine every day.     1. If exercise triggers your asthma, take your rescue medication    15 minutes before exercise or sports, and    During exercise if you have asthma symptoms  2. Spacer to use with inhaler: If you have a spacer, make sure to use it with your inhaler             YELLOW ZONE Getting Worse  I have ANY of these:    I do not feel good    Cough or wheeze    Chest feels tight    Wake up at night   1. Keep taking your Green Zone medications  2. Start taking your rescue medicine:    every 20 minutes for up to 1 hour. Then every 4 hours for 24-48 hours.  3. If you stay in the Yellow Zone for more than 12-24 hours, contact your doctor.  4. If you do not return to the Green Zone in 12-24 hours or you get worse, start taking your oral steroid medicine if prescribed by your provider.           RED ZONE Medical Alert - Get Help  I have ANY of these:    I feel awful    Medicine is not helping    Breathing getting harder    Trouble walking or talking    Nose opens wide to breathe       1. Take your rescue medicine NOW  2. If your provider has prescribed an oral steroid medicine, start taking it NOW  3. Call your doctor NOW  4. If you are still in the Red Zone after 20 minutes and you have not reached your doctor:    Take your rescue medicine again and    Call 911 or go to the emergency room right away    See your regular doctor within 2 weeks of an Emergency Room or Urgent Care visit for follow-up treatment.           Annual Reminders:  Meet with Asthma Educator,  Flu Shot in the Fall, consider Pneumonia Vaccination for patients with asthma (aged 19 and older).    Pharmacy:    [facility] [place]  [place], [place] - [address]  [facility] - [place], [place] - [address]  [id number] - [facility], [place] - [address]                      Asthma Triggers  How To Control Things That Make Your Asthma Worse    Triggers are things that make your asthma worse.  Look at the list below to help you find your triggers and what you can do about them.  You can help prevent asthma flare-ups by staying away from your triggers.      Trigger                                                          What you can do   Cigarette Smoke  Tobacco smoke can make asthma worse. Do not allow smoking in your home, car or around you.  Be sure no one smokes at a child s day care or school.  If you smoke, ask your health care provider for ways to help you quit.  Ask family members to quit too.  Ask your health care provider for a referral to Quit Plan to help you quit smoking, or call 2-573-751-PLAN.     Colds, Flu, Bronchitis  These are common triggers of asthma. Wash your hands often.  Don t touch your eyes, nose or mouth.  Get a flu shot every year.     Dust Mites  These are tiny bugs that live in cloth or carpet. They are too small to see. Wash sheets and blankets in hot water every week.   Encase pillows and mattress in dust mite proof covers.  Avoid having carpet if you can. If you have carpet, vacuum weekly.   Use a dust mask and HEPA vacuum.   Pollen and Outdoor Mold  Some people are allergic to trees, grass, or weed pollen, or molds. Try to keep your windows closed.  Limit time out doors when pollen count is high.   Ask you health care provider about taking medicine during allergy season.     Animal Dander  Some people are allergic to skin flakes, urine or saliva from pets with fur or feathers. Keep  pets with fur or feathers out of your home.    If you can t keep the pet outdoors, then keep the pet out of your bedroom.  Keep the bedroom door closed.  Keep pets off cloth furniture and away from stuffed toys.     Mice, Rats, and Cockroaches  Some people are allergic to the waste from these pests.   Cover food and garbage.  Clean up spills and food crumbs.  Store grease in the refrigerator.   Keep food out of the bedroom.   Indoor Mold  This can be a trigger if your home has high moisture. Fix leaking faucets, pipes, or other sources of water.   Clean moldy surfaces.  Dehumidify basement if it is damp and smelly.   Smoke, Strong Odors, and Sprays  These can reduce air quality. Stay away from strong odors and sprays, such as perfume, powder, hair spray, paints, smoke incense, paint, cleaning products, candles and new carpet.   Exercise or Sports  Some people with asthma have this trigger. Be active!  Ask your doctor about taking medicine before sports or exercise to prevent symptoms.    Warm up for 5-10 minutes before and after sports or exercise.     Other Triggers of Asthma  Cold air:  Cover your nose and mouth with a scarf.  Sometimes laughing or crying can be a trigger.  Some medicines and food can trigger asthma.

## 2019-05-30 ASSESSMENT — ASTHMA QUESTIONNAIRES: ACT_TOTALSCORE: 18

## 2019-05-30 ASSESSMENT — ANXIETY QUESTIONNAIRES: GAD7 TOTAL SCORE: 2

## 2019-06-20 ENCOUNTER — TELEPHONE (OUTPATIENT)
Dept: FAMILY MEDICINE | Facility: CLINIC | Age: 24
End: 2019-06-20

## 2019-07-12 ENCOUNTER — OFFICE VISIT (OUTPATIENT)
Dept: FAMILY MEDICINE | Facility: CLINIC | Age: 24
End: 2019-07-12
Payer: MEDICAID

## 2019-07-12 VITALS
WEIGHT: 147.6 LBS | HEIGHT: 62 IN | OXYGEN SATURATION: 100 % | BODY MASS INDEX: 27.16 KG/M2 | SYSTOLIC BLOOD PRESSURE: 121 MMHG | TEMPERATURE: 99.6 F | DIASTOLIC BLOOD PRESSURE: 75 MMHG | HEART RATE: 74 BPM

## 2019-07-12 DIAGNOSIS — F90.0 ATTENTION DEFICIT HYPERACTIVITY DISORDER (ADHD), PREDOMINANTLY INATTENTIVE TYPE: ICD-10-CM

## 2019-07-12 DIAGNOSIS — J45.20 MILD INTERMITTENT ASTHMA WITHOUT COMPLICATION: ICD-10-CM

## 2019-07-12 DIAGNOSIS — F32.1 CURRENT MODERATE EPISODE OF MAJOR DEPRESSIVE DISORDER WITHOUT PRIOR EPISODE (H): ICD-10-CM

## 2019-07-12 DIAGNOSIS — Z32.00 VISIT FOR CONFIRMATION OF PREGNANCY TEST RESULT WITH PHYSICAL EXAM: Primary | ICD-10-CM

## 2019-07-12 LAB — HCG UR QL: POSITIVE

## 2019-07-12 PROCEDURE — 99214 OFFICE O/P EST MOD 30 MIN: CPT | Performed by: PHYSICIAN ASSISTANT

## 2019-07-12 PROCEDURE — 81025 URINE PREGNANCY TEST: CPT | Performed by: PHYSICIAN ASSISTANT

## 2019-07-12 RX ORDER — ALBUTEROL SULFATE 90 UG/1
2 AEROSOL, METERED RESPIRATORY (INHALATION) EVERY 6 HOURS PRN
Qty: 1 INHALER | Refills: 1 | Status: SHIPPED | OUTPATIENT
Start: 2019-07-12 | End: 2019-10-19

## 2019-07-12 ASSESSMENT — MIFFLIN-ST. JEOR: SCORE: 1367.27

## 2019-07-12 ASSESSMENT — ANXIETY QUESTIONNAIRES
1. FEELING NERVOUS, ANXIOUS, OR ON EDGE: NOT AT ALL
3. WORRYING TOO MUCH ABOUT DIFFERENT THINGS: NOT AT ALL
2. NOT BEING ABLE TO STOP OR CONTROL WORRYING: NOT AT ALL
5. BEING SO RESTLESS THAT IT IS HARD TO SIT STILL: NOT AT ALL
GAD7 TOTAL SCORE: 2
7. FEELING AFRAID AS IF SOMETHING AWFUL MIGHT HAPPEN: NOT AT ALL
6. BECOMING EASILY ANNOYED OR IRRITABLE: SEVERAL DAYS

## 2019-07-12 ASSESSMENT — PATIENT HEALTH QUESTIONNAIRE - PHQ9
5. POOR APPETITE OR OVEREATING: SEVERAL DAYS
SUM OF ALL RESPONSES TO PHQ QUESTIONS 1-9: 2

## 2019-07-12 NOTE — PATIENT INSTRUCTIONS
You are 7 weeks along  Estimated due date 2/28/20    Schedule OB visit for 8-10 weeks  Continue zoloft  Stop adderall, lorazepam  Call Moraima Ann early next week for intake    Pregnant women exposed to antidepressants during pregnancy are encouraged to enroll in the National Pregnancy Registry for Antidepressants (NPRAD). Women 18 to 45 years of age or their health care providers may contact the registry by calling [phone]. Enrollment should be done as early in pregnancy as possible.    Coverage information:     Subscriber: [id number] JEREMY SUAREZ     Rel to sub: 01 - Self     Member ID: [id number]     Payor: 16-[employer]ICA[employer] [place] Ph: [phone]     Benefit plan: [id number]-[employer]ICA[employer] [place] Ph: [phone]     Group number: Not given     Member effective dates: from 04/01/19

## 2019-07-12 NOTE — PROGRESS NOTES
"SUBJECTIVE:                                                    Nargis Ruelas is a 24 year old female who presents to clinic today for the following health issues:    Chief Complaint   Patient presents with     Confirmation Of Pregnancy     3 positive at home pregnancy tests     Living in [place], working at [employer]  Stable relationship with boyfriend    7 weeks 2/28/20    Stopped adderall 4-5 days  Has been taking zoloft every other day because she wasn't sure    Minor spotting 4 days ago, no cramping    She is not smoking. She drinks caffeine - energy drinks and lots of coffee    Problem list and histories reviewed & adjusted, as indicated.  Additional history: none    Patient Active Problem List   Diagnosis     History of other specified conditions presenting hazards to health     Major depressive disorder, single episode     Anxiety state     History of syncope     Tobacco abuse     Attention deficit hyperactivity disorder (ADHD), predominantly inattentive type     Chronic pain of both knees     Asthma     Past Surgical History:   Procedure Laterality Date     TONSILLECTOMY         Social History     Tobacco Use     Smoking status: Never Smoker     Smokeless tobacco: Never Used   Substance Use Topics     Alcohol use: No     Alcohol/week: 0.0 oz     Comment: 4 drinks per month     Family History   Problem Relation Age of Onset     Diabetes Maternal Grandfather      Coronary Artery Disease Maternal Grandfather      Cerebrovascular Disease Maternal Grandfather      Depression Mother      Anxiety Disorder Mother      Other Cancer No family hx of            ROS:  Other than noted above, general, HEENT, respiratory, cardiac, MS, and gastrointestinal systems are negative.     OBJECTIVE:                                                    /75   Pulse 74   Temp 99.6  F (37.6  C) (Tympanic)   Ht 1.566 m (5' 1.65\")   Wt 67 kg (147 lb 9.6 oz)   LMP 05/24/2019 (Exact Date)   SpO2 100%   BMI 27.30 " kg/m   Body mass index is 27.3 kg/m .   GENERAL APPEARANCE: healthy, alert and no distress  RESP: lungs clear to auscultation - no rales, rhonchi or wheezes  CV: regular rates and rhythm, normal S1 S2, no S3 or S4 and no murmur, click or rub  PSYCH: mentation appears normal and affect normal/bright       ASSESSMENT/PLAN:                                                      ASSESSMENT/PLAN:      ICD-10-CM    1. Visit for confirmation of pregnancy test result with physical exam Z32.00 HCG qualitative urine - [facility]SC and Range   2. Mild intermittent asthma without complication J45.20 albuterol (PROAIR HFA/PROVENTIL HFA/VENTOLIN HFA) 108 (90 Base) MCG/ACT inhaler   3. Attention deficit hyperactivity disorder (ADHD), predominantly inattentive type F90.0    4. Current moderate episode of major depressive disorder without prior episode (H) F32.1      Confirmation of pregnancy. Patient is excited.   Discussed early pregnancy. We discussed her medciations. Including risks/benefits of zoloft. She would like to continue this for her mental health. We discussed OTC medications, limit caffeine, healthy lifestyle. Handouts on early pregnancy given.  Patient did not realized she had insurance, her active insurance info was given to her.    Patient Instructions     You are 7 weeks along  Estimated due date 2/28/20    Schedule OB visit for 8-10 weeks  Continue zoloft  Stop adderall, lorazepam  Call Moraima Ann early next week for intake    Pregnant women exposed to antidepressants during pregnancy are encouraged to enroll in the National Pregnancy Registry for Antidepressants (NPRAD). Women 18 to 45 years of age or their health care providers may contact the registry by calling [phone]. Enrollment should be done as early in pregnancy as possible.     25 minutes was spent face to face with the patient today discussing history, diagnosis, and follow-up plan as noted above. Over 50% of the visit was spent in counseling and coordination  of care. Total Visit Time: 25 minutes.   Carol Jennings PA-C   [facility]

## 2019-07-13 ASSESSMENT — ASTHMA QUESTIONNAIRES: ACT_TOTALSCORE: 22

## 2019-07-13 ASSESSMENT — ANXIETY QUESTIONNAIRES: GAD7 TOTAL SCORE: 2

## 2019-07-15 ENCOUNTER — NURSE TRIAGE (OUTPATIENT)
Dept: NURSING | Facility: CLINIC | Age: 24
End: 2019-07-15

## 2019-07-15 ENCOUNTER — TELEPHONE (OUTPATIENT)
Dept: OBGYN | Facility: CLINIC | Age: 24
End: 2019-07-15

## 2019-07-15 NOTE — TELEPHONE ENCOUNTER
Clinic Action Needed:  Pt REquesting a appt today and triage= that was disposition . Please call back to advise Pt ASAP for appt today in OB .   Reason for Call:   FNA triage call : / [facility]    Presenting problem : Pt called. 7 weeks pregnant , MOE 2/28/20  -  Wants a . [place] [facility] preferred  appt . Cramping on 7/13/19 but that stopped and vaginal bleeding  7/13/19 , - using a pad / daily . Currently : no fever , 1&0 is ok, and activity is normal .  Guideline used : pregnancy vag bleeding < 20 weeks AOH /  Routed to provider's pool .  Please do not route back to FNA but  Close Epic  encounter when you are  finished . Caller verbalizes understanding and denies further questions and will call back if  triage requested or other problems not responded to in a timely way .   Lety Kaur RN [facility] nurse advisors .     Additional Information    Negative: Shock suspected (e.g., cold/pale/clammy skin, too weak to stand, low BP, rapid pulse)    Negative: Difficult to awaken or acting confused (e.g., disoriented, slurred speech)    Negative: Passed out (i.e., fainted, collapsed and was not responding)    Negative: Sounds like a life-threatening emergency to the triager    Negative: Vaginal bleeding and pregnant > 20 weeks    Negative: Not pregnant or pregnancy status unknown    Negative: SEVERE abdominal pain (e.g., excruciating)    Negative: SEVERE vaginal bleeding (i.e., soaking 2 pads / hour, large blood clots) and present for 2 or more hours    Negative: SEVERE dizziness (e.g., unable to stand, requires support to walk, feels like passing out)    Negative: MODERATE vaginal bleeding (i.e., soaking 1 pad) and present > 6 hours    Negative: MODERATE vaginal bleeding (i.e., soaking 1 pad / hour, clots) and pregnant > 12 weeks    Negative: Passed tissue (e.g., gray-white)    Negative: Shoulder pain    Negative: Constant abdominal pain lasting > 1 hour    Negative: Fever > 100.4 F (38.0 C)    Negative: Pale skin  (pallor) of new onset or worsening    Negative: Patient sounds very sick or weak to the triager    Negative: MODERATE vaginal bleeding (i.e., soaking 1 pad / hour; clots)    Negative: Intermittent lower abdominal pain (e.g., cramping) lasting > 24 hours    Negative: Pain or burning with passing urine (urination)    Negative: Prior history of 'ectopic pregnancy' or previous tubal surgery (e.g., tubal ligation)    Patient wants to be seen    Protocols used: PREGNANCY - VAGINAL BLEEDING LESS THAN 20 WEEKS EGA-A-OH

## 2019-07-15 NOTE — TELEPHONE ENCOUNTER
Pt calling to check on status of this request. Pt is very upset and frustrated, crying.     (Transferred call to RN as pt does not have a good call back # to leave)      Stella Behrendt  Specialty CSS

## 2019-07-15 NOTE — TELEPHONE ENCOUNTER
Spoke with patient.    Discussed 6-7 weeks is transition time of hormones.  Explained it can be common.  Advised best to rest, drink plenty of fluids and try to relax and an OB RN will call back.    Pt seemed calmer and will await call back from OB RN regarding appt and further plan.    Hannah Montero RN  [facility]

## 2019-07-15 NOTE — TELEPHONE ENCOUNTER
Clinic Action Needed:  Pt Requesting a appt today and triage= that was the  disposition . Please call back to Pt at [phone] to advise Pt ASAP for appt today in OB .      FNA triage call : / [facility]    Presenting problem : Pt called. 7 weeks pregnant , MOE 2/28/20  -  Wants a . [place] [facility] preferred  appt . Cramping on 7/13/19 only  but that stopped and vaginal bleeding started  7/13/19 &  persisting and  - using a pad / daily . Currently : no fever but , 1&0 is ok, and activity is normal .  Guideline used : pregnancy vag bleeding < 20 weeks AOH /  Routed to provider's pool .  Please do not route back to FNA but  Close Epic  encounter when you are  finished . Caller verbalizes understanding and denies further questions and will call back if  triage requested or other problems not responded to in a timely way .   Lety Kaur RN [facility] nurse advisors .

## 2019-07-17 NOTE — TELEPHONE ENCOUNTER
"Patient returning call to clinic.    Patient saw PCP on 7/12/19 and had positive pregnancy exam.  Patient reports bright red bleeding with cramping since 7/13/19. Patient reports needing to now use a pad. Prior to today, bleeding seemed lighter, didn't really need a pad, used toilet paper and noticed mostly with urination. Patient reports back pain started today which reminds her of her period. Patient concerned that she may be having a miscarriage, bleeding \" seems like it is coming more.\"    LMP 5/24/19    Reassurance provided. Reviewed bleeding precautions and when to be seen. Monitor the bleeding. Recommended ER follow up for vaginal bleeding in pregnancy. US for viability.    Pt in agreement and reports understanding.    aCmilla Travis   [facility]  RN    "

## 2019-07-19 ENCOUNTER — TELEPHONE (OUTPATIENT)
Dept: FAMILY MEDICINE | Facility: CLINIC | Age: 24
End: 2019-07-19

## 2019-07-19 RX ORDER — PRENATAL VIT/IRON FUM/FOLIC AC 27MG-0.8MG
1 TABLET ORAL DAILY
Qty: 90 TABLET | Refills: 3 | Status: SHIPPED | OUTPATIENT
Start: 2019-07-19 | End: 2019-08-22

## 2019-07-19 NOTE — TELEPHONE ENCOUNTER
I have attempted to contact this patient by phone with the following results: left message to return my call on answering machine.    Jeannine Cook RN

## 2019-07-19 NOTE — TELEPHONE ENCOUNTER
I had realized I did not send in prescription for prenatal vitamin at our visit so I sent that in.  I was reviewing her chart, she has appointment with me for Monday for back pain. And she had called OB this week for possible miscarriage, then did not go to her appointment.  Please triage this, I am concerned with back pain with cramping/bleeding in early pregnancy, and OB had recommended she go to ER if concerns for miscarriage.  Carol Jennings PA-C

## 2019-07-23 NOTE — TELEPHONE ENCOUNTER
Patient no showed her appointment yesterday. She has OB appointment scheduled for 7/29/19.    Jeannine Cook RN

## 2019-07-24 ENCOUNTER — APPOINTMENT (OUTPATIENT)
Dept: ULTRASOUND IMAGING | Facility: CLINIC | Age: 24
End: 2019-07-24
Attending: EMERGENCY MEDICINE
Payer: MEDICAID

## 2019-07-24 ENCOUNTER — HOSPITAL ENCOUNTER (EMERGENCY)
Facility: CLINIC | Age: 24
Discharge: HOME | End: 2019-07-24
Attending: EMERGENCY MEDICINE | Admitting: EMERGENCY MEDICINE
Payer: MEDICAID

## 2019-07-24 VITALS
HEART RATE: 80 BPM | WEIGHT: 150 LBS | HEIGHT: 61 IN | BODY MASS INDEX: 28.32 KG/M2 | OXYGEN SATURATION: 99 % | SYSTOLIC BLOOD PRESSURE: 113 MMHG | TEMPERATURE: 97.4 F | RESPIRATION RATE: 16 BRPM | DIASTOLIC BLOOD PRESSURE: 74 MMHG

## 2019-07-24 DIAGNOSIS — O02.1 MISSED AB: ICD-10-CM

## 2019-07-24 LAB
ABO + RH BLD: NORMAL
ABO + RH BLD: NORMAL
ALBUMIN UR-MCNC: NEGATIVE MG/DL
ANION GAP SERPL CALCULATED.3IONS-SCNC: 4 MMOL/L (ref 3–14)
APPEARANCE UR: CLEAR
B-HCG SERPL-ACNC: 22 IU/L (ref 0–5)
BASOPHILS # BLD AUTO: 0 10E9/L (ref 0–0.2)
BASOPHILS NFR BLD AUTO: 0.6 %
BILIRUB UR QL STRIP: NEGATIVE
BUN SERPL-MCNC: 9 MG/DL (ref 7–30)
CALCIUM SERPL-MCNC: 8.9 MG/DL (ref 8.5–10.1)
CHLORIDE SERPL-SCNC: 106 MMOL/L (ref 94–109)
CO2 SERPL-SCNC: 27 MMOL/L (ref 20–32)
COLOR UR AUTO: YELLOW
CREAT SERPL-MCNC: 0.69 MG/DL (ref 0.52–1.04)
DIFFERENTIAL METHOD BLD: NORMAL
EOSINOPHIL # BLD AUTO: 0.3 10E9/L (ref 0–0.7)
EOSINOPHIL NFR BLD AUTO: 5.4 %
ERYTHROCYTE [DISTWIDTH] IN BLOOD BY AUTOMATED COUNT: 13.9 % (ref 10–15)
GFR SERPL CREATININE-BSD FRML MDRD: >90 ML/MIN/{1.73_M2}
GLUCOSE SERPL-MCNC: 93 MG/DL (ref 70–99)
GLUCOSE UR STRIP-MCNC: NEGATIVE MG/DL
HCG UR QL: NEGATIVE
HCT VFR BLD AUTO: 37.6 % (ref 35–47)
HGB BLD-MCNC: 11.9 G/DL (ref 11.7–15.7)
HGB UR QL STRIP: ABNORMAL
IMM GRANULOCYTES # BLD: 0 10E9/L (ref 0–0.4)
IMM GRANULOCYTES NFR BLD: 0.2 %
KETONES UR STRIP-MCNC: NEGATIVE MG/DL
LEUKOCYTE ESTERASE UR QL STRIP: NEGATIVE
LYMPHOCYTES # BLD AUTO: 1.4 10E9/L (ref 0.8–5.3)
LYMPHOCYTES NFR BLD AUTO: 27.1 %
MCH RBC QN AUTO: 29.2 PG (ref 26.5–33)
MCHC RBC AUTO-ENTMCNC: 31.6 G/DL (ref 31.5–36.5)
MCV RBC AUTO: 92 FL (ref 78–100)
MONOCYTES # BLD AUTO: 0.6 10E9/L (ref 0–1.3)
MONOCYTES NFR BLD AUTO: 11.8 %
NEUTROPHILS # BLD AUTO: 2.8 10E9/L (ref 1.6–8.3)
NEUTROPHILS NFR BLD AUTO: 54.9 %
NITRATE UR QL: NEGATIVE
NRBC # BLD AUTO: 0 10*3/UL
NRBC BLD AUTO-RTO: 0 /100
PH UR STRIP: 8 PH (ref 5–7)
PLATELET # BLD AUTO: 278 10E9/L (ref 150–450)
POTASSIUM SERPL-SCNC: 3.8 MMOL/L (ref 3.4–5.3)
RBC # BLD AUTO: 4.08 10E12/L (ref 3.8–5.2)
RBC #/AREA URNS AUTO: 0 /HPF (ref 0–2)
SODIUM SERPL-SCNC: 137 MMOL/L (ref 133–144)
SOURCE: ABNORMAL
SP GR UR STRIP: 1.02 (ref 1–1.03)
SPECIMEN EXP DATE BLD: NORMAL
SQUAMOUS #/AREA URNS AUTO: 2 /HPF (ref 0–1)
UROBILINOGEN UR STRIP-MCNC: 0 MG/DL (ref 0–2)
WBC # BLD AUTO: 5 10E9/L (ref 4–11)
WBC #/AREA URNS AUTO: <1 /HPF (ref 0–5)

## 2019-07-24 PROCEDURE — 99282 EMERGENCY DEPT VISIT SF MDM: CPT | Mod: Z6 | Performed by: EMERGENCY MEDICINE

## 2019-07-24 PROCEDURE — 99284 EMERGENCY DEPT VISIT MOD MDM: CPT | Mod: 25 | Performed by: EMERGENCY MEDICINE

## 2019-07-24 PROCEDURE — 81025 URINE PREGNANCY TEST: CPT | Performed by: EMERGENCY MEDICINE

## 2019-07-24 PROCEDURE — 86900 BLOOD TYPING SEROLOGIC ABO: CPT | Performed by: EMERGENCY MEDICINE

## 2019-07-24 PROCEDURE — 84702 CHORIONIC GONADOTROPIN TEST: CPT | Performed by: EMERGENCY MEDICINE

## 2019-07-24 PROCEDURE — 76801 OB US < 14 WKS SINGLE FETUS: CPT

## 2019-07-24 PROCEDURE — 80048 BASIC METABOLIC PNL TOTAL CA: CPT | Performed by: EMERGENCY MEDICINE

## 2019-07-24 PROCEDURE — 86901 BLOOD TYPING SEROLOGIC RH(D): CPT | Performed by: EMERGENCY MEDICINE

## 2019-07-24 PROCEDURE — 81001 URINALYSIS AUTO W/SCOPE: CPT | Performed by: EMERGENCY MEDICINE

## 2019-07-24 PROCEDURE — 85025 COMPLETE CBC W/AUTO DIFF WBC: CPT | Performed by: EMERGENCY MEDICINE

## 2019-07-24 PROCEDURE — 36415 COLL VENOUS BLD VENIPUNCTURE: CPT | Performed by: EMERGENCY MEDICINE

## 2019-07-24 ASSESSMENT — MIFFLIN-ST. JEOR: SCORE: 1367.78

## 2019-07-24 NOTE — ED TRIAGE NOTES
Patient is 7 weeks pregnant by dates, confirmed with UPT at clinic. Has been spotting and cramping for past 2 weeks, called clinic and was instructed to visit the ED. No worse s/s today, but concerned.

## 2019-07-24 NOTE — ED AVS SNAPSHOT
[facility] Emergency Department  [address] [address]  Phone:  [phone]  Fax:  [phone]                                    Nargis Ruelas   MRN: [id number]    Department:  [facility] Emergency Department   Date of Visit:  7/24/2019           After Visit Summary Signature Page    I have received my discharge instructions, and my questions have been answered. I have discussed any challenges I see with this plan with the nurse or doctor.    ..........................................................................................................................................  Patient/Patient Representative Signature      ..........................................................................................................................................  Patient Representative Print Name and Relationship to Patient    ..................................................               ................................................  Date                                   Time    ..........................................................................................................................................  Reviewed by Signature/Title    ...................................................              ..............................................  Date                                               Time          22EPIC Rev 08/18

## 2019-07-24 NOTE — DISCHARGE INSTRUCTIONS
Tylenol/ibuprofen for discomfort    Return for fever, bleeding, vomiting, faintness or any other concern

## 2019-07-24 NOTE — ED PROVIDER NOTES
History     Chief Complaint   Patient presents with     Vaginal Bleeding      est 7 weeks pg, reports scant spotting x 1 week.  has not had an OBGYN apt yet.       HPI  Nargis Ruelas is a 24 year old female who presents with vaginal spotting and lower abdominal cramping.  G1, P0 at approximately 8-1/2 weeks by dates, positive UPT by primary care presents with suprapubic cramps, and vaginal spotting.  Believes she may have passed some tissue several days ago denies trauma.  Denies fever.  Denies urinary symptoms.  Medications sertraline and prenatal vitamin    Allergies:  No Known Allergies    Problem List:    Patient Active Problem List    Diagnosis Date Noted     Chronic pain of both knees 2016     Priority: Medium     Tobacco abuse 10/11/2015     Priority: Medium     History of syncope 10/06/2015     Priority: Medium     History of other specified conditions presenting hazards to health 04/10/2015     Priority: Medium     Major depressive disorder, single episode 04/10/2015     Priority: Medium     Anxiety state 04/10/2015     Priority: Medium     Asthma 2014     Priority: Medium     Attention deficit hyperactivity disorder (ADHD), predominantly inattentive type 2010     Priority: Medium        Past Medical History:    No past medical history on file.    Past Surgical History:    Past Surgical History:   Procedure Laterality Date     TONSILLECTOMY         Family History:    Family History   Problem Relation Age of Onset     Diabetes Maternal Grandfather      Coronary Artery Disease Maternal Grandfather      Cerebrovascular Disease Maternal Grandfather      Depression Mother      Anxiety Disorder Mother      Other Cancer No family hx of        Social History:  Marital Status:  Single [1]  Social History     Tobacco Use     Smoking status: Never Smoker     Smokeless tobacco: Never Used   Substance Use Topics     Alcohol use: No     Alcohol/week: 0.0 oz     Comment: 4 drinks per month  "    Drug use: No        Medications:      amphetamine-dextroamphetamine (ADDERALL) 20 MG tablet   Prenatal Vit-Fe Fumarate-FA (PRENATAL MULTIVITAMIN W/IRON) 27-0.8 MG tablet   sertraline (ZOLOFT) 100 MG tablet   albuterol (PROAIR HFA/PROVENTIL HFA/VENTOLIN HFA) 108 (90 Base) MCG/ACT inhaler   [START ON 8/14/2019] amphetamine-dextroamphetamine (ADDERALL) 20 MG tablet   LORazepam (ATIVAN) 0.5 MG tablet         Review of Systems  All other systems reviewed and are negative.    Physical Exam   BP: 113/74  Pulse: 80  Temp: 97.4  F (36.3  C)  Resp: 18  Height: 154.9 cm (5' 1\")  Weight: 68 kg (150 lb)  SpO2: 99 %      Physical Exam  Nontoxic appearing no respiratory distress alert and oriented ×3  Head atraumatic normocephalic  Lungs clear to auscultation  Heart regular no murmur  Abdomen soft nontender bowel sounds positive no masses or HSM  Pelvic exam with female RN staff present shows normal external genitalia, there is no discharge, no vaginal blood, cervical osteo-some mucus, it is closed, there is no active bleeding  Strength and sensation grossly intact throughout the extremities, gait and station normal  Speech is fluent, good eye contact, thought processes are rational    ED Course        Procedures               Critical Care time:  none               Results for orders placed or performed during the hospital encounter of 07/24/19 (from the past 24 hour(s))   UA reflex to Microscopic   Result Value Ref Range    Color Urine Yellow     Appearance Urine Clear     Glucose Urine Negative NEG^Negative mg/dL    Bilirubin Urine Negative NEG^Negative    Ketones Urine Negative NEG^Negative mg/dL    Specific Gravity Urine 1.016 1.003 - 1.035    Blood Urine Small (A) NEG^Negative    pH Urine 8.0 (H) 5.0 - 7.0 pH    Protein Albumin Urine Negative NEG^Negative mg/dL    Urobilinogen mg/dL 0.0 0.0 - 2.0 mg/dL    Nitrite Urine Negative NEG^Negative    Leukocyte Esterase Urine Negative NEG^Negative    Source Midstream Urine     " RBC Urine 0 0 - 2 /HPF    WBC Urine <1 0 - 5 /HPF    Squamous Epithelial /HPF Urine 2 (H) 0 - 1 /HPF   HCG qualitative urine (UPT)   Result Value Ref Range    HCG Qual Urine Negative NEG^Negative   CBC with platelets differential   Result Value Ref Range    WBC 5.0 4.0 - 11.0 10e9/L    RBC Count 4.08 3.8 - 5.2 10e12/L    Hemoglobin 11.9 11.7 - 15.7 g/dL    Hematocrit 37.6 35.0 - 47.0 %    MCV 92 78 - 100 fl    MCH 29.2 26.5 - 33.0 pg    MCHC 31.6 31.5 - 36.5 g/dL    RDW 13.9 10.0 - 15.0 %    Platelet Count 278 150 - 450 10e9/L    Diff Method Automated Method     % Neutrophils 54.9 %    % Lymphocytes 27.1 %    % Monocytes 11.8 %    % Eosinophils 5.4 %    % Basophils 0.6 %    % Immature Granulocytes 0.2 %    Nucleated RBCs 0 0 /100    Absolute Neutrophil 2.8 1.6 - 8.3 10e9/L    Absolute Lymphocytes 1.4 0.8 - 5.3 10e9/L    Absolute Monocytes 0.6 0.0 - 1.3 10e9/L    Absolute Eosinophils 0.3 0.0 - 0.7 10e9/L    Absolute Basophils 0.0 0.0 - 0.2 10e9/L    Abs Immature Granulocytes 0.0 0 - 0.4 10e9/L    Absolute Nucleated RBC 0.0    Basic metabolic panel   Result Value Ref Range    Sodium 137 133 - 144 mmol/L    Potassium 3.8 3.4 - 5.3 mmol/L    Chloride 106 94 - 109 mmol/L    Carbon Dioxide 27 20 - 32 mmol/L    Anion Gap 4 3 - 14 mmol/L    Glucose 93 70 - 99 mg/dL    Urea Nitrogen 9 7 - 30 mg/dL    Creatinine 0.69 0.52 - 1.04 mg/dL    GFR Estimate >90 >60 mL/min/[1.73_m2]    GFR Estimate If Black >90 >60 mL/min/[1.73_m2]    Calcium 8.9 8.5 - 10.1 mg/dL   HCG quantitative pregnancy (blood)   Result Value Ref Range    HCG Quantitative Serum 22 (H) 0 - 5 IU/L   ABO and Rh   Result Value Ref Range    ABO A     RH(D) Pos     Specimen Expires 07/27/2019    US OB <14 Weeks w Transvaginal Single    Narrative    US OB <14 WEEKS WITH TRANSVAGINAL SINGLE 7/24/2019 3:35 PM    HISTORY: Bleeding and cramping during early pregnancy.    TECHNIQUE: Transvaginal imaging was added to the transabdominal scans.    COMPARISON:  None.    FINDINGS: No intrauterine gestational sac is identified. No embryonic  pole or embryonic cardiac activity is identified. The endometrial  stripe measures 0.6 cm, and is within normal limits for a  premenopausal patient. The uterus is measured at 7.5 x 2.4 x 3.4 cm.  The left ovary contains a small probable corpus luteum cyst. The right  ovary is unremarkable. Blood flow is identified within both ovaries.  No adnexal masses are identified. Small amount of anechoic free fluid  within the pelvis.      Impression    IMPRESSION:   1. No intrauterine pregnancy is identified. In the setting of a  positive pregnancy test, differential considerations include an  intrauterine pregnancy too early to visualize, a spontaneous   in progress, or possibly an ectopic pregnancy. Clinical correlation  and close followup are recommended.  2. Small amount of free fluid in the pelvis is nonspecific, and could  be physiologic.     NELSON SILVESTRE MD       Medications - No data to display    Assessments & Plan (with Medical Decision Making)  24-year-old female presents with vaginal cramps spotting, exam is unremarkable, ultrasound no intrauterine pregnancy is identified, hCG is 22, blood type a positive, labs unremarkable as is urinalysis.  Findings are consistent with missed AB.  Discussed follow-up and return criteria.     I have reviewed the nursing notes.    I have reviewed the findings, diagnosis, plan and need for follow up with the patient.             Medication List      There are no discharge medications for this visit.         Final diagnoses:   Missed ab       2019   [facility] EMERGENCY DEPARTMENT     Vadim Lundberg MD  [phone]

## 2019-08-22 ENCOUNTER — OFFICE VISIT (OUTPATIENT)
Dept: FAMILY MEDICINE | Facility: CLINIC | Age: 24
End: 2019-08-22
Payer: MEDICAID

## 2019-08-22 VITALS
DIASTOLIC BLOOD PRESSURE: 78 MMHG | SYSTOLIC BLOOD PRESSURE: 123 MMHG | HEART RATE: 66 BPM | OXYGEN SATURATION: 100 % | RESPIRATION RATE: 10 BRPM | TEMPERATURE: 98 F | BODY MASS INDEX: 28.39 KG/M2 | WEIGHT: 154.3 LBS | HEIGHT: 62 IN

## 2019-08-22 DIAGNOSIS — K21.9 GASTROESOPHAGEAL REFLUX DISEASE WITHOUT ESOPHAGITIS: ICD-10-CM

## 2019-08-22 DIAGNOSIS — F90.0 ATTENTION DEFICIT HYPERACTIVITY DISORDER (ADHD), PREDOMINANTLY INATTENTIVE TYPE: Primary | ICD-10-CM

## 2019-08-22 DIAGNOSIS — Z87.59 MISCARRIAGE WITHIN LAST 12 MONTHS: ICD-10-CM

## 2019-08-22 DIAGNOSIS — F32.1 CURRENT MODERATE EPISODE OF MAJOR DEPRESSIVE DISORDER WITHOUT PRIOR EPISODE (H): ICD-10-CM

## 2019-08-22 PROCEDURE — 99213 OFFICE O/P EST LOW 20 MIN: CPT | Performed by: PHYSICIAN ASSISTANT

## 2019-08-22 RX ORDER — DEXTROAMPHETAMINE SACCHARATE, AMPHETAMINE ASPARTATE, DEXTROAMPHETAMINE SULFATE AND AMPHETAMINE SULFATE 5; 5; 5; 5 MG/1; MG/1; MG/1; MG/1
10 TABLET ORAL 2 TIMES DAILY
Qty: 30 TABLET | Refills: 0 | Status: SHIPPED | OUTPATIENT
Start: 2019-09-20 | End: 2019-09-25

## 2019-08-22 RX ORDER — DEXTROAMPHETAMINE SACCHARATE, AMPHETAMINE ASPARTATE, DEXTROAMPHETAMINE SULFATE AND AMPHETAMINE SULFATE 5; 5; 5; 5 MG/1; MG/1; MG/1; MG/1
10 TABLET ORAL 2 TIMES DAILY
Qty: 30 TABLET | Refills: 0 | Status: SHIPPED | OUTPATIENT
Start: 2019-11-13 | End: 2019-08-22

## 2019-08-22 RX ORDER — DEXTROAMPHETAMINE SACCHARATE, AMPHETAMINE ASPARTATE, DEXTROAMPHETAMINE SULFATE AND AMPHETAMINE SULFATE 5; 5; 5; 5 MG/1; MG/1; MG/1; MG/1
10 TABLET ORAL 2 TIMES DAILY
Qty: 30 TABLET | Refills: 0 | Status: SHIPPED | OUTPATIENT
Start: 2019-11-20 | End: 2019-09-25

## 2019-08-22 RX ORDER — DEXTROAMPHETAMINE SACCHARATE, AMPHETAMINE ASPARTATE, DEXTROAMPHETAMINE SULFATE AND AMPHETAMINE SULFATE 5; 5; 5; 5 MG/1; MG/1; MG/1; MG/1
10 TABLET ORAL 2 TIMES DAILY
Qty: 30 TABLET | Refills: 0 | Status: CANCELLED | OUTPATIENT
Start: 2019-08-22

## 2019-08-22 RX ORDER — DEXTROAMPHETAMINE SACCHARATE, AMPHETAMINE ASPARTATE, DEXTROAMPHETAMINE SULFATE AND AMPHETAMINE SULFATE 5; 5; 5; 5 MG/1; MG/1; MG/1; MG/1
10 TABLET ORAL 2 TIMES DAILY
Qty: 30 TABLET | Refills: 0 | Status: SHIPPED | OUTPATIENT
Start: 2019-10-20 | End: 2019-09-25

## 2019-08-22 RX ORDER — SERTRALINE HYDROCHLORIDE 100 MG/1
100 TABLET, FILM COATED ORAL DAILY
Qty: 90 TABLET | Refills: 1 | Status: SHIPPED | OUTPATIENT
Start: 2019-08-22 | End: 2020-04-13

## 2019-08-22 RX ORDER — DEXTROAMPHETAMINE SACCHARATE, AMPHETAMINE ASPARTATE, DEXTROAMPHETAMINE SULFATE AND AMPHETAMINE SULFATE 5; 5; 5; 5 MG/1; MG/1; MG/1; MG/1
10 TABLET ORAL 2 TIMES DAILY
Qty: 30 TABLET | Refills: 0 | Status: SHIPPED | OUTPATIENT
Start: 2019-10-14 | End: 2019-08-22

## 2019-08-22 RX ORDER — DEXTROAMPHETAMINE SACCHARATE, AMPHETAMINE ASPARTATE, DEXTROAMPHETAMINE SULFATE AND AMPHETAMINE SULFATE 5; 5; 5; 5 MG/1; MG/1; MG/1; MG/1
10 TABLET ORAL 2 TIMES DAILY
Qty: 30 TABLET | Refills: 0 | Status: SHIPPED | OUTPATIENT
Start: 2019-09-13 | End: 2019-08-22

## 2019-08-22 ASSESSMENT — ANXIETY QUESTIONNAIRES
2. NOT BEING ABLE TO STOP OR CONTROL WORRYING: NOT AT ALL
7. FEELING AFRAID AS IF SOMETHING AWFUL MIGHT HAPPEN: NOT AT ALL
6. BECOMING EASILY ANNOYED OR IRRITABLE: NOT AT ALL
3. WORRYING TOO MUCH ABOUT DIFFERENT THINGS: SEVERAL DAYS
GAD7 TOTAL SCORE: 2
5. BEING SO RESTLESS THAT IT IS HARD TO SIT STILL: NOT AT ALL
1. FEELING NERVOUS, ANXIOUS, OR ON EDGE: SEVERAL DAYS

## 2019-08-22 ASSESSMENT — PATIENT HEALTH QUESTIONNAIRE - PHQ9
5. POOR APPETITE OR OVEREATING: NOT AT ALL
SUM OF ALL RESPONSES TO PHQ QUESTIONS 1-9: 2

## 2019-08-22 ASSESSMENT — MIFFLIN-ST. JEOR: SCORE: 1397.66

## 2019-08-22 NOTE — PROGRESS NOTES
SUBJECTIVE:                                                    Nargis Ruelas is a 24 year old female who presents to clinic today for the following health issues:    *  Refill on medications, restarted medications about 1 month ago after having a miscarriage    The miscarriage was hard  She feels it was complete  She has had normal period now  No pelvic or vaginal concerns  She does not wish to restart birth control at this time  Her boyfriend is supportive but doesn't really understand it  She feels she has the skills to manage anxiety, feels like if it was going to happen, this was okay because of her better mental state. She really has not used ativan in a long time    Has still had some heartburn  Bought some prilosec OTC. Using some days, works well  Zantac was more expensive    Problem list and histories reviewed & adjusted, as indicated.  Additional history: none    Patient Active Problem List   Diagnosis     History of other specified conditions presenting hazards to health     Major depressive disorder, single episode     Anxiety state     History of syncope     Tobacco abuse     Attention deficit hyperactivity disorder (ADHD), predominantly inattentive type     Chronic pain of both knees     Asthma     Past Surgical History:   Procedure Laterality Date     TONSILLECTOMY         Social History     Tobacco Use     Smoking status: Never Smoker     Smokeless tobacco: Never Used   Substance Use Topics     Alcohol use: No     Alcohol/week: 0.0 oz     Comment: 4 drinks per month     Family History   Problem Relation Age of Onset     Diabetes Maternal Grandfather      Coronary Artery Disease Maternal Grandfather      Cerebrovascular Disease Maternal Grandfather      Depression Mother      Anxiety Disorder Mother      Other Cancer No family hx of            ROS:  Other than noted above, general, HEENT, respiratory, cardiac, MS, and gastrointestinal systems are negative.     OBJECTIVE:                            "                         /78   Pulse 66   Temp 98  F (36.7  C) (Tympanic)   Resp 10   Ht 1.566 m (5' 1.65\")   Wt 70 kg (154 lb 4.8 oz)   LMP 08/16/2019   SpO2 100%   Breastfeeding? No   BMI 28.54 kg/m   Body mass index is 28.54 kg/m .   GENERAL: healthy, alert, well nourished, well hydrated, no distress  RESP: lungs clear to auscultation - no rales, no rhonchi, no wheezes  CV: regular rates and rhythm, normal S1 S2, no S3 or S4 and no murmur, no click or rub -  ABDOMEN: soft, no tenderness, no  hepatosplenomegaly, no masses, normal bowel sounds  PSYCH: Alert and oriented times 3; speech- coherent , normal rate and volume; able to articulate logical thoughts, able to abstract reason, no tangential thoughts, no hallucinations or delusions, affect- normal       ASSESSMENT/PLAN:                                                      ASSESSMENT/PLAN:      ICD-10-CM    1. Attention deficit hyperactivity disorder (ADHD), predominantly inattentive type F90.0 amphetamine-dextroamphetamine (ADDERALL) 20 MG tablet     amphetamine-dextroamphetamine (ADDERALL) 20 MG tablet     amphetamine-dextroamphetamine (ADDERALL) 20 MG tablet     DISCONTINUED: amphetamine-dextroamphetamine (ADDERALL) 20 MG tablet     DISCONTINUED: amphetamine-dextroamphetamine (ADDERALL) 20 MG tablet     DISCONTINUED: amphetamine-dextroamphetamine (ADDERALL) 20 MG tablet   2. Current moderate episode of major depressive disorder without prior episode (H) F32.1 sertraline (ZOLOFT) 100 MG tablet   3. Gastroesophageal reflux disease without esophagitis K21.9 ranitidine (ZANTAC) 150 MG tablet     omeprazole (PRILOSEC) 20 MG DR capsule   4. Miscarriage within last 12 months Z87.59      We discussed, deferred PAP today, patient would like to wait due to situation  Patient is doing better. We discussed support groups/counseling if needed. She will contact me if needed.    Patient Instructions   Follow up for your physical / PAP smear " "anytime    Continue adderall and zoloft    Restart prenatal vitamin    1. Start prilosec (omeprazole) and take 20mg once daily for 2-4 weeks, then every other day for 1 week, then stop.       2. When you decrease the omeprazole, you can start taking zantac 150 mg two times daily or just as needed     Both omeprazole and ranitidine can be used on an \"as needed\" basis. Ranitidine is probably more effective (works quicker) than omeprazole (more of a preventive)    Carol Jennings PA-C   [facility]    "

## 2019-08-22 NOTE — PATIENT INSTRUCTIONS
"Follow up for your physical / PAP smear anytime    Continue adderall and zoloft    Restart prenatal vitamin    1. Start prilosec (omeprazole) and take 20mg once daily for 2-4 weeks, then every other day for 1 week, then stop.       2. When you decrease the omeprazole, you can start taking zantac 150 mg two times daily or just as needed     Both omeprazole and ranitidine can be used on an \"as needed\" basis. Ranitidine is probably more effective (works quicker) than omeprazole (more of a preventive)      "

## 2019-08-22 NOTE — PROGRESS NOTES
Adult ADHD Self-Report Scale (ASRS-v1.1) Symptom Checklist Instructions      Adult ADHD Self-Report Scale (ASRS-v1.1) Symptom Checklist    Patient Name:_____________________________  Today's date: _________________    Please answer the questions below, rating yourself on each of the criteria shown using the scale on the right side of the page.  As you answer each questions, place an 'X' in the box that best describes how you have felt and conducted yourself over the past 6 mos.   Please give this completed checklist to your healthcare professional to discuss during today's appointment.                                                                                                    Some-                                                                        Never   Rarely      times       Often  Very Often  1. How often do you have trouble wrapping up the final details of a project,  once the challenging parts have been done?   x     2. How often do you have difficulty getting things in order when you have to do a task that requires organization?   x     3. How often do you have problems remembering appointments or obligations?    x    4. When you have a task that requires a lot of thought, how often do you avoid or delay getting started?  x      5. How often do you fidget or squirm with your hands or feet when you have to sit down for a long time?  x      6. How often do you feel overly active and compelled to do things, like you were driven by a motor?  x        Part A                                                        7. How often do you make careless mistakes when you have to work on a boring or difficult project?  x      8. How often do you have difficulty keeping your attention when you are doing boring or repetitive work?   x     9. How often do you have difficulty concentrating on what people say to you, even when they are speaking to you directly?   x     10. How often do you misplace or have  difficulty finding things at home or at work?   x     11. How often are you distracted by activity or noise around you?   x     12. How often do you leave your seat in meetings or other situations in which you are expected to remain seated?     x     13. How often do you feel restless or fidgety?    x      14. How often do you have difficulty unwinding and relaxing when you have time to yourself?  x      15. How often do you find yourself talking too much when you are in social situations?  x      16. When you're in a conversation, how often do you find yourself finishing the sentences of the people you are talking to, before they can finish them themselves?  x      17. How often do you have difficulty waiting your turn in situations when turn-taking is required?  x      18. How often do you interrupt others when they are busy?  x      Part B

## 2019-08-23 ASSESSMENT — ANXIETY QUESTIONNAIRES: GAD7 TOTAL SCORE: 2

## 2019-09-25 ENCOUNTER — OFFICE VISIT (OUTPATIENT)
Dept: FAMILY MEDICINE | Facility: CLINIC | Age: 24
End: 2019-09-25
Payer: COMMERCIAL

## 2019-09-25 VITALS
WEIGHT: 155.6 LBS | SYSTOLIC BLOOD PRESSURE: 118 MMHG | HEIGHT: 62 IN | DIASTOLIC BLOOD PRESSURE: 61 MMHG | BODY MASS INDEX: 28.63 KG/M2 | HEART RATE: 80 BPM | OXYGEN SATURATION: 98 % | TEMPERATURE: 98.8 F | RESPIRATION RATE: 10 BRPM

## 2019-09-25 DIAGNOSIS — Z32.01 PREGNANCY TEST POSITIVE: Primary | ICD-10-CM

## 2019-09-25 DIAGNOSIS — N92.6 MISSED PERIOD: ICD-10-CM

## 2019-09-25 LAB — HCG UR QL: POSITIVE

## 2019-09-25 PROCEDURE — 81025 URINE PREGNANCY TEST: CPT | Performed by: PHYSICIAN ASSISTANT

## 2019-09-25 PROCEDURE — 99213 OFFICE O/P EST LOW 20 MIN: CPT | Performed by: PHYSICIAN ASSISTANT

## 2019-09-25 RX ORDER — PRENATAL VIT/IRON FUM/FOLIC AC 27MG-0.8MG
1 TABLET ORAL DAILY
Qty: 90 TABLET | Refills: 3 | Status: SHIPPED | OUTPATIENT
Start: 2019-09-25 | End: 2020-06-01

## 2019-09-25 ASSESSMENT — MIFFLIN-ST. JEOR: SCORE: 1403.56

## 2019-09-25 NOTE — PROGRESS NOTES
"SUBJECTIVE:                                                    Nargis Ruelas is a 24 year old female who presents to clinic today for the following health issues:    *  Confirmation pregnancy, positive at home tests.    Were not protecting against pregnancy, didn't realize she could get pregnant again so quickly      LMP 8/17/19  5 weeks 4 days  Due date May 24 2020    Prenatal: taking  prilosec - didn't help, stopped this. The zantac works better    Stopped taking adderall  Would like to continue zoloft    She started seeing counselor in [place] a few weeks ago    Problem list and histories reviewed & adjusted, as indicated.  Additional history: none    Patient Active Problem List   Diagnosis     History of other specified conditions presenting hazards to health     Major depressive disorder, single episode     Anxiety state     History of syncope     Tobacco abuse     Attention deficit hyperactivity disorder (ADHD), predominantly inattentive type     Chronic pain of both knees     Asthma     Past Surgical History:   Procedure Laterality Date     TONSILLECTOMY         Social History     Tobacco Use     Smoking status: Former Smoker     Packs/day: 0.00     Smokeless tobacco: Never Used   Substance Use Topics     Alcohol use: No     Alcohol/week: 0.0 standard drinks     Comment: 4 drinks per month     Family History   Problem Relation Age of Onset     Diabetes Maternal Grandfather      Coronary Artery Disease Maternal Grandfather      Cerebrovascular Disease Maternal Grandfather      Depression Mother      Anxiety Disorder Mother      Other Cancer No family hx of            ROS:  Other than noted above, general, HEENT, respiratory, cardiac, MS, and gastrointestinal systems are negative.     OBJECTIVE:                                                    /61   Pulse 80   Temp 98.8  F (37.1  C) (Tympanic)   Resp 10   Ht 1.566 m (5' 1.65\")   Wt 70.6 kg (155 lb 9.6 oz)   LMP 08/17/2019 (Exact Date)   " SpO2 98%   BMI 28.78 kg/m   Body mass index is 28.78 kg/m .   GENERAL: healthy, alert, well nourished, well hydrated, no distress  RESP: lungs clear to auscultation - no rales, no rhonchi, no wheezes  CV: regular rates and rhythm, normal S1 S2, no S3 or S4 and no murmur, no click or rub -  PSYCH: Alert and oriented times 3; speech- coherent , normal rate and volume; able to articulate logical thoughts, able to abstract reason, no tangential thoughts, no hallucinations or delusions, affect- normal       ASSESSMENT/PLAN:                                                      ASSESSMENT/PLAN:      ICD-10-CM    1. Pregnancy test positive Z32.01 Prenatal Vit-Fe Fumarate-FA (PRENATAL MULTIVITAMIN W/IRON) 27-0.8 MG tablet     OB/GYN REFERRAL   2. Missed period N92.6 HCG Qual, Urine ([id number])       Patient Instructions   Call to schedule telephone visit and OBGYN visit  [facility]: [phone]    Carol Jennings PA-C   [facility]

## 2019-09-26 ASSESSMENT — ASTHMA QUESTIONNAIRES: ACT_TOTALSCORE: 25

## 2019-10-14 ENCOUNTER — APPOINTMENT (OUTPATIENT)
Dept: OBGYN | Facility: CLINIC | Age: 24
End: 2019-10-14
Payer: COMMERCIAL

## 2019-10-19 ENCOUNTER — PRENATAL OFFICE VISIT (OUTPATIENT)
Dept: OBGYN | Facility: CLINIC | Age: 24
End: 2019-10-19
Payer: COMMERCIAL

## 2019-10-19 DIAGNOSIS — Z34.00 PRENATAL CARE, FIRST PREGNANCY: ICD-10-CM

## 2019-10-19 PROCEDURE — 99207 ZZC NO CHARGE NURSE ONLY: CPT | Performed by: OBSTETRICS & GYNECOLOGY

## 2019-10-22 ENCOUNTER — PRENATAL OFFICE VISIT (OUTPATIENT)
Dept: OBGYN | Facility: CLINIC | Age: 24
End: 2019-10-22
Payer: COMMERCIAL

## 2019-10-22 VITALS
DIASTOLIC BLOOD PRESSURE: 71 MMHG | HEART RATE: 87 BPM | SYSTOLIC BLOOD PRESSURE: 123 MMHG | RESPIRATION RATE: 16 BRPM | BODY MASS INDEX: 27.94 KG/M2 | WEIGHT: 151.8 LBS | HEIGHT: 62 IN | TEMPERATURE: 99 F

## 2019-10-22 DIAGNOSIS — Z34.01 ENCOUNTER FOR PRENATAL CARE IN FIRST TRIMESTER OF FIRST PREGNANCY: Primary | ICD-10-CM

## 2019-10-22 LAB
ABO + RH BLD: NORMAL
ABO + RH BLD: NORMAL
ALBUMIN UR-MCNC: ABNORMAL MG/DL
APPEARANCE UR: CLEAR
BILIRUB UR QL STRIP: NEGATIVE
BLD GP AB SCN SERPL QL: NORMAL
BLOOD BANK CMNT PATIENT-IMP: NORMAL
COLOR UR AUTO: YELLOW
ERYTHROCYTE [DISTWIDTH] IN BLOOD BY AUTOMATED COUNT: 12.6 % (ref 10–15)
GLUCOSE UR STRIP-MCNC: NEGATIVE MG/DL
HCT VFR BLD AUTO: 36.7 % (ref 35–47)
HGB BLD-MCNC: 12.6 G/DL (ref 11.7–15.7)
HGB UR QL STRIP: NEGATIVE
KETONES UR STRIP-MCNC: NEGATIVE MG/DL
LEUKOCYTE ESTERASE UR QL STRIP: NEGATIVE
MCH RBC QN AUTO: 31.3 PG (ref 26.5–33)
MCHC RBC AUTO-ENTMCNC: 34.3 G/DL (ref 31.5–36.5)
MCV RBC AUTO: 91 FL (ref 78–100)
NITRATE UR QL: NEGATIVE
PH UR STRIP: 8.5 PH (ref 5–7)
PLATELET # BLD AUTO: 249 10E9/L (ref 150–450)
RBC # BLD AUTO: 4.03 10E12/L (ref 3.8–5.2)
RBC #/AREA URNS AUTO: NORMAL /HPF
SOURCE: ABNORMAL
SP GR UR STRIP: 1.01 (ref 1–1.03)
SPECIMEN EXP DATE BLD: NORMAL
T4 FREE SERPL-MCNC: 0.87 NG/DL (ref 0.76–1.46)
TSH SERPL DL<=0.005 MIU/L-ACNC: 0.37 MU/L (ref 0.4–4)
UROBILINOGEN UR STRIP-ACNC: 0.2 EU/DL (ref 0.2–1)
WBC # BLD AUTO: 7.5 10E9/L (ref 4–11)
WBC #/AREA URNS AUTO: NORMAL /HPF

## 2019-10-22 PROCEDURE — 87086 URINE CULTURE/COLONY COUNT: CPT | Performed by: OBSTETRICS & GYNECOLOGY

## 2019-10-22 PROCEDURE — 76817 TRANSVAGINAL US OBSTETRIC: CPT | Performed by: OBSTETRICS & GYNECOLOGY

## 2019-10-22 PROCEDURE — 87491 CHLMYD TRACH DNA AMP PROBE: CPT | Performed by: OBSTETRICS & GYNECOLOGY

## 2019-10-22 PROCEDURE — 84443 ASSAY THYROID STIM HORMONE: CPT | Performed by: OBSTETRICS & GYNECOLOGY

## 2019-10-22 PROCEDURE — 99207 ZZC FIRST OB VISIT: CPT | Performed by: OBSTETRICS & GYNECOLOGY

## 2019-10-22 PROCEDURE — 86762 RUBELLA ANTIBODY: CPT | Performed by: OBSTETRICS & GYNECOLOGY

## 2019-10-22 PROCEDURE — 86900 BLOOD TYPING SEROLOGIC ABO: CPT | Performed by: OBSTETRICS & GYNECOLOGY

## 2019-10-22 PROCEDURE — 87591 N.GONORRHOEAE DNA AMP PROB: CPT | Performed by: OBSTETRICS & GYNECOLOGY

## 2019-10-22 PROCEDURE — 86850 RBC ANTIBODY SCREEN: CPT | Performed by: OBSTETRICS & GYNECOLOGY

## 2019-10-22 PROCEDURE — 86780 TREPONEMA PALLIDUM: CPT | Performed by: OBSTETRICS & GYNECOLOGY

## 2019-10-22 PROCEDURE — 84439 ASSAY OF FREE THYROXINE: CPT | Performed by: OBSTETRICS & GYNECOLOGY

## 2019-10-22 PROCEDURE — 81001 URINALYSIS AUTO W/SCOPE: CPT | Performed by: OBSTETRICS & GYNECOLOGY

## 2019-10-22 PROCEDURE — 85027 COMPLETE CBC AUTOMATED: CPT | Performed by: OBSTETRICS & GYNECOLOGY

## 2019-10-22 PROCEDURE — G0145 SCR C/V CYTO,THINLAYER,RESCR: HCPCS | Performed by: OBSTETRICS & GYNECOLOGY

## 2019-10-22 PROCEDURE — 87340 HEPATITIS B SURFACE AG IA: CPT | Performed by: OBSTETRICS & GYNECOLOGY

## 2019-10-22 PROCEDURE — 87389 HIV-1 AG W/HIV-1&-2 AB AG IA: CPT | Performed by: OBSTETRICS & GYNECOLOGY

## 2019-10-22 PROCEDURE — 86901 BLOOD TYPING SEROLOGIC RH(D): CPT | Performed by: OBSTETRICS & GYNECOLOGY

## 2019-10-22 PROCEDURE — 36415 COLL VENOUS BLD VENIPUNCTURE: CPT | Performed by: OBSTETRICS & GYNECOLOGY

## 2019-10-22 RX ORDER — FAMOTIDINE 10 MG
10 TABLET ORAL 2 TIMES DAILY
Qty: 60 TABLET | Refills: 2 | Status: ON HOLD | OUTPATIENT
Start: 2019-10-22 | End: 2020-05-25

## 2019-10-22 RX ORDER — PYRIDOXINE HCL (VITAMIN B6) 25 MG
25 TABLET ORAL 3 TIMES DAILY
Qty: 90 TABLET | Refills: 2 | Status: ON HOLD | OUTPATIENT
Start: 2019-10-22 | End: 2020-05-25

## 2019-10-22 ASSESSMENT — MIFFLIN-ST. JEOR: SCORE: 1386.25

## 2019-10-22 NOTE — PROGRESS NOTES
"[facility]   OB/GYN Clinic    CC: New OB     Subjective:    Nargis is a 24 year old  at 8w4d by US here for new OB visit.  Patient reports planned pregnancy states that she recently stopped the Depo-Provera.  Had first regular period and got pregnant the following month.  Has had some mild nausea and vomiting.  Able to stay hydrated.  No vaginal bleeding or cramping.  Has a history of miscarriage x1 and is anxious for today's ultrasound.  No other pregnancies.  Her and her partner are excited about pregnancy.  Also has mild heartburn, wonders what she should use given the recent recall of ranitidine.    OB Hx:  1)SAB 8w0d    Medical Hx:  None    Surgical Hx:  [place] teeth  Tonsillectomy    Medications:  PNV  Zantac  Zoloft    Family History   Problem Relation Age of Onset     Diabetes Maternal Grandfather      Coronary Artery Disease Maternal Grandfather      Cerebrovascular Disease Maternal Grandfather      Depression Mother      Anxiety Disorder Mother      Unknown/Adopted Father         unknown      Chronic Obstructive Pulmonary Disease Maternal Grandmother      Other Cancer No family hx of      Social Hx:  Former smoker. No alcohol or drug use.    ROS: A 10 pt ROS was completed and found to be negative unless mentioned in the HPI.     Objective:  LMP 2019 (Exact Date)     Estimated body mass index is 28.78 kg/m  as calculated from the following:    Height as of 19: 1.566 m (5' 1.65\").    Weight as of 19: 70.6 kg (155 lb 9.6 oz).    Physical Exam:  Gen: Pleasant, talkative female in no apparent distress   Endocrine: Thyroid without enlargement or nodularity   Lymph: no appreciable cervical lymphadenopathy  Respiratory: Lungs clear, breathing comfortably on room air   Cardiac: Regular rate and rhythm with no murmurs, gallops or rubs. Warm and well-perfused.   GI: Abd soft and non-tender  : External genitalia is free of lesion. Urethra and bartholin glands normal.  " Vaginal mucosa is moist and pink without unusual discharge.  Cervix is without lesions or discharge.  Pap smear and endocervical sampling obtained without incident.   Bimanual exam reveals mobile antverted uterus without cervical motion tenderness.  Adenexa are mobile and non-tender bilaterally. No appreciable adnexal enlargement. Uterus is consistent with a 9 week gestation.   MSK: Grossly normal movement of all four extremities  Psych: mood and affect bright   Lower extremity: edema not present     Pelvic US <14w:  Moe IUP noted with CRL measuring 8w4d FHR noted to be 174. No adnexal masses.    Assessment/Plan:   24 year old  at 8w4d by LMP who presents for new OB visit.    1) Plan to draw new OB lab today (T&S, CBC, HIV, RPR, HepBsAg, Hep B antibody, rubella, GC/Chlam, UC)  2) Discussed routine prenatal care, group practice model at [facility], tertiary support and frequency of visits. Options for  testing for chromosomal and birth defects were discussed with the patient including nuchal translucency/blood marker testing in the first trimester, progenity and quad screening and/or Level 2 ultrasound in the second trimester. We discussed that these are screening tests and not diagnostic tests and that false positives and negatives are a distinct possibility. We discussed that follow up diagnostic testing would include chorionic villus sampling or amniocentesis depending on gestational age. Written information provided. Patient desires to consider and will let us know if she desires genetic testing.  3) dating/viability scan completed today, see above. Plan for anatomy scan at 18-20w  4) Concerns: nausea, vomiting, heartburn- B6, unisom, famotidine prescribed  5) Medication review: no changes, continue prenatal vitamin   6) Reviewed miscarriage precautions (present to ED or call clinic with abdominal pain, vaginal bleeding or fever)   7) Weight gain: discussed weight gain expectations (BMI  25-30: 15-25lbs)  8) PAP smear: NILM 2016, due today and completed during exam  9) Delivery, contraception, feeding plan: continue to discuss  10) Immunizations: plan for flu shot at next visit, Tdap at 28wks    Return to clinic in 4 weeks.     Sangeeta Brewer MD   10/22/2019 10:09 AM

## 2019-10-22 NOTE — LETTER
October 25, 2019      Nargis Ruelas  [address]  [place] [address]    Dear ,      I am happy to inform you that your recent cervical cancer screening test (PAP smear) was normal.      Preventative screenings such as this help to ensure your health for years to come. You should repeat a pap smear in 3 years, unless otherwise directed.      You will still need to return to the clinic every year for your annual exam and other preventive tests.     If you have additional questions regarding this result, please call our registered nurse, Kristel at [phone].      Sincerely,      Sangeeta Brewer MD/onelia

## 2019-10-22 NOTE — NURSING NOTE
"Initial /71 (BP Location: Left arm, Patient Position: Sitting, Cuff Size: Adult Regular)   Pulse 87   Temp 99  F (37.2  C) (Tympanic)   Resp 16   Ht 1.566 m (5' 1.65\")   Wt 68.9 kg (151 lb 12.8 oz)   LMP 08/17/2019 (Exact Date)   Breastfeeding? No   BMI 28.08 kg/m   Estimated body mass index is 28.08 kg/m  as calculated from the following:    Height as of this encounter: 1.566 m (5' 1.65\").    Weight as of this encounter: 68.9 kg (151 lb 12.8 oz). .    Kristel Ibrahim, [facility]    "

## 2019-10-23 LAB
BACTERIA SPEC CULT: NORMAL
C TRACH DNA SPEC QL NAA+PROBE: NEGATIVE
HBV SURFACE AG SERPL QL IA: NONREACTIVE
HIV 1+2 AB+HIV1 P24 AG SERPL QL IA: NONREACTIVE
Lab: NORMAL
N GONORRHOEA DNA SPEC QL NAA+PROBE: NEGATIVE
SPECIMEN SOURCE: NORMAL

## 2019-10-24 LAB
COPATH REPORT: NORMAL
PAP: NORMAL
RUBV IGG SERPL IA-ACNC: 48 IU/ML
T PALLIDUM AB SER QL: NONREACTIVE

## 2019-11-05 ENCOUNTER — HEALTH MAINTENANCE LETTER (OUTPATIENT)
Age: 24
End: 2019-11-05

## 2019-11-19 ENCOUNTER — PRENATAL OFFICE VISIT (OUTPATIENT)
Dept: OBGYN | Facility: CLINIC | Age: 24
End: 2019-11-19
Payer: MEDICAID

## 2019-11-19 VITALS
WEIGHT: 151.4 LBS | HEART RATE: 73 BPM | RESPIRATION RATE: 16 BRPM | BODY MASS INDEX: 28.58 KG/M2 | HEIGHT: 61 IN | TEMPERATURE: 98 F | DIASTOLIC BLOOD PRESSURE: 49 MMHG | SYSTOLIC BLOOD PRESSURE: 97 MMHG

## 2019-11-19 DIAGNOSIS — Z23 NEED FOR PROPHYLACTIC VACCINATION AND INOCULATION AGAINST INFLUENZA: ICD-10-CM

## 2019-11-19 DIAGNOSIS — Z34.01 ENCOUNTER FOR PRENATAL CARE IN FIRST TRIMESTER OF FIRST PREGNANCY: Primary | ICD-10-CM

## 2019-11-19 PROCEDURE — 90686 IIV4 VACC NO PRSV 0.5 ML IM: CPT | Performed by: OBSTETRICS & GYNECOLOGY

## 2019-11-19 PROCEDURE — 99212 OFFICE O/P EST SF 10 MIN: CPT | Mod: 25 | Performed by: OBSTETRICS & GYNECOLOGY

## 2019-11-19 PROCEDURE — 90471 IMMUNIZATION ADMIN: CPT | Performed by: OBSTETRICS & GYNECOLOGY

## 2019-11-19 ASSESSMENT — MIFFLIN-ST. JEOR: SCORE: 1374.13

## 2019-11-19 NOTE — NURSING NOTE
"Initial BP 97/49 (BP Location: Right arm, Patient Position: Sitting, Cuff Size: Adult Regular)   Pulse 73   Temp 98  F (36.7  C) (Tympanic)   Resp 16   Ht 1.549 m (5' 1\")   Wt 68.7 kg (151 lb 6.4 oz)   LMP 08/17/2019 (Exact Date)   Breastfeeding No   BMI 28.61 kg/m   Estimated body mass index is 28.61 kg/m  as calculated from the following:    Height as of this encounter: 1.549 m (5' 1\").    Weight as of this encounter: 68.7 kg (151 lb 6.4 oz). .    Kristel Ibrahim, LOCO    "

## 2019-11-19 NOTE — PROGRESS NOTES
"[facility]   OB/GYN Clinic    CC: Return OB     Subjective:    Nargis is a 24 year old  at 12w4d by Patient's last menstrual period was 2019 (exact date). who presents for return OB visit. She reports feeling well. Denies uterine cramping, vaginal bleeding or leaking, dysuria. No complaints or concerns.    Objective:  BP 97/49 (BP Location: Right arm, Patient Position: Sitting, Cuff Size: Adult Regular)   Pulse 73   Temp 98  F (36.7  C) (Tympanic)   Resp 16   Ht 1.549 m (5' 1\")   Wt 68.7 kg (151 lb 6.4 oz)   LMP 2019 (Exact Date)   Breastfeeding No   BMI 28.61 kg/m      Estimated body mass index is 28.61 kg/m  as calculated from the following:    Height as of this encounter: 1.549 m (5' 1\").    Weight as of this encounter: 68.7 kg (151 lb 6.4 oz).    Physical Exam:  Gen: Pleasant, talkative female in no apparent distress   Respiratory: breathing comfortably on room air   Cardiac: Warm and well-perfused.   GI: Abd soft and non-tender, gravid  MSK: Grossly normal movement of all four extremities  Psych: mood and affect bright   Lower extremity: edema not present     Fetal dop tones: 140s bpm    Assessment/Plan:   24 year old  at 12w4d by LMP who presents for new OB visit.     1) new OB labs WNL, TSH low, T4 normal, plan to recheck qtrimester  2) Plan for anatomy scan at 18-20w  3) Concerns: nausea, vomiting, heartburn- B6, unisom, famotidine prescribed  4) Medication review: no changes, continue prenatal vitamin   5) Reviewed miscarriage precautions (present to ED or call clinic with abdominal pain, vaginal bleeding or fever)   6) Weight gain: discussed weight gain expectations (BMI 25-30: 15-25lbs)  7) PAP smear: NILM 2016, due today and completed during exam  8) Delivery, contraception, feeding plan: continue to discuss  9) Immunizations: flu shot given today, Tdap at 28wks     Return to clinic in 4 weeks.     Sangeeta Brewer MD   2019 10:06 AM   " Acute otitis media

## 2019-12-17 ENCOUNTER — PRENATAL OFFICE VISIT (OUTPATIENT)
Dept: OBGYN | Facility: CLINIC | Age: 24
End: 2019-12-17
Payer: MEDICAID

## 2019-12-17 VITALS
TEMPERATURE: 97.4 F | BODY MASS INDEX: 28.25 KG/M2 | SYSTOLIC BLOOD PRESSURE: 116 MMHG | RESPIRATION RATE: 16 BRPM | DIASTOLIC BLOOD PRESSURE: 57 MMHG | HEIGHT: 61 IN | HEART RATE: 74 BPM | WEIGHT: 149.6 LBS

## 2019-12-17 DIAGNOSIS — Z34.01 ENCOUNTER FOR PRENATAL CARE IN FIRST TRIMESTER OF FIRST PREGNANCY: Primary | ICD-10-CM

## 2019-12-17 PROCEDURE — 99212 OFFICE O/P EST SF 10 MIN: CPT | Performed by: OBSTETRICS & GYNECOLOGY

## 2019-12-17 ASSESSMENT — MIFFLIN-ST. JEOR: SCORE: 1365.96

## 2019-12-17 NOTE — NURSING NOTE
"Initial /57 (BP Location: Right arm, Patient Position: Chair, Cuff Size: Adult Regular)   Pulse 74   Temp 97.4  F (36.3  C) (Tympanic)   Resp 16   Ht 1.549 m (5' 1\")   Wt 67.9 kg (149 lb 9.6 oz)   LMP 08/17/2019 (Exact Date)   Breastfeeding No   BMI 28.27 kg/m   Estimated body mass index is 28.27 kg/m  as calculated from the following:    Height as of this encounter: 1.549 m (5' 1\").    Weight as of this encounter: 67.9 kg (149 lb 9.6 oz). .    Talia Hyman, LOCO    "

## 2019-12-17 NOTE — PROGRESS NOTES
"CC: Here for routine prenatal visit @ 16w4d   HPI: no cramping or bleeding.  Occasional nausea.  No complaints.     PE: /57 (BP Location: Right arm, Patient Position: Chair, Cuff Size: Adult Regular)   Pulse 74   Temp 97.4  F (36.3  C) (Tympanic)   Resp 16   Ht 1.549 m (5' 1\")   Wt 67.9 kg (149 lb 9.6 oz)   LMP 2019 (Exact Date)   Breastfeeding No   BMI 28.27 kg/m     See OB flowsheet    U/S scheduled for 20    A/P  @ 16w4d normal pregnancy    1. Routine prenatal care.  Genetic screening desired--quad ordered    RTC 4 weeks.      Yuni Kaur M.D.    "

## 2019-12-20 LAB
# FETUSES US: NORMAL
# FETUSES: NORMAL
AFP ADJ MOM AMN: 0.87
AFP SERPL-MCNC: 31 NG/ML
AGE - REPORTED: 25.4 YR
CURRENT SMOKER: NO
CURRENT SMOKER: NO
DIABETES STATUS PATIENT: NO
FAMILY MEMBER DISEASES HX: NO
FAMILY MEMBER DISEASES HX: NO
GA METHOD: NORMAL
GA METHOD: NORMAL
GA: NORMAL WK
HCG MOM SERPL: 0.47
HCG SERPL-ACNC: NORMAL IU/L
HX OF HEREDITARY DISORDERS: NO
IDDM PATIENT QL: NO
INHIBIN A MOM SERPL: 1.05
INHIBIN A SERPL-MCNC: 172 PG/ML
INTEGRATED SCN PATIENT-IMP: NORMAL
IVF PREGNANCY: NO
LMP START DATE: NORMAL
MONOCHORIONIC TWINS: NO
PATHOLOGY STUDY: NORMAL
PREV FETUS DEFECT: NO
SERVICE CMNT-IMP: NO
SPECIMEN DRAWN SERPL: NORMAL
U ESTRIOL MOM SERPL: 1
U ESTRIOL SERPL-MCNC: 1.15 NG/ML
VALPROIC/CARBAMAZEPINE STATUS: NO
WEIGHT UNITS: NORMAL

## 2020-01-06 ENCOUNTER — HOSPITAL ENCOUNTER (OUTPATIENT)
Dept: ULTRASOUND IMAGING | Facility: CLINIC | Age: 25
Discharge: HOME | End: 2020-01-06
Attending: OBSTETRICS & GYNECOLOGY | Admitting: OBSTETRICS & GYNECOLOGY
Payer: COMMERCIAL

## 2020-01-06 ENCOUNTER — PRENATAL OFFICE VISIT (OUTPATIENT)
Dept: OBGYN | Facility: CLINIC | Age: 25
End: 2020-01-06
Payer: COMMERCIAL

## 2020-01-06 VITALS
WEIGHT: 158 LBS | TEMPERATURE: 98.6 F | BODY MASS INDEX: 29.85 KG/M2 | SYSTOLIC BLOOD PRESSURE: 112 MMHG | HEART RATE: 79 BPM | DIASTOLIC BLOOD PRESSURE: 69 MMHG

## 2020-01-06 DIAGNOSIS — Z34.01 ENCOUNTER FOR PRENATAL CARE IN FIRST TRIMESTER OF FIRST PREGNANCY: ICD-10-CM

## 2020-01-06 DIAGNOSIS — Z34.02 ENCOUNTER FOR PRENATAL CARE IN SECOND TRIMESTER OF FIRST PREGNANCY: Primary | ICD-10-CM

## 2020-01-06 PROCEDURE — 99212 OFFICE O/P EST SF 10 MIN: CPT | Performed by: OBSTETRICS & GYNECOLOGY

## 2020-01-06 PROCEDURE — 76805 OB US >/= 14 WKS SNGL FETUS: CPT

## 2020-01-06 NOTE — NURSING NOTE
"Initial /69 (BP Location: Right arm, Patient Position: Chair, Cuff Size: Adult Regular)   Pulse 79   Temp 98.6  F (37  C) (Tympanic)   Wt 71.7 kg (158 lb)   LMP 08/17/2019 (Exact Date)   Breastfeeding No   BMI 29.85 kg/m   Estimated body mass index is 29.85 kg/m  as calculated from the following:    Height as of 12/17/19: 1.549 m (5' 1\").    Weight as of this encounter: 71.7 kg (158 lb). .    Cecilia Queen MA    "

## 2020-01-06 NOTE — PROGRESS NOTES
[facility]   OB/GYN Clinic     CC: Return OB      Subjective:     Nargis is a 24 year old  at 19w3d  by Patient's last menstrual period was 2019 (exact date). who presents for return OB visit. She reports feeling well. Denies uterine cramping, vaginal bleeding or leaking, dysuria. No complaints or concerns. +FM     Objective:  /69 (BP Location: Right arm, Patient Position: Chair, Cuff Size: Adult Regular)   Pulse 79   Temp 98.6  F (37  C) (Tympanic)   Wt 71.7 kg (158 lb)   LMP 2019 (Exact Date)   Breastfeeding No   BMI 29.85 kg/m       Physical Exam:  Gen: Pleasant, talkative female in no apparent distress   Respiratory: breathing comfortably on room air   Cardiac: Warm and well-perfused.   GI: Abd soft and non-tender, gravid, fundus at  U  MSK: Grossly normal movement of all four extremities  Psych: mood and affect bright   Lower extremity: edema not present      Fetal dop tones: 140s bpm     Assessment/Plan:   24 year old  at 19w3d by LMP who presents for new OB visit.     1) new OB labs WNL, TSH low, T4 normal, plan to recheck qtrimester  2) anatomy scan completed today, awaiting read  3) Concerns: nausea, vomiting, heartburn- B6, unisom, famotidine prescribed  4) Medication review: no changes, continue prenatal vitamin   5) Reviewed miscarriage/PTL precautions (present to ED or call clinic with abdominal pain, vaginal bleeding or fever)   6) Weight gain: discussed weight gain expectations (BMI 25-30: 15-25lbs)  7) PAP smear: NILM 2016, due today and completed during exam  8) Delivery, contraception, feeding plan: continue to discuss  9) Immunizations: flu shot given, Tdap at 28wks     Return to clinic in 4 weeks.     Sangeeta Brewer MD   2020 9:25 AM

## 2020-01-20 ENCOUNTER — TELEPHONE (OUTPATIENT)
Dept: OBGYN | Facility: CLINIC | Age: 25
End: 2020-01-20

## 2020-01-20 ENCOUNTER — TRANSCRIBE ORDERS (OUTPATIENT)
Dept: MATERNAL FETAL MEDICINE | Facility: CLINIC | Age: 25
End: 2020-01-20

## 2020-01-20 DIAGNOSIS — G93.0 CHOROID PLEXUS CYST: ICD-10-CM

## 2020-01-20 DIAGNOSIS — Z34.02 ENCOUNTER FOR PRENATAL CARE IN SECOND TRIMESTER OF FIRST PREGNANCY: Primary | ICD-10-CM

## 2020-01-20 DIAGNOSIS — O26.90 PREGNANCY RELATED CONDITION, ANTEPARTUM: Primary | ICD-10-CM

## 2020-01-20 NOTE — TELEPHONE ENCOUNTER
Reason for Call:  Other orders    Detailed comments: Pt states she has not heard from [facility] ffor US scheduling, was an order placed yet? Please call to confirm    Phone Number Patient can be reached at: Home number on file [phone] (home)    Best Time: today    Can we leave a detailed message on this number? YES    Call taken on 1/20/2020 at 11:07 AM by Alfreda Nava

## 2020-01-20 NOTE — TELEPHONE ENCOUNTER
Return call to pt.  Unable to reach.  Left message for pt to return call to clinic.    Patient notified that referral was incorrectly entered.  Writer has correctly placed referral and patient should be hearing soon for appointment.    Camilla Travis   Ob/Gyn Clinic  RN

## 2020-01-21 ENCOUNTER — PRE VISIT (OUTPATIENT)
Dept: MATERNAL FETAL MEDICINE | Facility: CLINIC | Age: 25
End: 2020-01-21

## 2020-01-31 ENCOUNTER — HOSPITAL ENCOUNTER (OUTPATIENT)
Dept: ULTRASOUND IMAGING | Facility: CLINIC | Age: 25
Discharge: HOME | End: 2020-01-31
Attending: OBSTETRICS & GYNECOLOGY | Admitting: OBSTETRICS & GYNECOLOGY
Payer: COMMERCIAL

## 2020-01-31 ENCOUNTER — OFFICE VISIT (OUTPATIENT)
Dept: MATERNAL FETAL MEDICINE | Facility: CLINIC | Age: 25
End: 2020-01-31
Attending: OBSTETRICS & GYNECOLOGY
Payer: COMMERCIAL

## 2020-01-31 DIAGNOSIS — O35.9XX0 FETAL ABNORMALITY AFFECTING MANAGEMENT OF MOTHER, ANTEPARTUM, SINGLE OR UNSPECIFIED FETUS: Primary | ICD-10-CM

## 2020-01-31 DIAGNOSIS — O26.90 PREGNANCY RELATED CONDITION, ANTEPARTUM: ICD-10-CM

## 2020-01-31 PROCEDURE — 76811 OB US DETAILED SNGL FETUS: CPT

## 2020-01-31 NOTE — PROGRESS NOTES
Please see full imaging report from [employer] program under imaging tab.      Alvaro Ortega MD  Maternal Fetal Medicine

## 2020-02-04 ENCOUNTER — OFFICE VISIT (OUTPATIENT)
Dept: FAMILY MEDICINE | Facility: CLINIC | Age: 25
End: 2020-02-04
Payer: MEDICAID

## 2020-02-04 VITALS
RESPIRATION RATE: 18 BRPM | DIASTOLIC BLOOD PRESSURE: 62 MMHG | BODY MASS INDEX: 29.48 KG/M2 | TEMPERATURE: 97.5 F | HEIGHT: 62 IN | SYSTOLIC BLOOD PRESSURE: 108 MMHG | WEIGHT: 160.2 LBS | HEART RATE: 86 BPM | OXYGEN SATURATION: 98 %

## 2020-02-04 DIAGNOSIS — J20.9 ACUTE BRONCHITIS, UNSPECIFIED ORGANISM: Primary | ICD-10-CM

## 2020-02-04 PROCEDURE — 99213 OFFICE O/P EST LOW 20 MIN: CPT | Performed by: NURSE PRACTITIONER

## 2020-02-04 RX ORDER — ALBUTEROL SULFATE 90 UG/1
2 AEROSOL, METERED RESPIRATORY (INHALATION) EVERY 6 HOURS
Qty: 1 INHALER | Refills: 0 | Status: SHIPPED | OUTPATIENT
Start: 2020-02-04 | End: 2021-06-28

## 2020-02-04 ASSESSMENT — ENCOUNTER SYMPTOMS
SINUS PRESSURE: 1
ABDOMINAL PAIN: 0
CHEST TIGHTNESS: 1
ARTHRALGIAS: 1
HEADACHES: 0
SHORTNESS OF BREATH: 1
DIARRHEA: 0
WHEEZING: 1
VOMITING: 1
FEVER: 0
SORE THROAT: 1
COUGH: 1
FATIGUE: 1
NAUSEA: 0
RHINORRHEA: 1

## 2020-02-04 ASSESSMENT — MIFFLIN-ST. JEOR: SCORE: 1424.91

## 2020-02-04 NOTE — LETTER
February 4, 2020      Nargis Ruelas  [address]  [place] [address]        To Whom It May Concern:    Nargis Ruelas  was seen on 02/04/2020.  Please excuse her  until 02/06/2020 due to illness.        Sincerely,        SUDHEER Nguyen CNP

## 2020-02-04 NOTE — NURSING NOTE
"Chief Complaint   Patient presents with     Cough       Initial /62 (BP Location: Right arm, Patient Position: Chair, Cuff Size: Adult Regular)   Pulse 86   Temp 97.5  F (36.4  C) (Tympanic)   Resp 18   Ht 1.575 m (5' 2\")   Wt 72.7 kg (160 lb 3.2 oz)   LMP 08/17/2019 (Exact Date)   SpO2 98%   BMI 29.30 kg/m   Estimated body mass index is 29.3 kg/m  as calculated from the following:    Height as of this encounter: 1.575 m (5' 2\").    Weight as of this encounter: 72.7 kg (160 lb 3.2 oz).    Patient presents to the clinic using No DME    Health Maintenance that is potentially due pending provider review:  NONE    Qing Escalona MA  11:44 AM 2/4/2020  .    "

## 2020-02-04 NOTE — PATIENT INSTRUCTIONS
You have a viral Bronchitis. Your cough can last up to 3 weeks.  1. Antibiotics are not recommended at this time.  2. Continue to use OTC robitussin.   3. Continue to use Tylenol for pain and/or fevers.  4. Push fluids and rest.  5. Use inhaler as prescribed.  6. If symptoms do not improve or worsen over the next week, please follow-up with your PCP.

## 2020-02-04 NOTE — PROGRESS NOTES
Subjective     Nargis Ruelas is a 25 year old female who presents to clinic today for the following health issues:    HPI   ENT Symptoms             Symptoms: cc Present Absent Comment   Fever/Chills   x    Fatigue  x     Muscle Aches  x     Eye Irritation   x    Sneezing   x    Nasal Cali/Drg  x     Sinus Pressure/Pain  x     Loss of smell   x    Dental pain   x    Sore Throat  x     Swollen Glands   x    Ear Pain/Fullness   x    Cough  x     Wheeze  x     Chest Pain  x  Tightness in chest   Shortness of breath  x     Rash   x    Other         Symptom duration:  x 6 days   Symptom severity:  moderate   Treatments tried:  dayquil, nyquil   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 Hx asthma.     Patient Active Problem List   Diagnosis     Major depressive disorder, single episode     Anxiety state     History of syncope     Tobacco abuse     Attention deficit hyperactivity disorder (ADHD), predominantly inattentive type     Chronic pain of both knees     Asthma     Prenatal care, first pregnancy     Past Surgical History:   Procedure Laterality Date     MOUTH SURGERY      wisdom teeth     TONSILLECTOMY         Social History     Tobacco Use     Smoking status: Former Smoker     Packs/day: 0.20     Smokeless tobacco: Never Used     Tobacco comment: quit with pregnancy   Substance Use Topics     Alcohol use: Not Currently     Alcohol/week: 0.0 standard drinks     Comment: 4 drinks per month-quit with pregnancy     Family History   Problem Relation Age of Onset     Diabetes Maternal Grandfather      Coronary Artery Disease Maternal Grandfather      Cerebrovascular Disease Maternal Grandfather      Depression Mother      Anxiety Disorder Mother      Unknown/Adopted Father         unknown      Chronic Obstructive Pulmonary Disease Maternal Grandmother      Other Cancer No family hx of          Current Outpatient Medications   Medication Sig Dispense Refill     doxylamine  (UNISOM) 25 MG TABS tablet Take 1 tablet (25 mg) by mouth At Bedtime 30 tablet 3     famotidine (PEPCID) 10 MG tablet Take 1 tablet (10 mg) by mouth 2 times daily 60 tablet 2     Prenatal Vit-Fe Fumarate-FA (PRENATAL MULTIVITAMIN W/IRON) 27-0.8 MG tablet Take 1 tablet by mouth daily 90 tablet 3     ranitidine (ZANTAC) 150 MG tablet Take 1 tablet (150 mg) by mouth 2 times daily 180 tablet 0     sertraline (ZOLOFT) 100 MG tablet Take 1 tablet (100 mg) by mouth daily 90 tablet 1     vitamin B6 (PYRIDOXINE) 25 MG tablet Take 1 tablet (25 mg) by mouth 3 times daily 90 tablet 2     No Known Allergies  BP Readings from Last 3 Encounters:   02/04/20 108/62   01/06/20 112/69   12/17/19 116/57    Wt Readings from Last 3 Encounters:   02/04/20 72.7 kg (160 lb 3.2 oz)   01/06/20 71.7 kg (158 lb)   12/17/19 67.9 kg (149 lb 9.6 oz)                      Reviewed and updated as needed this visit by Provider         Review of Systems   Constitutional: Positive for fatigue. Negative for fever.   HENT: Positive for congestion, postnasal drip, rhinorrhea, sinus pressure and sore throat. Negative for dental problem and ear pain.    Respiratory: Positive for cough (painful), chest tightness, shortness of breath and wheezing.    Gastrointestinal: Positive for vomiting (from coughing). Negative for abdominal pain, diarrhea and nausea.   Musculoskeletal: Positive for arthralgias.   Neurological: Negative for headaches.            Objective    LMP 08/17/2019 (Exact Date)   There is no height or weight on file to calculate BMI.  Physical Exam  Vitals signs and nursing note reviewed.   Constitutional:       Appearance: Normal appearance. She is ill-appearing. She is not toxic-appearing.   HENT:      Head: Normocephalic and atraumatic.      Right Ear: Tympanic membrane, ear canal and external ear normal.      Left Ear: Tympanic membrane, ear canal and external ear normal.      Ears:      Comments: Purulent discharge behind bilat TMs     Nose:  "Congestion and rhinorrhea present.      Mouth/Throat:      Mouth: Mucous membranes are moist.      Pharynx: Oropharynx is clear. No posterior oropharyngeal erythema.   Eyes:      Conjunctiva/sclera: Conjunctivae normal.   Neck:      Musculoskeletal: Normal range of motion and neck supple.   Cardiovascular:      Rate and Rhythm: Normal rate and regular rhythm.      Pulses: Normal pulses.      Heart sounds: Normal heart sounds.   Pulmonary:      Effort: Pulmonary effort is normal.      Breath sounds: Normal breath sounds.   Abdominal:      Comments: pregnant   Lymphadenopathy:      Cervical: No cervical adenopathy.   Skin:     General: Skin is warm and dry.      Findings: No rash.   Neurological:      General: No focal deficit present.      Mental Status: She is alert and oriented to person, place, and time.   Psychiatric:         Mood and Affect: Mood normal.         Behavior: Behavior normal.                    Assessment & Plan     1. Acute bronchitis, unspecified organism  No steroids due to pregnancy. Has hx of asthma. Albuterol inhaler prescribed for cough/SOB/wheezing. Discussed OTC recommendations for symptom mgmt (follow recommendations from OBGYN). Rest, hydration, humidification.     - albuterol (PROAIR HFA/PROVENTIL HFA/VENTOLIN HFA) 108 (90 Base) MCG/ACT inhaler; Inhale 2 puffs into the lungs every 6 hours  Dispense: 1 Inhaler; Refill: 0     BMI:   Estimated body mass index is 29.3 kg/m  as calculated from the following:    Height as of this encounter: 1.575 m (5' 2\").    Weight as of this encounter: 72.7 kg (160 lb 3.2 oz).   **Patient is pregnant.         Return in about 1 week (around 2/11/2020), or if symptoms worsen or fail to improve.    SUDHEER Nguyen [facility]      "

## 2020-02-06 ENCOUNTER — PRENATAL OFFICE VISIT (OUTPATIENT)
Dept: OBGYN | Facility: CLINIC | Age: 25
End: 2020-02-06
Payer: MEDICAID

## 2020-02-06 VITALS
DIASTOLIC BLOOD PRESSURE: 59 MMHG | SYSTOLIC BLOOD PRESSURE: 111 MMHG | HEART RATE: 67 BPM | WEIGHT: 161 LBS | TEMPERATURE: 98.1 F | BODY MASS INDEX: 29.45 KG/M2

## 2020-02-06 DIAGNOSIS — Z34.02 ENCOUNTER FOR PRENATAL CARE IN SECOND TRIMESTER OF FIRST PREGNANCY: ICD-10-CM

## 2020-02-06 DIAGNOSIS — Z34.92 PRENATAL CARE, SECOND TRIMESTER: Primary | ICD-10-CM

## 2020-02-06 LAB
ERYTHROCYTE [DISTWIDTH] IN BLOOD BY AUTOMATED COUNT: 12.6 % (ref 10–15)
GLUCOSE 1H P 50 G GLC PO SERPL-MCNC: 176 MG/DL (ref 60–129)
HCT VFR BLD AUTO: 33 % (ref 35–47)
HGB BLD-MCNC: 11 G/DL (ref 11.7–15.7)
MCH RBC QN AUTO: 31.8 PG (ref 26.5–33)
MCHC RBC AUTO-ENTMCNC: 33.3 G/DL (ref 31.5–36.5)
MCV RBC AUTO: 95 FL (ref 78–100)
PLATELET # BLD AUTO: 181 10E9/L (ref 150–450)
RBC # BLD AUTO: 3.46 10E12/L (ref 3.8–5.2)
TSH SERPL DL<=0.005 MIU/L-ACNC: 0.76 MU/L (ref 0.4–4)
WBC # BLD AUTO: 7.1 10E9/L (ref 4–11)

## 2020-02-06 PROCEDURE — 86780 TREPONEMA PALLIDUM: CPT | Performed by: OBSTETRICS & GYNECOLOGY

## 2020-02-06 PROCEDURE — 82950 GLUCOSE TEST: CPT | Performed by: OBSTETRICS & GYNECOLOGY

## 2020-02-06 PROCEDURE — 85027 COMPLETE CBC AUTOMATED: CPT | Performed by: OBSTETRICS & GYNECOLOGY

## 2020-02-06 PROCEDURE — 99212 OFFICE O/P EST SF 10 MIN: CPT | Performed by: OBSTETRICS & GYNECOLOGY

## 2020-02-06 PROCEDURE — 36415 COLL VENOUS BLD VENIPUNCTURE: CPT | Performed by: OBSTETRICS & GYNECOLOGY

## 2020-02-06 PROCEDURE — 84443 ASSAY THYROID STIM HORMONE: CPT | Performed by: OBSTETRICS & GYNECOLOGY

## 2020-02-06 NOTE — PROGRESS NOTES
[facility]   OB/GYN Clinic     CC: Return OB      Subjective:     Nargis is a 24 year old  at 23w6d who presents for return OB visit. She reports feeling well. Denies uterine cramping, vaginal bleeding or leaking, dysuria. No complaints or concerns. +FM     Objective:  /59 (BP Location: Left arm, Patient Position: Chair, Cuff Size: Adult Regular)   Pulse 67   Temp 98.1  F (36.7  C) (Tympanic)   Wt 73 kg (161 lb)   LMP 2019 (Exact Date)   Breastfeeding No   BMI 29.45 kg/m     Physical Exam:  Gen: Pleasant, talkative female in no apparent distress   Respiratory: breathing comfortably on room air   Cardiac: Warm and well-perfused.   GI: Abd soft and non-tender, gravid, fundus at  U  MSK: Grossly normal movement of all four extremities  Psych: mood and affect bright   Lower extremity: edema not present      Fetal dop tones: 150s bpm     Assessment/Plan:   24 year old  at 23w6d  by LMP who presents for new OB visit.     1) new OB labs WNL, TSH low, T4 normal, plan to recheck qtrimester  2) anatomy scan with small CPC, level II with resolution and otherwise normal findings  3) Concerns: nausea, vomiting, heartburn- B6, unisom, famotidine prescribed  4) Medication review: no changes, continue prenatal vitamin   5) Reviewed miscarriage/PTL precautions (present to ED or call clinic with abdominal pain, vaginal bleeding or fever)   6) Weight gain: discussed weight gain expectations (BMI 25-30: 15-25lbs)  7) Delivery, contraception, feeding plan: continue to discuss  8) Immunizations: flu shot given, Tdap at 28wks     Return to clinic in 4 weeks.     Sangeeta Brewer MD  2020 3:33 PM

## 2020-02-06 NOTE — NURSING NOTE
"Initial /59 (BP Location: Left arm, Patient Position: Chair, Cuff Size: Adult Regular)   Pulse 67   Temp 98.1  F (36.7  C) (Tympanic)   Wt 73 kg (161 lb)   LMP 08/17/2019 (Exact Date)   Breastfeeding No   BMI 29.45 kg/m   Estimated body mass index is 29.45 kg/m  as calculated from the following:    Height as of 2/4/20: 1.575 m (5' 2\").    Weight as of this encounter: 73 kg (161 lb). .    Cecilia Queen MA    "

## 2020-02-07 DIAGNOSIS — R73.9 BLOOD GLUCOSE ELEVATED: ICD-10-CM

## 2020-02-07 DIAGNOSIS — Z34.92 PRENATAL CARE, SECOND TRIMESTER: Primary | ICD-10-CM

## 2020-02-07 LAB — T PALLIDUM AB SER QL: NONREACTIVE

## 2020-02-18 ENCOUNTER — TELEPHONE (OUTPATIENT)
Dept: OBGYN | Facility: CLINIC | Age: 25
End: 2020-02-18

## 2020-02-18 DIAGNOSIS — Z34.92 PRENATAL CARE, SECOND TRIMESTER: ICD-10-CM

## 2020-02-18 DIAGNOSIS — R73.9 BLOOD GLUCOSE ELEVATED: ICD-10-CM

## 2020-02-18 DIAGNOSIS — Z32.01 PREGNANCY TEST POSITIVE: Primary | ICD-10-CM

## 2020-02-18 PROCEDURE — 82952 GTT-ADDED SAMPLES: CPT | Performed by: OBSTETRICS & GYNECOLOGY

## 2020-02-18 RX ORDER — ONDANSETRON 4 MG/1
4 TABLET, ORALLY DISINTEGRATING ORAL
Qty: 1 TABLET | Refills: 0 | Status: SHIPPED | OUTPATIENT
Start: 2020-02-18 | End: 2020-03-05

## 2020-02-18 NOTE — TELEPHONE ENCOUNTER
Patient did not do well with the Glucola. She was not able to keep it down. The 3 hour glucose tolerance test was canceled. Thank You!

## 2020-02-18 NOTE — TELEPHONE ENCOUNTER
Pt was advised that she is to take zofran prior to the 3 hr gtt.  She would like rx to be sent to [facility] pharmacy.    Sharon Bragg  [facility] RN

## 2020-02-18 NOTE — TELEPHONE ENCOUNTER
Call patient to see if we can reorder the 3h GTT (she threw it up this AM). I can order a tab of ODT zofran to take right before hand to see if that helps her to complete.     Sangeeta Brewer MD      Lab re-ordered.  Attempted to reach patient to offer Zofran.  Unable to reach.  Left message for patient to return call to clinic.    Camilla Travis   Ob/Gyn Clinic  RN

## 2020-03-05 ENCOUNTER — PRENATAL OFFICE VISIT (OUTPATIENT)
Dept: OBGYN | Facility: CLINIC | Age: 25
End: 2020-03-05
Payer: MEDICAID

## 2020-03-05 ENCOUNTER — TELEPHONE (OUTPATIENT)
Dept: OBGYN | Facility: CLINIC | Age: 25
End: 2020-03-05

## 2020-03-05 VITALS
RESPIRATION RATE: 16 BRPM | HEART RATE: 80 BPM | WEIGHT: 168.6 LBS | BODY MASS INDEX: 31.03 KG/M2 | DIASTOLIC BLOOD PRESSURE: 60 MMHG | TEMPERATURE: 97.7 F | HEIGHT: 62 IN | SYSTOLIC BLOOD PRESSURE: 115 MMHG

## 2020-03-05 DIAGNOSIS — O24.410 DIET CONTROLLED GESTATIONAL DIABETES MELLITUS (GDM) IN SECOND TRIMESTER: ICD-10-CM

## 2020-03-05 DIAGNOSIS — Z34.92 PRENATAL CARE, SECOND TRIMESTER: ICD-10-CM

## 2020-03-05 DIAGNOSIS — Z34.02 ENCOUNTER FOR PRENATAL CARE IN SECOND TRIMESTER OF FIRST PREGNANCY: Primary | ICD-10-CM

## 2020-03-05 PROCEDURE — 90471 IMMUNIZATION ADMIN: CPT | Performed by: OBSTETRICS & GYNECOLOGY

## 2020-03-05 PROCEDURE — 90715 TDAP VACCINE 7 YRS/> IM: CPT | Performed by: OBSTETRICS & GYNECOLOGY

## 2020-03-05 PROCEDURE — 99212 OFFICE O/P EST SF 10 MIN: CPT | Mod: 25 | Performed by: OBSTETRICS & GYNECOLOGY

## 2020-03-05 ASSESSMENT — MIFFLIN-ST. JEOR: SCORE: 1463.01

## 2020-03-05 NOTE — PROGRESS NOTES
"CC: Here for routine prenatal visit @ 27w6d   HPI: + FM, no ctx, no LOF, no VB.  Unable to tolerate 3 GTT    PE: /60 (BP Location: Left arm, Patient Position: Chair, Cuff Size: Adult Regular)   Pulse 80   Temp 97.7  F (36.5  C) (Tympanic)   Resp 16   Ht 1.575 m (5' 2\")   Wt 76.5 kg (168 lb 9.6 oz)   LMP 2019 (Exact Date)   Breastfeeding No   BMI 30.84 kg/m     See OB flowsheet    A/P  @ 27w6d normal pregnancy, presumed GDM    1. Routine prenatal care.  Tdap given  2. Presumed GDM: will send to diabetic educators    RTC 4 weeks.      Yuni Kaur M.D.     "

## 2020-03-05 NOTE — TELEPHONE ENCOUNTER
Nargis was in lab this morning for her 2nd attempt at her 3 hour glucose test. She made it 40 minutes into her first hour before vomiting. She stated she has a visit with you this afternoon to discuss this.    Lab

## 2020-03-05 NOTE — NURSING NOTE
"Initial /60 (BP Location: Left arm, Patient Position: Chair, Cuff Size: Adult Regular)   Pulse 80   Temp 97.7  F (36.5  C) (Tympanic)   Resp 16   Ht 1.575 m (5' 2\")   Wt 76.5 kg (168 lb 9.6 oz)   LMP 08/17/2019 (Exact Date)   Breastfeeding No   BMI 30.84 kg/m   Estimated body mass index is 30.84 kg/m  as calculated from the following:    Height as of this encounter: 1.575 m (5' 2\").    Weight as of this encounter: 76.5 kg (168 lb 9.6 oz). .    Talia Hyman CMA    "

## 2020-03-05 NOTE — PROGRESS NOTES
Prior to immunization administration, verified patients identity using patient s name and date of birth. Please see Immunization Activity for additional information.     Screening Questionnaire for Adult Immunization    Are you sick today?   No   Do you have allergies to medications, food, a vaccine component or latex?   No   Have you ever had a serious reaction after receiving a vaccination?   No   Do you have a long-term health problem with heart, lung, kidney, or metabolic disease (e.g., diabetes), asthma, a blood disorder, no spleen, complement component deficiency, a cochlear implant, or a spinal fluid leak?  Are you on long-term aspirin therapy?   Yes asthma   Do you have cancer, leukemia, HIV/AIDS, or any other immune system problem?   No   Do you have a parent, brother, or sister with an immune system problem?   No   In the past 3 months, have you taken medications that affect  your immune system, such as prednisone, other steroids, or anticancer drugs; drugs for the treatment of rheumatoid arthritis, Crohn s disease, or psoriasis; or have you had radiation treatments?   No   Have you had a seizure, or a brain or other nervous system problem? Yes seizures growing up    During the past year, have you received a transfusion of blood or blood    products, or been given immune (gamma) globulin or antiviral drug?   No   For women: Are you pregnant or is there a chance you could become       pregnant during the next month? yes   Have you received any vaccinations in the past 4 weeks?   No     Immunization questionnaire answers were all negative.        Per orders of Dr. Kaur, injection of TDAP given by Talia Hyman. Patient instructed to remain in clinic for 15 minutes afterwards, and to report any adverse reaction to me immediately.       Screening performed by Talia Hyman on 3/5/2020 at 4:18 PM.

## 2020-03-06 ENCOUNTER — TELEPHONE (OUTPATIENT)
Dept: OBGYN | Facility: CLINIC | Age: 25
End: 2020-03-06

## 2020-03-06 NOTE — TELEPHONE ENCOUNTER
Diabetes Education Scheduling Outreach #1:    Call to patient to schedule. Left message with phone number to call to schedule.    Plan for 2nd outreach attempt within 1 business day.    [facility] Sam[facility]go [facility]  Diabetes and Nutrition Scheduling

## 2020-03-13 ENCOUNTER — ALLIED HEALTH/NURSE VISIT (OUTPATIENT)
Dept: EDUCATION SERVICES | Facility: CLINIC | Age: 25
End: 2020-03-13
Payer: MEDICAID

## 2020-03-13 DIAGNOSIS — O24.419 GESTATIONAL DIABETES: Primary | ICD-10-CM

## 2020-03-13 PROCEDURE — G0108 DIAB MANAGE TRN  PER INDIV: HCPCS

## 2020-03-13 RX ORDER — BLOOD-GLUCOSE METER
1 EACH MISCELLANEOUS 4 TIMES DAILY
Qty: 1 KIT | Refills: 0 | Status: SHIPPED | OUTPATIENT
Start: 2020-03-13 | End: 2020-08-28

## 2020-03-13 NOTE — PATIENT INSTRUCTIONS
"1. Check blood sugar 4 times a day, before breakfast and 1 hour after the start of each meal.     2. Check urine ketones when you wake up every morning for 7 days. If negative everyday, reduce testing to once a week.    3. Follow the recommended meal plan: eat something every 2-3 hours, include protein/fat and carbohydrate at every meal and snack, have 30g carbs at breakfast, 45-60g carbs at lunch, 45-60g carbs at supper, 15-30g carbs at 3 snacks per day.     4. Add activity to every day, try walking or being active after each meal to help control blood sugar levels.    5.Call or e-mail educator if 3 or more blood sugars are above goal in 1 week. Call or e-mail with questions or concerns.    Aim for the peace sign with [facility] Diabetes Education and Nutrition Services for the [facility] Area:  For Your Diabetes Education or Nutrition Appointments Call:  [phone]   For Diabetes Education and Nutrition Related Questions:   Phone: [phone]  E-mail: [email or web address]  Fax: [phone]   If you need a medication refill please contact your pharmacy. Please allow 3 business days for your refills to be completed.       1 cup cooked oats cereal  (1/2 cup dry) -30g  2 tablespoons of nuts     Add spices and cream for flavor, try rolled oats vs. instant.   1 whole grain English muffin (23g)    1 tablespoon of peanut butter  8 oz plain yogurt (8g)  oats (1/4 cup) (15g)  add spices    1 hard-boiled egg (0) Amharic toast 2 pc  (whole grain bread)- spread with peanut butter       1 cup breakfast potatoes with the skin. (30g)    Eggs and veggies for a scramble 2 corn tortillas (24g)  Eggs, salsa, guacamole     1 corn tortilla (12g)    cup beans (15g)  Eggs, salsa, guacamole   2/3 cup brown rice (30g)  Eggs, veggies, etc for a \"fried rice\" scramble    1 cup cottage cheese  Instead of eggs    Your choice carbs (30g) Smoothie:  1 cup milk (15g)  Protein powder  1 pc toast (15g)   cup pumpkin puree (11g " carb)  1 cup yogurt (8-9g)  Spices and nuts    Tips:  -Try cheese or goat cheese instead of peanut butter  -Try almond, sunflower, or cashew butter for other flavors         Here are some ideas for breakfast or bedtime snack. Pick a carbohydrate from the right and a protein from the left. If you have carbohydrates 30 grams of total carbohydrate and 3-5 grams of fiber that is even better.   Fruits are not listed as they can cause the blood sugar to raise blood sugar quickly and be used up early in the night. Fruits are better at meals, or morning or afternoon snack.     Protein/Fat list (about 14 grams of protein or 2 fat servings) Carbohydrate list (about 30 grams of carbohydrate)   2 slices of cheese English Muffin   2 TBS Peanut butter/[place] butter/Sun butter 10 crackers     cup Cottage cheese 2% 2 pieces of toast   2 oz any cooked meat - less than deck of cards size 1 hamburger or hot dog bun   1.5 oz of soy nuts 1 whole wheat jacquelyn   Avocado or guacamole 6 norma cracker squares     cup tuna - can add mayonnaise 1 cup full fat ice cream - no candy or sauce   2 eggs - boiled, poached, fried, scrambled, omelet 30 chips   1 veggie susie (might have carbohydrates also) Greek yogurt - 30 grams of carbohydrate   2 TBS Coconut 2 - 6 inch tortillas corn or flour   12 shrimp - not breaded 2 toaster waffles - no syrup   2-1oz meatballs   bagel   2 Tbs cream cheese - plain, veggie, salmon - no fruit or honey. 6 cups popcorn - unsweetened     cup of nuts Granola bar of 3o grams of carbohydrate   10 olives 1 cup of unsweetened lentils or beans.    Tofu or Temph 4-5oz 1 cup potato salad     You can always add vegetables with dip, salad dressing or salsa also.

## 2020-03-13 NOTE — PROGRESS NOTES
Diabetes Self-Management Education & Support    Initial Gestational Diabetes Self-Management Education & Support    SUBJECTIVE/OBJECTIVE:  Presents for education related to gestational diabetes.    Accompanied by: Self  Diabetes management related comments/concerns: I couldn't stand the 3 hour test, I've been good this prengnancy at forcing myself to eat things like lima beans that I wouldn't otherwise eat.    Cultural Influences/Ethnic Background:  American    Estimated Date of Delivery: May 29, 2020    1 hour OGTT  Lab Results   Component Value Date    GLU1 176 (H) 2020       3 hour OGTT    Fasting  No results found for: GLF    1 hour  No results found for: GL1    2 hour  No results found for: GL2    3 hour  No results found for: GL3    Lifestyle and Health Behaviors:  Pre-pregnancy weight (lbs): (P) 135  Exercise:: Currently not exercising(works full time at a  with 2 yos)  Barrier to exercise: (P) None  Meals include: (P) Breakfast, Lunch, Dinner, Morning Snack, Afternoon Snack, Evening Snack  Beverages: (P) Water, Coffee, Milk, Juice, Soda  Cultural/Roman Catholic diet restrictions?: (P) No  Biggest challenges to healthy eating: (P) Evening snacking  Pre-gilmar vitamin?: (P) Yes  Supplements?: (P) No  Experiencing nausea?: (P) No    Snack: belvita biscuit  Lunc: ham sandwich, string cheese, cucumbers  Snack: gatorade  Dinner: mac and cheese, hotdogs, pickle   Snack: cheese cakes    Beverages: 1 cup at dinner and lunch     Last vegetable: cucumber  Last fruit: usually with breakfast, none during the day    Rarely eats out     Healthy Coping:  Informal Support system:: (P) Family, Friends, Significant other  Stage of change: ACTION (Actively working towards change)    Current Management:  Taking medications for gestational diabetes?: No  Difficulty affording diabetes management supplies?: No    ASSESSMENT:  Patient is eating foods that are know to spike blood sugar, but post breakfast reading was in target  today.  Given she did not tolerate the 3 hour test, she may have in target reading with home testing.     INTERVENTION:  Patient was instructed on Contour Next One meter and was able to provide an accurate return demonstration. Patient's blood glucose reading today was 85 mg/dL, 1 hour after flavored yogurt and strawberries.    Educational topics covered today:  GDM diagnosis, pathophysiology, Risks and Complications of GDM, Means of controlling GDM, Using a Blood Glucose Monitor, Blood Glucose Goals, Logging and Interpreting Glucose Results, Ketone Testing, When to Call a Diabetes Educator or OB Provider, Healthy Eating During Pregnancy, Counting Carbohydrates, Meal Planning for GDM, and Physical Activity    Educational materials provided today:   Danette Understanding Gestational Diabetes  GDM Log Book  Sharps Disposal  Care After Delivery  Contour Next One meter kit    Pt verbalized understanding of concepts discussed and recommendations provided today.     PLAN:  Check glucose 4 times daily, before breakfast and 1 hour after each meal.     Check Ketones daily for one week, if negative, reduce testing to once a week.     Physical activity recommended: continue activity with work.    Meal plan: 30g carbs at breakfast, 45-60g carbs at lunch, 45-60g carbs at supper, 15-30g carbs at 3 snacks a day.  Follow consistent CHO meal plan, eat CHO and protein/fat at all meals/snacks.    Call/e-mail/MyChart message diabetes educator if 3 or more blood sugars are above the goal in 1 week, if ketones are positive, or with questions/concerns.    Klaudia Palomino MS, RD, LD, CDE    Time Spent: 65 minutes  Encounter Type: Individual    Any diabetes medication dose changes were made via the CDE Protocol and Collaborative Practice Agreement with the patient's OB/GYN provider. A copy of this encounter was shared with the provider.

## 2020-03-20 ENCOUNTER — ALLIED HEALTH/NURSE VISIT (OUTPATIENT)
Dept: EDUCATION SERVICES | Facility: CLINIC | Age: 25
End: 2020-03-20
Payer: MEDICAID

## 2020-03-20 DIAGNOSIS — O24.419 GESTATIONAL DIABETES: Primary | ICD-10-CM

## 2020-03-20 PROCEDURE — 98967 PH1 ASSMT&MGMT NQHP 11-20: CPT

## 2020-03-20 NOTE — PROGRESS NOTES
Diabetes Self-Management Education & Support  Follow-up Gestational Diabetes Self-Management Education & Support    SUBJECTIVE/OBJECTIVE:  Presents for education related to gestational diabetes.    Accompanied by: Self  Diabetes management related comments/concerns: I couldn't stand the 3 hour test, I've been good this prengnancy at forcing myself to eat things like lima beans that I wouldn't otherwise eat.    Cultural Influences/Ethnic Background:  American    LMP 08/17/2019 (Exact Date)     Wt Readings from Last 5 Encounters:   03/05/20 76.5 kg (168 lb 9.6 oz)   02/06/20 73 kg (161 lb)   02/04/20 72.7 kg (160 lb 3.2 oz)   01/06/20 71.7 kg (158 lb)   12/17/19 67.9 kg (149 lb 9.6 oz)       Estimated Date of Delivery: May 29, 2020    Blood Glucose/Ketone Log:    Hasn't picked up ketone strips but is going to try to soon  Didn't get a chance to e-mail in blood sugars ahead of time, will tonight to be added to visit.   Reports all blood sugars are 100% in target,  80s fasting and 110 after meals.   Is walking a lot at work     Lifestyle and Health Behaviors:   Breakfast: yogurt / fruit and ham sandwich on wheat     Snack: string cheese + protein [employer] [place] granola bar   Lunch: 1-2 ham sandwiches, pickle fruit  Snack: crackers and string cheese   Dinner: meat / veggies / milk / starch.  Had spaghetti - kept at 1 cup + 1 slice bread + veggies last night   Snack:  Stopped the sweet snacks     Doesn't feel overly restricted   Cut out all of the juice / soda (had been craving mtn dew / coke)    Healthy Coping:   action     Current Management:  Diet     ASSESSMENT/INTERVENTION:   Patient reports that blood sugars are 100% in target (sending an e-mail tonight with a picture of log).   Has not started checking ketones yet, but will also start this. Does not feel overly restricted in diet.  Intake sounds adequate to reach needs of > 170gm/day carbohydrates.   Is very busy at work, walking a lot.   Did not review insulin as  blood sugars sound well within target.  Reviewed care after delivery       Educational topics covered today:  What to expect after delivery, Future testing for Type 2 diabetes (2 hour OGTT at 6 week post-partum check-up and annual fasting blood glucose level), Risk of GDM and planning ahead for future pregnancies, Recommended lifestyle interventions for reducing the risk of Type 2 Diabetes, When to Call a Diabetes Educator or OB Provider    Educational Materials provided today:  Danette Preventing Diabetes    PLAN:  Check glucose 4 times daily.  Check ketones once a week when readings are consistently negative.  Continue with recommended physical activity.  Continue to follow recommended meal plan  Follow consistent CHO meal plan, eat CHO and protein/fat at all meals/snacks.    Call/e-mail/Purple Harryhart message diabetes educator if 3 or more blood sugars are above the goal in 1 week or if ketones are positive.    Ptaricia Bullock RD, LD, CDE  Diabetes Education    Time Spent: 15 minutes  Encounter Type: Individual  \

## 2020-03-25 PROBLEM — Z34.00 PRENATAL CARE, FIRST PREGNANCY: Status: ACTIVE | Noted: 2019-10-19

## 2020-03-25 PROBLEM — O24.410 GDM, CLASS A1: Status: ACTIVE | Noted: 2020-03-25

## 2020-03-27 ENCOUNTER — TELEPHONE (OUTPATIENT)
Dept: EDUCATION SERVICES | Facility: CLINIC | Age: 25
End: 2020-03-27

## 2020-03-27 NOTE — TELEPHONE ENCOUNTER
Gestational Diabetes Education follow up     Patient was to send in an email with blood sugars after the last in office visit 3/20.  Have not seen any blood sugars yet, but patient reported they were in target.  Is now due for weekly blood sugar follow up as well.     Called out to Nargis Ruelas 3:04 PM 3/27/2020 to review blood sugars. Voicemail left requesting return phone call to update diabetes education or to follow up via luly Bullock RD, LD, CDE  Diabetes Education

## 2020-03-31 NOTE — TELEPHONE ENCOUNTER
Called and left voicemail.  Patient has not read Clim[facility], will send E-mail as final contact attempt.    E-mail out to patient:  Renny Barillas,    The [place] Diabetes Education team has been trying to contact you to review your blood sugar reading.  If you could send in a picture of your readings or logbook we can help suggest any changes important to your babies growth.  Please call or email with questions [phone].    Thanks!      Klaudia Palomino, MS, RD, LD, CDE  Diabetes

## 2020-04-01 ENCOUNTER — TELEPHONE (OUTPATIENT)
Dept: OBGYN | Facility: CLINIC | Age: 25
End: 2020-04-01

## 2020-04-01 NOTE — TELEPHONE ENCOUNTER
I left messages for patient to call back for telephone visit today. Patient has not called back last message left at 4:45 pm.   Patient can reschedule telephone visit another day.   Didi Collins, [facility]

## 2020-04-07 ENCOUNTER — MYC MEDICAL ADVICE (OUTPATIENT)
Dept: FAMILY MEDICINE | Facility: CLINIC | Age: 25
End: 2020-04-07

## 2020-04-08 ENCOUNTER — VIRTUAL VISIT (OUTPATIENT)
Dept: OBGYN | Facility: CLINIC | Age: 25
End: 2020-04-08
Payer: COMMERCIAL

## 2020-04-08 VITALS — BODY MASS INDEX: 32.66 KG/M2 | WEIGHT: 173 LBS | HEIGHT: 61 IN

## 2020-04-08 DIAGNOSIS — O24.410 GDM, CLASS A1: ICD-10-CM

## 2020-04-08 PROCEDURE — 99207 ZZC PRENATAL VISIT: CPT | Performed by: OBSTETRICS & GYNECOLOGY

## 2020-04-08 ASSESSMENT — MIFFLIN-ST. JEOR: SCORE: 1467.1

## 2020-04-08 NOTE — PROGRESS NOTES
"Left voicemail's for patient at 1037, 1113, 1152, 1242, 1325, & 222.    FM+. +BH. No VB, LOF.   Failed GCT, unable to toelrate completing GTT. Plan had been made for two weeks of blood sugar testing to screen for gestational dibetes  Reports doing blood sugars for two weeks. Reports to me they were in 90s for fasting, after eating is 100s. Decided to stop checking blood sugars despite recommendation from gestational diabetes because she \"feels fine.\"  Is not following the diabetic diet.  I did request that she send blood sugars via [employer], as these have never been seen. I did  the patient that if she is a gestational diabetic she will have no symptoms, but it could result in excessive fetal growth, fetal injury at the time of birth and issue with her baby's blood sugars postpartum.     Reviewed COVID restrictions and precautions.  Is laid off from [employer] right now.   Plan in person appt in 2 weeks.     12 minutes was spent over the phone with the patient, greater than 50% of the time was spent in counseling/coordinating of care as noted above in the A&P.    Lydai Horan MD            "

## 2020-04-13 ENCOUNTER — TELEPHONE (OUTPATIENT)
Dept: FAMILY MEDICINE | Facility: CLINIC | Age: 25
End: 2020-04-13

## 2020-04-20 ASSESSMENT — ANXIETY QUESTIONNAIRES
6. BECOMING EASILY ANNOYED OR IRRITABLE: MORE THAN HALF THE DAYS
7. FEELING AFRAID AS IF SOMETHING AWFUL MIGHT HAPPEN: NOT AT ALL
2. NOT BEING ABLE TO STOP OR CONTROL WORRYING: SEVERAL DAYS
1. FEELING NERVOUS, ANXIOUS, OR ON EDGE: SEVERAL DAYS
3. WORRYING TOO MUCH ABOUT DIFFERENT THINGS: SEVERAL DAYS

## 2020-04-20 ASSESSMENT — PATIENT HEALTH QUESTIONNAIRE - PHQ9
5. POOR APPETITE OR OVEREATING: SEVERAL DAYS
SUM OF ALL RESPONSES TO PHQ QUESTIONS 1-9: 1

## 2020-04-23 ENCOUNTER — PRENATAL OFFICE VISIT (OUTPATIENT)
Dept: OBGYN | Facility: CLINIC | Age: 25
End: 2020-04-23
Payer: COMMERCIAL

## 2020-04-23 VITALS
SYSTOLIC BLOOD PRESSURE: 104 MMHG | TEMPERATURE: 98 F | DIASTOLIC BLOOD PRESSURE: 62 MMHG | RESPIRATION RATE: 16 BRPM | HEIGHT: 62 IN | BODY MASS INDEX: 33.44 KG/M2 | WEIGHT: 181.7 LBS | HEART RATE: 73 BPM

## 2020-04-23 DIAGNOSIS — Z34.03 ENCOUNTER FOR PRENATAL CARE IN THIRD TRIMESTER OF FIRST PREGNANCY: Primary | ICD-10-CM

## 2020-04-23 PROCEDURE — 99207 ZZC PRENATAL VISIT: CPT | Performed by: OBSTETRICS & GYNECOLOGY

## 2020-04-23 RX ORDER — BREAST PUMP
EACH MISCELLANEOUS
Qty: 1 EACH | Refills: 0 | Status: SHIPPED | OUTPATIENT
Start: 2020-04-23 | End: 2020-10-19

## 2020-04-23 ASSESSMENT — MIFFLIN-ST. JEOR: SCORE: 1522.44

## 2020-04-23 NOTE — PROGRESS NOTES
"CC: Here for routine prenatal visit @ 34w6d   HPI: + FM, no ctx, no LOF, no VB.  No complaints.     PE: /62 (BP Location: Right arm, Patient Position: Chair, Cuff Size: Adult Regular)   Pulse 73   Temp 98  F (36.7  C) (Tympanic)   Resp 16   Ht 1.575 m (5' 2\")   Wt 82.4 kg (181 lb 11.2 oz)   LMP 2019 (Exact Date)   Breastfeeding No   BMI 33.23 kg/m     See OB flowsheet      A/P  @ 34w6d normal pregnancy    1. Routine prenatal care.  Breast pump rx sent.  COVID restrictions and recommendations reviewed including iron supplementation.   2. A1GDM: highest reading 111 after eating.  Generally gets 80-90's.  Reminded patient to send logs to RDs.       RTC 1-2 weeks.      Yuni Kaur M.D.    "

## 2020-04-23 NOTE — NURSING NOTE
"Initial /62 (BP Location: Right arm, Patient Position: Chair, Cuff Size: Adult Regular)   Pulse 73   Temp 98  F (36.7  C) (Tympanic)   Resp 16   Ht 1.575 m (5' 2\")   Wt 82.4 kg (181 lb 11.2 oz)   LMP 08/17/2019 (Exact Date)   Breastfeeding No   BMI 33.23 kg/m   Estimated body mass index is 33.23 kg/m  as calculated from the following:    Height as of this encounter: 1.575 m (5' 2\").    Weight as of this encounter: 82.4 kg (181 lb 11.2 oz). .    Talia Hyman, LOCO    "

## 2020-05-08 ENCOUNTER — PRENATAL OFFICE VISIT (OUTPATIENT)
Dept: OBGYN | Facility: CLINIC | Age: 25
End: 2020-05-08
Payer: COMMERCIAL

## 2020-05-08 VITALS
DIASTOLIC BLOOD PRESSURE: 67 MMHG | HEIGHT: 62 IN | BODY MASS INDEX: 33.68 KG/M2 | RESPIRATION RATE: 18 BRPM | HEART RATE: 70 BPM | WEIGHT: 183 LBS | TEMPERATURE: 97.4 F | SYSTOLIC BLOOD PRESSURE: 114 MMHG

## 2020-05-08 DIAGNOSIS — Z34.03 ENCOUNTER FOR PRENATAL CARE IN THIRD TRIMESTER OF FIRST PREGNANCY: Primary | ICD-10-CM

## 2020-05-08 PROCEDURE — 87653 STREP B DNA AMP PROBE: CPT | Performed by: OBSTETRICS & GYNECOLOGY

## 2020-05-08 PROCEDURE — 99207 ZZC PRENATAL VISIT: CPT | Performed by: OBSTETRICS & GYNECOLOGY

## 2020-05-08 ASSESSMENT — MIFFLIN-ST. JEOR: SCORE: 1528.33

## 2020-05-08 NOTE — NURSING NOTE
"Initial /67 (BP Location: Right arm, Patient Position: Chair, Cuff Size: Adult Regular)   Pulse 70   Temp 97.4  F (36.3  C) (Tympanic)   Resp 18   Ht 1.575 m (5' 2\")   Wt 83 kg (183 lb)   LMP 08/17/2019 (Exact Date)   Breastfeeding No   BMI 33.47 kg/m   Estimated body mass index is 33.47 kg/m  as calculated from the following:    Height as of this encounter: 1.575 m (5' 2\").    Weight as of this encounter: 83 kg (183 lb). .    Talia Hyman, [facility]    "

## 2020-05-08 NOTE — PROGRESS NOTES
"CC: Here for routine prenatal visit @ 37w0d   HPI: + FM, no ctx, no LOF, no VB.  No complaints.     PE: /67 (BP Location: Right arm, Patient Position: Chair, Cuff Size: Adult Regular)   Pulse 70   Temp 97.4  F (36.3  C) (Tympanic)   Resp 18   Ht 1.575 m (5' 2\")   Wt 83 kg (183 lb)   LMP 2019 (Exact Date)   Breastfeeding No   BMI 33.47 kg/m     See OB flowsheet    GBS today    A/P  @ 37w0d normal pregnancy    1. Routine prenatal care  2. A1GDM: no longer checking sugars as all of her past checks were WNL.     RTC 1 week    Yuni Kaur M.D.    "

## 2020-05-09 LAB
GP B STREP DNA SPEC QL NAA+PROBE: NEGATIVE
SPECIMEN SOURCE: NORMAL

## 2020-05-14 ENCOUNTER — PRENATAL OFFICE VISIT (OUTPATIENT)
Dept: OBGYN | Facility: CLINIC | Age: 25
End: 2020-05-14
Payer: COMMERCIAL

## 2020-05-14 VITALS
DIASTOLIC BLOOD PRESSURE: 73 MMHG | HEIGHT: 62 IN | TEMPERATURE: 98 F | SYSTOLIC BLOOD PRESSURE: 113 MMHG | HEART RATE: 101 BPM | RESPIRATION RATE: 18 BRPM | WEIGHT: 183 LBS | BODY MASS INDEX: 33.68 KG/M2

## 2020-05-14 DIAGNOSIS — O24.410 GDM, CLASS A1: Primary | ICD-10-CM

## 2020-05-14 DIAGNOSIS — Z34.03 ENCOUNTER FOR PRENATAL CARE IN THIRD TRIMESTER OF FIRST PREGNANCY: ICD-10-CM

## 2020-05-14 PROCEDURE — 59425 ANTEPARTUM CARE ONLY: CPT | Performed by: OBSTETRICS & GYNECOLOGY

## 2020-05-14 PROCEDURE — 99207 ZZC PRENATAL VISIT: CPT | Performed by: OBSTETRICS & GYNECOLOGY

## 2020-05-14 RX ORDER — OXYCODONE AND ACETAMINOPHEN 5; 325 MG/1; MG/1
1 TABLET ORAL
Status: CANCELLED | OUTPATIENT
Start: 2020-05-14

## 2020-05-14 RX ORDER — METHYLERGONOVINE MALEATE 0.2 MG/ML
200 INJECTION INTRAVENOUS
Status: CANCELLED | OUTPATIENT
Start: 2020-05-14

## 2020-05-14 RX ORDER — NALOXONE HYDROCHLORIDE 0.4 MG/ML
.1-.4 INJECTION, SOLUTION INTRAMUSCULAR; INTRAVENOUS; SUBCUTANEOUS
Status: CANCELLED | OUTPATIENT
Start: 2020-05-14

## 2020-05-14 RX ORDER — FENTANYL CITRATE 50 UG/ML
50-100 INJECTION, SOLUTION INTRAMUSCULAR; INTRAVENOUS
Status: CANCELLED | OUTPATIENT
Start: 2020-05-14

## 2020-05-14 RX ORDER — CARBOPROST TROMETHAMINE 250 UG/ML
250 INJECTION, SOLUTION INTRAMUSCULAR
Status: CANCELLED | OUTPATIENT
Start: 2020-05-14

## 2020-05-14 RX ORDER — OXYTOCIN/0.9 % SODIUM CHLORIDE 30/500 ML
100-340 PLASTIC BAG, INJECTION (ML) INTRAVENOUS CONTINUOUS PRN
Status: CANCELLED | OUTPATIENT
Start: 2020-05-14

## 2020-05-14 RX ORDER — OXYTOCIN/0.9 % SODIUM CHLORIDE 30/500 ML
1-24 PLASTIC BAG, INJECTION (ML) INTRAVENOUS CONTINUOUS
Status: CANCELLED | OUTPATIENT
Start: 2020-05-14

## 2020-05-14 RX ORDER — IBUPROFEN 400 MG/1
800 TABLET, FILM COATED ORAL
Status: CANCELLED | OUTPATIENT
Start: 2020-05-14

## 2020-05-14 RX ORDER — ACETAMINOPHEN 325 MG/1
650 TABLET ORAL EVERY 4 HOURS PRN
Status: CANCELLED | OUTPATIENT
Start: 2020-05-14

## 2020-05-14 RX ORDER — MISOPROSTOL 100 UG/1
25 TABLET ORAL
Status: CANCELLED | OUTPATIENT
Start: 2020-05-14

## 2020-05-14 RX ORDER — SODIUM CHLORIDE, SODIUM LACTATE, POTASSIUM CHLORIDE, CALCIUM CHLORIDE 600; 310; 30; 20 MG/100ML; MG/100ML; MG/100ML; MG/100ML
INJECTION, SOLUTION INTRAVENOUS CONTINUOUS
Status: CANCELLED | OUTPATIENT
Start: 2020-05-14

## 2020-05-14 RX ORDER — TRANEXAMIC ACID 10 MG/ML
1 INJECTION, SOLUTION INTRAVENOUS EVERY 30 MIN PRN
Status: CANCELLED | OUTPATIENT
Start: 2020-05-14

## 2020-05-14 RX ORDER — OXYTOCIN 10 [USP'U]/ML
10 INJECTION, SOLUTION INTRAMUSCULAR; INTRAVENOUS
Status: CANCELLED | OUTPATIENT
Start: 2020-05-14

## 2020-05-14 RX ORDER — LIDOCAINE 40 MG/G
CREAM TOPICAL
Status: CANCELLED | OUTPATIENT
Start: 2020-05-14

## 2020-05-14 RX ORDER — ONDANSETRON 2 MG/ML
4 INJECTION INTRAMUSCULAR; INTRAVENOUS EVERY 6 HOURS PRN
Status: CANCELLED | OUTPATIENT
Start: 2020-05-14

## 2020-05-14 ASSESSMENT — MIFFLIN-ST. JEOR: SCORE: 1528.33

## 2020-05-14 NOTE — NURSING NOTE
"Initial /73 (BP Location: Right arm, Patient Position: Chair, Cuff Size: Adult Large)   Pulse 101   Temp 98  F (36.7  C) (Tympanic)   Resp 18   Ht 1.575 m (5' 2\")   Wt 83 kg (183 lb)   LMP 08/17/2019 (Exact Date)   BMI 33.47 kg/m   Estimated body mass index is 33.47 kg/m  as calculated from the following:    Height as of this encounter: 1.575 m (5' 2\").    Weight as of this encounter: 83 kg (183 lb). .      "

## 2020-05-14 NOTE — PROGRESS NOTES
"CC: Here for routine prenatal visit @ 37w6d   HPI:  Diet controlled GDM; feeling well; discussed cervical ripening/IOL around 39 weeks    PE: /73 (BP Location: Right arm, Patient Position: Chair, Cuff Size: Adult Large)   Pulse 101   Temp 98  F (36.7  C) (Tympanic)   Resp 18   Ht 1.575 m (5' 2\")   Wt 83 kg (183 lb)   LMP 08/17/2019 (Exact Date)   BMI 33.47 kg/m     See OB flowsheet      A:  1. GDM, class A1    - US OB >14 Weeks Follow Up; Future    2. Encounter for prenatal care in third trimester of first pregnancy    - US OB >14 Weeks Follow Up; Future      Routine prenatal care  RTC 1 weeks.  Scheduled ripening/IOL 0700 5/22/20      Jaqueline Reyes M.D.     "

## 2020-05-19 ENCOUNTER — TELEPHONE (OUTPATIENT)
Dept: OBGYN | Facility: CLINIC | Age: 25
End: 2020-05-19

## 2020-05-19 DIAGNOSIS — Z34.03 ENCOUNTER FOR PRENATAL CARE IN THIRD TRIMESTER OF FIRST PREGNANCY: Primary | ICD-10-CM

## 2020-05-19 NOTE — TELEPHONE ENCOUNTER
Confirmed with [facility] charge RN that patient is on the books for Friday at 0700 for IOL.    Reviewed with patient process and what that looks like - call [facility] ([phone]) at 0600, bring everything with you, wear a mask, partner needs a mask etc.     Patient has appointment for COVID-19 testing.    Pt in agreement and reports understanding.    Camilla Travis   Ob[facility]Gyn Clinic  RN

## 2020-05-19 NOTE — TELEPHONE ENCOUNTER
Reason for Call:  Other call back    Detailed comments: Pt states she was contacted by the Covid schedulers to set up test that she needs prior to induction.  Pt was not aware that she was scheduled for an induction - has many questions    Phone Number Patient can be reached at: Home number on file [phone] (home)    Best Time:     Can we leave a detailed message on this number? YES    Call taken on 5/19/2020 at 2:46 PM by Glo Torres

## 2020-05-20 ENCOUNTER — TELEPHONE (OUTPATIENT)
Dept: OBGYN | Facility: CLINIC | Age: 25
End: 2020-05-20

## 2020-05-20 ENCOUNTER — HOSPITAL ENCOUNTER (OUTPATIENT)
Dept: ULTRASOUND IMAGING | Facility: CLINIC | Age: 25
Discharge: HOME | End: 2020-05-20
Attending: OBSTETRICS & GYNECOLOGY | Admitting: OBSTETRICS & GYNECOLOGY
Payer: COMMERCIAL

## 2020-05-20 ENCOUNTER — OFFICE VISIT (OUTPATIENT)
Dept: FAMILY MEDICINE | Facility: CLINIC | Age: 25
End: 2020-05-20
Attending: OBSTETRICS & GYNECOLOGY
Payer: COMMERCIAL

## 2020-05-20 DIAGNOSIS — Z34.03 ENCOUNTER FOR PRENATAL CARE IN THIRD TRIMESTER OF FIRST PREGNANCY: ICD-10-CM

## 2020-05-20 DIAGNOSIS — O24.410 GDM, CLASS A1: ICD-10-CM

## 2020-05-20 PROCEDURE — 99207 ZZC NO CHARGE NURSE ONLY: CPT

## 2020-05-20 PROCEDURE — 87635 SARS-COV-2 COVID-19 AMP PRB: CPT | Performed by: OBSTETRICS & GYNECOLOGY

## 2020-05-20 PROCEDURE — 76816 OB US FOLLOW-UP PER FETUS: CPT

## 2020-05-20 PROCEDURE — 99000 SPECIMEN HANDLING OFFICE-LAB: CPT | Performed by: OBSTETRICS & GYNECOLOGY

## 2020-05-20 NOTE — PROGRESS NOTES
Patient presents to clinic today for covid testing, NP swab obtained by CB, assisted by MADHAVI RSAMUSSEN CMA ([facility])

## 2020-05-20 NOTE — TELEPHONE ENCOUNTER
Reason for call:  Patient reporting a symptom    Symptom or request: Scheduled to be induced on Friday - has had some very bad back pain since last night.  This morning pretty consistent - not going away.    Duration (how long have symptoms been present): since last night    Have you been treated for this before? No    Additional comments:     Phone Number patient can be reached at:  Home number on file [phone] (home)    Best Time:      Can we leave a detailed message on this number:  YES    Call taken on 5/20/2020 at 8:19 AM by Glo Torres

## 2020-05-20 NOTE — TELEPHONE ENCOUNTER
Pain getting worse despite comfort measure,  Constant and body extremely sore and tender to touch. Lower back almost down to tailbone.  39 weeks.  Discharge milky yellow.  No vaginal pains  Shooting and pinching sensation inside vagina.  Does have an odor to discharge.  No fevers no chills.  Waves of dizziness nausea / vomiting.  No appetite.  Did keep enough fluids down to stay hydrated.    1 st baby.    [facility] as pt began a pain that reached level of inability to speak thru it.    Transferred to L&D for further traige.    Hannah BRUMFIELD   [facility] RN

## 2020-05-20 NOTE — TELEPHONE ENCOUNTER
Spoke with patient on the phone.    S-(situation): Patient reports she has been having lower back pain since last night. Patient reports pain has kept her up some of the night. Patient reports the pain is more constant and does not come and go. Patient currently rating pain a 6/10. Patient reports pain is worse with activity. Patient reports she has been taking 1000 mg of Tylenol every 4 hours. Patient reports some nausea and vomiting but this is not new for her. Patient reports softer stools with occasional pelvic cramping that is off and on. Patient reports baby is active. Patient denies bright red bleeding or leaking of fluid.    B-(background): A1GDM, IOL 5/22/2020. Patient has appointment today for COVID19 testing and US in Radiology at 1030.    A-(assessment): low back pain, constant    R-(recommendations): Reassurance provided. Reviewed comfort measures such as warm shower, warm tub bath, low set heating pad. Rest off of feet and take it easy. Continue to monitor. Call with worsening symptoms or no improvement with comfort measures, bright red bleeding, leaking of fluid and decreased fetal movement.    Keep appointments as scheduled.    Pt in agreement and reports understanding.    Camilla Travis   Ob[facility]Gyn Clinic  RN

## 2020-05-21 ENCOUNTER — TELEPHONE (OUTPATIENT)
Dept: OBGYN | Facility: CLINIC | Age: 25
End: 2020-05-21

## 2020-05-21 LAB
SARS-COV-2 RNA SPEC QL NAA+PROBE: NOT DETECTED
SPECIMEN SOURCE: NORMAL

## 2020-05-21 NOTE — TELEPHONE ENCOUNTER
[facility], she stated she tried the triage line, but it was busy.  She was crying and stated she has been radha at some point every 5 min, then it spaced out.  She stated she is scheduled for an induction tomorrow, but can't take the pain.  Pt denied leaking of fluid and stated baby is active.  RN informed the pt that she can come in and we will assess.  Pt stated she would be here at 1200.  Pt then called back about 15 in later, and was very calm, stating that she doesn't want to jump the gun and she is going to stay home and try a heating pad.  She stated she will call back with any changes or if she decides to come in.

## 2020-05-21 NOTE — TELEPHONE ENCOUNTER
Reason for call:  Patient reporting a symptom    Symptom or request: 39 wks tomorrow - states she lost her mucous plug - just wondering if she needs to do anything>    Duration (how long have symptoms been present): today    Have you been treated for this before? No    Additional comments:     Phone Number patient can be reached at:  Home number on file [phone] (home)    Best Time:      Can we leave a detailed message on this number:  YES    Call taken on 5/21/2020 at 2:56 PM by Glo Torres

## 2020-05-21 NOTE — TELEPHONE ENCOUNTER
Return call to patient.  Spoke with patient on the phone.    S-(situation): patient states she lost her mucous plug. Pt offers no other concerns. Patient wondering what to do?    Patient is not having bright red bleeding.  Patient is not leaking fluid.  Patient reports baby is active.     Patient is scheduled for IOL tomorrow.      B-(background):  at 38.6 weeks gestation    A-(assessment): patient states she lost her mucous plug    R-(recommendations): reassurance provided. Continue to monitor for labor like contractions. Call with decreased fetal movement, bright red bleeding. leaking of fluid or labor like contractions.    Pt in agreement and reports understanding.    Camilla Travis   [facility]yn Clinic  RN

## 2020-05-22 ENCOUNTER — ANESTHESIA EVENT (OUTPATIENT)
Dept: OBGYN | Facility: CLINIC | Age: 25
End: 2020-05-22
Payer: COMMERCIAL

## 2020-05-22 ENCOUNTER — HOSPITAL ENCOUNTER (INPATIENT)
Facility: CLINIC | Age: 25
LOS: 3 days | Discharge: HOME | End: 2020-05-25
Attending: OBSTETRICS & GYNECOLOGY | Admitting: OBSTETRICS & GYNECOLOGY
Payer: COMMERCIAL

## 2020-05-22 ENCOUNTER — ANESTHESIA (OUTPATIENT)
Dept: OBGYN | Facility: CLINIC | Age: 25
End: 2020-05-22
Payer: COMMERCIAL

## 2020-05-22 DIAGNOSIS — Z98.891 S/P PRIMARY LOW TRANSVERSE C-SECTION: Primary | ICD-10-CM

## 2020-05-22 DIAGNOSIS — D50.8 IRON DEFICIENCY ANEMIA SECONDARY TO INADEQUATE DIETARY IRON INTAKE: ICD-10-CM

## 2020-05-22 PROBLEM — O24.410 DIET CONTROLLED GESTATIONAL DIABETES MELLITUS (GDM) IN THIRD TRIMESTER: Status: ACTIVE | Noted: 2020-05-22

## 2020-05-22 LAB
ABO + RH BLD: NORMAL
ABO + RH BLD: NORMAL
AMPHETAMINES UR QL SCN: NEGATIVE
BARBITURATES UR QL: NEGATIVE
BASOPHILS # BLD AUTO: 0 10E9/L (ref 0–0.2)
BASOPHILS NFR BLD AUTO: 0.2 %
BENZODIAZ UR QL: NEGATIVE
BLD GP AB SCN SERPL QL: NORMAL
BLOOD BANK CMNT PATIENT-IMP: NORMAL
CANNABINOIDS UR QL SCN: POSITIVE
COCAINE UR QL: NEGATIVE
DIFFERENTIAL METHOD BLD: ABNORMAL
EOSINOPHIL # BLD AUTO: 0.3 10E9/L (ref 0–0.7)
EOSINOPHIL NFR BLD AUTO: 2.2 %
ERYTHROCYTE [DISTWIDTH] IN BLOOD BY AUTOMATED COUNT: 13.9 % (ref 10–15)
GLUCOSE BLDC GLUCOMTR-MCNC: 73 MG/DL (ref 70–99)
GLUCOSE BLDC GLUCOMTR-MCNC: 74 MG/DL (ref 70–99)
GLUCOSE BLDC GLUCOMTR-MCNC: 78 MG/DL (ref 70–99)
GLUCOSE BLDC GLUCOMTR-MCNC: 80 MG/DL (ref 70–99)
GLUCOSE BLDC GLUCOMTR-MCNC: 86 MG/DL (ref 70–99)
GLUCOSE BLDC GLUCOMTR-MCNC: 96 MG/DL (ref 70–99)
HCT VFR BLD AUTO: 35.4 % (ref 35–47)
HGB BLD-MCNC: 11.7 G/DL (ref 11.7–15.7)
IMM GRANULOCYTES # BLD: 0.1 10E9/L (ref 0–0.4)
IMM GRANULOCYTES NFR BLD: 0.6 %
LYMPHOCYTES # BLD AUTO: 1.5 10E9/L (ref 0.8–5.3)
LYMPHOCYTES NFR BLD AUTO: 10.5 %
MCH RBC QN AUTO: 29.5 PG (ref 26.5–33)
MCHC RBC AUTO-ENTMCNC: 33.1 G/DL (ref 31.5–36.5)
MCV RBC AUTO: 89 FL (ref 78–100)
MONOCYTES # BLD AUTO: 1.2 10E9/L (ref 0–1.3)
MONOCYTES NFR BLD AUTO: 8.3 %
NEUTROPHILS # BLD AUTO: 11.4 10E9/L (ref 1.6–8.3)
NEUTROPHILS NFR BLD AUTO: 78.2 %
NRBC # BLD AUTO: 0 10*3/UL
NRBC BLD AUTO-RTO: 0 /100
OPIATES UR QL SCN: NEGATIVE
PCP UR QL SCN: NEGATIVE
PLATELET # BLD AUTO: 228 10E9/L (ref 150–450)
RBC # BLD AUTO: 3.97 10E12/L (ref 3.8–5.2)
SPECIMEN EXP DATE BLD: NORMAL
T PALLIDUM AB SER QL: NONREACTIVE
WBC # BLD AUTO: 14.5 10E9/L (ref 4–11)

## 2020-05-22 PROCEDURE — 25000125 ZZHC RX 250: Performed by: NURSE ANESTHETIST, CERTIFIED REGISTERED

## 2020-05-22 PROCEDURE — 86900 BLOOD TYPING SEROLOGIC ABO: CPT | Performed by: OBSTETRICS & GYNECOLOGY

## 2020-05-22 PROCEDURE — 36415 COLL VENOUS BLD VENIPUNCTURE: CPT | Performed by: OBSTETRICS & GYNECOLOGY

## 2020-05-22 PROCEDURE — 37000011 ZZH ANESTHESIA WARD SERVICE: Performed by: NURSE ANESTHETIST, CERTIFIED REGISTERED

## 2020-05-22 PROCEDURE — 12000000 ZZH R&B MED SURG/OB

## 2020-05-22 PROCEDURE — 80307 DRUG TEST PRSMV CHEM ANLYZR: CPT | Performed by: OBSTETRICS & GYNECOLOGY

## 2020-05-22 PROCEDURE — 10907ZC DRAINAGE OF AMNIOTIC FLUID, THERAPEUTIC FROM PRODUCTS OF CONCEPTION, VIA NATURAL OR ARTIFICIAL OPENING: ICD-10-PCS | Performed by: OBSTETRICS & GYNECOLOGY

## 2020-05-22 PROCEDURE — 86901 BLOOD TYPING SEROLOGIC RH(D): CPT | Performed by: OBSTETRICS & GYNECOLOGY

## 2020-05-22 PROCEDURE — 25800030 ZZH RX IP 258 OP 636: Performed by: OBSTETRICS & GYNECOLOGY

## 2020-05-22 PROCEDURE — 40000671 ZZH STATISTIC ANESTHESIA CASE

## 2020-05-22 PROCEDURE — 3E0R3BZ INTRODUCTION OF ANESTHETIC AGENT INTO SPINAL CANAL, PERCUTANEOUS APPROACH: ICD-10-PCS | Performed by: OBSTETRICS & GYNECOLOGY

## 2020-05-22 PROCEDURE — 86850 RBC ANTIBODY SCREEN: CPT | Performed by: OBSTETRICS & GYNECOLOGY

## 2020-05-22 PROCEDURE — 25000128 H RX IP 250 OP 636: Performed by: NURSE ANESTHETIST, CERTIFIED REGISTERED

## 2020-05-22 PROCEDURE — 85025 COMPLETE CBC W/AUTO DIFF WBC: CPT | Performed by: OBSTETRICS & GYNECOLOGY

## 2020-05-22 PROCEDURE — 00HU33Z INSERTION OF INFUSION DEVICE INTO SPINAL CANAL, PERCUTANEOUS APPROACH: ICD-10-PCS | Performed by: OBSTETRICS & GYNECOLOGY

## 2020-05-22 PROCEDURE — 86780 TREPONEMA PALLIDUM: CPT | Performed by: OBSTETRICS & GYNECOLOGY

## 2020-05-22 PROCEDURE — 25000128 H RX IP 250 OP 636: Performed by: OBSTETRICS & GYNECOLOGY

## 2020-05-22 PROCEDURE — 00000146 ZZHCL STATISTIC GLUCOSE BY METER IP

## 2020-05-22 PROCEDURE — 25000125 ZZHC RX 250: Performed by: OBSTETRICS & GYNECOLOGY

## 2020-05-22 PROCEDURE — 25000132 ZZH RX MED GY IP 250 OP 250 PS 637: Performed by: OBSTETRICS & GYNECOLOGY

## 2020-05-22 RX ORDER — OXYTOCIN/0.9 % SODIUM CHLORIDE 30/500 ML
1-24 PLASTIC BAG, INJECTION (ML) INTRAVENOUS CONTINUOUS
Status: DISCONTINUED | OUTPATIENT
Start: 2020-05-22 | End: 2020-05-23

## 2020-05-22 RX ORDER — METHYLERGONOVINE MALEATE 0.2 MG/ML
200 INJECTION INTRAVENOUS
Status: COMPLETED | OUTPATIENT
Start: 2020-05-22 | End: 2020-05-23

## 2020-05-22 RX ORDER — OXYCODONE AND ACETAMINOPHEN 5; 325 MG/1; MG/1
1 TABLET ORAL
Status: DISCONTINUED | OUTPATIENT
Start: 2020-05-22 | End: 2020-05-23

## 2020-05-22 RX ORDER — CARBOPROST TROMETHAMINE 250 UG/ML
250 INJECTION, SOLUTION INTRAMUSCULAR
Status: DISCONTINUED | OUTPATIENT
Start: 2020-05-22 | End: 2020-05-23

## 2020-05-22 RX ORDER — SODIUM CHLORIDE, SODIUM LACTATE, POTASSIUM CHLORIDE, CALCIUM CHLORIDE 600; 310; 30; 20 MG/100ML; MG/100ML; MG/100ML; MG/100ML
INJECTION, SOLUTION INTRAVENOUS CONTINUOUS
Status: DISCONTINUED | OUTPATIENT
Start: 2020-05-22 | End: 2020-05-23

## 2020-05-22 RX ORDER — DEXTROSE, SODIUM CHLORIDE, SODIUM LACTATE, POTASSIUM CHLORIDE, AND CALCIUM CHLORIDE 5; .6; .31; .03; .02 G/100ML; G/100ML; G/100ML; G/100ML; G/100ML
INJECTION, SOLUTION INTRAVENOUS CONTINUOUS
Status: DISCONTINUED | OUTPATIENT
Start: 2020-05-22 | End: 2020-05-23

## 2020-05-22 RX ORDER — LIDOCAINE HYDROCHLORIDE AND EPINEPHRINE 15; 5 MG/ML; UG/ML
INJECTION, SOLUTION EPIDURAL PRN
Status: DISCONTINUED | OUTPATIENT
Start: 2020-05-22 | End: 2020-05-23

## 2020-05-22 RX ORDER — TRANEXAMIC ACID 10 MG/ML
1 INJECTION, SOLUTION INTRAVENOUS EVERY 30 MIN PRN
Status: DISCONTINUED | OUTPATIENT
Start: 2020-05-22 | End: 2020-05-23

## 2020-05-22 RX ORDER — LIDOCAINE HYDROCHLORIDE 10 MG/ML
INJECTION, SOLUTION INFILTRATION; PERINEURAL PRN
Status: DISCONTINUED | OUTPATIENT
Start: 2020-05-22 | End: 2020-05-23

## 2020-05-22 RX ORDER — OXYTOCIN/0.9 % SODIUM CHLORIDE 30/500 ML
100-340 PLASTIC BAG, INJECTION (ML) INTRAVENOUS CONTINUOUS PRN
Status: COMPLETED | OUTPATIENT
Start: 2020-05-22 | End: 2020-05-23

## 2020-05-22 RX ORDER — NALOXONE HYDROCHLORIDE 0.4 MG/ML
.1-.4 INJECTION, SOLUTION INTRAMUSCULAR; INTRAVENOUS; SUBCUTANEOUS
Status: DISCONTINUED | OUTPATIENT
Start: 2020-05-22 | End: 2020-05-23

## 2020-05-22 RX ORDER — ACETAMINOPHEN 325 MG/1
650 TABLET ORAL EVERY 4 HOURS PRN
Status: DISCONTINUED | OUTPATIENT
Start: 2020-05-22 | End: 2020-05-23

## 2020-05-22 RX ORDER — DEXTROSE MONOHYDRATE 25 G/50ML
25-50 INJECTION, SOLUTION INTRAVENOUS
Status: DISCONTINUED | OUTPATIENT
Start: 2020-05-22 | End: 2020-05-23

## 2020-05-22 RX ORDER — MISOPROSTOL 100 UG/1
25 TABLET ORAL
Status: DISCONTINUED | OUTPATIENT
Start: 2020-05-22 | End: 2020-05-23

## 2020-05-22 RX ORDER — EPHEDRINE SULFATE 50 MG/ML
5 INJECTION, SOLUTION INTRAMUSCULAR; INTRAVENOUS; SUBCUTANEOUS
Status: DISCONTINUED | OUTPATIENT
Start: 2020-05-22 | End: 2020-05-23

## 2020-05-22 RX ORDER — NALOXONE HYDROCHLORIDE 0.4 MG/ML
.1-.4 INJECTION, SOLUTION INTRAMUSCULAR; INTRAVENOUS; SUBCUTANEOUS
Status: DISCONTINUED | OUTPATIENT
Start: 2020-05-22 | End: 2020-05-22

## 2020-05-22 RX ORDER — NALBUPHINE HYDROCHLORIDE 10 MG/ML
2.5-5 INJECTION, SOLUTION INTRAMUSCULAR; INTRAVENOUS; SUBCUTANEOUS EVERY 6 HOURS PRN
Status: DISCONTINUED | OUTPATIENT
Start: 2020-05-22 | End: 2020-05-23

## 2020-05-22 RX ORDER — BUPIVACAINE HYDROCHLORIDE 2.5 MG/ML
INJECTION, SOLUTION EPIDURAL; INFILTRATION; INTRACAUDAL PRN
Status: DISCONTINUED | OUTPATIENT
Start: 2020-05-22 | End: 2020-05-23

## 2020-05-22 RX ORDER — OXYTOCIN 10 [USP'U]/ML
10 INJECTION, SOLUTION INTRAMUSCULAR; INTRAVENOUS
Status: DISCONTINUED | OUTPATIENT
Start: 2020-05-22 | End: 2020-05-23

## 2020-05-22 RX ORDER — NICOTINE POLACRILEX 4 MG
15-30 LOZENGE BUCCAL
Status: DISCONTINUED | OUTPATIENT
Start: 2020-05-22 | End: 2020-05-23

## 2020-05-22 RX ORDER — ONDANSETRON 2 MG/ML
4 INJECTION INTRAMUSCULAR; INTRAVENOUS EVERY 6 HOURS PRN
Status: DISCONTINUED | OUTPATIENT
Start: 2020-05-22 | End: 2020-05-23

## 2020-05-22 RX ORDER — IBUPROFEN 800 MG/1
800 TABLET, FILM COATED ORAL
Status: DISCONTINUED | OUTPATIENT
Start: 2020-05-22 | End: 2020-05-23

## 2020-05-22 RX ORDER — SODIUM CHLORIDE 9 MG/ML
INJECTION, SOLUTION INTRAVENOUS CONTINUOUS
Status: DISCONTINUED | OUTPATIENT
Start: 2020-05-22 | End: 2020-05-23

## 2020-05-22 RX ORDER — LIDOCAINE 40 MG/G
CREAM TOPICAL
Status: DISCONTINUED | OUTPATIENT
Start: 2020-05-22 | End: 2020-05-23

## 2020-05-22 RX ORDER — FENTANYL CITRATE 50 UG/ML
50-100 INJECTION, SOLUTION INTRAMUSCULAR; INTRAVENOUS
Status: DISCONTINUED | OUTPATIENT
Start: 2020-05-22 | End: 2020-05-23

## 2020-05-22 RX ADMIN — LIDOCAINE HYDROCHLORIDE AND EPINEPHRINE 75 MG: 15; 5 INJECTION, SOLUTION EPIDURAL at 09:05

## 2020-05-22 RX ADMIN — Medication 10 ML/HR: at 09:05

## 2020-05-22 RX ADMIN — Medication: at 20:03

## 2020-05-22 RX ADMIN — PROCHLORPERAZINE EDISYLATE 10 MG: 5 INJECTION INTRAMUSCULAR; INTRAVENOUS at 19:55

## 2020-05-22 RX ADMIN — Medication 2 MILLI-UNITS/MIN: at 09:46

## 2020-05-22 RX ADMIN — SODIUM CHLORIDE, POTASSIUM CHLORIDE, SODIUM LACTATE AND CALCIUM CHLORIDE: 600; 310; 30; 20 INJECTION, SOLUTION INTRAVENOUS at 09:46

## 2020-05-22 RX ADMIN — SODIUM CHLORIDE, SODIUM LACTATE, POTASSIUM CHLORIDE, CALCIUM CHLORIDE AND DEXTROSE MONOHYDRATE: 5; 600; 310; 30; 20 INJECTION, SOLUTION INTRAVENOUS at 09:54

## 2020-05-22 RX ADMIN — ONDANSETRON 4 MG: 2 INJECTION INTRAMUSCULAR; INTRAVENOUS at 17:20

## 2020-05-22 RX ADMIN — SODIUM CHLORIDE, POTASSIUM CHLORIDE, SODIUM LACTATE AND CALCIUM CHLORIDE 1000 ML: 600; 310; 30; 20 INJECTION, SOLUTION INTRAVENOUS at 08:05

## 2020-05-22 RX ADMIN — BUPIVACAINE HYDROCHLORIDE 6 ML: 2.5 INJECTION, SOLUTION EPIDURAL; INFILTRATION; INTRACAUDAL at 09:02

## 2020-05-22 RX ADMIN — LIDOCAINE HYDROCHLORIDE 100 MG: 10 INJECTION, SOLUTION INFILTRATION; PERINEURAL at 08:50

## 2020-05-22 RX ADMIN — SODIUM CHLORIDE, SODIUM LACTATE, POTASSIUM CHLORIDE, CALCIUM CHLORIDE AND DEXTROSE MONOHYDRATE: 5; 600; 310; 30; 20 INJECTION, SOLUTION INTRAVENOUS at 17:20

## 2020-05-22 RX ADMIN — ONDANSETRON 4 MG: 2 INJECTION INTRAMUSCULAR; INTRAVENOUS at 09:49

## 2020-05-22 RX ADMIN — SERTRALINE HYDROCHLORIDE 100 MG: 50 TABLET ORAL at 21:00

## 2020-05-22 ASSESSMENT — ACTIVITIES OF DAILY LIVING (ADL)
AMBULATION: 0-->INDEPENDENT
TRANSFERRING: 0-->INDEPENDENT
BATHING: 0-->INDEPENDENT
RETIRED_EATING: 0-->INDEPENDENT
FALL_HISTORY_WITHIN_LAST_SIX_MONTHS: NO
COGNITION: 0 - NO COGNITION ISSUES REPORTED
DRESS: 0-->INDEPENDENT
RETIRED_COMMUNICATION: 0-->UNDERSTANDS/COMMUNICATES WITHOUT DIFFICULTY
TOILETING: 0-->INDEPENDENT
SWALLOWING: 0-->SWALLOWS FOODS/LIQUIDS WITHOUT DIFFICULTY

## 2020-05-22 ASSESSMENT — MIFFLIN-ST. JEOR: SCORE: 1539.38

## 2020-05-22 NOTE — ANESTHESIA PROCEDURE NOTES
Procedure note : epidural catheter  Staff -       CRNA: Sherrie Ruelas APRN CRNA    Performed By: CRNA        Pre-Procedure    Location: OB    Procedure Times:5/22/2020 8:40 AM and 5/22/2020 9:15 AM  Pre-Anesthestic Checklist: patient identified, IV checked, risks and benefits discussed, informed consent, monitors and equipment checked, pre-op evaluation and at physician/surgeon's request    Timeout  Correct Patient: Yes   Correct Procedure: Yes   Correct Site: Yes   Correct Laterality: N/A   Correct Position: Yes   Site Marked: N/A   .   Procedure Documentation    .    Procedure: epidural catheter, .   Patient Position:sitting Insertion Site:L3-4  (midline approach) Injection technique: LORT saline   Local skin infiltrated with 10 mL of 1% lidocaine.      Patient Prep/Sterile Barriers; mask, sterile gloves, patient draped.  .  Needle: Touhy needle   Needle Gauge: 17.    Needle Length (Inches) 3.5   # of attempts: 1 and # of redirects:  .    Catheter: 19 G . .  Catheter threaded easily  .  .   .    Assessment/Narrative  Paresthesias: No.  .  .  Aspiration negative for heme or CSF  . Test dose of 5 mL at 09:05. Test dose negative for signs of intravascular, subdural or intrathecal injection. Sensory Level Left: T10  Sensory Level Right: T10  Comments:  VAS pain score prior to epidural:9    VAS pain score after epidural:2    Pt. Tolerated well, FHR stable.

## 2020-05-22 NOTE — PLAN OF CARE
Pitocin induction due to gestational diabetes, diet controlled. Afebrile, Covid 19 negative. Cat 1 tracing. Frequent repositioning. Pt is nauseated and vomiting throughout pregnancy and labor. Blood glucoses are in the 70's no interventions are not necessary at this time. Pt and SO are excited. Educated and verbalizes understanding POC. Dr Reyes is updated. Will continue to assess and monitor

## 2020-05-22 NOTE — PROGRESS NOTES
Clinical Nutrition Services Progress Note - Brief     Patient is a 25 year old female with an admission nutrition risk screen (+) for newly diagnosed/uncontrolled DM.     Patient with GDM. Patient with controlled GDM during pregnancy with diet. She has also been followed by outpatient CDE - last in-person visit 3/20.      Patient not appropriate for RD visit at this time as patient here for delivery. Patient's GDM may resolve post-partum.      Please consult RD if patient becomes appropriate for DM diet education during stay prior to discharge. Since patient has been followed by CDE prior to labor, recommend pt reach out to CDE if diabetes does not resolve after delivery.      Caty Lewis RDN, LD  Clinical Dietitian  [phone]

## 2020-05-22 NOTE — ANESTHESIA PREPROCEDURE EVALUATION
Anesthesia Pre-Procedure Evaluation    Patient: Nargis Ruelas   MRN: [id number] : 1995          Preoperative Diagnosis: * No surgery found *        Past Medical History:   Diagnosis Date     GDM, class A1 3/25/2020     Tonsillitis      Past Surgical History:   Procedure Laterality Date     MOUTH SURGERY      wisdom teeth     TONSILLECTOMY         Anesthesia Evaluation       history and physical reviewed .             ROS/MED HX    ENT/Pulmonary:     (+)asthma , . .    Neurologic:  - neg neurologic ROS     Cardiovascular:  - neg cardiovascular ROS       METS/Exercise Tolerance:     Hematologic:         Musculoskeletal:         GI/Hepatic:         Renal/Genitourinary:         Endo:         Psychiatric:         Infectious Disease:         Malignancy:         Other:                     neg OB ROS            Physical Exam  Normal systems: cardiovascular, pulmonary and dental    Airway   Mallampati: II  TM distance: > 3 FB  Neck ROM: full  Mouth opening: > 3 cm    Dental     Cardiovascular       Pulmonary             Lab Results   Component Value Date    WBC 7.1 2020    HGB 11.0 (L) 2020    HCT 33.0 (L) 2020     2020     2019    POTASSIUM 3.8 2019    CHLORIDE 106 2019    CO2 27 2019    BUN 9 2019    CR 0.69 2019    GLC 93 2019    SAEED 8.9 2019    ALBUMIN 3.8 2018    PROTTOTAL 7.4 2018    ALT 25 2018    AST 19 2018    ALKPHOS 76 2018    BILITOTAL 0.2 2018    TSH 0.76 2020    T4 0.87 10/22/2019    HCG Positive (A) 2019       Preop Vitals  BP Readings from Last 3 Encounters:   20 113/73   20 114/67   20 104/62    Pulse Readings from Last 3 Encounters:   20 101   20 70   20 73      Resp Readings from Last 3 Encounters:   20 18   20 18   20 16    SpO2 Readings from Last 3 Encounters:   20 98%   19 98%   19 100%     "  Temp Readings from Last 1 Encounters:   05/14/20 36.7  C (98  F) (Tympanic)    Ht Readings from Last 1 Encounters:   05/14/20 1.575 m (5' 2\")      Wt Readings from Last 1 Encounters:   05/14/20 83 kg (183 lb)    Estimated body mass index is 33.47 kg/m  as calculated from the following:    Height as of 5/14/20: 1.575 m (5' 2\").    Weight as of 5/14/20: 83 kg (183 lb).       Anesthesia Plan      History & Physical Review      ASA Status:  .  OB Epidural Asa: 2            Postoperative Care      Consents  Anesthetic plan, risks, benefits and alternatives discussed with:  Patient..                 SUDHEER Townsend CRNA  "

## 2020-05-22 NOTE — PLAN OF CARE
Pt resting comfortably after epidural placed and dosed. Repositioning frequently. SO is present and very attentive. Pt has had a 3 bouts of emesis. Zofran and IV fluids given. Urine sent: negative except for THC. Gestational diabetic, diet controlled, blood glucose monitoring for labor. Hx of depression, anxiety, asthma, ADHD. SO is Robi Silva II they are not . Pitocin induction started at 0945. Epidural for pain management in labor. Cat 1 tracing. Obrien placed urine returned. Will continue to assess and monitor.

## 2020-05-22 NOTE — PLAN OF CARE
Data: Patient admitted to room  at 0740. Patient is a . Prenatal record reviewed.   OB History    Para Term  AB Living   2 0 0 0 1 0   SAB TAB Ectopic Multiple Live Births   0 0 0 0 0      # Outcome Date GA Lbr Roman/2nd Weight Sex Delivery Anes PTL Lv   2 Current            1 AB 19 8w0d    SAB      .  Medical History:   Past Medical History:   Diagnosis Date     GDM, class A1 3/25/2020     Tonsillitis    .  Gestational age 39w0d. Vital signs per doc flowsheet. Fetal movement present. Patient reports feeling contractions and IOL as reason for admission. Support persons are present.  Action: Report from pt, RN obtained at arrival. Care of patient assumed at arrival. Verbal consent for EFM, external fetal monitors applied. Admission assessment completed. Patient and support persons educated on labor process. Patient instructed to report change in fetal movement, contractions, vaginal leaking of fluid or bleeding, abdominal pain, or any concerns related to the pregnancy to her nurse/physician. Patient oriented to room, call light in reach.   Response: Dr. Reyes informed of pt labor progress. Reactive NST, Cat 1. Plan per provider is IOL pitocin and epidural for pain management. Patient verbalized understanding of education and verbalized agreement with plan. Patient coping with labor. Pt states pain with contractions an 8. Pt requests epidural. IV placed, infusion bolus, CRNA notified. Consent signed, time out for epidural completed.  Blood glucose 74.

## 2020-05-22 NOTE — H&P
[facility] Labor and Delivery History and Physical    Nargis Ruelas MRN# [id number]   Age: 25 year old YOB: 1995     Date of Admission:  2020    Primary care provider: Carol Jennings           Chief Complaint:   Nargsi Ruelas is a 25 year old female who is 39w0d pregnant and being admitted for induction of labor for GDMA1; she presents already having some spontaneous contractions with a favorable cervix.  She is GBS negative; COVID neg   She had sonogram for EFW  approx 3500gm.          Pregnancy history:     OBSTETRIC HISTORY:    OB History    Para Term  AB Living   2 0 0 0 1 0   SAB TAB Ectopic Multiple Live Births   0 0 0 0 0      # Outcome Date GA Lbr Roman/2nd Weight Sex Delivery Anes PTL Lv   2 Current            1 AB 19 8w0d    SAB          EDC: Estimated Date of Delivery: 20    Prenatal Labs:   Lab Results   Component Value Date    ABO A 2020    RH Pos 2020    AS Neg 2020    HEPBANG Nonreactive 10/22/2019    CHPCRT Negative 10/22/2019    GCPCRT Negative 10/22/2019    HGB 11.7 2020       GBS Status:   Lab Results   Component Value Date    GBS Negative 2020       Active Problem List  Patient Active Problem List   Diagnosis     Major depressive disorder, single episode     Anxiety state     History of syncope     Tobacco abuse     Attention deficit hyperactivity disorder (ADHD), predominantly inattentive type     Chronic pain of both knees     Asthma     Prenatal care, first pregnancy     GDM, class A1     Diet controlled gestational diabetes mellitus (GDM) in third trimester       Medication Prior to Admission  Medications Prior to Admission   Medication Sig Dispense Refill Last Dose     acetone urine (KETOSTIX) test strip 1 strip daily Use one strip daily for 1 week then weekly if negative (Patient not taking: Reported on 2020) 50 each 1      albuterol (PROAIR HFA/PROVENTIL HFA/VENTOLIN HFA) 108 (90  Base) MCG/ACT inhaler Inhale 2 puffs into the lungs every 6 hours 1 Inhaler 0      blood glucose (CONTOUR NEXT TEST) test strip Use to test blood sugar 4 times daily or as directed. (Patient not taking: Reported on 5/14/2020) 150 strip 3      blood glucose monitoring (ARNAV MICROLET) lancets Use to test blood sugar 4 times daily. (Patient not taking: Reported on 5/14/2020) 150 each 3      Contour Next EZ (CONTOUR NEXT EZ W/DEVICE KIT) w/Device KIT 1 kit 4 times daily Use to test blood sugars 4 times daily or as directed.  Ok to substitute alternative if insurance prefers. (Patient not taking: Reported on 5/14/2020) 1 kit 0      doxylamine (UNISOM) 25 MG TABS tablet Take 1 tablet (25 mg) by mouth At Bedtime 30 tablet 3      famotidine (PEPCID) 10 MG tablet Take 1 tablet (10 mg) by mouth 2 times daily 60 tablet 2      Misc. Devices (BREAST PUMP) MISC Use as directed. 1 each 0      Prenatal Vit-Fe Fumarate-FA (PRENATAL MULTIVITAMIN W/IRON) 27-0.8 MG tablet Take 1 tablet by mouth daily 90 tablet 3      ranitidine (ZANTAC) 150 MG tablet Take 1 tablet (150 mg) by mouth 2 times daily 180 tablet 0      sertraline (ZOLOFT) 100 MG tablet TAKE ONE TABLET BY MOUTH ONCE DAILY 90 tablet 0      vitamin B6 (PYRIDOXINE) 25 MG tablet Take 1 tablet (25 mg) by mouth 3 times daily 90 tablet 2    .        Maternal Past Medical History:     Past Medical History:   Diagnosis Date     GDM, class A1 3/25/2020     Tonsillitis                        Family History:     Family History   Problem Relation Age of Onset     Diabetes Maternal Grandfather      Coronary Artery Disease Maternal Grandfather      Cerebrovascular Disease Maternal Grandfather      Depression Mother      Anxiety Disorder Mother      Unknown/Adopted Father         unknown      Chronic Obstructive Pulmonary Disease Maternal Grandmother      Other Cancer No family hx of      Family history reviewed and updated in [facility]            Social History:     Social History     Tobacco  Use     Smoking status: Former Smoker     Packs/day: 0.20     Smokeless tobacco: Never Used     Tobacco comment: quit with pregnancy   Substance Use Topics     Alcohol use: Not Currently     Alcohol/week: 0.0 standard drinks     Comment: 4 drinks per month-quit with pregnancy            Review of Systems:   The Review of Systems is negative other than noted in the HPI          Physical Exam:   Vitals were reviewed  No data found.    Constitutional:   awake, alert, cooperative, no apparent distress, and appears stated age     Lungs:   No increased work of breathing, good air exchange, clear to auscultation bilaterally, no crackles or wheezing     Cardiovascular:   Normal apical impulse, regular rate and rhythm, normal S1 and S2, no S3 or S4, and no murmur noted     Abdomen:   No scars, normal bowel sounds, soft, non-distended, non-tender, no masses palpated, no hepatosplenomegally     Neurologic:   Awake, alert, oriented to name, place and time.  Cranial nerves II-XII are grossly intact.  Motor is 5 out of 5 bilaterally.  Cerebellar finger to nose, heel to shin intact.  Sensory is intact.  Babinski down going, Romberg negative, and gait is normal.      Cervix:   Membranes: AROM  clear amniotic fluid   Dilation: 2.5   Effacement: 90%   Station:-1   Consistency: soft   Position: Mid  Presentation:Cephalic  Fetal Heart Rate Tracing: reactive and reassuring, Tier 1 (normal)  Tocometer: external monitor                       Assessment:   Nargis Ruelas is a 39w0d pregnant female admitted with GDM A1 for augmentation of likely spontaneous labor.          Plan:   Admit - see IP orders  Labor augmentation with Pitocin  Pain medication epidural  Anticipate     Jaqueline Reyes MD

## 2020-05-22 NOTE — PLAN OF CARE
Pt continues to have bouts of emesis. Zofran IV given. Contraction pattern appears to be tachysystole, however, difficult to assess when pt is vomiting. Cat 1 tracing. Afebrile. SVE 5.5/100/0.  Pt states not having pain, just uncomfortable feeling in posterior pelvis. Repositioning frequently. Pt and SO are resting intermittently. Blood glucose levels are WNL's insulin for pump is in the fridge in the nursery if needed. [place] [id number]. Will continue to assess and monitor.

## 2020-05-23 ENCOUNTER — ANESTHESIA EVENT (OUTPATIENT)
Dept: SURGERY | Facility: CLINIC | Age: 25
End: 2020-05-23
Payer: COMMERCIAL

## 2020-05-23 ENCOUNTER — ANESTHESIA (OUTPATIENT)
Dept: SURGERY | Facility: CLINIC | Age: 25
End: 2020-05-23
Payer: COMMERCIAL

## 2020-05-23 PROBLEM — O36.8390 NON-REASSURING ELECTRONIC FETAL MONITORING TRACING: Status: ACTIVE | Noted: 2020-05-23

## 2020-05-23 PROBLEM — O33.4XX0 CEPHALOPELVIC DISPROPORTION DUE TO MIXED MATERNAL AND FETAL FACTORS: Status: ACTIVE | Noted: 2020-05-23

## 2020-05-23 PROBLEM — Z98.891 S/P PRIMARY LOW TRANSVERSE C-SECTION: Status: ACTIVE | Noted: 2020-05-23

## 2020-05-23 LAB
GLUCOSE BLDC GLUCOMTR-MCNC: 84 MG/DL (ref 70–99)
GLUCOSE BLDC GLUCOMTR-MCNC: 89 MG/DL (ref 70–99)
GLUCOSE BLDC GLUCOMTR-MCNC: 97 MG/DL (ref 70–99)
GLUCOSE BLDC GLUCOMTR-MCNC: 98 MG/DL (ref 70–99)

## 2020-05-23 PROCEDURE — 71000013 ZZH RECOVERY PHASE 1 LEVEL 1 EA ADDTL HR: Performed by: OBSTETRICS & GYNECOLOGY

## 2020-05-23 PROCEDURE — 59515 CESAREAN DELIVERY: CPT | Performed by: OBSTETRICS & GYNECOLOGY

## 2020-05-23 PROCEDURE — 25000132 ZZH RX MED GY IP 250 OP 250 PS 637: Performed by: OBSTETRICS & GYNECOLOGY

## 2020-05-23 PROCEDURE — 71000012 ZZH RECOVERY PHASE 1 LEVEL 1 FIRST HR: Performed by: OBSTETRICS & GYNECOLOGY

## 2020-05-23 PROCEDURE — 00000146 ZZHCL STATISTIC GLUCOSE BY METER IP

## 2020-05-23 PROCEDURE — 36000058 ZZH SURGERY LEVEL 3 EA 15 ADDTL MIN: Performed by: OBSTETRICS & GYNECOLOGY

## 2020-05-23 PROCEDURE — 25000125 ZZHC RX 250: Performed by: OBSTETRICS & GYNECOLOGY

## 2020-05-23 PROCEDURE — 27210794 ZZH OR GENERAL SUPPLY STERILE: Performed by: OBSTETRICS & GYNECOLOGY

## 2020-05-23 PROCEDURE — 25000128 H RX IP 250 OP 636: Performed by: NURSE ANESTHETIST, CERTIFIED REGISTERED

## 2020-05-23 PROCEDURE — 25800030 ZZH RX IP 258 OP 636: Performed by: OBSTETRICS & GYNECOLOGY

## 2020-05-23 PROCEDURE — 25000128 H RX IP 250 OP 636: Performed by: OBSTETRICS & GYNECOLOGY

## 2020-05-23 PROCEDURE — 37000009 ZZH ANESTHESIA TECHNICAL FEE, EACH ADDTL 15 MIN: Performed by: OBSTETRICS & GYNECOLOGY

## 2020-05-23 PROCEDURE — 36000056 ZZH SURGERY LEVEL 3 1ST 30 MIN: Performed by: OBSTETRICS & GYNECOLOGY

## 2020-05-23 PROCEDURE — 12000000 ZZH R&B MED SURG/OB

## 2020-05-23 PROCEDURE — C9290 INJ, BUPIVACAINE LIPOSOME: HCPCS | Performed by: NURSE ANESTHETIST, CERTIFIED REGISTERED

## 2020-05-23 PROCEDURE — 59514 CESAREAN DELIVERY ONLY: CPT | Mod: AS | Performed by: NURSE PRACTITIONER

## 2020-05-23 PROCEDURE — 37000008 ZZH ANESTHESIA TECHNICAL FEE, 1ST 30 MIN: Performed by: OBSTETRICS & GYNECOLOGY

## 2020-05-23 PROCEDURE — 25000125 ZZHC RX 250: Performed by: NURSE ANESTHETIST, CERTIFIED REGISTERED

## 2020-05-23 PROCEDURE — 27110028 ZZH OR GENERAL SUPPLY NON-STERILE: Performed by: OBSTETRICS & GYNECOLOGY

## 2020-05-23 RX ORDER — ALBUTEROL SULFATE 90 UG/1
2 AEROSOL, METERED RESPIRATORY (INHALATION) EVERY 6 HOURS
Status: DISCONTINUED | OUTPATIENT
Start: 2020-05-23 | End: 2020-05-25 | Stop reason: HOSPADM

## 2020-05-23 RX ORDER — OXYTOCIN 10 [USP'U]/ML
INJECTION, SOLUTION INTRAMUSCULAR; INTRAVENOUS PRN
Status: DISCONTINUED | OUTPATIENT
Start: 2020-05-23 | End: 2020-05-23

## 2020-05-23 RX ORDER — AMOXICILLIN 250 MG
2 CAPSULE ORAL 2 TIMES DAILY
Status: DISCONTINUED | OUTPATIENT
Start: 2020-05-23 | End: 2020-05-25 | Stop reason: HOSPADM

## 2020-05-23 RX ORDER — LIDOCAINE HCL/EPINEPHRINE/PF 2%-1:200K
VIAL (ML) INJECTION PRN
Status: DISCONTINUED | OUTPATIENT
Start: 2020-05-23 | End: 2020-05-23

## 2020-05-23 RX ORDER — HYDROMORPHONE HYDROCHLORIDE 1 MG/ML
.3-.5 INJECTION, SOLUTION INTRAMUSCULAR; INTRAVENOUS; SUBCUTANEOUS EVERY 4 HOURS PRN
Status: DISCONTINUED | OUTPATIENT
Start: 2020-05-23 | End: 2020-05-25 | Stop reason: HOSPADM

## 2020-05-23 RX ORDER — MISOPROSTOL 200 UG/1
800 TABLET ORAL
Status: DISCONTINUED | OUTPATIENT
Start: 2020-05-23 | End: 2020-05-25 | Stop reason: HOSPADM

## 2020-05-23 RX ORDER — TRANEXAMIC ACID 10 MG/ML
1 INJECTION, SOLUTION INTRAVENOUS EVERY 30 MIN PRN
Status: DISCONTINUED | OUTPATIENT
Start: 2020-05-23 | End: 2020-05-25 | Stop reason: HOSPADM

## 2020-05-23 RX ORDER — OXYTOCIN 10 [USP'U]/ML
10 INJECTION, SOLUTION INTRAMUSCULAR; INTRAVENOUS
Status: DISCONTINUED | OUTPATIENT
Start: 2020-05-23 | End: 2020-05-25 | Stop reason: HOSPADM

## 2020-05-23 RX ORDER — OXYCODONE HYDROCHLORIDE 5 MG/1
5-10 TABLET ORAL EVERY 4 HOURS PRN
Status: DISCONTINUED | OUTPATIENT
Start: 2020-05-23 | End: 2020-05-25 | Stop reason: HOSPADM

## 2020-05-23 RX ORDER — AMOXICILLIN 250 MG
1 CAPSULE ORAL 2 TIMES DAILY
Status: DISCONTINUED | OUTPATIENT
Start: 2020-05-23 | End: 2020-05-25 | Stop reason: HOSPADM

## 2020-05-23 RX ORDER — DEXTROSE MONOHYDRATE 25 G/50ML
25-50 INJECTION, SOLUTION INTRAVENOUS
Status: DISCONTINUED | OUTPATIENT
Start: 2020-05-23 | End: 2020-05-25 | Stop reason: HOSPADM

## 2020-05-23 RX ORDER — OXYCODONE HYDROCHLORIDE 5 MG/1
5 TABLET ORAL EVERY 4 HOURS PRN
Status: DISCONTINUED | OUTPATIENT
Start: 2020-05-23 | End: 2020-05-23

## 2020-05-23 RX ORDER — BISACODYL 10 MG
10 SUPPOSITORY, RECTAL RECTAL DAILY PRN
Status: DISCONTINUED | OUTPATIENT
Start: 2020-05-25 | End: 2020-05-25 | Stop reason: HOSPADM

## 2020-05-23 RX ORDER — KETOROLAC TROMETHAMINE 30 MG/ML
30 INJECTION, SOLUTION INTRAMUSCULAR; INTRAVENOUS EVERY 6 HOURS
Status: DISPENSED | OUTPATIENT
Start: 2020-05-23 | End: 2020-05-24

## 2020-05-23 RX ORDER — HYDROCORTISONE 2.5 %
CREAM (GRAM) TOPICAL 3 TIMES DAILY PRN
Status: DISCONTINUED | OUTPATIENT
Start: 2020-05-23 | End: 2020-05-25 | Stop reason: HOSPADM

## 2020-05-23 RX ORDER — ACETAMINOPHEN 325 MG/1
975 TABLET ORAL EVERY 6 HOURS
Status: DISCONTINUED | OUTPATIENT
Start: 2020-05-23 | End: 2020-05-25 | Stop reason: HOSPADM

## 2020-05-23 RX ORDER — LIDOCAINE 40 MG/G
CREAM TOPICAL
Status: DISCONTINUED | OUTPATIENT
Start: 2020-05-23 | End: 2020-05-25 | Stop reason: HOSPADM

## 2020-05-23 RX ORDER — SIMETHICONE 80 MG
80 TABLET,CHEWABLE ORAL 4 TIMES DAILY PRN
Status: DISCONTINUED | OUTPATIENT
Start: 2020-05-23 | End: 2020-05-25 | Stop reason: HOSPADM

## 2020-05-23 RX ORDER — SODIUM CHLORIDE, SODIUM LACTATE, POTASSIUM CHLORIDE, CALCIUM CHLORIDE 600; 310; 30; 20 MG/100ML; MG/100ML; MG/100ML; MG/100ML
INJECTION, SOLUTION INTRAVENOUS CONTINUOUS
Status: DISCONTINUED | OUTPATIENT
Start: 2020-05-23 | End: 2020-05-23

## 2020-05-23 RX ORDER — DEXTROSE, SODIUM CHLORIDE, SODIUM LACTATE, POTASSIUM CHLORIDE, AND CALCIUM CHLORIDE 5; .6; .31; .03; .02 G/100ML; G/100ML; G/100ML; G/100ML; G/100ML
INJECTION, SOLUTION INTRAVENOUS CONTINUOUS
Status: DISCONTINUED | OUTPATIENT
Start: 2020-05-23 | End: 2020-05-25 | Stop reason: HOSPADM

## 2020-05-23 RX ORDER — CITRIC ACID/SODIUM CITRATE 334-500MG
30 SOLUTION, ORAL ORAL
Status: COMPLETED | OUTPATIENT
Start: 2020-05-23 | End: 2020-05-23

## 2020-05-23 RX ORDER — IBUPROFEN 800 MG/1
800 TABLET, FILM COATED ORAL EVERY 6 HOURS
Status: DISCONTINUED | OUTPATIENT
Start: 2020-05-23 | End: 2020-05-25 | Stop reason: HOSPADM

## 2020-05-23 RX ORDER — CEFAZOLIN SODIUM 1 G/50ML
1 INJECTION, SOLUTION INTRAVENOUS SEE ADMIN INSTRUCTIONS
Status: DISCONTINUED | OUTPATIENT
Start: 2020-05-23 | End: 2020-05-23

## 2020-05-23 RX ORDER — ONDANSETRON 2 MG/ML
INJECTION INTRAMUSCULAR; INTRAVENOUS PRN
Status: DISCONTINUED | OUTPATIENT
Start: 2020-05-23 | End: 2020-05-23

## 2020-05-23 RX ORDER — OXYTOCIN/0.9 % SODIUM CHLORIDE 30/500 ML
PLASTIC BAG, INJECTION (ML) INTRAVENOUS PRN
Status: DISCONTINUED | OUTPATIENT
Start: 2020-05-23 | End: 2020-05-23

## 2020-05-23 RX ORDER — ONDANSETRON 2 MG/ML
4 INJECTION INTRAMUSCULAR; INTRAVENOUS EVERY 6 HOURS PRN
Status: DISCONTINUED | OUTPATIENT
Start: 2020-05-23 | End: 2020-05-25 | Stop reason: HOSPADM

## 2020-05-23 RX ORDER — LIDOCAINE HYDROCHLORIDE 10 MG/ML
INJECTION, SOLUTION EPIDURAL; INFILTRATION; INTRACAUDAL; PERINEURAL
Status: DISPENSED
Start: 2020-05-23 | End: 2020-05-23

## 2020-05-23 RX ORDER — FAMOTIDINE 10 MG
10 TABLET ORAL 2 TIMES DAILY
Status: DISCONTINUED | OUTPATIENT
Start: 2020-05-23 | End: 2020-05-25 | Stop reason: HOSPADM

## 2020-05-23 RX ORDER — DEXAMETHASONE SODIUM PHOSPHATE 4 MG/ML
INJECTION, SOLUTION INTRA-ARTICULAR; INTRALESIONAL; INTRAMUSCULAR; INTRAVENOUS; SOFT TISSUE PRN
Status: DISCONTINUED | OUTPATIENT
Start: 2020-05-23 | End: 2020-05-23

## 2020-05-23 RX ORDER — CEFAZOLIN SODIUM 2 G/100ML
2 INJECTION, SOLUTION INTRAVENOUS
Status: COMPLETED | OUTPATIENT
Start: 2020-05-23 | End: 2020-05-23

## 2020-05-23 RX ORDER — OXYTOCIN/0.9 % SODIUM CHLORIDE 30/500 ML
100 PLASTIC BAG, INJECTION (ML) INTRAVENOUS CONTINUOUS
Status: DISCONTINUED | OUTPATIENT
Start: 2020-05-23 | End: 2020-05-25 | Stop reason: HOSPADM

## 2020-05-23 RX ORDER — NICOTINE POLACRILEX 4 MG
15-30 LOZENGE BUCCAL
Status: DISCONTINUED | OUTPATIENT
Start: 2020-05-23 | End: 2020-05-25 | Stop reason: HOSPADM

## 2020-05-23 RX ORDER — MODIFIED LANOLIN
OINTMENT (GRAM) TOPICAL
Status: DISCONTINUED | OUTPATIENT
Start: 2020-05-23 | End: 2020-05-25 | Stop reason: HOSPADM

## 2020-05-23 RX ORDER — NALOXONE HYDROCHLORIDE 0.4 MG/ML
.1-.4 INJECTION, SOLUTION INTRAMUSCULAR; INTRAVENOUS; SUBCUTANEOUS
Status: DISCONTINUED | OUTPATIENT
Start: 2020-05-23 | End: 2020-05-25 | Stop reason: HOSPADM

## 2020-05-23 RX ORDER — OXYTOCIN/0.9 % SODIUM CHLORIDE 30/500 ML
340 PLASTIC BAG, INJECTION (ML) INTRAVENOUS CONTINUOUS PRN
Status: DISCONTINUED | OUTPATIENT
Start: 2020-05-23 | End: 2020-05-25 | Stop reason: HOSPADM

## 2020-05-23 RX ORDER — METOCLOPRAMIDE HYDROCHLORIDE 5 MG/ML
10 INJECTION INTRAMUSCULAR; INTRAVENOUS EVERY 6 HOURS PRN
Status: DISCONTINUED | OUTPATIENT
Start: 2020-05-23 | End: 2020-05-25 | Stop reason: HOSPADM

## 2020-05-23 RX ORDER — BUPIVACAINE HYDROCHLORIDE 2.5 MG/ML
INJECTION, SOLUTION EPIDURAL; INFILTRATION; INTRACAUDAL PRN
Status: DISCONTINUED | OUTPATIENT
Start: 2020-05-23 | End: 2020-05-23

## 2020-05-23 RX ORDER — METHYLERGONOVINE MALEATE 0.2 MG/ML
200 INJECTION INTRAVENOUS
Status: DISCONTINUED | OUTPATIENT
Start: 2020-05-23 | End: 2020-05-25 | Stop reason: HOSPADM

## 2020-05-23 RX ORDER — PROCHLORPERAZINE 25 MG
25 SUPPOSITORY, RECTAL RECTAL EVERY 12 HOURS PRN
Status: DISCONTINUED | OUTPATIENT
Start: 2020-05-23 | End: 2020-05-25 | Stop reason: HOSPADM

## 2020-05-23 RX ORDER — ACETAMINOPHEN 325 MG/1
975 TABLET ORAL ONCE
Status: COMPLETED | OUTPATIENT
Start: 2020-05-23 | End: 2020-05-23

## 2020-05-23 RX ADMIN — KETOROLAC TROMETHAMINE 30 MG: 30 INJECTION, SOLUTION INTRAMUSCULAR at 08:15

## 2020-05-23 RX ADMIN — ONDANSETRON 4 MG: 2 INJECTION INTRAMUSCULAR; INTRAVENOUS at 04:49

## 2020-05-23 RX ADMIN — DOCUSATE SODIUM AND SENNOSIDES 1 TABLET: 8.6; 5 TABLET, FILM COATED ORAL at 08:02

## 2020-05-23 RX ADMIN — OXYTOCIN 2.5 UNITS: 10 INJECTION, SOLUTION INTRAMUSCULAR; INTRAVENOUS at 04:40

## 2020-05-23 RX ADMIN — OXYCODONE HYDROCHLORIDE 10 MG: 5 TABLET ORAL at 21:40

## 2020-05-23 RX ADMIN — DOCUSATE SODIUM AND SENNOSIDES 1 TABLET: 8.6; 5 TABLET, FILM COATED ORAL at 20:30

## 2020-05-23 RX ADMIN — BUPIVACAINE HYDROCHLORIDE 5 ML: 2.5 INJECTION, SOLUTION EPIDURAL; INFILTRATION; INTRACAUDAL at 05:19

## 2020-05-23 RX ADMIN — LIDOCAINE HYDROCHLORIDE,EPINEPHRINE BITARTRATE 5 ML: 20; .005 INJECTION, SOLUTION EPIDURAL; INFILTRATION; INTRACAUDAL; PERINEURAL at 04:16

## 2020-05-23 RX ADMIN — SERTRALINE HYDROCHLORIDE 100 MG: 50 TABLET ORAL at 08:02

## 2020-05-23 RX ADMIN — ACETAMINOPHEN 975 MG: 325 TABLET, FILM COATED ORAL at 09:38

## 2020-05-23 RX ADMIN — AZITHROMYCIN MONOHYDRATE 500 MG: 500 INJECTION, POWDER, LYOPHILIZED, FOR SOLUTION INTRAVENOUS at 03:50

## 2020-05-23 RX ADMIN — HYDROMORPHONE HYDROCHLORIDE 0.5 MG: 1 INJECTION, SOLUTION INTRAMUSCULAR; INTRAVENOUS; SUBCUTANEOUS at 09:38

## 2020-05-23 RX ADMIN — OXYCODONE HYDROCHLORIDE 5 MG: 5 TABLET ORAL at 09:38

## 2020-05-23 RX ADMIN — BUPIVACAINE 10 ML: 13.3 INJECTION, SUSPENSION, LIPOSOMAL INFILTRATION at 05:19

## 2020-05-23 RX ADMIN — IBUPROFEN 800 MG: 800 TABLET ORAL at 14:01

## 2020-05-23 RX ADMIN — OXYCODONE HYDROCHLORIDE 5 MG: 5 TABLET ORAL at 08:02

## 2020-05-23 RX ADMIN — IBUPROFEN 800 MG: 800 TABLET ORAL at 20:30

## 2020-05-23 RX ADMIN — SODIUM CHLORIDE, POTASSIUM CHLORIDE, SODIUM LACTATE AND CALCIUM CHLORIDE: 600; 310; 30; 20 INJECTION, SOLUTION INTRAVENOUS at 04:13

## 2020-05-23 RX ADMIN — LIDOCAINE HYDROCHLORIDE,EPINEPHRINE BITARTRATE 5 ML: 20; .005 INJECTION, SOLUTION EPIDURAL; INFILTRATION; INTRACAUDAL; PERINEURAL at 04:19

## 2020-05-23 RX ADMIN — ACETAMINOPHEN 975 MG: 325 TABLET, FILM COATED ORAL at 21:40

## 2020-05-23 RX ADMIN — SODIUM CHLORIDE, POTASSIUM CHLORIDE, SODIUM LACTATE AND CALCIUM CHLORIDE: 600; 310; 30; 20 INJECTION, SOLUTION INTRAVENOUS at 04:29

## 2020-05-23 RX ADMIN — METHYLERGONOVINE MALEATE 200 MCG: 0.2 INJECTION INTRAMUSCULAR; INTRAVENOUS at 04:44

## 2020-05-23 RX ADMIN — Medication 500 ML: at 04:40

## 2020-05-23 RX ADMIN — BUPIVACAINE HYDROCHLORIDE 5 ML: 2.5 INJECTION, SOLUTION EPIDURAL; INFILTRATION; INTRACAUDAL at 05:14

## 2020-05-23 RX ADMIN — Medication: at 02:16

## 2020-05-23 RX ADMIN — OXYCODONE HYDROCHLORIDE 10 MG: 5 TABLET ORAL at 13:42

## 2020-05-23 RX ADMIN — DEXAMETHASONE SODIUM PHOSPHATE 4 MG: 4 INJECTION, SOLUTION INTRA-ARTICULAR; INTRALESIONAL; INTRAMUSCULAR; INTRAVENOUS; SOFT TISSUE at 04:49

## 2020-05-23 RX ADMIN — MIDAZOLAM 2 MG: 1 INJECTION INTRAMUSCULAR; INTRAVENOUS at 04:45

## 2020-05-23 RX ADMIN — ACETAMINOPHEN 975 MG: 325 TABLET, FILM COATED ORAL at 15:46

## 2020-05-23 RX ADMIN — BUPIVACAINE 10 ML: 13.3 INJECTION, SUSPENSION, LIPOSOMAL INFILTRATION at 05:14

## 2020-05-23 RX ADMIN — OXYCODONE HYDROCHLORIDE 10 MG: 5 TABLET ORAL at 17:50

## 2020-05-23 RX ADMIN — LIDOCAINE HYDROCHLORIDE,EPINEPHRINE BITARTRATE 6 ML: 20; .005 INJECTION, SOLUTION EPIDURAL; INFILTRATION; INTRACAUDAL; PERINEURAL at 04:24

## 2020-05-23 RX ADMIN — CEFAZOLIN SODIUM 2 G: 2 INJECTION, SOLUTION INTRAVENOUS at 04:05

## 2020-05-23 RX ADMIN — MIDAZOLAM 2 MG: 1 INJECTION INTRAMUSCULAR; INTRAVENOUS at 04:49

## 2020-05-23 RX ADMIN — ACETAMINOPHEN 975 MG: 325 TABLET, FILM COATED ORAL at 03:50

## 2020-05-23 RX ADMIN — SODIUM CITRATE AND CITRIC ACID MONOHYDRATE 30 ML: 500; 334 SOLUTION ORAL at 03:50

## 2020-05-23 RX ADMIN — FAMOTIDINE 10 MG: 10 TABLET, FILM COATED ORAL at 08:29

## 2020-05-23 RX ADMIN — Medication 125 ML/HR: at 06:22

## 2020-05-23 RX ADMIN — OXYTOCIN 2.5 UNITS: 10 INJECTION, SOLUTION INTRAMUSCULAR; INTRAVENOUS at 04:42

## 2020-05-23 ASSESSMENT — LIFESTYLE VARIABLES: TOBACCO_USE: 1

## 2020-05-23 NOTE — PLAN OF CARE
VSS, FF @ U/U, patient up independently in room, having incisional pain but it is managed adequately as long as Tylenol, ibuprofen, and oxycodone are given when available.  Incision CDI, attempted BF but did not achieve a latch- was able to pump and collect approx 5 mL EBM.  Has voided post catheter removal, no IV access.  Bonding observed with infant and patient very open to teaching/learning as is a first time mom.

## 2020-05-23 NOTE — PLAN OF CARE
Viable male delivered via primary unscheduled c/s for fetal intolerance by Dr. Reyes at 0440, bulb suctioned, dried & stimulated, apgars 7/8.  Pt GDM-diet controlled, BS WNL tonight.  .  Pitocin infusing as ordered post-op, VSS.  Pt denies pain thus far, knows meds are available & to call as needed.  Mother intends to breastfeed, initiated 0600, going fair.  FOB present & supportive, bonding well; infant rooming in with parents.  Transition to PP cares, report given to oncoming RN.  Will continue to monitor & update as needed.

## 2020-05-23 NOTE — PROGRESS NOTES
Pt has been repositioning frequently, reports she is not in pain but rather just uncomfortable with the feeling of being immobile related to the epidural.  C/O severe restlessness, reports home med use of zoloft 100mg every day, & lorazepam for prn anxiety, desires this writer phone the provider for orders.  Explained contraindication for use of lorazepam at this stage of labor, call placed to Dr. Reyes, orders received for zoloft to start now.  SO is supportive & helpful to the patient, encouraged deep breathing, relaxation & rest as much as possible.  Will continue to monitor & update as needed.

## 2020-05-23 NOTE — ANESTHESIA CARE TRANSFER NOTE
Patient: Nargis Ruelas    * No procedures listed *    Diagnosis: * No pre-op diagnosis entered *  Diagnosis Additional Information: No value filed.    Anesthesia Type:   No value filed.     Note:  Airway :Room Air  Patient transferred to:Labor and Delivery  Handoff Report: Identifed the Patient, Identified the Reponsible Provider, Reviewed the pertinent medical history, Discussed the surgical course, Reviewed Intra-OP anesthesia mangement and issues during anesthesia, Set expectations for post-procedure period and Allowed opportunity for questions and acknowledgement of understanding      Vitals: (Last set prior to Anesthesia Care Transfer)    CRNA VITALS  5/23/2020 0515 - 5/23/2020 0545      5/23/2020             EKG:  NSR                Electronically Signed By: SUDHEER Wang CRNA  May 23, 2020  5:45 AM

## 2020-05-23 NOTE — PROGRESS NOTES
Pt called this writer to the room to report feeling acute anxiety.  She is restless, cannot sit still, demanding to get up, grabbing at this writer & her .  Discussed with pt her anxiety& panic attack history, she reports she hasn't had this happen in over a year.  She reports typically utilizing lorazepam to resolve these.  Seems as though this is perhaps situational & may be resolved by delivery of her baby & subsequent resolution of her numb legs from the epidural.  Calmed patient enough for SVE, she denies pain; reports pressure with contractions; however, her cervix has an anterior rim that is not 100% effaced.  Advised patient to try spinning babies as she has declined the peanut ball multiple times.  Also the activity may keep her mind off the immobility.  She & FOB in agreement with plan of care.  Will try hands & knees positioning after this.  Will continue to monitor & update as needed.

## 2020-05-23 NOTE — ANESTHESIA CARE TRANSFER NOTE
Patient: Nargis Ruelas    Procedure(s):   SECTION    Diagnosis: Fetal intolerance to labor, delivered, current hospitalization [O77.9]  Diagnosis Additional Information: No value filed.    Anesthesia Type:   Epidural     Note:  Airway :Room Air  Patient transferred to:Labor and Delivery  Handoff Report: Identifed the Patient, Identified the Reponsible Provider, Reviewed the pertinent medical history, Discussed the surgical course, Reviewed Intra-OP anesthesia mangement and issues during anesthesia, Set expectations for post-procedure period and Allowed opportunity for questions and acknowledgement of understanding      Vitals: (Last set prior to Anesthesia Care Transfer)    CRNA VITALS  2020 0456 - 2020 0540      2020             EKG:  NSR                Electronically Signed By: SUDHEER Wang CRNA  May 23, 2020  5:40 AM

## 2020-05-23 NOTE — PLAN OF CARE
Stable post op . Pain has improved this am, Patient has been up doing well, ambulating in room, sitting up in rocking chair. Obrien removed per patient request, has not voided. Tolerating fluids well, does not have much of an appetite yet, taking jello at this time, water and soda.   Incision wound edges approximated without drainage, fundus firm, flow light, no clots noted. Patient declines compression stocking this afternoon.   Pumped x 1 today, has been doing skin to skin with infant, infant to breast again this afternoon, (infant with BS issues, IV started) with breast feeding mom needs assistance with getting infant to latch, is very open to suggestions and help. Is very attentive to infant. FOB helpful, asking lots of questions about caring for infant.

## 2020-05-23 NOTE — OP NOTE
Procedure Date: 2020      PREOPERATIVE DIAGNOSIS:     1.  Term intrauterine pregnancy.   2.  Diet-controlled gestational diabetes.   3.  Non-reassuring fetal heart tracing.   4.  Arrest of descent.      POSTOPERATIVE DIAGNOSES:   1.  Term intrauterine pregnancy.   2.  Diet-controlled gestational diabetes.   3.  Non-reassuring fetal heart tracing.   4.  Arrest of descent.   5.  Delivered.      PROCEDURE:  Primary low transverse .      SURGEON:  Jaqueline Reyes MD      ASSISTANT:  Jessica Greene NP      A surgical assistant was vital for surgical retraction, delivery of baby, and closure.      ANESTHESIA:  Epidural.      FINDINGS:  Liveborn male infant in a vertex presentation, occiput posterior.  Birth weight 7 pounds 7 ounces.      DESCRIPTION OF PROCEDURE:  After assuring informed consent, the patient was taken to the operating suite, where an existing epidural was serially dosed for surgical anesthesia.  A Obrien catheter was placed for bladder drainage and a fetal pillow as far in posteriorly in the sacral hollow as possible.  Legs were then placed straight and a fetal pillow was inflated with 180 mL of saline.  The abdomen was then sterilely prepped and draped.  A timeout and fire safety assessment was performed.  After assuring adequate anesthetic level, a Pfannenstiel-type incision was made with a knife and extended down to the fascia was incised sharply to the right and left.  These were then freed from the underlying rectus muscles, which were bluntly .  The peritoneum was entered and extended superiorly and inferiorly.  A self-retaining retractor was then placed within the abdomen, exposing lower uterine segment.  Lower segment then scored with a knife and the cavity entered with a Ilta clamp.  Thick meconium-stained amniotic fluid was noted at this time.  It was bluntly extended to the right and left.  The infant's head was then elevated to the incision.  At this time, noted to be in  occiput posterior position.  Initial fundal pressure did not result in delivery of the head.  A Kiwi vacuum extractor was then placed on the vertex and easily displaced off the scalp 3 times.  This was abandoned and the rectus muscle on the right side with interrupted approximately 2/3 from the medial aspect to create more room.  Further fundal pressure was applied and the Kiwi was then reapplied and the head delivered without difficulty.  The body was then delivered, the cord divided.  Infant was transferred to the warmer.  Cord blood was obtained, and the placenta was delivered manually intact.  The uterine fundus inverted and was pushed back up with significant lack of tone in the uterus.  High-dose Pitocin was given intravenously followed by Methergine 0.2 mg.  Hemabate was not given due to patient's history of fragile asthma.  Significant improvement was then noted in the uterine tone.  Uterine incision was then closed in a running locking fashion with 0 chromic suture in layers to provide good hemostasis.  Uterus was replaced in the abdomen, thoroughly irrigated and inspected for hemostasis, which was satisfactory.  The self-retaining retractor was then removed.  The subfascial region inspected also appeared hemostatic.  The fascia was then closed in running fashion with Vicryl suture.  The skin edges were irrigated and closed in a subcuticular fashion with 3-0 V-Loc suture and Dermabond.  The patient was then taken to postanesthesia recovery in stable condition.  QBL to be determined.      SPECIMENS TO LAB:  Cord blood.         JERSON VELASCO MD             D: 2020   T: 2020   MT: JEANETH      Name:     JEREMY SUAREZ   MRN:      [id number]        Account:        [id number]   :      1995           Procedure Date: 2020      Document: [id number]

## 2020-05-23 NOTE — ANESTHESIA PROCEDURE NOTES
Procedure note : TAP  Staff -       CRNA: Serena Ruiz APRN CRNA    Performed By: CRNA        Pre-Procedure    Location: pre-op    Procedure Times:5/23/2020 5:09 AM and 5/23/2020 5:21 AM  Pre-Anesthestic Checklist: patient identified, IV checked, site marked, risks and benefits discussed, informed consent, monitors and equipment checked, pre-op evaluation, at physician/surgeon's request and post-op pain management    Timeout  Correct Patient: Yes   Correct Procedure: Yes   Correct Site: Yes   Correct Laterality: Yes   Correct Position: Yes   Site Marked: Yes   .   Procedure Documentation    Diagnosis:PAIN.    Procedure: TAP, bilateral.   Patient Position:supine Local skin infiltrated with 1 mL of.    Ultrasound used to identify targeted nerve, plexus, or vascular marker and placed a needle adjacent to it., Ultrasound was used to visualize the spread of the anesthetic in close proximity to the above stated nerve. A permanent image is entered into the patient's record.  Patient Prep/Sterile Barriers; mask, sterile gloves, chlorhexidine gluconate and isopropyl alcohol, patient draped.  .  Needle: insulated, short bevel   Needle Gauge: 20.    Needle Length (Inches) 4   Insertion Method: Single Shot.        Assessment/Narrative  Paresthesias: No.  Injection made incrementally with aspirations every 5 mL..  The placement was negative for: blood aspirated, painful injection and site bleeding.  Bolus given via needle. No blood aspirated via catheter.   Secured via.   Complications: none.

## 2020-05-23 NOTE — PROGRESS NOTES
Pt's cervix stalled at 9 cm from 2200 until 0200, when pt encourage to try to push past it; with every push, the FHR would experience a protracted decel to 4-5 mins and not readily increase with head stimulation.  She was allowed to further labor down once anterior lip was able to be reduced.  After 1.5 hrs, the fetal head continued to sit at 0 station, and with pushing, again experienced protracted drop in FHR to <100 without prompt return to normal.    Discussed with pt that the baby does not tolerate pushing, has not descended past 0 station x 5 hrs with active and passive labor.      I recommend proceeding to c/section and pt is in agreement.    Informed consent for urgent c/section reviewed and signed  Jaqueline Reyes MD  [facility]

## 2020-05-23 NOTE — PROGRESS NOTES
OR crew and CRNA notified of need for urgent c section due to fetal intolerance.     Eunice Armendariz RN 5/23/2020 3:39 AM

## 2020-05-23 NOTE — PROGRESS NOTES
"Interventions included side-lying release bilaterally, forward-leaning inversion, \"shake the apples\" and high dodd's with legs in butterfly. Fetal heart tones accelerations present.. Plan is to continue to monitor. Patient informed of and agrees with plan of care.  "

## 2020-05-23 NOTE — L&D DELIVERY NOTE
"Delivery Summary    Nargis Ruelas MRN# [id number]   Age: 25 year old YOB: 1995     ASSESSMENT & PLAN: 24 y/o  at 39 weeks admitted for induction of labor due to GDM A1; EFW 3500gm per recent sonogram; her cervix was favorable at onset of induction, thus pitocin was utilized to augment existing contractions; epidural was given for labor analgesia  EFM demonstrated tier 1 tracing in stage 1.  The cervix stalled in dilation at 9cm x 4 hrs, but was ultimately reduced digitally with some maternal pushing efforts; this, however, resulted in protracted deceleration of FHR to <100 x 5 mins, every time she pushed.  She was then allowed to labor down x 1.5 hrs, with failure of the fetal head to descend further than 0 station; she again started to push resulting in signficant fetal heart rate abnormality; given distance from delivery, it was recommended she undergo primary c/section  See OP report.  At time of surgery, fetal pillow utilized to assist in elevation of head, which was found to be in an OP position; thick meconium encounted upon entry into uterus.  Apgar 7,8  QBL TBD  epidural used for surgical anesthesia and TAP block given postop for pain management.  7 lb 7 oz  \"Robi III\"  Jaqueline Reyes MD  [facility]         Labor Event Times    Labor onset date:  20 Onset time:  11:30 AM   Decision date/time (emergent ):  2020 0336      Labor Events     labor?:  No   steroids:  None  Labor Type:  Augmentation, AROM  Predominate monitoring during 1st stage:  continuous electronic fetal monitoring     Rupture identifier:  Sac 1  Rupture date/time: 20   Rupture type:  Artificial Rupture of Membranes  Fluid color:  Clear  Fluid odor:  Normal     Augmentation:  AROM, Oxytocin  Augmentation date/time:  20   Indications for augmentation:  Ineffective Contraction Pattern  1:1 continuous labor support provided by?:  RN     " "  Delivery/Placenta Date and Time    Delivery Date:  20 Delivery Time:   4:40 AM      Vaginal Counts     Initial count performed by 2 team members:   Two Team Members   [email or web address]   [email or web address]       Needles Suture Nunez Sponges Instruments   Initial counts 2  5    Added to count       Final counts       Placed during labor Accounted for at the end of labor   NA    NA    NA            Apgars    Living status:  Living   1 Minute 5 Minute 10 Minute 15 Minute 20 Minute   Skin color: 1  1       Heart rate: 2  2       Reflex irritability: 2  2       Muscle tone: 1  2       Respiratory effort: 1  1       Total: 7  8       Apgars assigned by:  FORTINO LEDBETTER RN     Cord    Vessels:  3 Vessels Complications:  None   Cord Blood Disposition:  Lab Gases Sent?:  No      Se[place]our Resuscitation    Output in Delivery Room:  Stool      Measurements    Weight:  7 lb 7 oz Length:  1' 9\"   Head circumference:  31.8 cm    Birth Comments:  FHT 's     Skin to Skin and Feeding Plan    Skin to skin initiation date/time:     Skin to skin with:  Mother  Skin to skin end date/time:     How do you plan to feed your baby:  Breastfeeding     Labor Events and Shoulder Dystocia    Fetal Tracing Prior to Delivery:  Category 2  Shoulder dystocia present?:  Neg     Delivery (Maternal) (Provider to Complete) ([id number])    Episiotomy:  None  Perineal lacerations:  None       Blood Loss  Mother: Nargis Ruelas #[id number]   Start of Mother's Information    IO Blood Loss  20 1130 - 20 0440    Delivery QBL (mL) Hospital Encounter 149 mL    Total Surgical QBL Blood Loss (mL) Hospital Encounter 333 mL    Total  482 mL         End of Mother's Information  Mother: Nargis Ruelas #[id number]         Delivery - Provider to Complete ([id number])    Delivering clinician:  Jaqueline Reyes MD  Attempted Delivery Types (Choose all that apply):  Spontaneous Vaginal Delivery  Delivery Type (Choose the 1 that will go to the " Birth History):  , Low Transverse   Specifics:  Primary nulliparius   Other personnel:   Provider Role   Patricia Hardy, RN Delivery Nurse   Tamara Lucia, MARGUERITE Charge Nurse   Jessica Greene NP Assistant Surgeon         Placenta    Immediate Cord Clamping:  Done  Removal:  Manual Removal  Disposition:  Hospital disposal     Anesthesia    Method:  Epidural  Cervical dilation at placement:  0-3          Presentation and Position    Presentation:  Vertex  Position:  Right Occiput Posterior           Jaqueline Reyes MD

## 2020-05-23 NOTE — BRIEF OP NOTE
[facility]    Brief Operative Note    Pre-operative diagnosis: Fetal intolerance to labor, delivered, current hospitalization [O77.9]  Post-operative diagnosis nonreassuring fetal heart tracing; occiput posterior position    Procedure: Procedure(s):   SECTION  Surgeon: Surgeon(s) and Role:     * Jaqueline Reyes MD - Primary  Anesthesia: Epidural   Estimated blood loss: TBD  Drains: Obrien cath  Specimens: * No specimens in log *  Findings:   None.  Complications: None.  Implants: * No implants in log *

## 2020-05-23 NOTE — ANESTHESIA POSTPROCEDURE EVALUATION
Patient: Nargis Ruelas    Procedure(s):   SECTION    Diagnosis:Fetal intolerance to labor, delivered, current hospitalization [O77.9]  Diagnosis Additional Information: No value filed.    Anesthesia Type:  Epidural    Note:  Anesthesia Post Evaluation    Patient location during evaluation: Floor  Patient participation: Able to participate in evaluation but full recovery from regional anesthesia has not yet ocurrred but is anticipated to occur within 48 hours  Level of consciousness: awake and alert  Pain management: adequate  Airway patency: patent  Cardiovascular status: acceptable and hemodynamically stable  Respiratory status: acceptable, room air and spontaneous ventilation  Hydration status: acceptable  PONV: none     Anesthetic complications: None          Last vitals:  Vitals:    20 0138 20 0145 20 0358   BP:  121/55    Pulse:  90    Resp:      Temp: 37.2  C (99  F)  36.7  C (98  F)   SpO2:            Electronically Signed By: SUDHEER Wang CRNA  May 23, 2020  5:44 AM

## 2020-05-23 NOTE — ANESTHESIA POSTPROCEDURE EVALUATION
Patient: Nargis Ruelas    * No procedures listed *    Diagnosis:* No pre-op diagnosis entered *  Diagnosis Additional Information: No value filed.    Anesthesia Type:  No value filed.    Note:  Anesthesia Post Evaluation    Patient location during evaluation: Floor  Patient participation: Able to participate in evaluation but full recovery from regional anesthesia has not yet ocurrred but is anticipated to occur within 48 hours  Level of consciousness: awake and alert  Pain management: adequate  Airway patency: patent  Cardiovascular status: acceptable and hemodynamically stable  Respiratory status: acceptable, room air and spontaneous ventilation  Hydration status: acceptable  PONV: none     Anesthetic complications: None          Last vitals:  Vitals:    05/23/20 0138 05/23/20 0145 05/23/20 0358   BP:  121/55    Pulse:  90    Resp:      Temp: 37.2  C (99  F)  36.7  C (98  F)   SpO2:            Electronically Signed By: SUDHEER Wang CRNA  May 23, 2020  5:45 AM

## 2020-05-23 NOTE — PLAN OF CARE
Pain has improved since IV dilaudid, tordol and additional dose of Oxycodone. IV was saline locked this am. Became dislodged at 1130. Patient elected not to restart. Encouraged increasing fluid intake. Will use po pain medications. Requested malave to be removed. Up at bedside, ambulated in room with standby assist, did very well. Malave removed. Patient agreed to increase fluid intake.

## 2020-05-23 NOTE — ANESTHESIA PREPROCEDURE EVALUATION
Anesthesia Pre-Procedure Evaluation    Patient: Nargis Ruelas   MRN: [id number] : 1995          Preoperative Diagnosis: Fetal intolerance to labor, delivered, current hospitalization [O77.9]    Procedure(s):   SECTION    Past Medical History:   Diagnosis Date     GDM, class A1 3/25/2020     Tonsillitis      Past Surgical History:   Procedure Laterality Date     MOUTH SURGERY      wisdom teeth     TONSILLECTOMY         Anesthesia Evaluation     . Pt has had prior anesthetic. Type: General    No history of anesthetic complications          ROS/MED HX    ENT/Pulmonary:     (+)tobacco use, Past use Intermittent asthma Treatment: Inhaler prn,  , . .    Neurologic:  - neg neurologic ROS     Cardiovascular:  - neg cardiovascular ROS       METS/Exercise Tolerance:  >4 METS   Hematologic:  - neg hematologic  ROS       Musculoskeletal:  - neg musculoskeletal ROS       GI/Hepatic:     (+) GERD Symptomatic,       Renal/Genitourinary:  - ROS Renal section negative       Endo:     (+) Other Endocrine Disorder GDM.      Psychiatric:     (+) psychiatric history depression, anxiety and other (comment)      Infectious Disease:  - neg infectious disease ROS       Malignancy:      - no malignancy   Other:    (+) Possibly pregnant C-spine cleared: N/A, no H/O Chronic Pain,no other significant disability                         Physical Exam  Normal systems: cardiovascular, pulmonary and dental    Airway   Mallampati: I  TM distance: >3 FB  Neck ROM: full    Dental     Cardiovascular       Pulmonary             Lab Results   Component Value Date    WBC 14.5 (H) 2020    HGB 11.7 2020    HCT 35.4 2020     2020     2019    POTASSIUM 3.8 2019    CHLORIDE 106 2019    CO2 27 2019    BUN 9 2019    CR 0.69 2019    GLC 93 2019    SAEED 8.9 2019    ALBUMIN 3.8 2018    PROTTOTAL 7.4 2018    ALT 25 2018    AST 19 2018     "ALKPHOS 76 07/11/2018    BILITOTAL 0.2 07/11/2018    TSH 0.76 02/06/2020    T4 0.87 10/22/2019    HCG Positive (A) 09/25/2019       Preop Vitals  BP Readings from Last 3 Encounters:   05/23/20 121/55   05/14/20 113/73   05/08/20 114/67    Pulse Readings from Last 3 Encounters:   05/23/20 90   05/14/20 101   05/08/20 70      Resp Readings from Last 3 Encounters:   05/22/20 16   05/14/20 18   05/08/20 18    SpO2 Readings from Last 3 Encounters:   05/22/20 (!) 89%   02/04/20 98%   09/25/19 98%      Temp Readings from Last 1 Encounters:   05/23/20 36.7  C (98  F) (Oral)    Ht Readings from Last 1 Encounters:   05/22/20 1.575 m (5' 2.01\")      Wt Readings from Last 1 Encounters:   05/22/20 84.1 kg (185 lb 6.5 oz)    Estimated body mass index is 33.9 kg/m  as calculated from the following:    Height as of this encounter: 1.575 m (5' 2.01\").    Weight as of this encounter: 84.1 kg (185 lb 6.5 oz).       Anesthesia Plan      History & Physical Review  History and physical reviewed and following examination; no interval change.    ASA Status:  2 emergent.    NPO Status:  > 6 hours    Plan for Epidural Maintenance will be Balanced.    PONV prophylaxis:  Ondansetron (or other 5HT-3) and Dexamethasone or Solumedrol         Postoperative Care  Postoperative pain management:  IV analgesics, Oral pain medications, Neuraxial analgesia and Peripheral nerve block (Single Shot).      Consents  Anesthetic plan, risks, benefits and alternatives discussed with:  Patient and Spouse..                 SUDHEER Wang CRNA  "

## 2020-05-24 LAB — HGB BLD-MCNC: 8.8 G/DL (ref 11.7–15.7)

## 2020-05-24 PROCEDURE — 12000000 ZZH R&B MED SURG/OB

## 2020-05-24 PROCEDURE — 25000132 ZZH RX MED GY IP 250 OP 250 PS 637: Performed by: OBSTETRICS & GYNECOLOGY

## 2020-05-24 PROCEDURE — 36415 COLL VENOUS BLD VENIPUNCTURE: CPT | Performed by: OBSTETRICS & GYNECOLOGY

## 2020-05-24 PROCEDURE — 85018 HEMOGLOBIN: CPT | Performed by: OBSTETRICS & GYNECOLOGY

## 2020-05-24 RX ADMIN — ACETAMINOPHEN 975 MG: 325 TABLET, FILM COATED ORAL at 10:10

## 2020-05-24 RX ADMIN — ACETAMINOPHEN 975 MG: 325 TABLET, FILM COATED ORAL at 22:10

## 2020-05-24 RX ADMIN — OXYCODONE HYDROCHLORIDE 10 MG: 5 TABLET ORAL at 01:59

## 2020-05-24 RX ADMIN — IBUPROFEN 800 MG: 800 TABLET ORAL at 20:38

## 2020-05-24 RX ADMIN — OXYCODONE HYDROCHLORIDE 10 MG: 5 TABLET ORAL at 10:11

## 2020-05-24 RX ADMIN — SERTRALINE HYDROCHLORIDE 100 MG: 50 TABLET ORAL at 08:36

## 2020-05-24 RX ADMIN — IBUPROFEN 800 MG: 800 TABLET ORAL at 02:42

## 2020-05-24 RX ADMIN — DOCUSATE SODIUM AND SENNOSIDES 1 TABLET: 8.6; 5 TABLET, FILM COATED ORAL at 08:36

## 2020-05-24 RX ADMIN — DOCUSATE SODIUM AND SENNOSIDES 1 TABLET: 8.6; 5 TABLET, FILM COATED ORAL at 22:10

## 2020-05-24 RX ADMIN — IBUPROFEN 800 MG: 800 TABLET ORAL at 08:36

## 2020-05-24 RX ADMIN — ACETAMINOPHEN 975 MG: 325 TABLET, FILM COATED ORAL at 03:32

## 2020-05-24 RX ADMIN — IBUPROFEN 800 MG: 800 TABLET ORAL at 14:18

## 2020-05-24 RX ADMIN — SIMETHICONE CHEW TAB 80 MG 80 MG: 80 TABLET ORAL at 02:54

## 2020-05-24 RX ADMIN — OXYCODONE HYDROCHLORIDE 10 MG: 5 TABLET ORAL at 22:10

## 2020-05-24 RX ADMIN — OXYCODONE HYDROCHLORIDE 10 MG: 5 TABLET ORAL at 14:18

## 2020-05-24 RX ADMIN — OXYCODONE HYDROCHLORIDE 10 MG: 5 TABLET ORAL at 18:33

## 2020-05-24 RX ADMIN — ACETAMINOPHEN 975 MG: 325 TABLET, FILM COATED ORAL at 16:10

## 2020-05-24 RX ADMIN — OXYCODONE HYDROCHLORIDE 10 MG: 5 TABLET ORAL at 06:20

## 2020-05-24 NOTE — PLAN OF CARE
Pt follows PP pathway with the exception of an episode of shivering in the night, VSS, FF@ U, no excessive bleeding, she does complain of abd pain, which is distended, but (+) [id number] & (+) flatus, simethicone & pain meds given, declines offer for heat or ice.  The pain is not incisional.  She is up and about independently caring for herself & her , tolerates well.  Planning to shower later today.  She is breastfeeding her , that is going well; she is also pumping.  FOB present & supportive, bonding well; infant rooming in with parents tonight.  Will continue to monitor & update as needed.

## 2020-05-24 NOTE — PLAN OF CARE
Problem: Adult Inpatient Plan of Care  Goal: Plan of Care Review  2020 by Essie Osman RN  Outcome: Improving  Flowsheets (Taken 2020)  Outcome Summary: assessment    Data: Vital signs within normal limits. Postpartum checks within normal limits - see flow record. Patient eating and drinking normally. Patient able to empty bladder independently. Patient ambulating independently.  No apparent signs of infection. Incision healing well. Patient Is performing self cares and Is able to care for infant. Positive attachment behaviors are observed with infant. Support persons are present.  Action:  Pain plan was discussed. Patient would like pain meds to be brought in when they are due. Patient was medicated during the shift for pain. See MAR.Patient education done about  cares, postpartum cares, and pain management/plan. See flow record.  Response:   Patient reassessed within 1 hour after each medication for pain. Patient stated that pain had improved. Patient stated that she was comfortable. .   Plan: Anticipate discharge on 20.

## 2020-05-24 NOTE — PROGRESS NOTES
[facility] Obstetrics Post-Op / Progress Note         Assessment and Plan:    Assessment:   Post-operative day #1  Low transverse primary  section  L&D complications: Cephalopelvic disproportion  Fetal distress      Doing well.  Clean wound without signs of infection.  Normal healing wound.  No immediate surgical complications identified.  Pain well-controlled.      Plan:   Continue postop care           Interval History:   Doing well.  Pain is well-controlled.  No fevers.  No history of wound drainage, warmth or significant erythema.  Good appetite.  Denies chest pain, shortness of breath, nausea or vomiting.  Ambulatory.  Breastfeeding well.          Significant Problems:    Principal Problem:    Diet controlled gestational diabetes mellitus (GDM) in third trimester  Active Problems:    Cephalopelvic disproportion due to mixed maternal and fetal factors    S/P primary low transverse     Non-reassuring electronic fetal monitoring tracing            Review of Systems:    The patient denies any chest pain, shortness of breath, excessive pain, fever, chills, purulent drainage from the wound, nausea or vomiting.          Medications:   All medications related to the patient's surgery have been reviewed          Physical Exam:     All vitals stable  Patient Vitals for the past 8 hrs:   BP Temp Temp src Pulse Resp   20 0239 115/54 97.6  F (36.4  C) Oral 84 16     Wound clean and dry with minimal or no drainage.  Surrounding skin with minimal erythema.          Data:     All laboratory data related to this surgery reviewed  Hemoglobin   Date Value Ref Range Status   2020 8.8 (L) 11.7 - 15.7 g/dL Final   2020 11.7 11.7 - 15.7 g/dL Final   2020 11.0 (L) 11.7 - 15.7 g/dL Final   10/22/2019 12.6 11.7 - 15.7 g/dL Final   2019 11.9 11.7 - 15.7 g/dL Final     No imaging studies have been ordered    Jaqueline Reyes MD

## 2020-05-25 VITALS
HEIGHT: 62 IN | WEIGHT: 172.6 LBS | OXYGEN SATURATION: 97 % | HEART RATE: 78 BPM | RESPIRATION RATE: 16 BRPM | SYSTOLIC BLOOD PRESSURE: 107 MMHG | TEMPERATURE: 98.7 F | DIASTOLIC BLOOD PRESSURE: 44 MMHG | BODY MASS INDEX: 31.76 KG/M2

## 2020-05-25 PROCEDURE — 25000132 ZZH RX MED GY IP 250 OP 250 PS 637: Performed by: OBSTETRICS & GYNECOLOGY

## 2020-05-25 RX ORDER — OXYCODONE HYDROCHLORIDE 5 MG/1
5-10 TABLET ORAL EVERY 4 HOURS PRN
Qty: 20 TABLET | Refills: 0 | Status: SHIPPED | OUTPATIENT
Start: 2020-05-25 | End: 2020-05-27

## 2020-05-25 RX ORDER — IBUPROFEN 800 MG/1
800 TABLET, FILM COATED ORAL EVERY 6 HOURS
Qty: 100 TABLET | Refills: 1 | Status: SHIPPED | OUTPATIENT
Start: 2020-05-25 | End: 2020-08-28

## 2020-05-25 RX ORDER — AMOXICILLIN 250 MG
1 CAPSULE ORAL 2 TIMES DAILY
Qty: 30 TABLET | Refills: 1 | Status: SHIPPED | OUTPATIENT
Start: 2020-05-25 | End: 2020-08-28

## 2020-05-25 RX ORDER — FERROUS GLUCONATE 324(38)MG
324 TABLET ORAL 2 TIMES DAILY WITH MEALS
Status: DISCONTINUED | OUTPATIENT
Start: 2020-05-25 | End: 2020-05-25 | Stop reason: HOSPADM

## 2020-05-25 RX ORDER — FERROUS GLUCONATE 324(38)MG
324 TABLET ORAL 2 TIMES DAILY WITH MEALS
Qty: 60 TABLET | Refills: 3 | Status: SHIPPED | OUTPATIENT
Start: 2020-05-25 | End: 2020-08-28

## 2020-05-25 RX ADMIN — OXYCODONE HYDROCHLORIDE 5 MG: 5 TABLET ORAL at 14:02

## 2020-05-25 RX ADMIN — DOCUSATE SODIUM AND SENNOSIDES 1 TABLET: 8.6; 5 TABLET, FILM COATED ORAL at 08:32

## 2020-05-25 RX ADMIN — ACETAMINOPHEN 975 MG: 325 TABLET, FILM COATED ORAL at 14:01

## 2020-05-25 RX ADMIN — OXYCODONE HYDROCHLORIDE 5 MG: 5 TABLET ORAL at 14:07

## 2020-05-25 RX ADMIN — SERTRALINE HYDROCHLORIDE 100 MG: 50 TABLET ORAL at 08:32

## 2020-05-25 RX ADMIN — FERROUS GLUCONATE 324 MG: 324 TABLET ORAL at 14:02

## 2020-05-25 RX ADMIN — IBUPROFEN 800 MG: 800 TABLET ORAL at 08:32

## 2020-05-25 RX ADMIN — ACETAMINOPHEN 975 MG: 325 TABLET, FILM COATED ORAL at 04:37

## 2020-05-25 RX ADMIN — OXYCODONE HYDROCHLORIDE 10 MG: 5 TABLET ORAL at 04:37

## 2020-05-25 RX ADMIN — OXYCODONE HYDROCHLORIDE 10 MG: 5 TABLET ORAL at 08:32

## 2020-05-25 RX ADMIN — IBUPROFEN 800 MG: 800 TABLET ORAL at 02:36

## 2020-05-25 ASSESSMENT — MIFFLIN-ST. JEOR: SCORE: 1481.29

## 2020-05-25 NOTE — PLAN OF CARE
VSS, incision CDI, pain being managed with oxy, Tylenol, and ibuprofen around-the-clock when available.  Bonding well with baby, continues to BF then supplement with EBM and/or formula via finger feed.

## 2020-05-25 NOTE — PLAN OF CARE
Patient independent with breastfeeding.  Patient is comfortable with positioning, proper deep latch is being achieved and observed.  Patient is using her breastfeeding log.  Audible swallows heard.  Data: Vital signs within normal limits. Postpartum checks within normal limits - see flow record. Patient eating and drinking normally. Patient able to empty bladder independently. . Patient ambulating independently..   No apparent signs of infection. Incision healing well. Patient Is performing self cares and Is able to care for infant. Positive attachment behaviors are observed with infant. Support persons are present.  Action:  Pain plan was discussed. Patient would like pain meds to be brought in when they are due. Patient was medicated during the shift for pain. See MAR.Patient education done about breastfeeding,  cares, postpartum cares, pain management/plan, and  safety. See flow record.  Response:   Patient reassessed within 1 hour after each medication for pain. Patient stated that pain had improved. Patient stated that she was comfortable. .   Plan: Anticipate discharge today.

## 2020-05-25 NOTE — PLAN OF CARE
Data: Vital signs within normal limits. Postpartum checks within normal limits - see flow record. Patient eating and drinking normally. Patient able to empty bladder independently. . Patient ambulating independently..   No apparent signs of infection. perineum healing well. Patient Is performing self cares and Is able to care for infant. Positive attachment behaviors are observed with infant. Support persons are present.  Action:  Pain plan was discussed. Patient would like pain meds to be brought in when they are due. Patient was medicated during the shift for pain. See MAR.Patient education done about breastfeeding, postpartum cares, pain management/plan, and discharge from hospital. See flow record.  Response:   Patient reassessed within 1 hour after each medication for pain. Patient stated that pain had improved. Patient stated that she was comfortable. .   Plan: Anticipate discharge on 5/25/20.

## 2020-05-25 NOTE — DISCHARGE SUMMARY
[facility] Discharge Summary    Nargis Ruelas MRN# [id number]   Age: 25 year old YOB: 1995     Date of Admission:  2020  Date of Discharge::  2020  Admitting Physician:  Jaqueline Reyes MD  Discharge Physician:  Antione Maldonado MD     Home clinic: [facility]          Admission Diagnoses:    pregnancy  Diet controlled gestational diabetes mellitus (GDM) in third trimester              Discharge Diagnosis:   S/P primary low transverse   Intrauterine pregnancy at 39+1 weeks gestation  Diet controlled gestational diabetes mellitus (GDM) in third trimester  Fetal  Intolerance of labor  THC + on drug screen          Procedures:   Procedure(s): Primary  section       No other procedures performed during this admission           Medications Prior to Admission:     Medications Prior to Admission   Medication Sig Dispense Refill Last Dose     acetone urine (KETOSTIX) test strip 1 strip daily Use one strip daily for 1 week then weekly if negative (Patient not taking: Reported on 2020) 50 each 1      albuterol (PROAIR HFA/PROVENTIL HFA/VENTOLIN HFA) 108 (90 Base) MCG/ACT inhaler Inhale 2 puffs into the lungs every 6 hours 1 Inhaler 0      blood glucose (CONTOUR NEXT TEST) test strip Use to test blood sugar 4 times daily or as directed. (Patient not taking: Reported on 2020) 150 strip 3      blood glucose monitoring (ARNAV MICROLET) lancets Use to test blood sugar 4 times daily. (Patient not taking: Reported on 2020) 150 each 3      Contour Next EZ (CONTOUR NEXT EZ W/DEVICE KIT) w/Device KIT 1 kit 4 times daily Use to test blood sugars 4 times daily or as directed.  Ok to substitute alternative if insurance prefers. (Patient not taking: Reported on 2020) 1 kit 0      Misc. Devices (BREAST PUMP) MISC Use as directed. 1 each 0      Prenatal Vit-Fe Fumarate-FA (PRENATAL MULTIVITAMIN W/IRON) 27-0.8 MG tablet Take 1 tablet by mouth  daily 90 tablet 3      sertraline (ZOLOFT) 100 MG tablet TAKE ONE TABLET BY MOUTH ONCE DAILY 90 tablet 0      [DISCONTINUED] doxylamine (UNISOM) 25 MG TABS tablet Take 1 tablet (25 mg) by mouth At Bedtime 30 tablet 3      [DISCONTINUED] famotidine (PEPCID) 10 MG tablet Take 1 tablet (10 mg) by mouth 2 times daily 60 tablet 2      [DISCONTINUED] ranitidine (ZANTAC) 150 MG tablet Take 1 tablet (150 mg) by mouth 2 times daily 180 tablet 0      [DISCONTINUED] vitamin B6 (PYRIDOXINE) 25 MG tablet Take 1 tablet (25 mg) by mouth 3 times daily 90 tablet 2              Discharge Medications:     Current Discharge Medication List      START taking these medications    Details   ferrous gluconate (FERGON) 324 (38 Fe) MG tablet Take 1 tablet (324 mg) by mouth 2 times daily (with meals)  Qty: 60 tablet, Refills: 3    Associated Diagnoses: Iron deficiency anemia secondary to inadequate dietary iron intake; S/P primary low transverse       ibuprofen (ADVIL/MOTRIN) 800 MG tablet Take 1 tablet (800 mg) by mouth every 6 hours  Qty: 100 tablet, Refills: 1    Associated Diagnoses: S/P primary low transverse       oxyCODONE (ROXICODONE) 5 MG tablet Take 1-2 tablets (5-10 mg) by mouth every 4 hours as needed  Qty: 20 tablet, Refills: 0    Associated Diagnoses: S/P primary low transverse       senna-docusate (SENOKOT-S/PERICOLACE) 8.6-50 MG tablet Take 1 tablet by mouth 2 times daily  Qty: 30 tablet, Refills: 1    Associated Diagnoses: S/P primary low transverse          CONTINUE these medications which have NOT CHANGED    Details   acetone urine (KETOSTIX) test strip 1 strip daily Use one strip daily for 1 week then weekly if negative  Qty: 50 each, Refills: 1    Associated Diagnoses: Gestational diabetes      albuterol (PROAIR HFA/PROVENTIL HFA/VENTOLIN HFA) 108 (90 Base) MCG/ACT inhaler Inhale 2 puffs into the lungs every 6 hours  Qty: 1 Inhaler, Refills: 0    Comments: Pharmacy may dispense brand  covered by insurance (Proair, or proventil or ventolin or generic albuterol inhaler)  Associated Diagnoses: Acute bronchitis, unspecified organism      blood glucose (CONTOUR NEXT TEST) test strip Use to test blood sugar 4 times daily or as directed.  Qty: 150 strip, Refills: 3    Associated Diagnoses: Gestational diabetes      blood glucose monitoring (ARNAV MICROLET) lancets Use to test blood sugar 4 times daily.  Qty: 150 each, Refills: 3    Associated Diagnoses: Gestational diabetes      Contour Next EZ (CONTOUR NEXT EZ W/DEVICE KIT) w/Device KIT 1 kit 4 times daily Use to test blood sugars 4 times daily or as directed.  Ok to substitute alternative if insurance prefers.  Qty: 1 kit, Refills: 0    Comments: Patient given sample meter do not dispense unless alternate brand is covered.  LOT: [id number], SN: [id number], Exp: 2/28/2021  Associated Diagnoses: Gestational diabetes      Misc. Devices (BREAST PUMP) MISC Use as directed.  Qty: 1 each, Refills: 0    Comments: Bilateral electric breast pump  Associated Diagnoses: Encounter for prenatal care in third trimester of first pregnancy      Prenatal Vit-Fe Fumarate-FA (PRENATAL MULTIVITAMIN W/IRON) 27-0.8 MG tablet Take 1 tablet by mouth daily  Qty: 90 tablet, Refills: 3    Associated Diagnoses: Pregnancy test positive      sertraline (ZOLOFT) 100 MG tablet TAKE ONE TABLET BY MOUTH ONCE DAILY  Qty: 90 tablet, Refills: 0    Associated Diagnoses: Current moderate episode of major depressive disorder without prior episode (H)         STOP taking these medications       doxylamine (UNISOM) 25 MG TABS tablet Comments:   Reason for Stopping:         famotidine (PEPCID) 10 MG tablet Comments:   Reason for Stopping:         ranitidine (ZANTAC) 150 MG tablet Comments:   Reason for Stopping:         vitamin B6 (PYRIDOXINE) 25 MG tablet Comments:   Reason for Stopping:                     Consultations:   none          Brief History of Labor or Admission:     Nargis Dunne  "Suraj MRN# [id number]   Age: 25 year old YOB: 1995      ASSESSMENT & PLAN: 26 y/o  at 39 weeks admitted for induction of labor due to GDM A1; EFW 3500gm per recent sonogram; her cervix was favorable at onset of induction, thus pitocin was utilized to augment existing contractions; epidural was given for labor analgesia  EFM demonstrated tier 1 tracing in stage 1.  The cervix stalled in dilation at 9cm x 4 hrs, but was ultimately reduced digitally with some maternal pushing efforts; this, however, resulted in protracted deceleration of FHR to <100 x 5 mins, every time she pushed.  She was then allowed to labor down x 1.5 hrs, with failure of the fetal head to descend further than 0 station; she again started to push resulting in signficant fetal heart rate abnormality; given distance from delivery, it was recommended she undergo primary c/section  See OP report.  At time of surgery, fetal pillow utilized to assist in elevation of head, which was found to be in an OP position; thick meconium encounted upon entry into uterus.  Apgar 7,8  QBL TBD  epidural used for surgical anesthesia and TAP block given postop for pain management.  7 lb 7 oz  \"Robi III\"          Hospital Course:   The patient's hospital course was unremarkable.  She recovered as anticipated and experienced no post-operative complications.  On discharge, her pain was well controlled. Vaginal bleeding is similar to peak menstrual flow.  Voiding without difficulty.  Ambulating well and tolerating a normal diet.  No fever or significant wound drainage.  Breastfeeding well.  Infant is stable.  No bowel movement yet.  She was discharged on post-partum day #2.  Her blood sugars returned to normal.    /44   Pulse 78   Temp 98.7  F (37.1  C) (Oral)   Resp 16   Ht 1.575 m (5' 2.01\")   Wt 84.1 kg (185 lb 6.5 oz)   LMP 2019 (Exact Date)   SpO2 97%   Breastfeeding Unknown   BMI 33.90 kg/m    Exam:  Constitutional: healthy, " alert and no distress  Head: Normocephalic. No masses, lesions, tenderness or abnormalities    Cardiovascular: negative, PMI normal. No lifts, heaves, or thrills. RRR. No murmurs, clicks gallops or rub  Respiratory: negative, Percussion normal. Good diaphragmatic excursion. Lungs clear  Gastrointestinal: Abdomen soft, non-tender. BS normal. No masses, organomegaly Incision intact , soft and dry  FF@U-1  : Deferred  Musculoskeletal: extremities normal- no gross deformities noted, gait normal and normal muscle tone  Skin: no suspicious lesions or rashes  Neurologic: Gait normal. Reflexes normal and symmetric. Sensation grossly WNL.  Psychiatric: mentation appears normal and affect normal/bright        Post-partum hemoglobin:   Hemoglobin   Date Value Ref Range Status   05/24/2020 8.8 (L) 11.7 - 15.7 g/dL Final             Discharge Instructions and Follow-Up:   Discharge diet: Regular   Discharge activity: Lifting restricted to 15 pounds  No heavy lifting for 6 week(s)  Pelvic rest: abstain from intercourse and do not use tampons for 6 week(s)   Discharge follow-up: Follow up with   in 6 weeks   Wound care: Drink plenty of fluids  Ice to area for comfort  Keep wound clean and dry           Discharge Disposition:   Discharged to home      Attestation:  I have reviewed today's vital signs, notes, medications, labs and imaging.  Amount of time performed on this discharge summary: 10 minutes.    Antione Maldonado MD

## 2020-05-25 NOTE — DISCHARGE INSTRUCTIONS
Postop  Birth Instructions    Activity       Do not lift more than 10 pounds for 6 weeks after surgery.  Ask family and friends for help when you need it.    No driving until you have stopped taking your pain medications (usually two weeks after surgery).    No heavy exercise or activity for 6 weeks.  Don't do anything that will put a strain on your surgery site.    Don't strain when using the toilet.  Your care team may prescribe a stool softener if you have problems with your bowel movements.     To care for your incision:       Keep the incision clean and dry.    Do not soak your incision in water. No swimming or hot tubs until it has fully healed. You may soak in the bathtub if the water level is below your incision.    Do not use peroxide, gel, cream, lotion, or ointment on your incision.    Adjust your clothes to avoid pressure on your surgery site (check the elastic in your underwear for example).     You may see a small amount of clear or pink drainage and this is normal.  Check with your health care provider:       If the drainage increases or has an odor.    If the incision reddens, you have swelling, or develop a rash.    If you have increased pain and the medicine we prescribed doesn't help.    If you have a fever above 100.4 F (38 C) with or without chills when placing thermometer under your tongue.   The area around your incision (surgery wound), will feel numb.  This is normal. The numbness should go away in less than a year.     Keep your hands clean:  Always wash your hands before touching your incision (surgery wound). This helps reduce your risk of infection. If your hands aren't dirty, you may use an alcohol hand-rub to clean your hands. Keep your nails clean and short.    Call your healthcare provider if you have any of these symptoms:       You soak a sanitary pad with blood within 1 hour, or you see blood clots larger than a golf ball.    Bleeding that lasts more than 6  weeks.    Vaginal discharge that smells bad.    Severe pain, cramping or tenderness in your lower belly area.    A need to urinate more frequently (use the toilet more often), more urgently (use the toilet very quickly), or it burns when you urinate.    Nausea and vomiting.    Redness, swelling or pain around a vein in your leg.    Problems breastfeeding or a red or painful area on your breast.    Chest pain and cough or are gasping for air.    Problems with coping with sadness, anxiety or depression. If you have concerns about hurting yourself or the baby, call your provider immediately.      You have questions or concerns after you return home.        Discharge Instructions and Follow-Up:   Discharge diet: Regular   Discharge activity: Lifting restricted to 15 pounds  No heavy lifting for 6 week(s)  Pelvic rest: abstain from intercourse and do not use tampons for 6 week(s)   Discharge follow-up: Follow up with   in 6 weeks   Wound care: Drink plenty of fluids  Ice to area for comfort  Keep wound clean and dry

## 2020-05-25 NOTE — PROGRESS NOTES
Patient discharged per ambulatory with infant in car seat. Mother verified that her band matches her infant's band by comparing the infant's  MR#.  Discharge instructions given. Encouraged to call for any problems, questions or concerns. RXs picked up prior to discharge

## 2020-05-27 ENCOUNTER — MYC REFILL (OUTPATIENT)
Dept: OBGYN | Facility: CLINIC | Age: 25
End: 2020-05-27

## 2020-05-27 DIAGNOSIS — Z98.891 S/P PRIMARY LOW TRANSVERSE C-SECTION: ICD-10-CM

## 2020-05-27 RX ORDER — OXYCODONE HYDROCHLORIDE 5 MG/1
5-10 TABLET ORAL EVERY 4 HOURS PRN
Qty: 20 TABLET | Refills: 0 | Status: SHIPPED | OUTPATIENT
Start: 2020-05-27 | End: 2020-06-01

## 2020-05-27 NOTE — TELEPHONE ENCOUNTER
Pt calling stating she is out of medication and is needing a refill     Please call    Melba Cooper  Specialty CSS

## 2020-05-27 NOTE — TELEPHONE ENCOUNTER
Call to patient to further triage pain and pain mediation needs.    Unable to reach patient.   Left message for patient to return call to clinic.    2020 primary  section   Discharge to home on 2020  Prescription for #20 Oxycodone 5mg given.    Camilla Travis   [facility]  RN

## 2020-05-27 NOTE — TELEPHONE ENCOUNTER
"Patient returning call to patient.    S-(situation): Patient reports yesterday getting up too quick off of couch and felt pulling sensation at incision. Patient reports \" incision has been irritated ever since.\"    Patient reports using Ibuprofen 800 mg every 6 hours. Patient reports using 2 tablets of Oxycodone every 4 hours with 1000 mg of Acetaminophen.    Patient reports last oxy/acetaminophen at 0400  Patient reports ibuprofen last at 0700    Pain currently 7-8/10    Patient reports pain at worse 8/10.  Patient reports pain at best is 3-4/10    Patient denies any fevers.  Patient denies nausea or vomiting.   Patient reports bowel movement this morning \"uncomfortable\".  Patient reports tolerating eating and drinking.    Patient reports breastfeeding and bottle feeding.    Patient reports incision looks red ( but normal )   Surrounding skin is slightly pink. Patient reports it is not hot to the touch and looks \" basically like my other skin.\"  Patient denies swelling or drainage from incision.  Patient reports surgical glue is still on incision    B-(background): 2020 primary  section. Discharged home on 2020.    A-(assessment): post op pain POD#4    R-(recommendations): Comfort measures reviewed. Reviewed pain expectation with patient following  section. All questions answered.    Will send to provider on call today for medication refill.    Please review and advise.  Thank you.    Camilla Travis   [facility]  RN          "

## 2020-05-27 NOTE — TELEPHONE ENCOUNTER
Call placed to patient to notify of prescription refill generated to the pharmacy downstairs.  Unable to reach.  Left message for patient to check with the pharmacy for .    Camilla Travis   [facility]  RN

## 2020-06-01 ENCOUNTER — VIRTUAL VISIT (OUTPATIENT)
Dept: FAMILY MEDICINE | Facility: CLINIC | Age: 25
End: 2020-06-01
Payer: COMMERCIAL

## 2020-06-01 DIAGNOSIS — F32.1 CURRENT MODERATE EPISODE OF MAJOR DEPRESSIVE DISORDER WITHOUT PRIOR EPISODE (H): ICD-10-CM

## 2020-06-01 DIAGNOSIS — F41.1 ANXIETY STATE: ICD-10-CM

## 2020-06-01 DIAGNOSIS — F41.8 POSTPARTUM ANXIETY: Primary | ICD-10-CM

## 2020-06-01 PROCEDURE — 99214 OFFICE O/P EST MOD 30 MIN: CPT | Mod: 95 | Performed by: PHYSICIAN ASSISTANT

## 2020-06-01 PROCEDURE — 96127 BRIEF EMOTIONAL/BEHAV ASSMT: CPT | Performed by: PHYSICIAN ASSISTANT

## 2020-06-01 RX ORDER — LORAZEPAM 0.5 MG/1
0.5 TABLET ORAL EVERY 6 HOURS PRN
Qty: 10 TABLET | Refills: 0 | Status: SHIPPED | OUTPATIENT
Start: 2020-06-01 | End: 2020-08-28

## 2020-06-01 RX ORDER — PRENATAL VIT/IRON FUM/FOLIC AC 27MG-0.8MG
TABLET ORAL
COMMUNITY
Start: 2020-04-06 | End: 2020-10-19

## 2020-06-01 ASSESSMENT — PATIENT HEALTH QUESTIONNAIRE - PHQ9
SUM OF ALL RESPONSES TO PHQ QUESTIONS 1-9: 12
5. POOR APPETITE OR OVEREATING: NEARLY EVERY DAY

## 2020-06-01 ASSESSMENT — ANXIETY QUESTIONNAIRES
6. BECOMING EASILY ANNOYED OR IRRITABLE: NEARLY EVERY DAY
3. WORRYING TOO MUCH ABOUT DIFFERENT THINGS: NEARLY EVERY DAY
1. FEELING NERVOUS, ANXIOUS, OR ON EDGE: NEARLY EVERY DAY
5. BEING SO RESTLESS THAT IT IS HARD TO SIT STILL: NEARLY EVERY DAY
IF YOU CHECKED OFF ANY PROBLEMS ON THIS QUESTIONNAIRE, HOW DIFFICULT HAVE THESE PROBLEMS MADE IT FOR YOU TO DO YOUR WORK, TAKE CARE OF THINGS AT HOME, OR GET ALONG WITH OTHER PEOPLE: VERY DIFFICULT
7. FEELING AFRAID AS IF SOMETHING AWFUL MIGHT HAPPEN: NEARLY EVERY DAY
GAD7 TOTAL SCORE: 21
2. NOT BEING ABLE TO STOP OR CONTROL WORRYING: NEARLY EVERY DAY

## 2020-06-01 NOTE — PATIENT INSTRUCTIONS
Can use the ativan sparingly as needed - goal 1-2 times a week or less  - breastfeed or pump before taking medication  Continue the zoloft 100 mg  Restart prenatal vitamin    More skin to skin time with baby    Make appointment to talk with a therapist    Follow up with me in a couple weeks, sooner if needed    Some postpartum resources:   [employer] natasha - postpartum track    [email or web address]     Https://ppsupportmn.org/  This website has a listing of mental health providers that specialize in postpartum concerns  Our Helpline is a free service. It is staffed by trained mental health professionals who have the resources to help. Call or Text [phone].    [email or web address].postpartum.net/  [facility] Helpline: 1-606.284.8970 OR TEXT:249.340.6786

## 2020-06-01 NOTE — PROGRESS NOTES
"Nargis Ruelas is a 25 year old female who is being evaluated via a billable telephone visit.      The patient has been notified of following:     \"This telephone visit will be conducted via a call between you and your physician/provider. We have found that certain health care needs can be provided without the need for a physical exam.  This service lets us provide the care you need with a short phone conversation.  If a prescription is necessary we can send it directly to your pharmacy.  If lab work is needed we can place an order for that and you can then stop by our lab to have the test done at a later time.    Telephone visits are billed at different rates depending on your insurance coverage. During this emergency period, for some insurers they may be billed the same as an in-person visit.  Please reach out to your insurance provider with any questions.    If during the course of the call the physician/provider feels a telephone visit is not appropriate, you will not be charged for this service.\"    Patient has given verbal consent for Telephone visit?  Yes    What phone number would you like to be contacted at? [phone]    How would you like to obtain your AVS? Sharifa Joseph     Nargis Ruelas is a 25 year old female who presents via phone visit today for the following health issues:    HPI  Anxiety Follow-Up    How are you doing with your anxiety since your last visit? Worsened    Are you having other symptoms that might be associated with anxiety? Yes:  lack of appetite, she says she is alsways sweaty, sometimes she feels she cant hold her baby cause her anxiety is really bad.    Have you had a significant life event? OTHER: she just had a baby     Are you feeling depressed? No    Do you have any concerns with your use of alcohol or other drugs? No    Social History     Tobacco Use     Smoking status: Former Smoker     Packs/day: 0.20     Smokeless tobacco: Never Used     Tobacco comment: " "quit with pregnancy   Substance Use Topics     Alcohol use: Not Currently     Alcohol/week: 0.0 standard drinks     Comment: 4 drinks per month-quit with pregnancy     Drug use: No     JERMAIN-7 SCORE 7/12/2019 8/22/2019 6/1/2020   Total Score 2 2 21     PHQ 8/22/2019 4/20/2020 6/1/2020   PHQ-9 Total Score 2 1 12   Q9: Thoughts of better off dead/self-harm past 2 weeks Not at all Not at all Not at all       How many servings of fruits and vegetables do you eat daily?  0-1    On average, how many sweetened beverages do you drink each day (Examples: soda, juice, sweet tea, etc.  Do NOT count diet or artificially sweetened beverages)?   1    How many days per week do you exercise enough to make your heart beat faster? 3 or less    How many minutes a day do you exercise enough to make your heart beat faster? 30 - 60    How many days per week do you miss taking your medication? 0     Baby boy Robi III (FOB/boyfriend Robi)  Some trauma from birth - C section, Difficult due to COVID-19 too, mom couldn't be there. Have not had visitors at home.  She is off adderall and ativan  zoloft throughout pregnancy, not \"cutting it\" right now  Pumping and breastfeeding. It's successful but \"it's a lot\"  Sometimes trouble sleeping at night with anxiety, sometimes he feeds baby at night because she is on the verge of panic attack  Sometimes going outside   When she's with baby - \"I feel happy\", \"I feel bad that I feel like this\"  She denies any thoughts of harm to herself or baby  She feels she is bonding well with baby  She worries about doing everything right, it's exhausting  Boyfriend encourages her to go on walks, but leaving the house makes her anxious too  Hydroxyzine has not worked in the past, ativan has      Patient Active Problem List   Diagnosis     Major depressive disorder, single episode     Anxiety state     History of syncope     Tobacco abuse     Attention deficit hyperactivity disorder (ADHD), predominantly inattentive " type     Chronic pain of both knees     Asthma     Prenatal care, first pregnancy     GDM, class A1     Diet controlled gestational diabetes mellitus (GDM) in third trimester     Cephalopelvic disproportion due to mixed maternal and fetal factors     S/P primary low transverse      Non-reassuring electronic fetal monitoring tracing     Past Surgical History:   Procedure Laterality Date      SECTION N/A 2020    Procedure:  SECTION;  Surgeon: Jaqueline Reyes MD;  Location: [facility] OR     MOUTH SURGERY      wisdom teeth     TONSILLECTOMY         Social History     Tobacco Use     Smoking status: Former Smoker     Packs/day: 0.20     Smokeless tobacco: Never Used     Tobacco comment: quit with pregnancy   Substance Use Topics     Alcohol use: Not Currently     Alcohol/week: 0.0 standard drinks     Comment: 4 drinks per month-quit with pregnancy     Family History   Problem Relation Age of Onset     Diabetes Maternal Grandfather      Coronary Artery Disease Maternal Grandfather      Cerebrovascular Disease Maternal Grandfather      Depression Mother      Anxiety Disorder Mother      Unknown/Adopted Father         unknown      Chronic Obstructive Pulmonary Disease Maternal Grandmother      Coronary Artery Disease Daughter      Other Cancer No family hx of            Reviewed and updated as needed this visit by Provider  Tobacco  Allergies  Meds  Problems  Med Hx  Surg Hx  Fam Hx         Review of Systems   Other than noted above, general, HEENT, respiratory, cardiac, MS, and gastrointestinal systems are negative.        Objective   Reported vitals:  LMP 2019 (Exact Date)      PSYCH: Alert and oriented times 3; coherent speech, normal   rate and volume, able to articulate logical thoughts, able   to abstract reason, no tangential thoughts, no hallucinations   or delusions  Her affect is anxious  RESP: No cough, no audible wheezing, able to talk in full sentences  Remainder of  exam unable to be completed due to telephone visits    Diagnostic Test Results:  Labs reviewed in Epic        Assessment/Plan:  ASSESSMENT/PLAN:      ICD-10-CM    1. Postpartum anxiety  O99.345 MENTAL HEALTH REFERRAL  - Adult; Outpatient Treatment; Individual/Couples/Family/Group Therapy/Health Psychology; [facility] [place] [phone]; We will contact you to schedule the appointment or please call with any questions    F41.8    2. Anxiety state  F41.1 LORazepam (ATIVAN) 0.5 MG tablet   3. Current moderate episode of major depressive disorder without prior episode (H)  F32.1      Long discussion on safety/data (lack of data) on medications. Ativan  L3 (limited data, probably compatible with breastfeeding) according to book Medications and Mothers Milk.  We discussed in depth the potential adverse reactions in her and baby, what to watch out for.   She has high anxiety primarily. She denies concern for harm for herself or baby.  She has good support with boyfriend (he is home during the day as well as night), family.    Patient Instructions   Can use the ativan sparingly as needed - goal 1-2 times a week or less  - breastfeed or pump before taking medication  Continue the zoloft 100 mg  Restart prenatal vitamin    More skin to skin time with baby    Make appointment to talk with a therapist    Follow up with me in a couple weeks, sooner if needed    Some postpartum resources:   [employer] natasha - postpartum track    [email or web address]     Https://ppsupportmn.org/  This website has a listing of mental health providers that specialize in postpartum concerns  Our Helpline is a free service. It is staffed by trained mental health professionals who have the resources to help. Call or Text [phone].    Https://www.postpartum.net/  PSI Helpline: 1-842.585.9123 OR TEXT:820.217.3671    Return in about 2 weeks (around 6/15/2020).      Phone call duration:  26 minutes    Carol Jennings PA-C

## 2020-06-02 ASSESSMENT — ANXIETY QUESTIONNAIRES: GAD7 TOTAL SCORE: 21

## 2020-07-31 ENCOUNTER — OFFICE VISIT (OUTPATIENT)
Dept: OBGYN | Facility: CLINIC | Age: 25
End: 2020-07-31
Payer: COMMERCIAL

## 2020-07-31 VITALS
HEART RATE: 65 BPM | WEIGHT: 156 LBS | BODY MASS INDEX: 29.45 KG/M2 | SYSTOLIC BLOOD PRESSURE: 119 MMHG | TEMPERATURE: 98.1 F | DIASTOLIC BLOOD PRESSURE: 74 MMHG | RESPIRATION RATE: 18 BRPM | HEIGHT: 61 IN

## 2020-07-31 DIAGNOSIS — O92.3 LACTATION FAILURE: ICD-10-CM

## 2020-07-31 DIAGNOSIS — Z30.09 ENCOUNTER FOR OTHER GENERAL COUNSELING OR ADVICE ON CONTRACEPTION: ICD-10-CM

## 2020-07-31 DIAGNOSIS — O24.410 GDM, CLASS A1: ICD-10-CM

## 2020-07-31 PROBLEM — Z34.00 PRENATAL CARE, FIRST PREGNANCY: Status: RESOLVED | Noted: 2019-10-19 | Resolved: 2020-07-31

## 2020-07-31 PROBLEM — O36.8390 NON-REASSURING ELECTRONIC FETAL MONITORING TRACING: Status: RESOLVED | Noted: 2020-05-23 | Resolved: 2020-07-31

## 2020-07-31 PROBLEM — O33.4XX0 CEPHALOPELVIC DISPROPORTION DUE TO MIXED MATERNAL AND FETAL FACTORS: Status: RESOLVED | Noted: 2020-05-23 | Resolved: 2020-07-31

## 2020-07-31 LAB — HCG UR QL: NEGATIVE

## 2020-07-31 PROCEDURE — 96372 THER/PROPH/DIAG INJ SC/IM: CPT | Performed by: OBSTETRICS & GYNECOLOGY

## 2020-07-31 PROCEDURE — 81025 URINE PREGNANCY TEST: CPT | Performed by: OBSTETRICS & GYNECOLOGY

## 2020-07-31 PROCEDURE — 99207 ZZC POST PARTUM EXAM: CPT | Performed by: OBSTETRICS & GYNECOLOGY

## 2020-07-31 RX ORDER — MEDROXYPROGESTERONE ACETATE 150 MG/ML
150 INJECTION, SUSPENSION INTRAMUSCULAR
Status: ACTIVE | OUTPATIENT
Start: 2020-07-31 | End: 2021-07-26

## 2020-07-31 RX ORDER — METOCLOPRAMIDE 10 MG/1
10 TABLET ORAL 4 TIMES DAILY PRN
Qty: 90 TABLET | Refills: 4 | Status: SHIPPED | OUTPATIENT
Start: 2020-07-31 | End: 2020-10-19

## 2020-07-31 RX ADMIN — MEDROXYPROGESTERONE ACETATE 150 MG: 150 INJECTION, SUSPENSION INTRAMUSCULAR at 14:56

## 2020-07-31 ASSESSMENT — ANXIETY QUESTIONNAIRES
6. BECOMING EASILY ANNOYED OR IRRITABLE: MORE THAN HALF THE DAYS
5. BEING SO RESTLESS THAT IT IS HARD TO SIT STILL: NOT AT ALL
3. WORRYING TOO MUCH ABOUT DIFFERENT THINGS: MORE THAN HALF THE DAYS
GAD7 TOTAL SCORE: 8
IF YOU CHECKED OFF ANY PROBLEMS ON THIS QUESTIONNAIRE, HOW DIFFICULT HAVE THESE PROBLEMS MADE IT FOR YOU TO DO YOUR WORK, TAKE CARE OF THINGS AT HOME, OR GET ALONG WITH OTHER PEOPLE: SOMEWHAT DIFFICULT
7. FEELING AFRAID AS IF SOMETHING AWFUL MIGHT HAPPEN: NOT AT ALL
1. FEELING NERVOUS, ANXIOUS, OR ON EDGE: MORE THAN HALF THE DAYS
2. NOT BEING ABLE TO STOP OR CONTROL WORRYING: SEVERAL DAYS

## 2020-07-31 ASSESSMENT — PATIENT HEALTH QUESTIONNAIRE - PHQ9
SUM OF ALL RESPONSES TO PHQ QUESTIONS 1-9: 5
5. POOR APPETITE OR OVEREATING: SEVERAL DAYS

## 2020-07-31 ASSESSMENT — MIFFLIN-ST. JEOR: SCORE: 1389.99

## 2020-07-31 NOTE — NURSING NOTE
Clinic Administered Medication Documentation      Depo Provera Documentation    URINE HCG: negative    Depo-Provera Standing Order inclusion/exclusion criteria reviewed.   Patient meets: inclusion criteria     BP: 119/74  LAST PAP/EXAM:   Lab Results   Component Value Date    PAP NIL 10/22/2019       Prior to injection, verified patient identity using patient's name and date of birth. Medication was administered. Please see MAR and medication order for additional information.     Was entire vial of medication used? Yes  Vial/Syringe: Single dose vial  Expiration Date:  1/2021    Patient instructed to remain in clinic for 15 minutes.  NEXT INJECTION DUE: 10/16/20 - 10/30/20  Given- right gluteus  Didi Collins, CMA

## 2020-07-31 NOTE — PROGRESS NOTES
"Nargis is a 25 year old female 6 weeks S/P Primary  of a liveborn baby boy.  The delivery was  complicated by FTP.  She is not still bleeding.  She is breastfeeding, and has selected DMPA for birth control.  Her last PAP smear was <3 yrs, and was Normal; her  PHQ-9 inventory score is: 5  She has difficulty with her milksupply; she has tried pumping every 2 hours, Fenugreek and Baker's yeast supplement.    Exam: /74 (BP Location: Right arm, Patient Position: Chair, Cuff Size: Adult Regular)   Pulse 65   Temp 98.1  F (36.7  C) (Tympanic)   Resp 18   Ht 1.549 m (5' 1\")   Wt 70.8 kg (156 lb)   LMP 2019 (Exact Date)   Breastfeeding Yes   BMI 29.48 kg/m    pfannensteil incision healed well; no erythema, nontender    Assessment:  Encounter Diagnoses   Name Primary?     Routine postpartum follow-up Yes     Lactation failure      GDM, class A1      Encounter for other general counseling or advice on contraception          Plan:  Recommend trial of off label Reglan 10mg po QID; if no improvement in 2 weeks, she will stop  Recommend 2 hr GTT due to h/o GDM A1  DMPA 150mg IM every 3 mons  Jaqueline Reyes MD  [facility]      "

## 2020-07-31 NOTE — NURSING NOTE
"Initial /74 (BP Location: Right arm, Patient Position: Chair, Cuff Size: Adult Regular)   Pulse 65   Temp 98.1  F (36.7  C) (Tympanic)   Resp 18   Ht 1.549 m (5' 1\")   Wt 70.8 kg (156 lb)   LMP 08/17/2019 (Exact Date)   Breastfeeding Yes   BMI 29.48 kg/m   Estimated body mass index is 29.48 kg/m  as calculated from the following:    Height as of this encounter: 1.549 m (5' 1\").    Weight as of this encounter: 70.8 kg (156 lb). .      "

## 2020-08-01 ASSESSMENT — ANXIETY QUESTIONNAIRES: GAD7 TOTAL SCORE: 8

## 2020-08-24 DIAGNOSIS — F41.1 ANXIETY STATE: ICD-10-CM

## 2020-08-24 NOTE — TELEPHONE ENCOUNTER
Routing refill request to provider for review/approval because:  Drug not on the [facility], [facility] or [facility] refill protocol or controlled substance  Shivani Flores RN

## 2020-08-25 RX ORDER — LORAZEPAM 0.5 MG/1
TABLET ORAL
Qty: 10 TABLET | Refills: 0 | OUTPATIENT
Start: 2020-08-25

## 2020-08-28 ENCOUNTER — VIRTUAL VISIT (OUTPATIENT)
Dept: FAMILY MEDICINE | Facility: CLINIC | Age: 25
End: 2020-08-28
Payer: COMMERCIAL

## 2020-08-28 DIAGNOSIS — F32.1 CURRENT MODERATE EPISODE OF MAJOR DEPRESSIVE DISORDER WITHOUT PRIOR EPISODE (H): ICD-10-CM

## 2020-08-28 DIAGNOSIS — R56.9 SEIZURES (H): ICD-10-CM

## 2020-08-28 DIAGNOSIS — F41.1 ANXIETY STATE: Primary | ICD-10-CM

## 2020-08-28 PROCEDURE — 99214 OFFICE O/P EST MOD 30 MIN: CPT | Mod: 95 | Performed by: PHYSICIAN ASSISTANT

## 2020-08-28 RX ORDER — SERTRALINE HYDROCHLORIDE 100 MG/1
100 TABLET, FILM COATED ORAL DAILY
Qty: 90 TABLET | Refills: 1 | Status: SHIPPED | OUTPATIENT
Start: 2020-08-28 | End: 2020-12-04

## 2020-08-28 RX ORDER — BUSPIRONE HYDROCHLORIDE 5 MG/1
TABLET ORAL
Qty: 120 TABLET | Refills: 1 | Status: SHIPPED | OUTPATIENT
Start: 2020-08-28 | End: 2020-12-04

## 2020-08-28 RX ORDER — LORAZEPAM 0.5 MG/1
0.5 TABLET ORAL EVERY 6 HOURS PRN
Qty: 10 TABLET | Refills: 0 | Status: SHIPPED | OUTPATIENT
Start: 2020-08-28 | End: 2021-01-01

## 2020-08-28 ASSESSMENT — ANXIETY QUESTIONNAIRES
7. FEELING AFRAID AS IF SOMETHING AWFUL MIGHT HAPPEN: SEVERAL DAYS
IF YOU CHECKED OFF ANY PROBLEMS ON THIS QUESTIONNAIRE, HOW DIFFICULT HAVE THESE PROBLEMS MADE IT FOR YOU TO DO YOUR WORK, TAKE CARE OF THINGS AT HOME, OR GET ALONG WITH OTHER PEOPLE: SOMEWHAT DIFFICULT
6. BECOMING EASILY ANNOYED OR IRRITABLE: MORE THAN HALF THE DAYS
5. BEING SO RESTLESS THAT IT IS HARD TO SIT STILL: NOT AT ALL
GAD7 TOTAL SCORE: 15
3. WORRYING TOO MUCH ABOUT DIFFERENT THINGS: NEARLY EVERY DAY
2. NOT BEING ABLE TO STOP OR CONTROL WORRYING: NEARLY EVERY DAY
1. FEELING NERVOUS, ANXIOUS, OR ON EDGE: NEARLY EVERY DAY

## 2020-08-28 ASSESSMENT — PATIENT HEALTH QUESTIONNAIRE - PHQ9
5. POOR APPETITE OR OVEREATING: NEARLY EVERY DAY
SUM OF ALL RESPONSES TO PHQ QUESTIONS 1-9: 10

## 2020-08-28 NOTE — PROGRESS NOTES
"Nargis Ruelas is a 25 year old female who is being evaluated via a billable telephone visit.      The patient has been notified of following:     \"This telephone visit will be conducted via a call between you and your physician/provider. We have found that certain health care needs can be provided without the need for a physical exam.  This service lets us provide the care you need with a short phone conversation.  If a prescription is necessary we can send it directly to your pharmacy.  If lab work is needed we can place an order for that and you can then stop by our lab to have the test done at a later time.    Telephone visits are billed at different rates depending on your insurance coverage. During this emergency period, for some insurers they may be billed the same as an in-person visit.  Please reach out to your insurance provider with any questions.    If during the course of the call the physician/provider feels a telephone visit is not appropriate, you will not be charged for this service.\"    Patient has given verbal consent for Telephone visit?  Yes    What phone number would you like to be contacted at? [phone]    How would you like to obtain your AVS? Sharifa Joseph     Nargis Ruelas is a 25 year old female who presents via phone visit today for the following health issues:    HPI    Depression and Anxiety Follow-Up    How are you doing with your depression since your last visit? Worsened     How are you doing with your anxiety since your last visit?  Worsened, states that anxiety worse since last visit, she feels like everything is intensified. Having anxiety with things that she never had before    Are you having other symptoms that might be associated with depression or anxiety? Yes:  Not able to sleep very well, increased panic attacks, heart racing    Have you had a significant life event? OTHER: Just had baby 3 months ago     Do you have any concerns with your use of alcohol " "or other drugs? No    Social History     Tobacco Use     Smoking status: Former Smoker     Packs/day: 0.20     Smokeless tobacco: Never Used     Tobacco comment: quit with pregnancy   Substance Use Topics     Alcohol use: Not Currently     Alcohol/week: 0.0 standard drinks     Comment: 4 drinks per month-quit with pregnancy     Drug use: No     PHQ 6/1/2020 7/31/2020 8/28/2020   PHQ-9 Total Score 12 5 10   Q9: Thoughts of better off dead/self-harm past 2 weeks Not at all Not at all Not at all     JERMAIN-7 SCORE 6/1/2020 7/31/2020 8/28/2020   Total Score 21 8 15     Last PHQ-9 8/28/2020   1.  Little interest or pleasure in doing things 2   2.  Feeling down, depressed, or hopeless 2   3.  Trouble falling or staying asleep, or sleeping too much 0   4.  Feeling tired or having little energy 3   5.  Poor appetite or overeating 0   6.  Feeling bad about yourself 1   7.  Trouble concentrating 1   8.  Moving slowly or restless 1   Q9: Thoughts of better off dead/self-harm past 2 weeks 0   PHQ-9 Total Score 10   Difficulty at work, home, or with people Somewhat difficult     JERMAIN-7  8/28/2020   1. Feeling nervous, anxious, or on edge 3   2. Not being able to stop or control worrying 3   3. Worrying too much about different things 3   4. Trouble relaxing 3   5. Being so restless that it is hard to sit still 0   6. Becoming easily annoyed or irritable 2   7. Feeling afraid, as if something awful might happen 1   JERMAIN-7 Total Score 15   If you checked any problems, how difficult have they made it for you to do your work, take care of things at home, or get along with other people? Somewhat difficult     Suicide Assessment Five-step Evaluation and Treatment (SAFE-T)     roge Rosenbaum (~3 months)  is doing well, he is \"thriving\"  Sad about not making enough milk. She is pumping every two hours, taking reglan (maybe a little benefit). Average she is producing 2-3 oz total. She is supplementing, exclusively pumping.  She wants to \"give her " "baby the best\", she is so sad about this, \"of course MY body won't work\". She is feeling guilt and feeling like it makes her a bad mom. Boyfriend/family/friends is supportive either way. \"I'm my own worst enemy\"  This stress is \"definitely not helping\" her mental health  Otherwise she feels like life is going well, started work again will be full time in a couple weeks.     She feels like the zoloft has helped her the most out of medications. She would like to increase this at some point.  Baby is sleeping pretty well, up once or twice a night. Boyfriend often gets up with him  Trouble relaxing in the evening/bedtime especially on days anxiety is high    We talked about self care. She knows Boyfriend would be helpful if she told him she needed time alone.  She loves crosswords and word finds, has books for this. She also feels exercise would help her - running, weightlifting. She was doing this before starting work again, but sometimes     She really does not want to do counseling. It hasn't worked well for her, tried a few times. Doesn't want to do phone visits    Review of Systems   Other than noted above, general, HEENT, respiratory, cardiac, MS, and gastrointestinal systems are negative.        Objective          Vitals:  No vitals were obtained today due to virtual visit.      PSYCH: Alert and oriented times 3; coherent speech, normal   rate and volume, able to articulate logical thoughts, able   to abstract reason, no tangential thoughts, no hallucinations   or delusions  Her affect is normal most of the time but tearful while discussing breastfeeding  RESP: No cough, no audible wheezing, able to talk in full sentences  Remainder of exam unable to be completed due to telephone visit        Assessment/Plan:    ASSESSMENT/PLAN:      ICD-10-CM    1. Anxiety state  F41.1 busPIRone (BUSPAR) 5 MG tablet     LORazepam (ATIVAN) 0.5 MG tablet   2. Current moderate episode of major depressive disorder without prior " episode (H)  F32.1 sertraline (ZOLOFT) 100 MG tablet   3. Seizures (H)  R56.9      Long discussion on risks and benefits of medications. Especially considering breastfeeding.  Infrequent use of ativan.  We spent a long time discussing self care, breastfeeding. Consider how breastfeeding is affecting her mental health.  She adamantly does not want to do counseling, has not been successful in the past.  She feels she has a good support system.    Avoid bupropion due to unknown cause with history seizure/pseudoseizure.    Buspirone:   Discussed risks/benefits/side effects/infant side effects possible from Mcdonald and from [employer] - lack of long term data especially while breastfeeding /  L3 (limited data, probably compatible with breastfeeding) according to book Medications and Mothers Milk.    Next step consider increase dose of zoloft    Start low dose / taper up of buspar  Follow up in 3-4 weeks, virtual visit is fine. Sooner if needed.  Work on daily self care, relaxing bedtime routine as we discussed.    Look into these self help apps/information:      Phone call duration:  29 minutes

## 2020-08-28 NOTE — LETTER
My Asthma Action Plan    Name: Nargis Ruelas   YOB: 1995  Date: 8/28/2020   My doctor: Carol Jennings PA-C   My clinic: [facility] (formerly [facility]3]        My Rescue Medicine:   Albuterol inhaler (Proair/Ventolin/Proventil HFA)  2-4 puffs EVERY 4 HOURS as needed. Use a spacer if recommended by your provider.   My Asthma Severity:   Intermittent / Exercise Induced  Know your asthma triggers: upper respiratory infections and exercise or sports             GREEN ZONE   Good Control    I feel good    No cough or wheeze    Can work, sleep and play without asthma symptoms       Take your asthma control medicine every day.     1. If exercise triggers your asthma, take your rescue medication    15 minutes before exercise or sports, and    During exercise if you have asthma symptoms  2. Spacer to use with inhaler: If you have a spacer, make sure to use it with your inhaler             YELLOW ZONE Getting Worse  I have ANY of these:    I do not feel good    Cough or wheeze    Chest feels tight    Wake up at night   1. Keep taking your Green Zone medications  2. Start taking your rescue medicine:    every 20 minutes for up to 1 hour. Then every 4 hours for 24-48 hours.  3. If you stay in the Yellow Zone for more than 12-24 hours, contact your doctor.  4. If you do not return to the Green Zone in 12-24 hours or you get worse, start taking your oral steroid medicine if prescribed by your provider.           RED ZONE Medical Alert - Get Help  I have ANY of these:    I feel awful    Medicine is not helping    Breathing getting harder    Trouble walking or talking    Nose opens wide to breathe       1. Take your rescue medicine NOW  2. If your provider has prescribed an oral steroid medicine, start taking it NOW  3. Call your doctor NOW  4. If you are still in the Red Zone after 20 minutes and you have not reached your doctor:    Take your rescue medicine again and    Call 911 or go to the emergency room right  away    See your regular doctor within 2 weeks of an Emergency Room or Urgent Care visit for follow-up treatment.          Annual Reminders:  Meet with Asthma Educator,  Flu Shot in the Fall, consider Pneumonia Vaccination for patients with asthma (aged 19 and older).    Pharmacy: [facility] - [place], [place] - [address]    Electronically signed by Carol Jennings PA-C   Date: 08/28/20                    Asthma Triggers  How To Control Things That Make Your Asthma Worse    Triggers are things that make your asthma worse.  Look at the list below to help you find your triggers and   what you can do about them. You can help prevent asthma flare-ups by staying away from your triggers.      Trigger                                                          What you can do   Cigarette Smoke  Tobacco smoke can make asthma worse. Do not allow smoking in your home, car or around you.  Be sure no one smokes at a child s day care or school.  If you smoke, ask your health care provider for ways to help you quit.  Ask family members to quit too.  Ask your health care provider for a referral to Quit Plan to help you quit smoking, or call 2-503-293-PLAN.     Colds, Flu, Bronchitis  These are common triggers of asthma. Wash your hands often.  Don t touch your eyes, nose or mouth.  Get a flu shot every year.     Dust Mites  These are tiny bugs that live in cloth or carpet. They are too small to see. Wash sheets and blankets in hot water every week.   Encase pillows and mattress in dust mite proof covers.  Avoid having carpet if you can. If you have carpet, vacuum weekly.   Use a dust mask and HEPA vacuum.   Pollen and Outdoor Mold  Some people are allergic to trees, grass, or weed pollen, or molds. Try to keep your windows closed.  Limit time out doors when pollen count is high.   Ask you health care provider about taking medicine during allergy season.     Animal Dander  Some people are allergic to skin flakes,  urine or saliva from pets with fur or feathers. Keep pets with fur or feathers out of your home.    If you can t keep the pet outdoors, then keep the pet out of your bedroom.  Keep the bedroom door closed.  Keep pets off cloth furniture and away from stuffed toys.     Mice, Rats, and Cockroaches  Some people are allergic to the waste from these pests.   Cover food and garbage.  Clean up spills and food crumbs.  Store grease in the refrigerator.   Keep food out of the bedroom.   Indoor Mold  This can be a trigger if your home has high moisture. Fix leaking faucets, pipes, or other sources of water.   Clean moldy surfaces.  Dehumidify basement if it is damp and smelly.   Smoke, Strong Odors, and Sprays  These can reduce air quality. Stay away from strong odors and sprays, such as perfume, powder, hair spray, paints, smoke incense, paint, cleaning products, candles and new carpet.   Exercise or Sports  Some people with asthma have this trigger. Be active!  Ask your doctor about taking medicine before sports or exercise to prevent symptoms.    Warm up for 5-10 minutes before and after sports or exercise.     Other Triggers of Asthma  Cold air:  Cover your nose and mouth with a scarf.  Sometimes laughing or crying can be a trigger.  Some medicines and food can trigger asthma.

## 2020-08-29 ASSESSMENT — ASTHMA QUESTIONNAIRES: ACT_TOTALSCORE: 25

## 2020-08-29 ASSESSMENT — ANXIETY QUESTIONNAIRES: GAD7 TOTAL SCORE: 15

## 2020-08-31 NOTE — PATIENT INSTRUCTIONS
Start low dose / taper up of buspar  Follow up in 3-4 weeks, virtual visit is fine. Sooner if needed.  Work on daily self care, relaxing bedtime routine as we discussed.    Look into these self help apps/information:    Good information and free webinars for managing mental health issues: https://adaa.org/understanding-anxiety    Yoga/mindfulness: youtube videos, apps    Mobile Apps :  AdReady - Microco.smpace - Smiling Mind -  Abide -- Calm -- Shine -- Insight Timer  - these are helpful for daily mindfulness and meditation, as well as sleep meditations to help you fall asleep more quickly and rest better    BetterHelp natasha : counseling sessions online ($)  Woebot: free natasha which trains you in individualized CBT (cognitive behavioral therapy) and mindfulness, and checks in with you

## 2020-10-19 ENCOUNTER — VIRTUAL VISIT (OUTPATIENT)
Dept: FAMILY MEDICINE | Facility: CLINIC | Age: 25
End: 2020-10-19
Payer: COMMERCIAL

## 2020-10-19 DIAGNOSIS — F32.1 CURRENT MODERATE EPISODE OF MAJOR DEPRESSIVE DISORDER WITHOUT PRIOR EPISODE (H): Primary | ICD-10-CM

## 2020-10-19 PROCEDURE — 99214 OFFICE O/P EST MOD 30 MIN: CPT | Mod: 95 | Performed by: PHYSICIAN ASSISTANT

## 2020-10-19 RX ORDER — VENLAFAXINE HYDROCHLORIDE 37.5 MG/1
CAPSULE, EXTENDED RELEASE ORAL
Qty: 67 CAPSULE | Refills: 0 | Status: SHIPPED | OUTPATIENT
Start: 2020-10-19 | End: 2020-11-27

## 2020-10-19 ASSESSMENT — ANXIETY QUESTIONNAIRES
3. WORRYING TOO MUCH ABOUT DIFFERENT THINGS: SEVERAL DAYS
7. FEELING AFRAID AS IF SOMETHING AWFUL MIGHT HAPPEN: NOT AT ALL
GAD7 TOTAL SCORE: 8
2. NOT BEING ABLE TO STOP OR CONTROL WORRYING: NEARLY EVERY DAY
5. BEING SO RESTLESS THAT IT IS HARD TO SIT STILL: NOT AT ALL
6. BECOMING EASILY ANNOYED OR IRRITABLE: MORE THAN HALF THE DAYS
1. FEELING NERVOUS, ANXIOUS, OR ON EDGE: MORE THAN HALF THE DAYS

## 2020-10-19 ASSESSMENT — PATIENT HEALTH QUESTIONNAIRE - PHQ9
5. POOR APPETITE OR OVEREATING: NOT AT ALL
SUM OF ALL RESPONSES TO PHQ QUESTIONS 1-9: 13

## 2020-10-19 NOTE — PATIENT INSTRUCTIONS
Start zoloft 50 mg (1/2 tablet) for 5 days then STOP  At that point start effexor 37.5 mg for 7-14 days then can increase to 75 mg (2 tablets)  This is a new class of antidepressant, an SNRI (works on norepinephrine)    Schedule virtual visit with me in 3-4 weeks, sooner if needed  I also placed a referral for talking with a psychiatrist. I think that further evaluation is a good idea to help us figure out next steps in treatment

## 2020-10-19 NOTE — PROGRESS NOTES
"Nargis Ruelas is a 25 year old female who is being evaluated via a billable telephone visit.      The patient has been notified of following:     \"This telephone visit will be conducted via a call between you and your physician/provider. We have found that certain health care needs can be provided without the need for a physical exam.  This service lets us provide the care you need with a short phone conversation.  If a prescription is necessary we can send it directly to your pharmacy.  If lab work is needed we can place an order for that and you can then stop by our lab to have the test done at a later time.    Telephone visits are billed at different rates depending on your insurance coverage. During this emergency period, for some insurers they may be billed the same as an in-person visit.  Please reach out to your insurance provider with any questions.    If during the course of the call the physician/provider feels a telephone visit is not appropriate, you will not be charged for this service.\"    Patient has given verbal consent for Telephone visit?  Yes    What phone number would you like to be contacted at? [phone]    How would you like to obtain your AVS? Sharifa Joseph     Nargis Ruelas is a 25 year old female who presents via phone visit today for the following health issues:    HPI     Depression and Anxiety Follow-Up    How are you doing with your depression since your last visit? Worsened, feels like she needs something for her depression. Does not have the energy to do some activity's. Hard time to get up and do things    How are you doing with your anxiety since your last visit?  Improved    Are you having other symptoms that might be associated with depression or anxiety? No    Have you had a significant life event? No     Do you have any concerns with your use of alcohol or other drugs? No    Social History     Tobacco Use     Smoking status: Former Smoker     Packs/day: 0.20 "     Smokeless tobacco: Never Used     Tobacco comment: quit with pregnancy   Substance Use Topics     Alcohol use: Not Currently     Alcohol/week: 0.0 standard drinks     Comment: 4 drinks per month-quit with pregnancy     Drug use: No     PHQ 7/31/2020 8/28/2020 10/19/2020   PHQ-9 Total Score 5 10 13   Q9: Thoughts of better off dead/self-harm past 2 weeks Not at all Not at all Not at all     JERMAIN-7 SCORE 7/31/2020 8/28/2020 10/19/2020   Total Score 8 15 8     Last PHQ-9 10/19/2020   1.  Little interest or pleasure in doing things 3   2.  Feeling down, depressed, or hopeless 3   3.  Trouble falling or staying asleep, or sleeping too much 2   4.  Feeling tired or having little energy 3   5.  Poor appetite or overeating 0   6.  Feeling bad about yourself 1   7.  Trouble concentrating 1   8.  Moving slowly or restless 0   Q9: Thoughts of better off dead/self-harm past 2 weeks 0   PHQ-9 Total Score 13   Difficulty at work, home, or with people -     JERMAIN-7  10/19/2020   1. Feeling nervous, anxious, or on edge 2   2. Not being able to stop or control worrying 3   3. Worrying too much about different things 1   4. Trouble relaxing 0   5. Being so restless that it is hard to sit still 0   6. Becoming easily annoyed or irritable 2   7. Feeling afraid, as if something awful might happen 0   JERMAIN-7 Total Score 8   If you checked any problems, how difficult have they made it for you to do your work, take care of things at home, or get along with other people? -       Suicide Assessment Five-step Evaluation and Treatment (SAFE-T)    She feels anxiety is stable/somewhat improved  Sometimes has trouble telling between depression and anxiety  She feels depression is worsening gradually. Not able to pinpoint a cause.  She is taking buspar 10 mg two times daily   Baby is up 4 times per night thus she is not sleeping well  She did stop pumping in September - this is a relief but still hard  Lacking motivation, energy  Doesn't want to  "go to the gym - tired with working 40 hours and baby. Still going to work. Sometimes has to force herself to shower  Not doing self care, not sure what she would do and not motivated     Feels her depression has never truly been treated well  In the past: celexa, prozac, lexapro - doesn't remember what they were like          Review of Systems   Other than noted above, general, HEENT, respiratory, cardiac, MS, and gastrointestinal systems are negative.        Objective          Vitals:  No vitals were obtained today due to virtual visit.      PSYCH: Alert and oriented times 3; coherent speech, normal   rate and volume, able to articulate logical thoughts, able   to abstract reason, no tangential thoughts, no hallucinations   or delusions  Her affect is normal  RESP: No cough, no audible wheezing, able to talk in full sentences  Remainder of exam unable to be completed due to telephone visits        Assessment/Plan:    ASSESSMENT/PLAN:      ICD-10-CM    1. Current moderate episode of major depressive disorder without prior episode (H)  F32.1 venlafaxine (EFFEXOR-XR) 37.5 MG 24 hr capsule     MENTAL HEALTH REFERRAL  - Adult; Psychiatry; Psychiatry; G: Collaborative Care Psychiatry Service/Bridge to Long-Term Psychiatry as indicated ([phone]); Yes; Chronic Mental Health without improvement; Yes; We will contact you to schedule ...     She is no longer breastfeeding  She has \"failed\" 4 SSRIs will trial SNRI. Discussed risks and benefits of effexor.  We discussed importance of self care and leaning on support system.    Patient Instructions   Start zoloft 50 mg (1/2 tablet) for 5 days then STOP  At that point start effexor 37.5 mg for 7-14 days then can increase to 75 mg (2 tablets)  This is a new class of antidepressant, an SNRI (works on norepinephrine)    Schedule virtual visit with me in 3-4 weeks, sooner if needed  I also placed a referral for talking with a psychiatrist. I think that further evaluation is " a good idea to help us figure out next steps in treatment    Phone call duration:  14 minutes

## 2020-10-20 ASSESSMENT — ANXIETY QUESTIONNAIRES: GAD7 TOTAL SCORE: 8

## 2020-10-26 ENCOUNTER — ALLIED HEALTH/NURSE VISIT (OUTPATIENT)
Dept: OBGYN | Facility: CLINIC | Age: 25
End: 2020-10-26
Payer: COMMERCIAL

## 2020-10-26 VITALS — HEART RATE: 112 BPM | DIASTOLIC BLOOD PRESSURE: 72 MMHG | SYSTOLIC BLOOD PRESSURE: 126 MMHG

## 2020-10-26 DIAGNOSIS — Z30.09 ENCOUNTER FOR OTHER GENERAL COUNSELING OR ADVICE ON CONTRACEPTION: ICD-10-CM

## 2020-10-26 PROCEDURE — 96372 THER/PROPH/DIAG INJ SC/IM: CPT

## 2020-10-26 RX ADMIN — MEDROXYPROGESTERONE ACETATE 150 MG: 150 INJECTION, SUSPENSION INTRAMUSCULAR at 16:03

## 2020-11-22 ENCOUNTER — HEALTH MAINTENANCE LETTER (OUTPATIENT)
Age: 25
End: 2020-11-22

## 2020-11-27 ENCOUNTER — MYC REFILL (OUTPATIENT)
Dept: FAMILY MEDICINE | Facility: CLINIC | Age: 25
End: 2020-11-27

## 2020-11-27 DIAGNOSIS — F32.1 CURRENT MODERATE EPISODE OF MAJOR DEPRESSIVE DISORDER WITHOUT PRIOR EPISODE (H): ICD-10-CM

## 2020-11-29 RX ORDER — VENLAFAXINE HYDROCHLORIDE 37.5 MG/1
75 CAPSULE, EXTENDED RELEASE ORAL DAILY
Qty: 60 CAPSULE | Refills: 0 | Status: SHIPPED | OUTPATIENT
Start: 2020-11-29 | End: 2020-12-04 | Stop reason: DRUGHIGH

## 2020-11-29 NOTE — TELEPHONE ENCOUNTER
Medication is being filled for 1 time refill only due to:  Patient has 12/4/20 appointment   Navin Del Toro RN

## 2020-12-04 ENCOUNTER — VIRTUAL VISIT (OUTPATIENT)
Dept: FAMILY MEDICINE | Facility: CLINIC | Age: 25
End: 2020-12-04
Payer: COMMERCIAL

## 2020-12-04 DIAGNOSIS — F32.1 CURRENT MODERATE EPISODE OF MAJOR DEPRESSIVE DISORDER WITHOUT PRIOR EPISODE (H): Primary | ICD-10-CM

## 2020-12-04 DIAGNOSIS — K21.9 GASTROESOPHAGEAL REFLUX DISEASE WITHOUT ESOPHAGITIS: ICD-10-CM

## 2020-12-04 DIAGNOSIS — F41.1 ANXIETY STATE: ICD-10-CM

## 2020-12-04 PROCEDURE — 99214 OFFICE O/P EST MOD 30 MIN: CPT | Mod: 95 | Performed by: PHYSICIAN ASSISTANT

## 2020-12-04 RX ORDER — BUSPIRONE HYDROCHLORIDE 10 MG/1
10 TABLET ORAL 2 TIMES DAILY
Qty: 180 TABLET | Refills: 1 | Status: SHIPPED | OUTPATIENT
Start: 2020-12-04 | End: 2021-03-22

## 2020-12-04 RX ORDER — FAMOTIDINE 20 MG/1
20 TABLET, FILM COATED ORAL 2 TIMES DAILY
Qty: 180 TABLET | Refills: 1 | Status: SHIPPED | OUTPATIENT
Start: 2020-12-04 | End: 2021-03-22

## 2020-12-04 RX ORDER — VENLAFAXINE HYDROCHLORIDE 75 MG/1
75 CAPSULE, EXTENDED RELEASE ORAL DAILY
Qty: 90 CAPSULE | Refills: 1 | Status: SHIPPED | OUTPATIENT
Start: 2020-12-04 | End: 2021-03-22

## 2020-12-04 RX ORDER — VENLAFAXINE HYDROCHLORIDE 75 MG/1
CAPSULE, EXTENDED RELEASE ORAL
COMMUNITY
Start: 2020-11-29 | End: 2020-12-04

## 2020-12-04 ASSESSMENT — PATIENT HEALTH QUESTIONNAIRE - PHQ9
5. POOR APPETITE OR OVEREATING: NOT AT ALL
SUM OF ALL RESPONSES TO PHQ QUESTIONS 1-9: 3

## 2020-12-04 ASSESSMENT — ANXIETY QUESTIONNAIRES
5. BEING SO RESTLESS THAT IT IS HARD TO SIT STILL: NOT AT ALL
GAD7 TOTAL SCORE: 3
3. WORRYING TOO MUCH ABOUT DIFFERENT THINGS: SEVERAL DAYS
6. BECOMING EASILY ANNOYED OR IRRITABLE: NOT AT ALL
2. NOT BEING ABLE TO STOP OR CONTROL WORRYING: SEVERAL DAYS
1. FEELING NERVOUS, ANXIOUS, OR ON EDGE: SEVERAL DAYS
7. FEELING AFRAID AS IF SOMETHING AWFUL MIGHT HAPPEN: NOT AT ALL

## 2020-12-04 NOTE — PATIENT INSTRUCTIONS
Continue effexor 75 mg daily  Continue buspirone 10 mg two times daily    Take omeprazole 20 mg daily for 6 weeks then try discontinuing. At that point take it as needed; often people will have a bad week or two with heartburn/stomach symptoms then it will improve. If you find you need to use it daily for symptom relief, in the future I'd like to get an upper GI scope to look at the esophagus/stomach so come back to clinic if that happens.    Use Tums for symptom relief.    Follow up if worsening or not improving with the medication, or if black/tarry stools, bloody vomit, severe abdominal pain.    The hope is that you will not need this now (that the medication has taken down the inflammation) or that you will only need it intermittently.    Use pepcid as well up to two times daily as an acid blocker      Patient Education     Medicines for Acid Reflux  Your healthcare provider has told you that you have acid reflux. This condition causes stomach acid to wash up into your throat. For most people, acid reflux is troubling but not dangerous. But left untreated, it can sometimes damages the esophagus. Medicines can help control acid reflux and limit your risk of future problems.  Medicines for acid reflux  Your healthcare provider may prescribe medicine to help treat your acid reflux. Medicine will be based on your symptoms and any test results. Your provider will explain how to take your medicine. You will also be told about possible side effects.  Reducing stomach acid  Your provider may suggest antacids that you can buy over the counter. Antacids can give fast relief. Or you may be told to take a type of medicine called H2 blockers. These are available over-the-counter and by prescription (for higher doses).  Blocking stomach acid  In more severe cases, your healthcare provider may suggest stronger medicines such as proton pump inhibitors (PPIs). These keep the stomach from making acid. They are often prescribed  for long-term use.  Other medicines  In some cases, medicines to reduce or block stomach acid may not work. Then you may be switched to another type of medicine that helps your stomach empty better.  [employer] last reviewed this educational content on 6/1/2019 2000-2020 [employer]. [address] [place] [id number]. All rights reserved. This information is not intended as a substitute for professional medical care. Always follow your healthcare professional's instructions.           Patient Education     Tips to Control Acid Reflux    To control acid reflux, you ll need to make some basic diet and lifestyle changes. The simple steps outlined below may be all you ll need to ease discomfort.   Watch what you eat    Don't have fatty foods or spicy foods.    Eat fewer acidic foods, such as citrus and tomato-based foods. These can increase symptoms.    Limit drinking alcohol, caffeine, and fizzy beverages. All increase acid reflux.    Try limiting chocolate, peppermint, and spearmint. These can make acid reflux worse in some people.    Watch when you eat    Don't lie down for 3 hours after eating.    Don't snack before going to bed.    Raise your head  Raising your head and upper body by 4 to 6 inches helps limit reflux when you re lying down. Put blocks under the head of your bed frame or a wedge under your mattress to raise it.   Other changes    Lose weight, if you need to    Don t exercise near bedtime    Don't wear tight-fitting clothes    Limit aspirin and ibuprofen    Stop smoking    [employer] last reviewed this educational content on 6/1/2019 2000-2020 [employer]. [address] [place] [id number]. All rights reserved. This information is not intended as a substitute for professional medical care. Always follow your healthcare professional's instructions.

## 2020-12-04 NOTE — PROGRESS NOTES
"Nargis Ruelas is a 25 year old female who is being evaluated via a billable telephone visit.      The patient has been notified of following:     \"This telephone visit will be conducted via a call between you and your physician/provider. We have found that certain health care needs can be provided without the need for a physical exam.  This service lets us provide the care you need with a short phone conversation.  If a prescription is necessary we can send it directly to your pharmacy.  If lab work is needed we can place an order for that and you can then stop by our lab to have the test done at a later time.    Telephone visits are billed at different rates depending on your insurance coverage. During this emergency period, for some insurers they may be billed the same as an in-person visit.  Please reach out to your insurance provider with any questions.    If during the course of the call the physician/provider feels a telephone visit is not appropriate, you will not be charged for this service.\"    Patient has given verbal consent for Telephone visit?  Yes    What phone number would you like to be contacted at? [phone]    How would you like to obtain your AVS? Sharifa Joseph     Nargis Ruelas is a 25 year old female who presents via phone visit today for the following health issues:    HPI     Depression and Anxiety Follow-Up    How are you doing with your depression since your last visit? Improved, things have been going well since starting new medication    How are you doing with your anxiety since your last visit?  Improved, things are going really we    Are you having other symptoms that might be associated with depression or anxiety? No    Have you had a significant life event? No     Do you have any concerns with your use of alcohol or other drugs? No     *  Acid reflux has increased would like to go back on medication, she has many nights where it wakes her up.    Social History "     Tobacco Use     Smoking status: Former Smoker     Packs/day: 0.20     Smokeless tobacco: Never Used     Tobacco comment: quit with pregnancy   Substance Use Topics     Alcohol use: Not Currently     Alcohol/week: 0.0 standard drinks     Comment: 4 drinks per month-quit with pregnancy     Drug use: No     PHQ 7/31/2020 8/28/2020 10/19/2020   PHQ-9 Total Score 5 10 13   Q9: Thoughts of better off dead/self-harm past 2 weeks Not at all Not at all Not at all     JERMAIN-7 SCORE 7/31/2020 8/28/2020 10/19/2020   Total Score 8 15 8     Last PHQ-9 12/4/2020   1.  Little interest or pleasure in doing things 1   2.  Feeling down, depressed, or hopeless 1   3.  Trouble falling or staying asleep, or sleeping too much 0   4.  Feeling tired or having little energy 1   5.  Poor appetite or overeating 0   6.  Feeling bad about yourself 0   7.  Trouble concentrating 0   8.  Moving slowly or restless 0   Q9: Thoughts of better off dead/self-harm past 2 weeks 0   PHQ-9 Total Score 3   Difficulty at work, home, or with people -     JERMAIN-7  12/4/2020   1. Feeling nervous, anxious, or on edge 1   2. Not being able to stop or control worrying 1   3. Worrying too much about different things 1   4. Trouble relaxing 0   5. Being so restless that it is hard to sit still 0   6. Becoming easily annoyed or irritable 0   7. Feeling afraid, as if something awful might happen 0   JERMAIN-7 Total Score 3   If you checked any problems, how difficult have they made it for you to do your work, take care of things at home, or get along with other people? -       Suicide Assessment Five-step Evaluation and Treatment (SAFE-T)    She is taking effexor 75 mg  It is working well  No side effects    She has had heartburn issues since pregnancy and postpartum  She is now having lot of heartburn, using frequent tums  Triggered by anything it seems   she used zantac while pregnant, omeprazole        Review of Systems   Other than noted above, general, HEENT,  respiratory, cardiac, MS, and gastrointestinal systems are negative.        Objective          Vitals:  No vitals were obtained today due to virtual visit.    healthy, alert and no distress  PSYCH: Alert and oriented times 3; coherent speech, normal   rate and volume, able to articulate logical thoughts, able   to abstract reason, no tangential thoughts, no hallucinations   or delusions  Her affect is normal  RESP: No cough, no audible wheezing, able to talk in full sentences  Remainder of exam unable to be completed due to telephone visits        Assessment/Plan:    Assessment & Plan     ASSESSMENT/PLAN:      ICD-10-CM    1. Current moderate episode of major depressive disorder without prior episode (H)  F32.1 busPIRone (BUSPAR) 10 MG tablet     venlafaxine (EFFEXOR-XR) 75 MG 24 hr capsule   2. Anxiety state  F41.1 busPIRone (BUSPAR) 10 MG tablet     venlafaxine (EFFEXOR-XR) 75 MG 24 hr capsule   3. Gastroesophageal reflux disease without esophagitis  K21.9 omeprazole (PRILOSEC) 20 MG DR capsule     famotidine (PEPCID) 20 MG tablet       Patient Instructions   Continue effexor 75 mg daily  Continue buspirone 10 mg two times daily    Take omeprazole 20 mg daily for 6 weeks then try discontinuing. At that point take it as needed; often people will have a bad week or two with heartburn/stomach symptoms then it will improve. If you find you need to use it daily for symptom relief, in the future I'd like to get an upper GI scope to look at the esophagus/stomach so come back to clinic if that happens.    Use Tums for symptom relief.    Follow up if worsening or not improving with the medication, or if black/tarry stools, bloody vomit, severe abdominal pain.    The hope is that you will not need this now (that the medication has taken down the inflammation) or that you will only need it intermittently.    Use pepcid as well up to two times daily as an acid blocker    GERD handouts    Return in about 3 months (around  3/4/2021).    Carol Jennings PA-C  [facility]    Phone call duration:  10 minutes

## 2020-12-05 ASSESSMENT — ANXIETY QUESTIONNAIRES: GAD7 TOTAL SCORE: 3

## 2020-12-22 ENCOUNTER — MYC MEDICAL ADVICE (OUTPATIENT)
Dept: FAMILY MEDICINE | Facility: CLINIC | Age: 25
End: 2020-12-22

## 2021-01-01 ENCOUNTER — MYC REFILL (OUTPATIENT)
Dept: FAMILY MEDICINE | Facility: CLINIC | Age: 26
End: 2021-01-01

## 2021-01-01 DIAGNOSIS — F41.1 ANXIETY STATE: ICD-10-CM

## 2021-01-04 RX ORDER — LORAZEPAM 0.5 MG/1
0.5 TABLET ORAL EVERY 6 HOURS PRN
Qty: 10 TABLET | Refills: 0 | Status: SHIPPED | OUTPATIENT
Start: 2021-01-04 | End: 2022-01-11

## 2021-01-04 NOTE — TELEPHONE ENCOUNTER
Routing refill request to provider for review/approval because:  Drug not on the [facility] refill protocol   Navin Del Toro RN

## 2021-01-26 ENCOUNTER — ALLIED HEALTH/NURSE VISIT (OUTPATIENT)
Dept: FAMILY MEDICINE | Facility: CLINIC | Age: 26
End: 2021-01-26
Payer: COMMERCIAL

## 2021-01-26 VITALS — WEIGHT: 181 LBS | BODY MASS INDEX: 34.2 KG/M2 | DIASTOLIC BLOOD PRESSURE: 73 MMHG | SYSTOLIC BLOOD PRESSURE: 123 MMHG

## 2021-01-26 DIAGNOSIS — Z30.42 ENCOUNTER FOR SURVEILLANCE OF INJECTABLE CONTRACEPTIVE: Primary | ICD-10-CM

## 2021-01-26 LAB — HCG UR QL: NEGATIVE

## 2021-01-26 PROCEDURE — 81025 URINE PREGNANCY TEST: CPT | Performed by: NURSE PRACTITIONER

## 2021-01-26 PROCEDURE — 96372 THER/PROPH/DIAG INJ SC/IM: CPT

## 2021-01-26 PROCEDURE — 99207 PR NO CHARGE NURSE ONLY: CPT

## 2021-01-26 RX ADMIN — MEDROXYPROGESTERONE ACETATE 150 MG: 150 INJECTION, SUSPENSION INTRAMUSCULAR at 11:39

## 2021-01-26 NOTE — NURSING NOTE
Addended by: RIANNA RAMOS on: 4/24/2017 05:14 PM     Modules accepted: Orders     Clinic Administered Medication Documentation      Depo Provera Documentation    URINE HCG: negative    Depo-Provera Standing Order inclusion/exclusion criteria reviewed.   Patient meets: inclusion criteria     BP: 123/73  LAST PAP/EXAM:   Lab Results   Component Value Date    PAP NIL 10/22/2019       Prior to injection, verified patient identity using patient's name and date of birth. Medication was administered. Please see MAR and medication order for additional information.     Was entire vial of medication used? Yes  Vial/Syringe: Single dose vial  Expiration Date:  08/2022    Patient instructed to remain in clinic for 15 minutes.  NEXT INJECTION DUE: 4/13/21 - 4/27/21

## 2021-03-22 ENCOUNTER — VIRTUAL VISIT (OUTPATIENT)
Dept: FAMILY MEDICINE | Facility: CLINIC | Age: 26
End: 2021-03-22
Payer: COMMERCIAL

## 2021-03-22 DIAGNOSIS — R56.9 SEIZURES (H): ICD-10-CM

## 2021-03-22 DIAGNOSIS — F90.0 ATTENTION DEFICIT HYPERACTIVITY DISORDER (ADHD), PREDOMINANTLY INATTENTIVE TYPE: Primary | ICD-10-CM

## 2021-03-22 DIAGNOSIS — F32.1 CURRENT MODERATE EPISODE OF MAJOR DEPRESSIVE DISORDER WITHOUT PRIOR EPISODE (H): ICD-10-CM

## 2021-03-22 DIAGNOSIS — F41.1 ANXIETY STATE: ICD-10-CM

## 2021-03-22 DIAGNOSIS — K21.9 GASTROESOPHAGEAL REFLUX DISEASE WITHOUT ESOPHAGITIS: ICD-10-CM

## 2021-03-22 PROCEDURE — 99214 OFFICE O/P EST MOD 30 MIN: CPT | Mod: 95 | Performed by: PHYSICIAN ASSISTANT

## 2021-03-22 RX ORDER — DEXTROAMPHETAMINE SACCHARATE, AMPHETAMINE ASPARTATE, DEXTROAMPHETAMINE SULFATE AND AMPHETAMINE SULFATE 2.5; 2.5; 2.5; 2.5 MG/1; MG/1; MG/1; MG/1
10 TABLET ORAL 2 TIMES DAILY
Qty: 60 TABLET | Refills: 0 | Status: SHIPPED | OUTPATIENT
Start: 2021-03-22 | End: 2021-04-19

## 2021-03-22 RX ORDER — DEXTROAMPHETAMINE SACCHARATE, AMPHETAMINE ASPARTATE, DEXTROAMPHETAMINE SULFATE AND AMPHETAMINE SULFATE 2.5; 2.5; 2.5; 2.5 MG/1; MG/1; MG/1; MG/1
10 TABLET ORAL 2 TIMES DAILY
Qty: 60 TABLET | Refills: 0 | Status: SHIPPED | OUTPATIENT
Start: 2021-04-22 | End: 2021-04-19 | Stop reason: DRUGHIGH

## 2021-03-22 RX ORDER — BUSPIRONE HYDROCHLORIDE 10 MG/1
10 TABLET ORAL 2 TIMES DAILY
Qty: 180 TABLET | Refills: 1 | Status: SHIPPED | OUTPATIENT
Start: 2021-03-22 | End: 2021-10-11

## 2021-03-22 RX ORDER — VENLAFAXINE HYDROCHLORIDE 75 MG/1
75 CAPSULE, EXTENDED RELEASE ORAL DAILY
Qty: 90 CAPSULE | Refills: 1 | Status: SHIPPED | OUTPATIENT
Start: 2021-03-22 | End: 2021-10-11

## 2021-03-22 RX ORDER — FAMOTIDINE 20 MG/1
20 TABLET, FILM COATED ORAL 2 TIMES DAILY
Qty: 180 TABLET | Refills: 1 | Status: SHIPPED | OUTPATIENT
Start: 2021-03-22 | End: 2022-07-20

## 2021-03-22 RX ORDER — DEXTROAMPHETAMINE SACCHARATE, AMPHETAMINE ASPARTATE, DEXTROAMPHETAMINE SULFATE AND AMPHETAMINE SULFATE 2.5; 2.5; 2.5; 2.5 MG/1; MG/1; MG/1; MG/1
10 TABLET ORAL 2 TIMES DAILY
Qty: 60 TABLET | Refills: 0 | Status: SHIPPED | OUTPATIENT
Start: 2021-05-23 | End: 2021-04-19 | Stop reason: DRUGHIGH

## 2021-03-22 NOTE — PATIENT INSTRUCTIONS
Restart adderall 10 mg two times daily  Continue other medications  Try slow weaning down on the heartburn medications. Can start by trying pepcid once daily instead of twice daily. If you find you are still needing heartburn medications daily we would get an EGD/upper endoscopy to check it out further.

## 2021-03-22 NOTE — PROGRESS NOTES
Nargis is a 26 year old who is being evaluated via a billable telephone visit.      What phone number would you like to be contacted at? [phone]  How would you like to obtain your AVS? Sharifa    Assessment & Plan     ASSESSMENT/PLAN:      ICD-10-CM    1. Attention deficit hyperactivity disorder (ADHD), predominantly inattentive type  F90.0 amphetamine-dextroamphetamine (ADDERALL) 10 MG tablet     amphetamine-dextroamphetamine (ADDERALL) 10 MG tablet     amphetamine-dextroamphetamine (ADDERALL) 10 MG tablet   2. Anxiety state  F41.1 venlafaxine (EFFEXOR-XR) 75 MG 24 hr capsule     busPIRone (BUSPAR) 10 MG tablet   3. Current moderate episode of major depressive disorder without prior episode (H)  F32.1 venlafaxine (EFFEXOR-XR) 75 MG 24 hr capsule     busPIRone (BUSPAR) 10 MG tablet   4. Gastroesophageal reflux disease without esophagitis  K21.9 omeprazole (PRILOSEC) 20 MG DR capsule     famotidine (PEPCID) 20 MG tablet   5. Seizures (H)  R56.9        Saw neurologist when younger for seizures/syncope/?pseudoseizure  They couldn't figure out what it was, she was not put on medication.  Has not had seizure for several years    Patient Instructions   Restart adderall 10 mg two times daily  Continue other medications  Try slow weaning down on the heartburn medications. Can start by trying pepcid once daily instead of twice daily. If you find you are still needing heartburn medications daily we would get an EGD/upper endoscopy to check it out further.    Return in about 3 months (around 6/22/2021).    Carol Jennings PA-C  [facility] SHEREE Barillas is a 26 year old who presents for the following health issues     HPI     *  Would like to discuss getting back on ADHD medication    Depression and Anxiety Follow-Up    How are you doing with your depression since your last visit? Improved, things are going really well on current medications    How are you doing with your anxiety since your  last visit?  Improved     Are you having other symptoms that might be associated with depression or anxiety? No    Have you had a significant life event? No     Do you have any concerns with your use of alcohol or other drugs? No    Social History     Tobacco Use     Smoking status: Former Smoker     Packs/day: 0.20     Smokeless tobacco: Never Used     Tobacco comment: quit with pregnancy   Substance Use Topics     Alcohol use: Not Currently     Alcohol/week: 0.0 standard drinks     Comment: 4 drinks per month-quit with pregnancy     Drug use: No     PHQ 10/19/2020 12/4/2020 3/22/2021   PHQ-9 Total Score 13 3 8   Q9: Thoughts of better off dead/self-harm past 2 weeks Not at all Not at all Not at all     JERMAIN-7 SCORE 10/19/2020 12/4/2020 3/22/2021   Total Score - - 2 (minimal anxiety)   Total Score 8 3 2     Last PHQ-9 3/22/2021   1.  Little interest or pleasure in doing things 1   2.  Feeling down, depressed, or hopeless 1   3.  Trouble falling or staying asleep, or sleeping too much 1   4.  Feeling tired or having little energy 2   5.  Poor appetite or overeating 2   6.  Feeling bad about yourself 0   7.  Trouble concentrating 1   8.  Moving slowly or restless 0   Q9: Thoughts of better off dead/self-harm past 2 weeks 0   PHQ-9 Total Score 8   Difficulty at work, home, or with people -     JERMAIN-7  3/22/2021   1. Feeling nervous, anxious, or on edge 1   2. Not being able to stop or control worrying 0   3. Worrying too much about different things 0   4. Trouble relaxing 0   5. Being so restless that it is hard to sit still 0   6. Becoming easily annoyed or irritable 1   7. Feeling afraid, as if something awful might happen 0   JERMAIN-7 Total Score 2   If you checked any problems, how difficult have they made it for you to do your work, take care of things at home, or get along with other people? -     Stopped ADHD medications when she became pregnant  Works at , is having trouble focusing, feels like she is going  all over, felt more in control of her life with the meds    pepcid and prilosec daily. Can tell if she does not take it.  Started having heartburn when she first became pregnant and didn't get better after delivery    Saw neurologist when younger for seizures/syncope/?pseudoseizure  They couldn't figure out what it was, she was not put on medication.  Has not had seizure for several years    Questions about COVID-19 vaccine  She had COVID-19 - loss of taste/smell was only symptom    Review of Systems   Other than noted above, general, HEENT, respiratory, cardiac, MS, and gastrointestinal systems are negative.       Objective           Vitals:  No vitals were obtained today due to virtual visit.    Physical Exam   healthy, alert and no distress  PSYCH: Alert and oriented times 3; coherent speech, normal   rate and volume, able to articulate logical thoughts, able   to abstract reason, no tangential thoughts, no hallucinations   or delusions  Her affect is normal  RESP: No cough, no audible wheezing, able to talk in full sentences  Remainder of exam unable to be completed due to telephone visits        Phone call duration: 16 minutes

## 2021-03-23 ASSESSMENT — ASTHMA QUESTIONNAIRES: ACT_TOTALSCORE: 25

## 2021-03-27 ENCOUNTER — HOSPITAL ENCOUNTER (EMERGENCY)
Facility: CLINIC | Age: 26
Discharge: HOME | End: 2021-03-28
Attending: EMERGENCY MEDICINE | Admitting: EMERGENCY MEDICINE
Payer: COMMERCIAL

## 2021-03-27 ENCOUNTER — APPOINTMENT (OUTPATIENT)
Dept: GENERAL RADIOLOGY | Facility: CLINIC | Age: 26
End: 2021-03-27
Attending: EMERGENCY MEDICINE
Payer: COMMERCIAL

## 2021-03-27 ENCOUNTER — NURSE TRIAGE (OUTPATIENT)
Dept: NURSING | Facility: CLINIC | Age: 26
End: 2021-03-27

## 2021-03-27 VITALS
HEIGHT: 61 IN | WEIGHT: 189 LBS | TEMPERATURE: 98.6 F | DIASTOLIC BLOOD PRESSURE: 93 MMHG | SYSTOLIC BLOOD PRESSURE: 140 MMHG | OXYGEN SATURATION: 100 % | HEART RATE: 117 BPM | BODY MASS INDEX: 35.68 KG/M2 | RESPIRATION RATE: 16 BRPM

## 2021-03-27 DIAGNOSIS — S82.832A CLOSED FRACTURE OF DISTAL END OF LEFT FIBULA, UNSPECIFIED FRACTURE MORPHOLOGY, INITIAL ENCOUNTER: ICD-10-CM

## 2021-03-27 PROCEDURE — 73610 X-RAY EXAM OF ANKLE: CPT | Mod: LT

## 2021-03-27 PROCEDURE — 99284 EMERGENCY DEPT VISIT MOD MDM: CPT | Mod: 25 | Performed by: EMERGENCY MEDICINE

## 2021-03-27 PROCEDURE — 27786 TREATMENT OF ANKLE FRACTURE: CPT | Mod: 54 | Performed by: EMERGENCY MEDICINE

## 2021-03-27 PROCEDURE — 27786 TREATMENT OF ANKLE FRACTURE: CPT | Performed by: EMERGENCY MEDICINE

## 2021-03-27 ASSESSMENT — MIFFLIN-ST. JEOR: SCORE: 1534.68

## 2021-03-27 NOTE — LETTER
March 28, 2021      To Whom It May Concern:      Nargis Ruelas was seen in our Emergency Department today, 03/28/21.  She has a broken left ankle. She has the following restrictions for the next 2 weeks:    She cannot put any weight on her left leg.  No lifting over 10 pounds.   Every 1-2 hours she needs to be able to elevate her leg and apply ice to the ankle for 10 to 15 minutes at a time.    Sincerely,        Joel Deras MD

## 2021-03-28 RX ORDER — OXYCODONE HYDROCHLORIDE 5 MG/1
5 TABLET ORAL EVERY 6 HOURS PRN
Status: DISCONTINUED | OUTPATIENT
Start: 2021-03-28 | End: 2021-03-28 | Stop reason: HOSPADM

## 2021-03-28 RX ORDER — OXYCODONE HYDROCHLORIDE 5 MG/1
5 TABLET ORAL EVERY 6 HOURS PRN
Qty: 6 TABLET | Refills: 0 | Status: SHIPPED | OUTPATIENT
Start: 2021-03-28 | End: 2021-04-19

## 2021-03-28 RX ORDER — OXYCODONE HYDROCHLORIDE 5 MG/1
5 TABLET ORAL EVERY 6 HOURS PRN
Qty: 6 TABLET | Refills: 0 | Status: SHIPPED | OUTPATIENT
Start: 2021-03-28 | End: 2021-03-31

## 2021-03-28 NOTE — DISCHARGE INSTRUCTIONS
Keep the injured extremity elevated above the level of your heart as much as possible over the next 24-72 hours. Move the joints above and below the splint as much as possible to avoid stiffness. Over the next 24 hours, apply ice 2-4 times per day for 15-20 minutes at a time, do not let the splint get wet.    There is some data to suggest taking Vitamin C may decrease the chance of developing chronic pain in the affected extremity.     Follow up in orthopedic surgery clinic in 1 week for a recheck. Return to the emergency department sooner if you develop severe, intractable pain, burning or stinging pain, new numbness or tingling, increasing paralysis, a foul odor from the splint, or new skin findings of warmth, redness, discoloration, breakdown, or discharge (these would be concerning for infection).

## 2021-03-28 NOTE — ED NOTES
Pt was ice skating tonight, fell, and had immediate ankle pain.  NWB d/t pain.  Pt took 600mg ibuprofen PTA.  CMS intact.

## 2021-03-28 NOTE — TELEPHONE ENCOUNTER
Nargis's boyfriend calls and says that they were ice skating tonight and Nargis rolled her left ankle and fell on the ice. Caller denies any cuts or bleeding. Caller says that pt. Cannot put any pressure on her left foot and cannot walk. Caller says that he will take pt. To the ER now. COVID 19 Nurse Triage Plan/Patient Instructions    Please be aware that novel coronavirus (COVID-19) may be circulating in the community. If you develop symptoms such as fever, cough, or SOB or if you have concerns about the presence of another infection including coronavirus (COVID-19), please contact your health care provider or visit [email or web address].     Disposition/Instructions    ED Visit recommended. Follow protocol based instructions.     Bring Your Own Device:  Please also bring your smart device(s) (smart phones, tablets, laptops) and their charging cables for your personal use and to communicate with your care team during your visit.    Thank you for taking steps to prevent the spread of this virus.  o Limit your contact with others.  o Wear a simple mask to cover your cough.  o Wash your hands well and often.    Resources    [facility]: About COVID-19: [email or web address]    CDC: What to Do If You're Sick: www.cdc.gov/coronavirus/2019-ncov/about/steps-when-sick.html    CDC: Ending Home Isolation: www.cdc.gov/coronavirus/2019-ncov/hcp/disposition-in-home-patients.html     CDC: Caring for Someone: www.cdc.gov/coronavirus/2019-ncov/if-you-are-sick/care-for-someone.html     [facility]: Interim Guidance for Hospital Discharge to Home: [email or web address]/[email or web address]/hcp/hospdischarge.pdf    [facility] clinical trials (COVID-19 research studies): [email or web address]     Below are the COVID-19 hotlines at the [place] [facility] ([facility]). Interpreters are available.   o For health questions: Call 811-578-0575 or 1-936.538.4324 (7 a.m. to 7  p.m.)  o For questions about schools and childcare: Call 641-166-7314 or 1-413.101.8628 (7 a.m. to 7 p.m.)                       Reason for Disposition    Can't stand (bear weight) or walk    Additional Information    Followed an ankle injury    Negative: Serious injury with multiple fractures    Negative: [1] Major bleeding (e.g., actively dripping or spurting) AND [2] can't be stopped    Negative: Amputation    Negative: Looks like a dislocated joint (very crooked or deformed)    Negative: Sounds like a life-threatening emergency to the triager    Negative: Wound looks infected    Negative: Caused by an animal bite    Negative: Caused by a human bite    Negative: Puncture wound of foot    Negative: Toe injury is main concern    Negative: Cast problems or questions    Negative: Bullet wound, stabbed by knife, or other serious penetrating wound    Negative: Skin is split open or gaping (or length > 1/2 inch or 12 mm)    Negative: [1] Bleeding AND [2] won't stop after 10 minutes of direct pressure (using correct technique)    Negative: [1] Dirt in the wound AND [2] not removed with 15 minutes of scrubbing    Protocols used: FOOT AND ANKLE INJURY-A-AH, ANKLE PAIN-A-AH

## 2021-03-28 NOTE — ED PROVIDER NOTES
History     Chief Complaint   Patient presents with     Ankle Pain     fell ice skating shivam Ruelas is a 26 year old female who presents for evaluation of left ankle pain.  Symptoms started just prior to arrival when she fell ice skating and twisted her ankle.  She had immediate onset of pain over the lateral malleolus.  She has not been able to bear weight.  She has taken ibuprofen prior to arrival.  She did not hit her head or have loss of consciousness in the fall.  No knee pain.  Pain is aching and throbbing, moderate to severe.    Allergies:  No Known Allergies    Problem List:    Patient Active Problem List    Diagnosis Date Noted     Seizures (H) 2020     Priority: Medium     S/P primary low transverse  2020     Priority: Medium     Chronic pain of both knees 2016     Priority: Medium     Tobacco abuse 10/11/2015     Priority: Medium     History of syncope 10/06/2015     Priority: Medium     Major depressive disorder, single episode 04/10/2015     Priority: Medium     Anxiety state 04/10/2015     Priority: Medium     Asthma 2014     Priority: Medium     Attention deficit hyperactivity disorder (ADHD), predominantly inattentive type 2010     Priority: Medium        Past Medical History:    Past Medical History:   Diagnosis Date     Cephalopelvic disproportion due to mixed maternal and fetal factors 2020     GDM, class A1 3/25/2020     Tonsillitis        Past Surgical History:    Past Surgical History:   Procedure Laterality Date      SECTION N/A 2020    Procedure:  SECTION;  Surgeon: Jaqueline Reyes MD;  Location: [facility] OR     MOUTH SURGERY      wisdom teeth     TONSILLECTOMY         Family History:    Family History   Problem Relation Age of Onset     Diabetes Maternal Grandfather      Coronary Artery Disease Maternal Grandfather      Cerebrovascular Disease Maternal Grandfather      Depression Mother      Anxiety  "Disorder Mother      Unknown/Adopted Father         unknown      Chronic Obstructive Pulmonary Disease Maternal Grandmother      Coronary Artery Disease Daughter      Other Cancer No family hx of        Social History:  Marital Status:  Single [1]  Social History     Tobacco Use     Smoking status: Former Smoker     Packs/day: 0.20     Smokeless tobacco: Never Used     Tobacco comment: quit with pregnancy   Substance Use Topics     Alcohol use: Not Currently     Alcohol/week: 0.0 standard drinks     Comment: 4 drinks per month-quit with pregnancy     Drug use: No        Medications:    oxyCODONE (ROXICODONE) 5 MG tablet  oxyCODONE (ROXICODONE) 5 MG tablet  albuterol (PROAIR HFA/PROVENTIL HFA/VENTOLIN HFA) 108 (90 Base) MCG/ACT inhaler  amphetamine-dextroamphetamine (ADDERALL) 10 MG tablet  [START ON 4/22/2021] amphetamine-dextroamphetamine (ADDERALL) 10 MG tablet  [START ON 5/23/2021] amphetamine-dextroamphetamine (ADDERALL) 10 MG tablet  busPIRone (BUSPAR) 10 MG tablet  famotidine (PEPCID) 20 MG tablet  LORazepam (ATIVAN) 0.5 MG tablet  omeprazole (PRILOSEC) 20 MG DR capsule  venlafaxine (EFFEXOR-XR) 75 MG 24 hr capsule          Review of Systems  A 2 point review of systems was performed. All pertinent positives and negatives were listed in the HPI and rest of ROS were otherwise negative.    Physical Exam   BP: (!) 140/93  Pulse: 117  Temp: 98.6  F (37  C)  Resp: 16  Height: 154.9 cm (5' 1\")  Weight: 85.7 kg (189 lb)  SpO2: 100 %      Physical Exam  Constitutional:       General: She is not in acute distress.     Appearance: She is well-developed. She is not diaphoretic.   HENT:      Head: Normocephalic and atraumatic.   Eyes:      General: No scleral icterus.  Neck:      Musculoskeletal: Normal range of motion and neck supple.   Musculoskeletal:      Comments: Left Knee: no deformity, effusion, erythema or warmth appreciated; no tenderness over patella, joint line, or femoral condyles; full ROM  Left Ankle: " Swelling and ecchymosis with tenderness over the lateral malleolus; no tenderness over medial malleolus, base of 5th metatarsal, navicular, or ankle syndesmosis; full ankle ROM   Skin:     General: Skin is warm and dry.      Coloration: Skin is not pale.      Findings: No erythema or rash.   Neurological:      Mental Status: She is alert and oriented to person, place, and time.         ED [facility]    Splint Application    Date/Time: 3/28/2021 12:35 AM  Performed by: Joel Deras MD  Authorized by: Joel Deras MD       PRE-PROCEDURE DETAILS     Sensation:  Normal    PROCEDURE DETAILS     Laterality:  Left    Location:  Ankle    Ankle:  L ankle    Cast type:  Short leg    Splint type:  Ankle stirrup and short leg    Supplies:  Ortho-Glass    POST PROCEDURE DETAILS     Pain:  Improved    Sensation:  Normal    Patient tolerance of procedure:  Patient tolerated the procedure well with no immediate complications    PROCEDURE   Patient Tolerance:  Patient tolerated the procedure well with no immediate complications                     Critical Care time:  none               Results for orders placed or performed during the hospital encounter of 03/27/21 (from the past 24 hour(s))   XR Ankle Left G/E 3 Views    Narrative    EXAM: XR ANKLE LT G/E 3 VW  LOCATION: [facility]  DATE/TIME: 3/27/2021 11:27 PM    INDICATION: Pain after twisting while ice skating  COMPARISON: None.      Impression    IMPRESSION: There is an oblique minimally displaced fracture through the distal fibula at and just above the level the ankle mortise. No dislocation. No other fracture.       Medications   oxyCODONE (ROXICODONE) tablet 5 mg (has no administration in time range)       Assessments & Plan (with Medical Decision Making)   26-year-old female who presents for evaluation of left ankle pain after a fall while ice skating.  Temperature is 37  C, heart rate 117, SPO2  is 100% on room air.  X-ray of the ankle obtained, images reviewed independently as well as radiology read reviewed, positive for mildly displaced fracture through the distal fibula with normal joint spaces.  The patient was placed in a splint as above and was discharged with a short course of oxycodone and instructions to return if worse, otherwise follow-up in orthopedic surgery clinic.  The patient is in agreement to this plan.    I have reviewed the nursing notes.    I have reviewed the findings, diagnosis, plan and need for follow up with the patient.       Discharge Medication List as of 3/28/2021 12:16 AM      START taking these medications    Details   !! oxyCODONE (ROXICODONE) 5 MG tablet Take 1 tablet (5 mg) by mouth every 6 hours as needed for moderate to severe pain, Disp-6 tablet, R-0, E-Prescribe      !! oxyCODONE (ROXICODONE) 5 MG tablet Take 1 tablet (5 mg) by mouth every 6 hours as needed for moderate to severe pain, Disp-6 tablet, R-0, Local Print       !! - Potential duplicate medications found. Please discuss with provider.          Final diagnoses:   Closed fracture of distal end of left fibula, unspecified fracture morphology, initial encounter       3/27/2021   [facility] EMERGENCY DEPT     Joel Deras MD  03/28/21 0036

## 2021-03-31 ENCOUNTER — OFFICE VISIT (OUTPATIENT)
Dept: PODIATRY | Facility: CLINIC | Age: 26
End: 2021-03-31
Payer: COMMERCIAL

## 2021-03-31 VITALS
HEIGHT: 61 IN | SYSTOLIC BLOOD PRESSURE: 124 MMHG | WEIGHT: 189 LBS | BODY MASS INDEX: 35.68 KG/M2 | DIASTOLIC BLOOD PRESSURE: 82 MMHG | HEART RATE: 81 BPM

## 2021-03-31 DIAGNOSIS — S82.832A CLOSED FRACTURE OF DISTAL END OF LEFT FIBULA, UNSPECIFIED FRACTURE MORPHOLOGY, INITIAL ENCOUNTER: ICD-10-CM

## 2021-03-31 PROCEDURE — 99203 OFFICE O/P NEW LOW 30 MIN: CPT | Performed by: PODIATRIST

## 2021-03-31 RX ORDER — OXYCODONE HYDROCHLORIDE 5 MG/1
5 TABLET ORAL EVERY 6 HOURS PRN
Qty: 6 TABLET | Refills: 0 | Status: SHIPPED | OUTPATIENT
Start: 2021-03-31 | End: 2021-04-19

## 2021-03-31 ASSESSMENT — MIFFLIN-ST. JEOR: SCORE: 1534.68

## 2021-03-31 ASSESSMENT — PAIN SCALES - GENERAL: PAINLEVEL: SEVERE PAIN (6)

## 2021-03-31 NOTE — PATIENT INSTRUCTIONS
Ankle Fractures  What Is an Ankle Fracture?  A fracture is a partial or complete break in a bone. Fractures in the ankle can range from the less serious avulsion injuries (small pieces of bone that have been pulled off) to severe shattering-type breaks of the tibia, fibula, or both.  Ankle fractures are common injuries that are most often caused by the ankle rolling inward or outward. Many people mistake an ankle fracture for an ankle sprain, but they are quite different and therefore require an accurate and early diagnosis. They sometimes occur simultaneously.  Symptoms  An ankle fracture is accompanied by one or all of these symptoms:  Pain at the site of the fracture, which in some cases can extend from the foot to the knee   Significant swelling, which may occur along the length of the leg or may be more localized   Blisters may occur over the fracture site. These should be promptly treated by a foot and ankle surgeon.   Bruising that develops soon after the injury   Inability to walk--however, it is possible to walk with less severe breaks, so never rely on walking as a test of whether a bone has been fractured   Change in the appearance of the ankle - it will look different from the other ankle   Bone protruding through the skin--a sign that immediate care is needed. Fractures that fuentes the skin require immediate attention because they can lead to severe infection and prolonged recovery.  Diagnosis  Following an ankle injury it is important to have the ankle evaluated by a foot and ankle surgeon for proper diagnosis and treatment. If you are unable to do so right away, go to the emergency room and then follow up with a foot and ankle surgeon as soon as possible for a more thorough assessment.  The affected limb will be examined by the foot and ankle surgeon by touching specific areas to evaluate the injury. In addition, the surgeon may order x-rays and other imaging studies, as necessary.  Non-Surgical  Treatment  Treatment of ankle fractures depends upon the type and severity of the injury. At first, the foot and ankle surgeon will want you to follow the R.I.C.E. protocol:  Rest: Stay off the injured ankle. Walking may cause further injury.   Ice: Apply an ice pack to the injured area, placing a thin towel between the ice and the skin. Use ice for 20 minutes and then wait at least 40 minutes before icing again.   Compression: An elastic wrap should be used to control swelling.   Elevation: The ankle should be raised slightly above the level of your heart to reduce swelling.  Additional treatment options include:  Immobilization. Certain fractures are treated by protecting and restricting the ankle and foot in a cast or splint. This allows the bone to heal.   Prescription medications. To help relieve the pain, the surgeon may prescribe pain medications or anti-inflammatory drugs.  When is Surgery Needed?  For some ankle fractures, surgery is needed to repair the fracture and other soft tissue related injuries, if present. The foot and ankle surgeon will select the procedure that is appropriate for your injury.  Follow-up Care  It is important to follow your surgeon s instructions after treatment. Failure to do so can lead to infection, deformity, arthritis, and chronic pain.

## 2021-03-31 NOTE — NURSING NOTE
"Chief Complaint   Patient presents with     Fracture     XR 3/27, \"Fell ice skating\" left foot       Initial /82   Pulse 81   Ht 1.549 m (5' 1\")   Wt 85.7 kg (189 lb)   BMI 35.71 kg/m   Estimated body mass index is 35.71 kg/m  as calculated from the following:    Height as of this encounter: 1.549 m (5' 1\").    Weight as of this encounter: 85.7 kg (189 lb).  Medications and allergies reviewed.      Sangeeta VIVEROS MA    "

## 2021-03-31 NOTE — LETTER
March 31, 2021      Nargis Ruelas  [address]  [place] [address]        To Whom It May Concern:    Nargis Ruelas was seen in our clinic. She will not be able to return to work for one month due to weight bearing restrictions.  The patient will return in one month for reevaluation and repeat x-rays.  An updated workability will be made then.      Sincerely,        Narendra Watts DPM

## 2021-03-31 NOTE — LETTER
3/31/2021         RE: Nargis Ruelas  [address]  [place] [address]        Dear Colleague,    Thank you for referring your patient, Nargis Ruelas, to the [facility] [place]. Please see a copy of my visit note below.    PATIENT HISTORY:  Nargis Ruelas is a 26 year old female who presents with a chief complaint of a painful left ankle.  The patient relates injuring the left ankle on  while skating.  The patient states that she twisted the left ankle.  The patient relates pain with weight bearing to the left.   The patient relates being seen and treated with ice, elevation, and nonweightbearing with crutches.  The patient denies any numbness to the toes on the left foot.    REVIEW OF SYSTEMS:  Constitutional, HEENT, cardiovascular, pulmonary, GI, , musculoskeletal, neuro, skin, endocrine and psych systems are negative, except as otherwise noted.     PAST MEDICAL HISTORY:   Past Medical History:   Diagnosis Date     Cephalopelvic disproportion due to mixed maternal and fetal factors 2020     GDM, class A1 3/25/2020     Tonsillitis         PAST SURGICAL HISTORY:   Past Surgical History:   Procedure Laterality Date      SECTION N/A 2020    Procedure:  SECTION;  Surgeon: Jaqueline Reyes MD;  Location: [facility] OR     MOUTH SURGERY      wisdom teeth     TONSILLECTOMY          MEDICATIONS:   Current Outpatient Medications:      amphetamine-dextroamphetamine (ADDERALL) 10 MG tablet, Take 1 tablet (10 mg) by mouth 2 times daily, Disp: 60 tablet, Rfl: 0     [START ON 2021] amphetamine-dextroamphetamine (ADDERALL) 10 MG tablet, Take 1 tablet (10 mg) by mouth 2 times daily, Disp: 60 tablet, Rfl: 0     [START ON 2021] amphetamine-dextroamphetamine (ADDERALL) 10 MG tablet, Take 1 tablet (10 mg) by mouth 2 times daily, Disp: 60 tablet, Rfl: 0     busPIRone (BUSPAR) 10 MG tablet, Take 1 tablet (10 mg) by mouth 2 times daily, Disp: 180 tablet,  Rfl: 1     famotidine (PEPCID) 20 MG tablet, Take 1 tablet (20 mg) by mouth 2 times daily, Disp: 180 tablet, Rfl: 1     omeprazole (PRILOSEC) 20 MG DR capsule, Take 1 capsule (20 mg) by mouth daily, Disp: 90 capsule, Rfl: 1     oxyCODONE (ROXICODONE) 5 MG tablet, Take 1 tablet (5 mg) by mouth every 6 hours as needed for moderate to severe pain, Disp: 6 tablet, Rfl: 0     oxyCODONE (ROXICODONE) 5 MG tablet, Take 1 tablet (5 mg) by mouth every 6 hours as needed for moderate to severe pain, Disp: 6 tablet, Rfl: 0     venlafaxine (EFFEXOR-XR) 75 MG 24 hr capsule, Take 1 capsule (75 mg) by mouth daily, Disp: 90 capsule, Rfl: 1     albuterol (PROAIR HFA/PROVENTIL HFA/VENTOLIN HFA) 108 (90 Base) MCG/ACT inhaler, Inhale 2 puffs into the lungs every 6 hours (Patient not taking: Reported on 8/28/2020), Disp: 1 Inhaler, Rfl: 0     LORazepam (ATIVAN) 0.5 MG tablet, Take 1 tablet (0.5 mg) by mouth every 6 hours as needed for anxiety (Patient not taking: Reported on 3/22/2021), Disp: 10 tablet, Rfl: 0    Current Facility-Administered Medications:      medroxyPROGESTERone (DEPO-PROVERA) injection 150 mg, 150 mg, Intramuscular, Q90 Days, Jaqueline Reyes MD, 150 mg at 01/26/21 1139     ALLERGIES:  No Known Allergies     SOCIAL HISTORY:   Social History     Socioeconomic History     Marital status: Single     Spouse name: Not on file     Number of children: Not on file     Years of education: Not on file     Highest education level: Not on file   Occupational History     Not on file   Social Needs     Financial resource strain: Not on file     Food insecurity     Worry: Not on file     Inability: Not on file     Transportation needs     Medical: Not on file     Non-medical: Not on file   Tobacco Use     Smoking status: Former Smoker     Packs/day: 0.20     Smokeless tobacco: Never Used     Tobacco comment: quit with pregnancy   Substance and Sexual Activity     Alcohol use: Not Currently     Alcohol/week: 0.0 standard  "drinks     Comment: 4 drinks per month-quit with pregnancy     Drug use: No     Sexual activity: Yes     Partners: Male   Lifestyle     Physical activity     Days per week: Not on file     Minutes per session: Not on file     Stress: Not on file   Relationships     Social connections     Talks on phone: Not on file     Gets together: Not on file     Attends Spiritism service: Not on file     Active member of club or organization: Not on file     Attends meetings of clubs or organizations: Not on file     Relationship status: Not on file     Intimate partner violence     Fear of current or ex partner: Not on file     Emotionally abused: Not on file     Physically abused: Not on file     Forced sexual activity: Not on file   Other Topics Concern     Parent/sibling w/ CABG, MI or angioplasty before 65F 55M? No   Social History Narrative    ** Merged History Encounter **             FAMILY HISTORY:   Family History   Problem Relation Age of Onset     Diabetes Maternal Grandfather      Coronary Artery Disease Maternal Grandfather      Cerebrovascular Disease Maternal Grandfather      Depression Mother      Anxiety Disorder Mother      Unknown/Adopted Father         unknown      Chronic Obstructive Pulmonary Disease Maternal Grandmother      Coronary Artery Disease Daughter      Other Cancer No family hx of         EXAM:Vitals: /82   Pulse 81   Ht 1.549 m (5' 1\")   Wt 85.7 kg (189 lb)   BMI 35.71 kg/m    BMI= Body mass index is 35.71 kg/m .     General appearance: Patient is alert and fully cooperative with history & exam.  No sign of distress is noted during the visit.     Psychiatric: Affect is pleasant & appropriate.  Patient appears motivated to improve health.     Respiratory: Breathing is regular & unlabored while sitting.     HEENT: Hearing is intact to spoken word.  Speech is clear.  No gross evidence of visual impairment that would impact ambulation.     Dermatologic: Skin is intact with no laceration " for fracture blisters.        Vascular: DP & PT pulses are difficult to palpate due to the edema.  CFT and skin temperature is normal to both lower extremities.     Neurologic: Lower extremity sensation is intact to light touch.  No evidence of weakness or contracture in the lower extremities.        Musculoskeletal: One notes decreased ankle joint ROM due to swelling and pain on the left.  Point of maximum tenderness located over the  lateral aspect of the left ankle.  One notes no pain with palpation over the medial deltoid ligament on the left.  Moderate edema noted.  Positive ecchymosis noted.      Radiograph evaluation including AP, lateral and mortise views of the left ankle reveals a spiral oblique fracture of the lateral malleolus extending at the level of the ankle mortise proximally and posteriorly.  One notes a displaced transverse medial malleolar fracture.     ASSESSMENT / PLAN:     ICD-10-CM    1. Closed fracture of distal end of left fibula, unspecified fracture morphology, initial encounter  S82.832A Orthopedic & Spine  Referral       I have explained to Nargis  about the conditions.  We discussed the nature of the condition as well as the treatment plan and expected length of recovery.  At this point, there is no indication for surgical intervention at this time.  The patient will continue nonweightbearing with the use of a knee scooter in a cam boot.   The patient was prescribed Percocet and Narcan for pain management of the fracture.  The patient will return in 4 weeks for reevaluation repeat x-rays.    Nargis verbalized agreement with and understanding of the rational for the diagnosis and treatment plan.  All questions were answered to best of my ability and the patient's satisfaction. The patient was advised to contact the clinic with any questions that may arise after the clinic visit.      Disclaimer: This note consists of symbols derived from keyboarding, dictation and/or voice  recognition software. As a result, there may be errors in the script that have gone undetected. Please consider this when interpreting information found in this chart.       JACKELINE Ferrara.P.M., F.A.C.F.A.S.        Again, thank you for allowing me to participate in the care of your patient.        Sincerely,        Narendra Watts DPM

## 2021-04-19 ENCOUNTER — VIRTUAL VISIT (OUTPATIENT)
Dept: FAMILY MEDICINE | Facility: CLINIC | Age: 26
End: 2021-04-19
Payer: COMMERCIAL

## 2021-04-19 DIAGNOSIS — F90.0 ATTENTION DEFICIT HYPERACTIVITY DISORDER (ADHD), PREDOMINANTLY INATTENTIVE TYPE: ICD-10-CM

## 2021-04-19 PROCEDURE — 99213 OFFICE O/P EST LOW 20 MIN: CPT | Mod: 95 | Performed by: PHYSICIAN ASSISTANT

## 2021-04-19 RX ORDER — DEXTROAMPHETAMINE SACCHARATE, AMPHETAMINE ASPARTATE, DEXTROAMPHETAMINE SULFATE AND AMPHETAMINE SULFATE 2.5; 2.5; 2.5; 2.5 MG/1; MG/1; MG/1; MG/1
10 TABLET ORAL DAILY
Qty: 30 TABLET | Refills: 0 | Status: SHIPPED | OUTPATIENT
Start: 2021-04-19 | End: 2021-05-07

## 2021-04-19 RX ORDER — DEXTROAMPHETAMINE SACCHARATE, AMPHETAMINE ASPARTATE MONOHYDRATE, DEXTROAMPHETAMINE SULFATE AND AMPHETAMINE SULFATE 5; 5; 5; 5 MG/1; MG/1; MG/1; MG/1
20 CAPSULE, EXTENDED RELEASE ORAL DAILY
Qty: 30 CAPSULE | Refills: 0 | Status: SHIPPED | OUTPATIENT
Start: 2021-04-19 | End: 2021-05-18

## 2021-04-19 NOTE — PATIENT INSTRUCTIONS
Have the pharmacy check your blood pressure and pulse  Change dosing: adderall XR 20 mg in the morning + adderall IR 10 mg in the afternoon  Let me know in 2 weeks if not improving, otherwise let's talk again in 1 month or so

## 2021-04-19 NOTE — PROGRESS NOTES
Nargis is a 26 year old who is being evaluated via a billable telephone visit.      What phone number would you like to be contacted at? [phone]  How would you like to obtain your AVS? Sharifa    Assessment & Plan     ASSESSMENT/PLAN:      ICD-10-CM    1. Attention deficit hyperactivity disorder (ADHD), predominantly inattentive type  F90.0 amphetamine-dextroamphetamine (ADDERALL XR) 20 MG 24 hr capsule     amphetamine-dextroamphetamine (ADDERALL) 10 MG tablet       Patient Instructions   Have the pharmacy check your blood pressure and pulse  Change dosing: adderall XR 20 mg in the morning + adderall IR 10 mg in the afternoon  Let me know in 2 weeks if not improving, otherwise let's talk again in 1 month or so      Return in about 1 month (around 5/19/2021).    Carol Jennings PA-C  [facility]   Nargis is a 26 year old who presents for the following health issues     HPI   ADHD Follow-Up (Adult)  Concerns: Discuss increasing medication  Changes since last visit: some improvement, feels like dose is not strong enoiugh  Taking controlled (daily) medications as prescribed: Yes  Sleep: no problems  Adult ADHD Self-Reporting form given to patient?:  No    Medication Side Effects:  Reports:  none  Sleep Problems? no  ++++++++++++++++++++++++++++++++++++++++++++++++    She feels the dose helps. Still getting off track, not as clear thinking  Like it helps then wears off within a couple         Review of Systems   Other than noted above, general, HEENT, respiratory, cardiac, MS, and gastrointestinal systems are negative.       Objective           Vitals:  No vitals were obtained today due to virtual visit.    Physical Exam   healthy, alert and no distress  PSYCH: Alert and oriented times 3; coherent speech, normal   rate and volume, able to articulate logical thoughts, able   to abstract reason, no tangential thoughts, no hallucinations   or delusions  Her affect is normal  RESP: No  cough, no audible wheezing, able to talk in full sentences  Remainder of exam unable to be completed due to telephone visits        Phone call duration: 8 minutes

## 2021-04-22 ENCOUNTER — OFFICE VISIT (OUTPATIENT)
Dept: FAMILY MEDICINE | Facility: CLINIC | Age: 26
End: 2021-04-22
Payer: COMMERCIAL

## 2021-04-22 VITALS
TEMPERATURE: 99.3 F | HEIGHT: 61 IN | WEIGHT: 186 LBS | OXYGEN SATURATION: 98 % | HEART RATE: 110 BPM | BODY MASS INDEX: 35.12 KG/M2 | RESPIRATION RATE: 16 BRPM | DIASTOLIC BLOOD PRESSURE: 88 MMHG | SYSTOLIC BLOOD PRESSURE: 138 MMHG

## 2021-04-22 DIAGNOSIS — Z13.29 SCREENING FOR THYROID DISORDER: Primary | ICD-10-CM

## 2021-04-22 DIAGNOSIS — R63.5 ABNORMAL WEIGHT GAIN: ICD-10-CM

## 2021-04-22 DIAGNOSIS — Z30.09 GENERAL COUNSELING FOR PRESCRIPTION OF ORAL CONTRACEPTIVES: ICD-10-CM

## 2021-04-22 LAB — TSH SERPL DL<=0.005 MIU/L-ACNC: 0.96 MU/L (ref 0.4–4)

## 2021-04-22 PROCEDURE — 99213 OFFICE O/P EST LOW 20 MIN: CPT | Performed by: NURSE PRACTITIONER

## 2021-04-22 PROCEDURE — 84443 ASSAY THYROID STIM HORMONE: CPT | Performed by: NURSE PRACTITIONER

## 2021-04-22 PROCEDURE — 36415 COLL VENOUS BLD VENIPUNCTURE: CPT | Performed by: NURSE PRACTITIONER

## 2021-04-22 PROCEDURE — 86376 MICROSOMAL ANTIBODY EACH: CPT | Performed by: NURSE PRACTITIONER

## 2021-04-22 RX ORDER — NORETHINDRONE ACETATE AND ETHINYL ESTRADIOL .02; 1 MG/1; MG/1
1 TABLET ORAL DAILY
Qty: 84 TABLET | Refills: 4 | Status: SHIPPED | OUTPATIENT
Start: 2021-04-22 | End: 2022-05-06

## 2021-04-22 ASSESSMENT — MIFFLIN-ST. JEOR: SCORE: 1521.07

## 2021-04-22 NOTE — PROGRESS NOTES
"    Assessment & Plan     Screening for thyroid disorder    - TSH with free T4 reflex  - Thyroid peroxidase antibody    Abnormal weight gain    - TSH with free T4 reflex  - Thyroid peroxidase antibody    General counseling for prescription of oral contraceptives    - norethindrone-ethinyl estradiol (MICROGESTIN 1/20) 1-20 MG-MCG tablet; Take 1 tablet by mouth daily             BMI:   Estimated body mass index is 35.14 kg/m  as calculated from the following:    Height as of this encounter: 1.549 m (5' 1\").    Weight as of this encounter: 84.4 kg (186 lb).   Weight management plan: Discussed healthy diet and exercise guidelines    FUTURE APPOINTMENTS:       - Follow-up for annual visit or as needed.    Patient Instructions     Patient Education     Birth Control Methods  Birth control methods are used to help prevent pregnancy. There are many different methods to choose from. Talk with your healthcare provider about which method is right for you. Be sure to ask your provider how well each one works. Also ask about the benefits, risks, and side effects of each method.   Hormones  Some birth control methods work by releasing hormones such as progestin and estrogen. These methods include hormone implants, hormone shots, the vaginal ring, the patch, and birth control pills. They all work by stopping the release of the egg from the ovary (ovulation). All of these methods work well and can be stopped at any time.     The implant is a small device that needs to be placed in the upper arm by a trained healthcare provider. It works for up to 3 years.    Hormone injections must be repeated every 3 months.    The vaginal ring must be replaced monthly. It can be removed during the fourth week of each cycle.    The patch must be replaced weekly. It's not worn during the fourth week of each cycle.    Birth control pills must be taken every day.  Intrauterine device (IUD)  An IUD is a small, T-shaped device. It must be placed in the " uterus by a trained healthcare provider. There are different types of IUDs available. They work by causing changes in the uterus that make it harder for sperm to reach the egg. Depending on the type of IUD you have, it may work for several years or longer. The IUD is a reversible birth control method. This means it can be removed at any time.   Condom  A condom is a sheath that forms a thin barrier between the penis and the vagina. It helps prevent pregnancy by keeping sperm from entering the vagina. When latex condoms are used, they have the added benefit of protecting against most STIs (sexually transmitted infections). Condoms are used each time there is sexual intercourse and should be discarded after each use. Ask your healthcare provider about the different types of condoms available. These include both the male condom and female condom.   Spermicide  Spermicides come as foams, jellies, creams, suppositories, and tablets.  They help prevent pregnancy by killing sperm. When used alone they are not that reliable. They work best when combined with other birth control methods such as diaphragms and cervical caps.   Sponge, diaphragm, and cervical cap   All of these methods help prevent pregnancy by covering the opening of the uterus (cervix). This prevents sperm from passing through.   The sponge contains spermicide. It can be bought over the counter. The sponge must be left in place for at least 6 hours after the last time you have sex. However, it should not stay in place for more than 24 hours. Discard after use.   The diaphragm and cervical cap must be fitted and prescribed by your healthcare provider. Both are used with spermicide. The diaphragm must be left in place for at least 6 hours after sex. However, it should not stay in place for more than 24 hours. It can be washed and reused. The cervical cap must be left in place for at least 6 hours after sex. However, it should not stay in place for more than 48  hours. It can be washed and reused.   Withdrawal method  This is when the man pulls his penis out of the vagina just before ejaculation ( coming ). This lowers the amount of sperm entering the vagina. Be aware that fluids released just before ejaculation often still contain some sperm, so this method is not as reliable as certain other methods.   Rhythm method   This method is also call natural family planning or fertility awareness. It requires that you know when in your menstrual cycle you are likely to become pregnant. Then you not have sex during those days. This requires careful planning and good discipline. Your healthcare provider can explain more about how this works.   Tubal ligation and vasectomy  These are surgical methods to prevent pregnancy. Tubal ligation is an option for women. The fallopian tubes are blocked or cut (ligated). This keeps the egg from passing into the uterus or sperm from reaching the egg. Vasectomy is an option for men. The tubes that normally carry sperm to the penis are either closed or blocked. Both tubal ligation and vasectomy are permanent birth control methods. This means reversal is either not possible or unlikely to work. They are good choices for women and men who know that they don't want to have children in the future.   [employer] last reviewed this educational content on 7/1/2020 2000-2021 [employer]. All rights reserved. This information is not intended as a substitute for professional medical care. Always follow your healthcare professional's instructions.           Patient Education     Birth Control: The Pill    Birth control pills contain hormones that help prevent pregnancy. The pills are prescribed by your healthcare provider. There are many types of birth control pills available. If you have side effects from one type of pill, tell your healthcare provider. He or she may be able to prescribe a pill that works better for you.  Pregnancy rates  Talk  to your healthcare provider about the effectiveness of this birth control method.  Using the pill    Take one pill daily. Take it at around the same time each day.    Follow your healthcare provider s guidelines on when to start your first pack of pills. You may need to use another form of birth control for a week or more after you start.    Know what to do if you forget to take a pill. (Consult your healthcare provider or check the package.) If you miss more than one pill, you may need to use a backup method of birth control for a week or more.    Pros    Low pregnancy rate    No interruption to sex    Easy to use    Can help make periods more regular    May lower your risk of ovarian cysts and certain cancers    May decrease menstrual cramps, menstrual flow, and acne    Cons    Does not protect against sexually transmitted infections (STIs)    Requires taking a pill on time each day    May not work as well when taken with certain other medicines (check with your pharmacist)    May cause side effects such as nausea, irregular bleeding, headaches, breast tenderness, fatigue, or mood changes (these often go away within 3 months)    May increase the risk of blood clots, heart attack, and stroke    The pill may not be for you  The pill may not be for you if:    You are a smoker and over age 35    You have high blood pressure or gallbladder, liver, cerebrovascular or heart disease    You have diabetes, migraines, blood clot in the vein or artery, lupus, depression, certain lipid disorders, or take medicines that interfere with the pill  In these cases, discuss the risks with your healthcare provider.  [employer] last reviewed this educational content on 4/1/2020 2000-2021 [employer]. All rights reserved. This information is not intended as a substitute for professional medical care. Always follow your healthcare professional's instructions.               No follow-ups on file.    Jacklyn Marsh,  "APRN CNP  [facility]    Opal Barillas is a 26 year old who presents for the following health issues     HPI     Chief Complaint   Patient presents with     Contraception     here to discuss contraception options with recent weight gain       ADDITIONAL PROVIDER NOTES:   Has gained an abnormal amount of weight since giving birth 11 months ago and then going on Depo. She has been on Depo in the past. Family hx + for hypothyroidism (mother).     Review of Systems   Constitutional, HEENT, cardiovascular, pulmonary, gi and gu systems are negative, except as otherwise noted.      Objective    /88   Pulse 110   Temp 99.3  F (37.4  C) (Tympanic)   Resp 16   Ht 1.549 m (5' 1\")   Wt 84.4 kg (186 lb)   SpO2 98%   BMI 35.14 kg/m    Body mass index is 35.14 kg/m .  Physical Exam   GENERAL: healthy, alert and no distress  CV: regular rates and rhythm  NEURO: Normal strength and tone, mentation intact and speech normal  PSYCH: mentation appears normal, affect normal/bright            "

## 2021-04-22 NOTE — PATIENT INSTRUCTIONS
Patient Education     Birth Control Methods  Birth control methods are used to help prevent pregnancy. There are many different methods to choose from. Talk with your healthcare provider about which method is right for you. Be sure to ask your provider how well each one works. Also ask about the benefits, risks, and side effects of each method.   Hormones  Some birth control methods work by releasing hormones such as progestin and estrogen. These methods include hormone implants, hormone shots, the vaginal ring, the patch, and birth control pills. They all work by stopping the release of the egg from the ovary (ovulation). All of these methods work well and can be stopped at any time.     The implant is a small device that needs to be placed in the upper arm by a trained healthcare provider. It works for up to 3 years.    Hormone injections must be repeated every 3 months.    The vaginal ring must be replaced monthly. It can be removed during the fourth week of each cycle.    The patch must be replaced weekly. It's not worn during the fourth week of each cycle.    Birth control pills must be taken every day.  Intrauterine device (IUD)  An IUD is a small, T-shaped device. It must be placed in the uterus by a trained healthcare provider. There are different types of IUDs available. They work by causing changes in the uterus that make it harder for sperm to reach the egg. Depending on the type of IUD you have, it may work for several years or longer. The IUD is a reversible birth control method. This means it can be removed at any time.   Condom  A condom is a sheath that forms a thin barrier between the penis and the vagina. It helps prevent pregnancy by keeping sperm from entering the vagina. When latex condoms are used, they have the added benefit of protecting against most STIs (sexually transmitted infections). Condoms are used each time there is sexual intercourse and should be discarded after each use. Ask  your healthcare provider about the different types of condoms available. These include both the male condom and female condom.   Spermicide  Spermicides come as foams, jellies, creams, suppositories, and tablets.  They help prevent pregnancy by killing sperm. When used alone they are not that reliable. They work best when combined with other birth control methods such as diaphragms and cervical caps.   Sponge, diaphragm, and cervical cap   All of these methods help prevent pregnancy by covering the opening of the uterus (cervix). This prevents sperm from passing through.   The sponge contains spermicide. It can be bought over the counter. The sponge must be left in place for at least 6 hours after the last time you have sex. However, it should not stay in place for more than 24 hours. Discard after use.   The diaphragm and cervical cap must be fitted and prescribed by your healthcare provider. Both are used with spermicide. The diaphragm must be left in place for at least 6 hours after sex. However, it should not stay in place for more than 24 hours. It can be washed and reused. The cervical cap must be left in place for at least 6 hours after sex. However, it should not stay in place for more than 48 hours. It can be washed and reused.   Withdrawal method  This is when the man pulls his penis out of the vagina just before ejaculation ( coming ). This lowers the amount of sperm entering the vagina. Be aware that fluids released just before ejaculation often still contain some sperm, so this method is not as reliable as certain other methods.   Rhythm method   This method is also call natural family planning or fertility awareness. It requires that you know when in your menstrual cycle you are likely to become pregnant. Then you not have sex during those days. This requires careful planning and good discipline. Your healthcare provider can explain more about how this works.   Tubal ligation and vasectomy  These are  surgical methods to prevent pregnancy. Tubal ligation is an option for women. The fallopian tubes are blocked or cut (ligated). This keeps the egg from passing into the uterus or sperm from reaching the egg. Vasectomy is an option for men. The tubes that normally carry sperm to the penis are either closed or blocked. Both tubal ligation and vasectomy are permanent birth control methods. This means reversal is either not possible or unlikely to work. They are good choices for women and men who know that they don't want to have children in the future.   [email or web address] last reviewed this educational content on 7/1/2020 2000-2021 [employer]. All rights reserved. This information is not intended as a substitute for professional medical care. Always follow your healthcare professional's instructions.           Patient Education     Birth Control: The Pill    Birth control pills contain hormones that help prevent pregnancy. The pills are prescribed by your healthcare provider. There are many types of birth control pills available. If you have side effects from one type of pill, tell your healthcare provider. He or she may be able to prescribe a pill that works better for you.  Pregnancy rates  Talk to your healthcare provider about the effectiveness of this birth control method.  Using the pill    Take one pill daily. Take it at around the same time each day.    Follow your healthcare provider s guidelines on when to start your first pack of pills. You may need to use another form of birth control for a week or more after you start.    Know what to do if you forget to take a pill. (Consult your healthcare provider or check the package.) If you miss more than one pill, you may need to use a backup method of birth control for a week or more.    Pros    Low pregnancy rate    No interruption to sex    Easy to use    Can help make periods more regular    May lower your risk of ovarian cysts and certain  cancers    May decrease menstrual cramps, menstrual flow, and acne    Cons    Does not protect against sexually transmitted infections (STIs)    Requires taking a pill on time each day    May not work as well when taken with certain other medicines (check with your pharmacist)    May cause side effects such as nausea, irregular bleeding, headaches, breast tenderness, fatigue, or mood changes (these often go away within 3 months)    May increase the risk of blood clots, heart attack, and stroke    The pill may not be for you  The pill may not be for you if:    You are a smoker and over age 35    You have high blood pressure or gallbladder, liver, cerebrovascular or heart disease    You have diabetes, migraines, blood clot in the vein or artery, lupus, depression, certain lipid disorders, or take medicines that interfere with the pill  In these cases, discuss the risks with your healthcare provider.  [employer] last reviewed this educational content on 4/1/2020 2000-2021 [employer]. All rights reserved. This information is not intended as a substitute for professional medical care. Always follow your healthcare professional's instructions.

## 2021-04-23 LAB — THYROPEROXIDASE AB SERPL-ACNC: <10 IU/ML

## 2021-04-23 NOTE — RESULT ENCOUNTER NOTE
Felisa Barillas    Your thyroid results came back within normal limits- both the hormone level and the antibody. Please let us know if you have any questions.     Take care,    SUDHEER Guadalupe CNP

## 2021-04-28 ENCOUNTER — ANCILLARY PROCEDURE (OUTPATIENT)
Dept: GENERAL RADIOLOGY | Facility: CLINIC | Age: 26
End: 2021-04-28
Attending: PODIATRIST
Payer: COMMERCIAL

## 2021-04-28 ENCOUNTER — OFFICE VISIT (OUTPATIENT)
Dept: PODIATRY | Facility: CLINIC | Age: 26
End: 2021-04-28
Payer: COMMERCIAL

## 2021-04-28 VITALS
HEIGHT: 61 IN | DIASTOLIC BLOOD PRESSURE: 89 MMHG | SYSTOLIC BLOOD PRESSURE: 135 MMHG | BODY MASS INDEX: 35.12 KG/M2 | WEIGHT: 186 LBS | HEART RATE: 112 BPM

## 2021-04-28 DIAGNOSIS — S82.832D CLOSED FRACTURE OF DISTAL END OF LEFT FIBULA WITH ROUTINE HEALING, UNSPECIFIED FRACTURE MORPHOLOGY, SUBSEQUENT ENCOUNTER: ICD-10-CM

## 2021-04-28 DIAGNOSIS — S82.832D CLOSED FRACTURE OF DISTAL END OF LEFT FIBULA WITH ROUTINE HEALING, UNSPECIFIED FRACTURE MORPHOLOGY, SUBSEQUENT ENCOUNTER: Primary | ICD-10-CM

## 2021-04-28 PROCEDURE — 73610 X-RAY EXAM OF ANKLE: CPT | Mod: LT | Performed by: RADIOLOGY

## 2021-04-28 PROCEDURE — 99213 OFFICE O/P EST LOW 20 MIN: CPT | Performed by: PODIATRIST

## 2021-04-28 ASSESSMENT — MIFFLIN-ST. JEOR: SCORE: 1521.07

## 2021-04-28 NOTE — PATIENT INSTRUCTIONS
Next Steps:      1. Support:  a. Wear supportive shoes, sandals, boots and/or inserts that have a rigid supportive sole.    i. This will offload the majority of tension forces that travel through your feet every step you take.    1. [employer] Max Cushioning Elite/Premier   2. [employer] Relax Fit D'Lux Walker  3. Superfeet inserts (www.superfeet.com)  b. It is important that you also wear supportive shoe wear in the house to continue providing support to your feet.    c. You may always use a cushioned liner for your shoes if that makes your feet feel better.  2. Stretching  a. Calf stretching is essential to offload the tension forces that travel through your feet every step you take  b. Preferred calf stretch is the Runner's Stretch  i. Place one foot behind the other foot, flat against the ground (it is important to keep the heel on the ground).  The back leg is the one that will be stretched.  1. Start with the knee straight and lean your hips into the wall, counter or whatever you are leaning into - count to ten.  2. Next, bend the knee.  You should feel the stretch lower in the calf muscle - count to ten.  c. Repeat this stretch once an hour to start off with.  When symptoms subside, I recommend performing the stretch 3 times daily to prevent any future problems.                3. Tissue Massage  a. It is important that you physically loosen the inflammation tissue to help your body heal the injured tissue.  b. I recommend soaking your foot in warm water to increase the microcirculation to the soft tissues.  You may add Epson salt to the water if you prefer.  c. You may apply an over-the-counter muscle rub, such as Voltaren gel, and deeply massage the injured tissue.  4. Reduce Inflammation  a. You can ice the injured tissue with an ice pack with a light cloth covering or soaking in ice water 20 minutes to reduce any acute inflammation, typically at the end of the day.  b. If tolerated, you may take a  Non-Steroidal Antiinflammatory medication (NSAID), such as Advil or Aleve, to help reduce the inflammation tissue.  This can help the overall healing of the injured tissue.  i. It is important to take food with any NSAID medication as the most common side effect is stomach irritation.  If you encounter any problems when taking NSAID, it is recommended that you stop taking the medication and notify your provider.    It is important to understand that most problems that develop in the foot and ankle are caused by excessive tension that cause microinjury to the soft tissues and inflammation in the foot and ankle.  By addressing the underlying causes with support and stretching as well as treating the current inflammatory conditions with tissue massage and anti-inflammatory treatments, most foot and ankle musculoskeletal conditions will resolve.  This may take time to heal.  However, if symptoms persist past 4 weeks you should return to the office for reevaluation to determine further treatment options.

## 2021-04-28 NOTE — NURSING NOTE
"Chief Complaint   Patient presents with     Fracture Followup     Closed fracture of distal end of left fibula, unspecified fracture morphology \"soreness around ankle with motion\"       Initial /89   Pulse 112   Ht 1.549 m (5' 1\")   Wt 84.4 kg (186 lb)   BMI 35.14 kg/m   Estimated body mass index is 35.14 kg/m  as calculated from the following:    Height as of this encounter: 1.549 m (5' 1\").    Weight as of this encounter: 84.4 kg (186 lb).  Medications and allergies reviewed.      Sangeeta VIVEROS MA    "

## 2021-04-28 NOTE — LETTER
"    4/28/2021         RE: Nargis Ruelas  [address]  [facility] [address]        Dear Colleague,    Thank you for referring your patient, Nargis Ruelas, to the [facility]. Please see a copy of my visit note below.    Nargis returns to the office for reevaluation of the left ankle.  The patient relates following the instructions given at the last visit with noted overall less pain and more improvement in function of the left foot.   The patient relates no other problems.    Vitals: /89   Pulse 112   Ht 1.549 m (5' 1\")   Wt 84.4 kg (186 lb)   BMI 35.14 kg/m    BMI= Body mass index is 35.14 kg/m .    Lower Extremity Physical Exam:      Neurovascular status remains unchanged.  Muscular exam is within normal limits to major muscle groups.  Integument is intact.      Noted decreased pain on palpation over the lateral malleolus fracture on the left.  No surrounding erythema or edema noted.  Noted improved ankle range of motion on the left with decreased stiffness.    Diagnostics:  Radiograph evaluation including AP, lateral and mortise views of the left ankle reveals interval healing with increased trabeculation of the lateral malleolus fracture.  I personally evaluated the images as well as reviewed the images with the patient pointing out the findings.      Assessment:     ICD-10-CM    1. Closed fracture of distal end of left fibula with routine healing, unspecified fracture morphology, subsequent encounter  S82.832D XR Ankle Left G/E 3 Views       Plan:    I have explained to Nargis about the progress of the conditions.  At this time, the patient may increase activity with use of  supportive ankle brace and  shoe.  The patient will return in 1 month for reevaluation and repeat x-rays.    Nargis verbalized agreement with and understanding of the rational for the diagnosis and treatment plan.  All questions were answered to best of my ability and the patient's " satisfaction. The patient was advised to contact the clinic with any questions that may arise after the clinic visit.      Disclaimer: This note consists of symbols derived from keyboarding, dictation and/or voice recognition software. As a result, there may be errors in the script that have gone undetected. Please consider this when interpreting information found in this chart.       RHIANNON Watts D.P.M., F.LEDA.F.A.S.        Again, thank you for allowing me to participate in the care of your patient.        Sincerely,        Narendra Watts DPM     stable

## 2021-04-28 NOTE — PROGRESS NOTES
"Nargis returns to the office for reevaluation of the left ankle.  The patient relates following the instructions given at the last visit with noted overall less pain and more improvement in function of the left foot.   The patient relates no other problems.    Vitals: /89   Pulse 112   Ht 1.549 m (5' 1\")   Wt 84.4 kg (186 lb)   BMI 35.14 kg/m    BMI= Body mass index is 35.14 kg/m .    Lower Extremity Physical Exam:      Neurovascular status remains unchanged.  Muscular exam is within normal limits to major muscle groups.  Integument is intact.      Noted decreased pain on palpation over the lateral malleolus fracture on the left.  No surrounding erythema or edema noted.  Noted improved ankle range of motion on the left with decreased stiffness.    Diagnostics:  Radiograph evaluation including AP, lateral and mortise views of the left ankle reveals interval healing with increased trabeculation of the lateral malleolus fracture.  I personally evaluated the images as well as reviewed the images with the patient pointing out the findings.      Assessment:     ICD-10-CM    1. Closed fracture of distal end of left fibula with routine healing, unspecified fracture morphology, subsequent encounter  S82.832D XR Ankle Left G/E 3 Views       Plan:    I have explained to Nargis about the progress of the conditions.  At this time, the patient may increase activity with use of  supportive ankle brace and  shoe.  The patient will return in 1 month for reevaluation and repeat x-rays.    Nargis verbalized agreement with and understanding of the rational for the diagnosis and treatment plan.  All questions were answered to best of my ability and the patient's satisfaction. The patient was advised to contact the clinic with any questions that may arise after the clinic visit.      Disclaimer: This note consists of symbols derived from keyboarding, dictation and/or voice recognition software. As a result, there may be " errors in the script that have gone undetected. Please consider this when interpreting information found in this chart.       RHIANNON Watts D.P.M., [email or web address].

## 2021-05-26 ENCOUNTER — ANCILLARY PROCEDURE (OUTPATIENT)
Dept: GENERAL RADIOLOGY | Facility: CLINIC | Age: 26
End: 2021-05-26
Attending: PODIATRIST
Payer: COMMERCIAL

## 2021-05-26 ENCOUNTER — OFFICE VISIT (OUTPATIENT)
Dept: PODIATRY | Facility: CLINIC | Age: 26
End: 2021-05-26
Payer: COMMERCIAL

## 2021-05-26 VITALS
BODY MASS INDEX: 35.12 KG/M2 | HEIGHT: 61 IN | SYSTOLIC BLOOD PRESSURE: 126 MMHG | HEART RATE: 71 BPM | WEIGHT: 186 LBS | DIASTOLIC BLOOD PRESSURE: 88 MMHG

## 2021-05-26 DIAGNOSIS — S82.832D CLOSED FRACTURE OF DISTAL END OF LEFT FIBULA WITH ROUTINE HEALING, UNSPECIFIED FRACTURE MORPHOLOGY, SUBSEQUENT ENCOUNTER: Primary | ICD-10-CM

## 2021-05-26 PROCEDURE — 73610 X-RAY EXAM OF ANKLE: CPT | Mod: LT | Performed by: RADIOLOGY

## 2021-05-26 PROCEDURE — 99213 OFFICE O/P EST LOW 20 MIN: CPT | Performed by: PODIATRIST

## 2021-05-26 ASSESSMENT — MIFFLIN-ST. JEOR: SCORE: 1521.07

## 2021-05-26 NOTE — LETTER
"    5/26/2021         RE: Nargis Ruelas  [address]  [facility] [address]        Dear Colleague,    Thank you for referring your patient, Nargis Ruelas, to the [facility]. Please see a copy of my visit note below.    Nargis returns to the office for reevaluation of the left ankle.  The patient relates following the instructions given at the last visit with noted overall less pain and more improvement in function of the left ankle.   The patient relates no other problems.    Vitals: /88   Pulse 71   Ht 1.549 m (5' 1\")   Wt 84.4 kg (186 lb)   BMI 35.14 kg/m    BMI= Body mass index is 35.14 kg/m .    Lower Extremity Physical Exam:      Neurovascular status remains unchanged.  Muscular exam is within normal limits to major muscle groups.  Integument is intact.      Noted no pain on palpation over the lateral malleolus on the left ankle    Diagnostics:  Radiograph evaluation including three views of the left ankle reveals interval healing with increased trabeculation of the lateral malleolus fracture.  I personally evaluated the images as well as reviewed the images with the patient pointing out the findings.      Assessment:     ICD-10-CM    1. Closed fracture of distal end of left fibula with routine healing, unspecified fracture morphology, subsequent encounter  S82.832D XR Ankle Left G/E 3 Views       Plan:    I have explained to Nargis about the progress of the conditions.  At this time, the patient was instructed to move out of the boot and into supportive shoes.  The patient was fitted with a Trilok ankle brace for added ankle support.  The patient was instructed to return to the office if any problems arise.    Narigs verbalized agreement with and understanding of the rational for the diagnosis and treatment plan.  All questions were answered to best of my ability and the patient's satisfaction. The patient was advised to contact the clinic with any " questions that may arise after the clinic visit.      Disclaimer: This note consists of symbols derived from keyboarding, dictation and/or voice recognition software. As a result, there may be errors in the script that have gone undetected. Please consider this when interpreting information found in this chart.       RHIANNON Watts D.P.M., F.LEDA.F.A.S.        Again, thank you for allowing me to participate in the care of your patient.        Sincerely,        Narendra Watts DPM

## 2021-05-26 NOTE — PROGRESS NOTES
"Nargis returns to the office for reevaluation of the left ankle.  The patient relates following the instructions given at the last visit with noted overall less pain and more improvement in function of the left ankle.   The patient relates no other problems.    Vitals: /88   Pulse 71   Ht 1.549 m (5' 1\")   Wt 84.4 kg (186 lb)   BMI 35.14 kg/m    BMI= Body mass index is 35.14 kg/m .    Lower Extremity Physical Exam:      Neurovascular status remains unchanged.  Muscular exam is within normal limits to major muscle groups.  Integument is intact.      Noted no pain on palpation over the lateral malleolus on the left ankle    Diagnostics:  Radiograph evaluation including three views of the left ankle reveals interval healing with increased trabeculation of the lateral malleolus fracture.  I personally evaluated the images as well as reviewed the images with the patient pointing out the findings.      Assessment:     ICD-10-CM    1. Closed fracture of distal end of left fibula with routine healing, unspecified fracture morphology, subsequent encounter  S82.832D XR Ankle Left G/E 3 Views       Plan:    I have explained to Nargis about the progress of the conditions.  At this time, the patient was instructed to move out of the boot and into supportive shoes.  The patient was fitted with a Trilok ankle brace for added ankle support.  The patient was instructed to return to the office if any problems arise.    Nargis verbalized agreement with and understanding of the rational for the diagnosis and treatment plan.  All questions were answered to best of my ability and the patient's satisfaction. The patient was advised to contact the clinic with any questions that may arise after the clinic visit.      Disclaimer: This note consists of symbols derived from keyboarding, dictation and/or voice recognition software. As a result, there may be errors in the script that have gone undetected. Please consider this when " interpreting information found in this chart.       RHIANNON Watts D.P.M., [email or web address].

## 2021-05-26 NOTE — PATIENT INSTRUCTIONS
Next Steps:      1. Support:  a. Wear supportive shoes, sandals, boots and/or inserts that have a rigid supportive sole.    i. This will offload the majority of tension forces that travel through your feet every step you take.    1. [employer] Max Cushioning Elite/Premier   2. Skechers Relax Fit D'Lux Walker  3. Superfeet inserts (www.superfeet.com)  b. It is important that you also wear supportive shoe wear in the house to continue providing support to your feet.    c. You may always use a cushioned liner for your shoes if that makes your feet feel better.  2. Stretching  a. Calf stretching is essential to offload the tension forces that travel through your feet every step you take  b. Preferred calf stretch is the Runner's Stretch  i. Place one foot behind the other foot, flat against the ground (it is important to keep the heel on the ground).  The back leg is the one that will be stretched.  1. Start with the knee straight and lean your hips into the wall, counter or whatever you are leaning into - count to ten.  2. Next, bend the knee.  You should feel the stretch lower in the calf muscle - count to ten.  c. Repeat this stretch once an hour to start off with.  When symptoms subside, I recommend performing the stretch 3 times daily to prevent any future problems.                3. Tissue Massage  a. It is important that you physically loosen the inflammation tissue to help your body heal the injured tissue.  b. I recommend soaking your foot in warm water to increase the microcirculation to the soft tissues.  You may add Epson salt to the water if you prefer.  c. You may apply an over-the-counter muscle rub, such as Voltaren gel, and deeply massage the injured tissue.  4. Reduce Inflammation  a. You can ice the injured tissue with an ice pack with a light cloth covering or soaking in ice water 20 minutes to reduce any acute inflammation, typically at the end of the day.  b. If tolerated, you may take a  Non-Steroidal Antiinflammatory medication (NSAID), such as Advil or Aleve, to help reduce the inflammation tissue.  This can help the overall healing of the injured tissue.  i. It is important to take food with any NSAID medication as the most common side effect is stomach irritation.  If you encounter any problems when taking NSAID, it is recommended that you stop taking the medication and notify your provider.    It is important to understand that most problems that develop in the foot and ankle are caused by excessive tension that cause microinjury to the soft tissues and inflammation in the foot and ankle.  By addressing the underlying causes with support and stretching as well as treating the current inflammatory conditions with tissue massage and anti-inflammatory treatments, most foot and ankle musculoskeletal conditions will resolve.  This may take time to heal.  However, if symptoms persist past 4 weeks you should return to the office for reevaluation to determine further treatment options.

## 2021-05-27 ENCOUNTER — NURSE TRIAGE (OUTPATIENT)
Dept: NURSING | Facility: CLINIC | Age: 26
End: 2021-05-27

## 2021-05-27 NOTE — TELEPHONE ENCOUNTER
Nargis has developed a severe cough today. She has coughed so hard that she vomited. This has happened more than once.    For the past 2 days she reports:  - runny nose  - nasal congestion  - fatigue    She has tried  - Dayquil    Per protocol, advised to see PCP within 24 hours  Care Advice reviewed    Transferred to [facility]    COVID 19 Nurse Triage Plan/Patient Instructions    Please be aware that novel coronavirus (COVID-19) may be circulating in the community. If you develop symptoms such as fever, cough, or SOB or if you have concerns about the presence of another infection including coronavirus (COVID-19), please contact your health care provider or visit [email or web address].     Disposition/Instructions    In-Person Visit with provider recommended. Reference Visit Selection Guide.    Thank you for taking steps to prevent the spread of this virus.  o Limit your contact with others.  o Wear a simple mask to cover your cough.  o Wash your hands well and often.    Resources    [facility]: About COVID-19: [email or web address]    CDC: What to Do If You're Sick: www.cdc.gov/coronavirus/2019-ncov/about/steps-when-sick.html    CDC: Ending Home Isolation: www.cdc.gov/coronavirus/2019-ncov/hcp/disposition-in-home-patients.html     CDC: Caring for Someone: www.cdc.gov/coronavirus/2019-ncov/if-you-are-sick/care-for-someone.html     [facility]: Interim Guidance for Hospital Discharge to Home: www.health.[facility].[place]/diseases/coronavirus/hcp/hospdischarge.pdf    [facility] clinical trials (COVID-19 research studies): [email or web address].[facility]/umn-clinical-trials     Below are the COVID-19 hotlines at the [place] Department of Health ([facility]). Interpreters are available.   o For health questions: Call 536-940-5533 or 1-591.488.1107 (7 a.m. to 7 p.m.)  o For questions about schools and childcare: Call 886-585-8351 or 1-764.690.4461 (7 a.m. to 7 p.m.)     Selene Negro RN  [facility]  [facility]      Reason for Disposition    SEVERE coughing spells (e.g., whooping sound after coughing, vomiting after coughing)    Additional Information    Negative: Severe difficulty breathing (e.g., struggling for each breath, speaks in single words)    Negative: Bluish (or gray) lips or face now    Negative: [1] Rapid onset of cough AND [2] has hives    Negative: Coughing started suddenly after medicine, an allergic food or bee sting    Negative: [1] Difficulty breathing AND [2] exposure to flames, smoke, or fumes    Negative: [1] Stridor AND [2] difficulty breathing    Negative: Sounds like a life-threatening emergency to the triager    Negative: Chest pain  (Exception: MILD central chest pain, present only when coughing)    Negative: Difficulty breathing    Negative: Patient sounds very sick or weak to the triager    Negative: [1] Coughed up blood AND [2] > 1 tablespoon (15 ml) (Exception: blood-tinged sputum)    Negative: Fever > 103 F (39.4 C)    Negative: [1] Fever > 101 F (38.3 C) AND [2] age > 60    Negative: [1] Fever > 100.0 F (37.8 C) AND [2] bedridden (e.g., nursing home patient, CVA, chronic illness, recovering from surgery)    Negative: [1] Fever > 100.0 F (37.8 C) AND [2] diabetes mellitus or weak immune system (e.g., HIV positive, cancer chemo, splenectomy, organ transplant, chronic steroids)    Negative: Wheezing is present    Negative: [1] Ankle swelling AND [2] swelling is increasing    Protocols used: COUGH - ACUTE NON-PRODUCTIVE-A-AH

## 2021-05-28 ENCOUNTER — OFFICE VISIT (OUTPATIENT)
Dept: FAMILY MEDICINE | Facility: CLINIC | Age: 26
End: 2021-05-28
Payer: COMMERCIAL

## 2021-05-28 VITALS
DIASTOLIC BLOOD PRESSURE: 68 MMHG | SYSTOLIC BLOOD PRESSURE: 100 MMHG | HEIGHT: 61 IN | OXYGEN SATURATION: 96 % | TEMPERATURE: 96.9 F | RESPIRATION RATE: 14 BRPM | HEART RATE: 87 BPM | WEIGHT: 186 LBS | BODY MASS INDEX: 35.12 KG/M2

## 2021-05-28 DIAGNOSIS — R05.9 COUGH: ICD-10-CM

## 2021-05-28 DIAGNOSIS — Z20.822 SUSPECTED 2019 NOVEL CORONAVIRUS INFECTION: Primary | ICD-10-CM

## 2021-05-28 LAB
LABORATORY COMMENT REPORT: NORMAL
SARS-COV-2 RNA RESP QL NAA+PROBE: NEGATIVE
SARS-COV-2 RNA RESP QL NAA+PROBE: NORMAL
SPECIMEN SOURCE: NORMAL
SPECIMEN SOURCE: NORMAL

## 2021-05-28 PROCEDURE — 99213 OFFICE O/P EST LOW 20 MIN: CPT | Performed by: NURSE PRACTITIONER

## 2021-05-28 PROCEDURE — U0003 INFECTIOUS AGENT DETECTION BY NUCLEIC ACID (DNA OR RNA); SEVERE ACUTE RESPIRATORY SYNDROME CORONAVIRUS 2 (SARS-COV-2) (CORONAVIRUS DISEASE [COVID-19]), AMPLIFIED PROBE TECHNIQUE, MAKING USE OF HIGH THROUGHPUT TECHNOLOGIES AS DESCRIBED BY CMS-2020-01-R: HCPCS | Performed by: NURSE PRACTITIONER

## 2021-05-28 PROCEDURE — U0005 INFEC AGEN DETEC AMPLI PROBE: HCPCS | Performed by: NURSE PRACTITIONER

## 2021-05-28 ASSESSMENT — PAIN SCALES - GENERAL: PAINLEVEL: NO PAIN (0)

## 2021-05-28 ASSESSMENT — MIFFLIN-ST. JEOR: SCORE: 1521.07

## 2021-05-28 NOTE — PROGRESS NOTES
"    Assessment & Plan     Suspected 2019 novel coronavirus infection  Will rule out. Discussed quarantine until results are known as well as if positive.  - Symptomatic COVID-19 Virus (Coronavirus) by PCR; Future    Cough  Likely viral upper respiratory infection. Will rule out COVID-19. Continue with symptomatic management.  - Symptomatic COVID-19 Virus (Coronavirus) by PCR; Future             Return in about 1 week (around 6/4/2021) for ongoing symptoms if not improving.    Yvonne Kim, SUDHEER CNP  [facility]   Nargis is a 26 year old who presents for the following health issues     HPI     Acute Illness  Acute illness concerns: cough  Onset/Duration: 3 days  Symptoms:  Fever: no  Chills/Sweats: no  Headache (location?): no  Sinus Pressure: no  Conjunctivitis:  no  Ear Pain: YES- left ear a couple days ago  Rhinorrhea: YES  Congestion: YES- nasal  Sore Throat: no  Cough: YES-productive of green sputum  Wheeze: no  Decreased Appetite: no  Nausea: no  Vomiting: YES- possibly from coughing so hard  Diarrhea: no  Dysuria/Freq.: no  Dysuria or Hematuria: no  Fatigue/Achiness: YES- fatigue  Sick/Strep Exposure: YES- works at a  croup, strep, pneumonia going around pt is not in that room son has been sick was seen but was cleared of everything  Therapies tried and outcome: robitussin helps a little     No history of seasonal allergies. Son was seen yesterday and his test results have been negative. Sleep has not been great due to symptoms. Coughing is present at night. Cough was productive this morning.       Review of Systems   Constitutional, HEENT, cardiovascular, pulmonary, gi and gu systems are negative, except as otherwise noted.      Objective    /68 (BP Location: Right arm, Patient Position: Chair, Cuff Size: Adult Large)   Pulse 87   Temp 96.9  F (36.1  C) (Tympanic)   Resp 14   Ht 1.549 m (5' 1\")   Wt 84.4 kg (186 lb)   LMP 05/25/2021 (Exact Date)  "  SpO2 96%   Breastfeeding No   BMI 35.14 kg/m    Body mass index is 35.14 kg/m .     Physical Exam   GENERAL: healthy, alert and no distress  EYES: Eyes grossly normal to inspection, PERRL and conjunctivae and sclerae normal  HENT: normal cephalic/atraumatic, ear canals and TM's normal, oral mucous membranes moist. Positive for tonsillar hypertrophy, tonsillar erythema and tonsillar exudate.   NECK: slight cervical chain adenopathy present.  No asymmetry, masses, or scars and thyroid normal to palpation  RESP: lungs clear to auscultation - no rales, rhonchi or wheezes  CV: regular rate and rhythm, normal S1 S2, no S3 or S4, no murmur, click or rub, no peripheral edema and peripheral pulses strong  MS: no gross musculoskeletal defects noted, no edema

## 2021-06-02 ENCOUNTER — VIRTUAL VISIT (OUTPATIENT)
Dept: FAMILY MEDICINE | Facility: CLINIC | Age: 26
End: 2021-06-02
Payer: COMMERCIAL

## 2021-06-02 ENCOUNTER — TELEPHONE (OUTPATIENT)
Dept: FAMILY MEDICINE | Facility: CLINIC | Age: 26
End: 2021-06-02

## 2021-06-02 DIAGNOSIS — F32.1 CURRENT MODERATE EPISODE OF MAJOR DEPRESSIVE DISORDER WITHOUT PRIOR EPISODE (H): ICD-10-CM

## 2021-06-02 DIAGNOSIS — F41.1 ANXIETY STATE: ICD-10-CM

## 2021-06-02 DIAGNOSIS — F90.0 ATTENTION DEFICIT HYPERACTIVITY DISORDER (ADHD), PREDOMINANTLY INATTENTIVE TYPE: Primary | ICD-10-CM

## 2021-06-02 PROCEDURE — 99213 OFFICE O/P EST LOW 20 MIN: CPT | Mod: 95 | Performed by: PHYSICIAN ASSISTANT

## 2021-06-02 RX ORDER — DEXTROAMPHETAMINE SACCHARATE, AMPHETAMINE ASPARTATE, DEXTROAMPHETAMINE SULFATE AND AMPHETAMINE SULFATE 2.5; 2.5; 2.5; 2.5 MG/1; MG/1; MG/1; MG/1
20 TABLET ORAL DAILY
Qty: 30 TABLET | Refills: 0 | Status: SHIPPED | OUTPATIENT
Start: 2021-06-04 | End: 2021-08-04

## 2021-06-02 RX ORDER — DEXTROAMPHETAMINE SACCHARATE, AMPHETAMINE ASPARTATE MONOHYDRATE, DEXTROAMPHETAMINE SULFATE AND AMPHETAMINE SULFATE 5; 5; 5; 5 MG/1; MG/1; MG/1; MG/1
20 CAPSULE, EXTENDED RELEASE ORAL DAILY
Qty: 30 CAPSULE | Refills: 0 | Status: SHIPPED | OUTPATIENT
Start: 2021-06-18 | End: 2021-07-18

## 2021-06-02 RX ORDER — DEXTROAMPHETAMINE SACCHARATE, AMPHETAMINE ASPARTATE MONOHYDRATE, DEXTROAMPHETAMINE SULFATE AND AMPHETAMINE SULFATE 5; 5; 5; 5 MG/1; MG/1; MG/1; MG/1
20 CAPSULE, EXTENDED RELEASE ORAL DAILY
Qty: 30 CAPSULE | Refills: 0 | Status: SHIPPED | OUTPATIENT
Start: 2021-08-18 | End: 2021-08-04 | Stop reason: ALTCHOICE

## 2021-06-02 RX ORDER — DEXTROAMPHETAMINE SACCHARATE, AMPHETAMINE ASPARTATE, DEXTROAMPHETAMINE SULFATE AND AMPHETAMINE SULFATE 5; 5; 5; 5 MG/1; MG/1; MG/1; MG/1
20 TABLET ORAL DAILY
Qty: 30 TABLET | Refills: 0 | Status: SHIPPED | OUTPATIENT
Start: 2021-08-18 | End: 2021-08-04 | Stop reason: ALTCHOICE

## 2021-06-02 RX ORDER — DEXTROAMPHETAMINE SACCHARATE, AMPHETAMINE ASPARTATE, DEXTROAMPHETAMINE SULFATE AND AMPHETAMINE SULFATE 5; 5; 5; 5 MG/1; MG/1; MG/1; MG/1
20 TABLET ORAL DAILY
Qty: 30 TABLET | Refills: 0 | Status: SHIPPED | OUTPATIENT
Start: 2021-07-18 | End: 2021-08-17

## 2021-06-02 RX ORDER — DEXTROAMPHETAMINE SACCHARATE, AMPHETAMINE ASPARTATE MONOHYDRATE, DEXTROAMPHETAMINE SULFATE AND AMPHETAMINE SULFATE 5; 5; 5; 5 MG/1; MG/1; MG/1; MG/1
20 CAPSULE, EXTENDED RELEASE ORAL DAILY
Qty: 30 CAPSULE | Refills: 0 | Status: SHIPPED | OUTPATIENT
Start: 2021-07-18 | End: 2021-08-04 | Stop reason: ALTCHOICE

## 2021-06-02 RX ORDER — DEXTROAMPHETAMINE SACCHARATE, AMPHETAMINE ASPARTATE, DEXTROAMPHETAMINE SULFATE AND AMPHETAMINE SULFATE 5; 5; 5; 5 MG/1; MG/1; MG/1; MG/1
20 TABLET ORAL DAILY
Qty: 30 TABLET | Refills: 0 | Status: SHIPPED | OUTPATIENT
Start: 2021-06-18 | End: 2021-07-18

## 2021-06-02 NOTE — TELEPHONE ENCOUNTER
WILL Medel is calling on the following medication and wondering if this should be filled at 60 tabs?     Disp Refills Start End ALEJA   amphetamine-dextroamphetamine (ADDERALL) 10 MG tablet 30 tablet 0 6/4/2021  No   Sig - Route: Take 2 tablets (20 mg) by mouth daily In the afternoon - Oral   Sent to pharmacy as: Amphetamine-Dextroamphetamine 10 MG Oral Tablet (Adderall)   Class: E-Prescribe   Earliest Fill Date: 6/4/2021   Order: [id number]   E-Prescribing Status: Receipt confirmed by pharmacy (6/2/2021  9:48 AM CDT)     Please resend   if so.    Angeline Mcfadden  [facility]

## 2021-06-02 NOTE — PROGRESS NOTES
Nargis is a 26 year old who is being evaluated via a billable telephone visit.      What phone number would you like to be contacted at? [phone]  How would you like to obtain your AVS? Sharifa    Assessment & Plan     ASSESSMENT/PLAN:      ICD-10-CM    1. Attention deficit hyperactivity disorder (ADHD), predominantly inattentive type  F90.0 amphetamine-dextroamphetamine (ADDERALL) 10 MG tablet     amphetamine-dextroamphetamine (ADDERALL XR) 20 MG 24 hr capsule     amphetamine-dextroamphetamine (ADDERALL XR) 20 MG 24 hr capsule     amphetamine-dextroamphetamine (ADDERALL XR) 20 MG 24 hr capsule     amphetamine-dextroamphetamine (ADDERALL) 20 MG tablet     amphetamine-dextroamphetamine (ADDERALL) 20 MG tablet     amphetamine-dextroamphetamine (ADDERALL) 20 MG tablet   2. Current moderate episode of major depressive disorder without prior episode (H)  F32.1    3. Anxiety state  F41.1      Depression/anxiety: stable/well controlled  ADHD: well controlled on current regimen. Recent dose change, trying to schedule for same day  for each medication.     Patient Instructions   Continue current medications: adderall XR 20 mg daily + adderall IR 20 mg daily  - sent in 2 weeks of the IR 10 mg (take 2 tablets) then we will get on track with both prescriptions to  on the 18th (sent in IR 20 mg take 1 tablet)  Continue other medications  Follow up sooner if symptoms are worsening/changing    Return in about 6 months (around 12/2/2021).    NASREEN Hickey [facility]   Nargis is a 26 year old who presents for the following health issues     HPI   ADHD Follow-Up (Adult) - Recent dose adjustment since last visit  Concerns: Things are going really well on new dose  Changes since last visit: Improving - she feels like increased dose is working really well for her  Taking controlled (daily) medications as prescribed: Yes  Sleep: no problems  Adult ADHD Self-Reporting form  "given to patient?:  No  Currently in counseling: No    Medication Benefits:   Controlled symptoms: Attention span and Distractability    Medication Side Effects:  Reports:  none  Sleep Problems? no  ++++++++++++++++++++++++++++++++++++++++++++++++    Anxiety has been well controlled  No side effects  Not feeling \"scatterbrained\"      Review of Systems   Other than noted above, general, HEENT, respiratory, cardiac, MS, and gastrointestinal systems are negative.       Objective           Vitals:  No vitals were obtained today due to virtual visit.    Physical Exam   healthy, alert and no distress  PSYCH: Alert and oriented times 3; coherent speech, normal   rate and volume, able to articulate logical thoughts, able   to abstract reason, no tangential thoughts, no hallucinations   or delusions  Her affect is normal  RESP: No cough, no audible wheezing, able to talk in full sentences  Remainder of exam unable to be completed due to telephone visits        Phone call duration: 8 minutes  "

## 2021-06-02 NOTE — TELEPHONE ENCOUNTER
This is what we discussed that I was sending in 2 weeks worth of this dose so then on 5/18 she can  her IR 20 mg tablet at the same time as the XR 20 mg tablet. We discussed doing this for just 2 weeks so we can get the two medications filled the same day from now on.  Marah Jennings PA-C

## 2021-06-02 NOTE — PATIENT INSTRUCTIONS
Continue current medications: adderall XR 20 mg daily + adderall IR 20 mg daily  - sent in 2 weeks of the IR 10 mg (take 2 tablets) then we will get on track with both prescriptions to  on the 18th (sent in IR 20 mg take 1 tablet)  Continue other medications  Follow up sooner if symptoms are worsening/changing

## 2021-06-02 NOTE — TELEPHONE ENCOUNTER
Call placed to [facility] in [facility]   Spoke with Phuong Waned Damon's message   Phuong states she does not see any issues with the prescriptions and states the technician has patient's medications ready for pick-up  Phuong states everything looks good on her end for patient's prescription refill     No further questions/concerns    Margarito Bingham RN

## 2021-06-28 ENCOUNTER — TELEPHONE (OUTPATIENT)
Dept: FAMILY MEDICINE | Facility: CLINIC | Age: 26
End: 2021-06-28

## 2021-06-28 ENCOUNTER — OFFICE VISIT (OUTPATIENT)
Dept: FAMILY MEDICINE | Facility: CLINIC | Age: 26
End: 2021-06-28
Payer: COMMERCIAL

## 2021-06-28 VITALS
WEIGHT: 186 LBS | DIASTOLIC BLOOD PRESSURE: 78 MMHG | BODY MASS INDEX: 35.14 KG/M2 | HEART RATE: 87 BPM | SYSTOLIC BLOOD PRESSURE: 121 MMHG | TEMPERATURE: 97.9 F

## 2021-06-28 DIAGNOSIS — N76.4 ABSCESS OF LABIA MAJORA: Primary | ICD-10-CM

## 2021-06-28 PROCEDURE — 99213 OFFICE O/P EST LOW 20 MIN: CPT | Performed by: NURSE PRACTITIONER

## 2021-06-28 RX ORDER — SULFAMETHOXAZOLE/TRIMETHOPRIM 800-160 MG
1 TABLET ORAL 2 TIMES DAILY
Qty: 20 TABLET | Refills: 0 | Status: SHIPPED | OUTPATIENT
Start: 2021-06-28 | End: 2021-07-08

## 2021-06-28 ASSESSMENT — ENCOUNTER SYMPTOMS: CONSTITUTIONAL NEGATIVE: 1

## 2021-06-28 NOTE — TELEPHONE ENCOUNTER
Reason for call:  Patient reporting a symptom    Symptom or request: Pt has a sore/boil in her vag area and it is painful.  Please call patient and advise.      Duration (how long have symptoms been present): 5 days    Have you been treated for this before? No    Additional comments:     Phone Number patient can be reached at:  Home number on file [phone] (home)    Best Time:  any    Can we leave a detailed message on this number:  NO    Call taken on 6/28/2021 at 8:54 AM by Patricia Rowell

## 2021-06-28 NOTE — PATIENT INSTRUCTIONS
Monitor for flu like symptoms, please return to clinic  Take Ibuprofen 400 mg with Acetaminophen (Tylenol) 1000 mg every 6 hours for acute pain.  Do not take more than 4000 mg of acetaminophen (Tylenol) in a 24 hour period.

## 2021-06-28 NOTE — PROGRESS NOTES
Assessment & Plan     Abscess of labia majora  Patient declined having in incision and drainage today of the abscess, it is fluctuant so it is likely that pustulant material would be drained out of it. Will do a trial of antibiotics today, denies fevers, chills, body aches, malaise, this is not perirectal or perioral abscess so 1 agent is sufficient today.  I recommended to patient that she return in 1-2 days to the clinic if the abscess is not getting better and is getting larger, more tender more swollen and more fluctuant for an incision and drainage of the abscess.  - sulfamethoxazole-trimethoprim (BACTRIM DS) 800-160 MG tablet; Take 1 tablet by mouth 2 times daily for 10 days      Return in about 1 day (around 6/29/2021), or if symptoms worsen or fail to improve, for Follow up.    SUDHEER Shelton CNP  [facility] SHEREE Barillas is a 26 year old who presents for the following health issues.  HPI     Concern - Left perineum mass  Onset: 6/24/21  Description: Very painful  Intensity: severe  Progression of Symptoms:  worsening  Accompanying Signs & Symptoms: None  Previous history of similar problem: no  Precipitating factors:        Worsened by: Walking and Sitting  Alleviating factors:        Improved by: Hurts to sit, walk  Therapies tried and outcome: None    Patient developed a painful lump in her left labia last week, she felt like it was maybe because of shaving and was like a folliculitis.  It worsened over the weekend where it became a large, tender lump.  Also is having pain in her left groin.  Denies fevers, chills, body aches, malaise.    Review of Systems   Constitutional: Negative.    Genitourinary:        Abscess in left groin   All other systems reviewed and are negative.           Objective    There were no vitals taken for this visit.  There is no height or weight on file to calculate BMI.  Physical Exam  Constitutional:       Appearance: Normal appearance.    Genitourinary:      Skin:     General: Skin is warm and dry.   Neurological:      Mental Status: She is alert and oriented to person, place, and time.   Psychiatric:         Mood and Affect: Mood normal.         Behavior: Behavior normal.         Thought Content: Thought content normal.         Judgment: Judgment normal.

## 2021-06-28 NOTE — TELEPHONE ENCOUNTER
Call placed to patient.  Reports day 5 of firm raised area on left side of perineum.  Patient thought it was due to a cut from shaving.  Reports area has gotten larger, size of a golf ball today.  Warmer than surrounding skin.  Denies drainage.  Painful with walking.  Afebrile.  Patient has applied warm pack to site, taking ibuprofen for pain with no improvement.  Scheduled visit today with IRIS Mendoza.  Sherie Llanes RN

## 2021-07-27 ENCOUNTER — MYC MEDICAL ADVICE (OUTPATIENT)
Dept: FAMILY MEDICINE | Facility: CLINIC | Age: 26
End: 2021-07-27

## 2021-08-04 ENCOUNTER — VIRTUAL VISIT (OUTPATIENT)
Dept: FAMILY MEDICINE | Facility: CLINIC | Age: 26
End: 2021-08-04
Payer: COMMERCIAL

## 2021-08-04 DIAGNOSIS — F90.0 ATTENTION DEFICIT HYPERACTIVITY DISORDER (ADHD), PREDOMINANTLY INATTENTIVE TYPE: Primary | ICD-10-CM

## 2021-08-04 PROCEDURE — 99213 OFFICE O/P EST LOW 20 MIN: CPT | Mod: 95 | Performed by: PHYSICIAN ASSISTANT

## 2021-08-04 RX ORDER — DEXTROAMPHETAMINE SACCHARATE, AMPHETAMINE ASPARTATE, DEXTROAMPHETAMINE SULFATE AND AMPHETAMINE SULFATE 5; 5; 5; 5 MG/1; MG/1; MG/1; MG/1
20 TABLET ORAL 2 TIMES DAILY
Qty: 60 TABLET | Refills: 0 | Status: SHIPPED | OUTPATIENT
Start: 2021-08-04 | End: 2021-08-27

## 2021-08-04 NOTE — PATIENT INSTRUCTIONS
Stop adderall XR 20 mg.  Start taking adderall IR 20 mg two times daily   Hopefully the immediate release medication will just be a better fit right now  If going well - send me a mychart when you need a refill and I can send in refills then. If not working well, please let's talk on the phone again and decide on the next course

## 2021-08-04 NOTE — PROGRESS NOTES
Nargis is a 26 year old who is being evaluated via a billable telephone visit.      What phone number would you like to be contacted at? [phone]  How would you like to obtain your AVS? MyChart    Assessment & Plan     ASSESSMENT/PLAN:      ICD-10-CM    1. Attention deficit hyperactivity disorder (ADHD), predominantly inattentive type  F90.0 amphetamine-dextroamphetamine (ADDERALL) 20 MG tablet     We discussed lack of benefit/increased risk of side effects potentially with total doses adderall >40 mg daily. She has better benefit now from IR medication, will try that for both doses.  If not working, consider TID dosing with lower doses, or consider switching to a different stimulant.    Patient Instructions   Stop adderall XR 20 mg.  Start taking adderall IR 20 mg two times daily   Hopefully the immediate release medication will just be a better fit right now  If going well - send me a mychart when you need a refill and I can send in refills then. If not working well, please let's talk on the phone again and decide on the next course    Return in about 1 month (around 9/4/2021) for if not improving or if worsening.    Carol Jennings PA-C  [facility]   Nargis is a 26 year old who presents for the following health issues     HPI     HPI   ADHD Follow-Up (Adult) - Recent dose adjustment since last visit  Concerns: wearing off   Changes since last visit: not working as well  Taking controlled (daily) medications as prescribed: Yes  Sleep: no problems  Adult ADHD Self-Reporting form given to patient?:  No  Currently in counseling: No     Medication Benefits:   Uncontrolled symptoms: Attention span and Distractability     Medication Side Effects:  Reports:  none  Sleep Problems? no  ++++++++++++++++++++++++++++++++++++++++++++++++     She's not sure what changed, feels like since having her son she feels like a different person and everything is different. She doesn't feel that  anxiety or depression is a factor. She feels her mental health is in a good place. Can't remember the last time she had a panic attack.  The morning dose is not lasting, takes first dose before 7 AM, then second dose between 1-2 PM. First dose is wearing off around 10:30/11 AM. Feels the second dose works well - seems to last until goes home before 5 PM.     Review of Systems   Other than noted above, general, HEENT, respiratory, cardiac, MS, and gastrointestinal systems are negative.       Objective           Vitals:  No vitals were obtained today due to virtual visit.    Physical Exam   healthy, alert and no distress  PSYCH: Alert and oriented times 3; coherent speech, normal   rate and volume, able to articulate logical thoughts, able   to abstract reason, no tangential thoughts, no hallucinations   or delusions  Her affect is normal  RESP: No cough, no audible wheezing, able to talk in full sentences  Remainder of exam unable to be completed due to telephone visits        Phone call duration: 10 minutes

## 2021-09-19 ENCOUNTER — HEALTH MAINTENANCE LETTER (OUTPATIENT)
Age: 26
End: 2021-09-19

## 2021-09-21 ENCOUNTER — E-VISIT (OUTPATIENT)
Dept: FAMILY MEDICINE | Facility: CLINIC | Age: 26
End: 2021-09-21
Payer: COMMERCIAL

## 2021-09-21 DIAGNOSIS — Z20.822 SUSPECTED COVID-19 VIRUS INFECTION: Primary | ICD-10-CM

## 2021-09-21 PROCEDURE — 99207 PR NON-BILLABLE SERV PER CHARTING: CPT | Performed by: PHYSICIAN ASSISTANT

## 2021-09-24 ENCOUNTER — NURSE TRIAGE (OUTPATIENT)
Dept: NURSING | Facility: CLINIC | Age: 26
End: 2021-09-24

## 2021-09-24 NOTE — TELEPHONE ENCOUNTER
"-Coronavirus (COVID-19) Notification    Caller Name (Patient, parent, daughter/son, grandparent, etc)  Nargis Ruelas    Reason for call  Notify of Positive Coronavirus (COVID-19) lab results, assess symptoms,  review  [employer] recommendations    Lab Result    Lab test:  2019-nCoV rRt-PCR or SARS-CoV-2 PCR    Oropharyngeal AND/OR nasopharyngeal swabs is POSITIVE for 2019-nCoV RNA/SARS-COV-2 PCR (COVID-19 virus)    RN Recommendations/Instructions per [facility] Coronavirus COVID-19 recommendations    Brief introduction script  Introduce self then review script:  \"I am calling on behalf of [employer].  We were notified that your Coronavirus test (COVID-19) for was POSITIVE for the virus.  I have some information to relay to you but first I wanted to mention that the [place] Dept of Health will be contacting you shortly [it's possible MD already called Patient] to talk to you more about how you are feeling and other people you have had contact with who might now also have the virus.  Also,  [employer] is Partnering with the [facility] for Covid-19 research, you may be contacted directly by research staff.\"    Assessment (Inquire about Patient's current symptoms)   Assessment   Current Symptoms at time of phone call: (if no symptoms, document No symptoms] Taste and smell   Symptoms onset (if applicable) 09/21/2021     If at time of call, Patients symptoms hare worsened, the Patient should contact 911 or have someone drive them to Emergency Dept promptly:      If Patient calling 911, inform 911 personal that you have tested positive for the Coronavirus (COVID-19).  Place mask on and await 911 to arrive.    If Emergency Dept, If possible, please have another adult drive you to the Emergency Dept but you need to wear mask when in contact with other people.      Monoclonal Antibody Administration    You may be eligible to receive a new treatment with a monoclonal antibody for preventing " "hospitalization in patients at high risk for complications from COVID-19.   This medication is still experimental and available on a limited basis; it is given through an IV and must be given at an infusion center. Please note that not all people who are eligible will receive the medication since it is in limited supply.     Are you interested in being considered for this medication?  No.   Does the patient fit the criteria: Patient declined    If patient qualifies based on above criteria:  \"You will be contacted if you are selected to receive this treatment in the next 1-2 business days.   This is time sensitive and if you are not selected in the next 1-2 business days, you will not receive the medication.  If you do not receive a call to schedule, you have not been selected.\"      Review information with Patient    Your result was positive. This means you have COVID-19 (coronavirus).  We have sent you a letter that reviews the information that I'll be reviewing with you now.    How can I protect others?    If you have symptoms: stay home and away from others (self-isolate) until:    You've had no fever--and no medicine that reduces fever--for 1 full day (24 hours). And       Your other symptoms have gotten better. For example, your cough or breathing has improved. And     At least 10 days have passed since your symptoms started. (If you've been told by a doctor that you have a weak immune system, wait 20 days.)     If you don't have symptoms: Stay home and away from others (self-isolate) until at least 10 days have passed since your first positive COVID-19 test. (Date test collected)    During this time:    Stay in your own room, including for meals. Use your own bathroom if you can.    Stay away from others in your home. No hugging, kissing or shaking hands. No visitors.     Don't go to work, school or anywhere else.     Clean  high touch  surfaces often (doorknobs, counters, handles, etc.). Use a household " cleaning spray or wipes. You'll find a full list on the EPA website at www.epa.gov/pesticide-registration/list-n-disinfectants-use-against-sars-cov-2.     Cover your mouth and nose with a mask, tissue or other face covering to avoid spreading germs.    Wash your hands and face often with soap and water.    Make a list of people you have been in close contact with recently, even if either of you wore a face covering.   ; Start your list from 2 days before you became ill or had a positive test.  ; Include anyone that was within 6 feet of you for a cumulative total of 15 minutes or more in 24 hours. (Example: if you sat next to Narendra for 5 minutes in the morning and 10 minutes in the afternoon, then you were in close contact for 15 minutes total that day. Narendra would be added to your list.)    A public health worker will call or text you. It is important that you answer. They will ask you questions about possible exposures to COVID-19, such as people you have been in direct contact with and places you have visited.    Tell the people on your list that you have COVID-19; they should stay away from others for 14 days starting from the last time they were in contact with you (unless you are told something different from a public health worker).     Caregivers in these groups are at risk for severe illness due to COVID-19:  o People 65 years and older  o People who live in a nursing home or long-term care facility  o People with chronic disease (lung, heart, cancer, diabetes, kidney, liver, immunologic)  o People who have a weakened immune system, including those who:  - Are in cancer treatment  - Take medicine that weakens the immune system, such as corticosteroids  - Had a bone marrow or organ transplant  - Have an immune deficiency  - Have poorly controlled HIV or AIDS  - Are obese (body mass index of 40 or higher)  - Smoke regularly    Caregivers should wear gloves while washing dishes, handling laundry and cleaning  bedrooms and bathrooms.    Wash and dry laundry with special caution. Don't shake dirty laundry, and use the warmest water setting you can.    If you have a weakened immune system, ask your doctor about other actions you should take.    For more tips, go to www.cdc.gov/coronavirus/2019-ncov/downloads/10Things.pdf.    You should not go back to work until you meet the guidelines above for ending your home isolation. You don't need to be retested for COVID-19 before going back to work--studies show that you won't spread the virus if it's been at least 10 days since your symptoms started (or 20 days, if you have a weak immune system).    Employers: This document serves as formal notice of your employee's medical guidelines for going back to work. They must meet the above guidelines before going back to work in person.    How can I take care of myself?    1. Get lots of rest. Drink extra fluids (unless a doctor has told you not to).    2. Take Tylenol (acetaminophen) for fever or pain. If you have liver or kidney problems, ask your family doctor if it's okay to take Tylenol.     Take either:     650 mg (two 325 mg pills) every 4 to 6 hours, or     1,000 mg (two 500 mg pills) every 8 hours as needed.     Note: Don't take more than 3,000 mg in one day. Acetaminophen is found in many medicines (both prescribed and over-the-counter medicines). Read all labels to be sure you don't take too much.    For children, check the Tylenol bottle for the right dose (based on their age or weight).    3. If you have other health problems (like cancer, heart failure, an organ transplant or severe kidney disease): Call your specialty clinic if you don't feel better in the next 2 days.    4. Know when to call 911: Emergency warning signs include:    Trouble breathing or shortness of breath    Pain or pressure in the chest that doesn't go away    Feeling confused like you haven't felt before, or not being able to wake up    Bluish-colored  lips or face    5. Sign up for [employer]. We know it's scary to hear that you have COVID-19. We want to track your symptoms to make sure you're okay over the next 2 weeks. Please look for an email from [employer]--this is a free, online program that we'll use to keep in touch. To sign up, follow the link in the email. Learn more at [email or web address].    Where can I get more information?    [facility]: [email or web address]    Coronavirus Basics: www.health.[facility].[place]./diseases/coronavirus/basics.html    What to Do If You're Sick: www.cdc.gov/coronavirus/2019-ncov/about/steps-when-sick.html    Ending Home Isolation: www.cdc.gov/coronavirus/2019-ncov/hcp/disposition-in-home-patients.html     Caring for Someone with COVID-19: www.cdc.gov/coronavirus/2019-ncov/if-you-are-sick/care-for-someone.html     [facility] clinical trials (COVID-19 research studies): [email or web address].[email or web address]-clinical-trials     A Positive COVID-19 letter will be sent via [employer] or the mail. (Exception, no letters sent to Presurgerical/Preprocedure Patients)    Abner Benton RN

## 2021-10-11 ENCOUNTER — VIRTUAL VISIT (OUTPATIENT)
Dept: FAMILY MEDICINE | Facility: CLINIC | Age: 26
End: 2021-10-11
Payer: COMMERCIAL

## 2021-10-11 DIAGNOSIS — K21.9 GASTROESOPHAGEAL REFLUX DISEASE WITHOUT ESOPHAGITIS: ICD-10-CM

## 2021-10-11 DIAGNOSIS — F90.0 ATTENTION DEFICIT HYPERACTIVITY DISORDER (ADHD), PREDOMINANTLY INATTENTIVE TYPE: Primary | ICD-10-CM

## 2021-10-11 DIAGNOSIS — F32.1 CURRENT MODERATE EPISODE OF MAJOR DEPRESSIVE DISORDER WITHOUT PRIOR EPISODE (H): ICD-10-CM

## 2021-10-11 DIAGNOSIS — F41.1 ANXIETY STATE: ICD-10-CM

## 2021-10-11 DIAGNOSIS — J45.20 MILD INTERMITTENT ASTHMA WITHOUT COMPLICATION: ICD-10-CM

## 2021-10-11 PROCEDURE — 99214 OFFICE O/P EST MOD 30 MIN: CPT | Mod: 95 | Performed by: PHYSICIAN ASSISTANT

## 2021-10-11 RX ORDER — LISDEXAMFETAMINE DIMESYLATE 30 MG/1
30 CAPSULE ORAL EVERY MORNING
Qty: 30 CAPSULE | Refills: 0 | Status: SHIPPED | OUTPATIENT
Start: 2021-10-11 | End: 2021-10-22

## 2021-10-11 RX ORDER — VENLAFAXINE HYDROCHLORIDE 75 MG/1
75 CAPSULE, EXTENDED RELEASE ORAL DAILY
Qty: 90 CAPSULE | Refills: 1 | Status: SHIPPED | OUTPATIENT
Start: 2021-10-11 | End: 2021-12-22

## 2021-10-11 RX ORDER — BUSPIRONE HYDROCHLORIDE 10 MG/1
10 TABLET ORAL 2 TIMES DAILY
Qty: 180 TABLET | Refills: 1 | Status: SHIPPED | OUTPATIENT
Start: 2021-10-11 | End: 2021-12-22

## 2021-10-11 NOTE — PATIENT INSTRUCTIONS
Continue buspirone and venlafaxine daily  Stop adderall  Start vyvanse 30 mg daily  Follow up in 1 month for a recheck, however reach out sooner if after 1-2 weeks noticing no improvement we can increase dose

## 2021-10-11 NOTE — PROGRESS NOTES
Nargis is a 26 year old who is being evaluated via a billable telephone visit.      What phone number would you like to be contacted at? [phone]  How would you like to obtain your AVS? Sharifa    Assessment & Plan     ASSESSMENT/PLAN:      ICD-10-CM    1. Attention deficit hyperactivity disorder (ADHD), predominantly inattentive type  F90.0 lisdexamfetamine (VYVANSE) 30 MG capsule   2. Anxiety state  F41.1 busPIRone (BUSPAR) 10 MG tablet     venlafaxine (EFFEXOR-XR) 75 MG 24 hr capsule   3. Current moderate episode of major depressive disorder without prior episode (H)  F32.1 busPIRone (BUSPAR) 10 MG tablet     venlafaxine (EFFEXOR-XR) 75 MG 24 hr capsule   4. Gastroesophageal reflux disease without esophagitis  K21.9    5. Mild intermittent asthma without complication  J45.20      We discussed risks/benefits of vyvanse. I called pharmacy to cancel previous adderall prescriptions on file. Recommended continued close follow up.    She does plan to get the flu shot  We also discussed if GERD is worsening or not improving in the future consider EGD for follow up, has not had in the past.    Patient Instructions   Continue buspirone and venlafaxine daily  Stop adderall  Start vyvanse 30 mg daily  Follow up in 1 month for a recheck, however reach out sooner if after 1-2 weeks noticing no improvement we can increase dose    Return in about 1 month (around 11/11/2021).    NASREEN Hickey [facility]    Opal Barillas is a 26 year old who presents for the following health issues     HPI   ADHD Follow-Up (Adult)  Concerns: Would like to discuss increasing medication, states it has not been working as well as it has in the past  Changes since last visit: Worse  Taking controlled (daily) medications as prescribed: Yes  Sleep: no problems  Adult ADHD Self-Reporting form given to patient?:  No  Currently in counseling: No    Medication Benefits:   Uncontrolled symptoms:  Attention span,  Distractability and Finishing tasks    Medication Side Effects:  Reports:  none  ++++++++++++++++++++++++++++++++++++++++++++++++    Depression and Anxiety Follow-Up    How are you doing with your depression since your last visit? Improved, things are going well on current medication    How are you doing with your anxiety since your last visit?  Improved     Are you having other symptoms that might be associated with depression or anxiety? No    Have you had a significant life event? No     Do you have any concerns with your use of alcohol or other drugs? No    Social History     Tobacco Use     Smoking status: Former Smoker     Packs/day: 0.20     Smokeless tobacco: Never Used     Tobacco comment: quit with pregnancy   Substance Use Topics     Alcohol use: Not Currently     Alcohol/week: 0.0 standard drinks     Comment: 4 drinks per month-quit with pregnancy     Drug use: No     PHQ 10/19/2020 12/4/2020 3/22/2021   PHQ-9 Total Score 13 3 8   Q9: Thoughts of better off dead/self-harm past 2 weeks Not at all Not at all Not at all     JERMAIN-7 SCORE 10/19/2020 12/4/2020 3/22/2021   Total Score - - 2 (minimal anxiety)   Total Score 8 3 2     Last PHQ-9 10/11/2021   1.  Little interest or pleasure in doing things 2   2.  Feeling down, depressed, or hopeless 1   3.  Trouble falling or staying asleep, or sleeping too much 0   4.  Feeling tired or having little energy 1   5.  Poor appetite or overeating 0   6.  Feeling bad about yourself 0   7.  Trouble concentrating 0   8.  Moving slowly or restless 0   Q9: Thoughts of better off dead/self-harm past 2 weeks 0   PHQ-9 Total Score 4   Difficulty at work, home, or with people -     JERMAIN-7  10/11/2021   1. Feeling nervous, anxious, or on edge 0   2. Not being able to stop or control worrying 0   3. Worrying too much about different things 1   4. Trouble relaxing 0   5. Being so restless that it is hard to sit still 0   6. Becoming easily annoyed or irritable 0   7. Feeling  afraid, as if something awful might happen 0   JERMAIN-7 Total Score 1   If you checked any problems, how difficult have they made it for you to do your work, take care of things at home, or get along with other people? -       Suicide Assessment Five-step Evaluation and Treatment (SAFE-T)    ADHD: not well controlled.  She feels like ADHD dosing will work really well for a while and then stop working  She would like to switch. Has tried different dosings of adderall XR and IR, no other stimulants in the past    Depression/anxiety: doing well, improved, feels medications are working    GERD:  She uses pepcid once a day, prilosec rarely  Had sudden onset heartburn with pregnancy and has continued, however is better controlled now        Review of Systems   Other than noted above, general, HEENT, respiratory, cardiac, MS, and gastrointestinal systems are negative.       Objective           Vitals:  No vitals were obtained today due to virtual visit.    Physical Exam   healthy, alert and no distress  PSYCH: Alert and oriented times 3; coherent speech, normal   rate and volume, able to articulate logical thoughts, able   to abstract reason, no tangential thoughts, no hallucinations   or delusions  Her affect is normal  RESP: No cough, no audible wheezing, able to talk in full sentences  Remainder of exam unable to be completed due to telephone visits        Phone call duration: 10 minutes

## 2021-10-12 ASSESSMENT — ASTHMA QUESTIONNAIRES: ACT_TOTALSCORE: 25

## 2021-10-19 ENCOUNTER — MYC MEDICAL ADVICE (OUTPATIENT)
Dept: FAMILY MEDICINE | Facility: CLINIC | Age: 26
End: 2021-10-19

## 2021-10-19 DIAGNOSIS — F90.0 ATTENTION DEFICIT HYPERACTIVITY DISORDER (ADHD), PREDOMINANTLY INATTENTIVE TYPE: ICD-10-CM

## 2021-10-19 DIAGNOSIS — K21.9 GASTROESOPHAGEAL REFLUX DISEASE WITHOUT ESOPHAGITIS: Primary | ICD-10-CM

## 2021-10-19 NOTE — TELEPHONE ENCOUNTER
Patient Instructions   Continue buspirone and venlafaxine daily  Stop adderall  Start vyvanse 30 mg daily  Follow up in 1 month for a recheck, however reach out sooner if after 1-2 weeks noticing no improvement we can increase dose      Marah's note on 10/11/21

## 2021-10-22 RX ORDER — LISDEXAMFETAMINE DIMESYLATE 40 MG/1
40 CAPSULE ORAL EVERY MORNING
Qty: 14 CAPSULE | Refills: 0 | Status: SHIPPED | OUTPATIENT
Start: 2021-10-22 | End: 2021-11-03

## 2021-11-03 DIAGNOSIS — Z11.59 ENCOUNTER FOR SCREENING FOR OTHER VIRAL DISEASES: ICD-10-CM

## 2021-11-08 ENCOUNTER — ANESTHESIA EVENT (OUTPATIENT)
Dept: GASTROENTEROLOGY | Facility: CLINIC | Age: 26
End: 2021-11-08
Payer: COMMERCIAL

## 2021-11-08 DIAGNOSIS — Z11.59 ENCOUNTER FOR SCREENING FOR OTHER VIRAL DISEASES: ICD-10-CM

## 2021-11-08 ASSESSMENT — ENCOUNTER SYMPTOMS: SEIZURES: 1

## 2021-11-08 NOTE — ANESTHESIA PREPROCEDURE EVALUATION
Anesthesia Pre-Procedure Evaluation    Patient: Nargis Ruelas   MRN: [id number] : 1995        Preoperative Diagnosis: Gastroesophageal reflux disease without esophagitis [K21.9]    Procedure : Procedure(s):  ESOPHAGOGASTRODUODENOSCOPY (EGD)          Past Medical History:   Diagnosis Date     Cephalopelvic disproportion due to mixed maternal and fetal factors 2020     GDM, class A1 3/25/2020     Tonsillitis       Past Surgical History:   Procedure Laterality Date      SECTION N/A 2020    Procedure:  SECTION;  Surgeon: Jaqueline Reyes MD;  Location: [facility] OR     MOUTH SURGERY      wisdom teeth     TONSILLECTOMY        No Known Allergies   Social History     Tobacco Use     Smoking status: Former Smoker     Packs/day: 0.20     Smokeless tobacco: Never Used     Tobacco comment: quit with pregnancy   Substance Use Topics     Alcohol use: Not Currently     Alcohol/week: 0.0 standard drinks     Comment: 4 drinks per month-quit with pregnancy      Wt Readings from Last 1 Encounters:   21 84.4 kg (186 lb)        Anesthesia Evaluation   Pt has had prior anesthetic. Type: General, MAC and Regional.        ROS/MED HX  ENT/Pulmonary:     (+) tobacco use, Past use, Intermittent, asthma     Neurologic: Comment: ADHD    (+) seizures,     Cardiovascular:     (+) -----fainting (syncope). Previous cardiac testing   Echo: Date: Results:    Stress Test: Date: Results:    ECG Reviewed: Date: 10/6/15 Results:  Atrial Rhythm   P:QRS - 1:1, Abnormal P axis, H Rate 60  WITHIN NORMAL LIMITS    Cath: Date: Results:      METS/Exercise Tolerance:     Hematologic:       Musculoskeletal:       GI/Hepatic: Comment: Increased risk of aspiration due to GERD    (+) GERD, Asymptomatic on medication,     Renal/Genitourinary:  - neg Renal ROS     Endo: Comment: GDM      Psychiatric/Substance Use:     (+) psychiatric history anxiety and depression     Infectious Disease:  - neg infectious disease ROS      Malignancy:  - neg malignancy ROS     Other:      (+) , H/O Chronic Pain (knees),        Physical Exam    Airway        Mallampati: I   TM distance: > 3 FB   Neck ROM: full   Mouth opening: > 3 cm    Respiratory Devices and Support         Dental  no notable dental history         Cardiovascular   cardiovascular exam normal          Pulmonary   pulmonary exam normal                OUTSIDE LABS:  CBC:   Lab Results   Component Value Date    WBC 14.5 (H) 05/22/2020    WBC 7.1 02/06/2020    HGB 8.8 (L) 05/24/2020    HGB 11.7 05/22/2020    HCT 35.4 05/22/2020    HCT 33.0 (L) 02/06/2020     05/22/2020     02/06/2020     BMP:   Lab Results   Component Value Date     07/24/2019     03/06/2019    POTASSIUM 3.8 07/24/2019    POTASSIUM 3.4 (L) 03/06/2019    CHLORIDE 106 07/24/2019    CHLORIDE 103 03/06/2019    CO2 27 07/24/2019    CO2 24 03/06/2019    BUN 9 07/24/2019    BUN 14 03/06/2019    CR 0.69 07/24/2019    CR 0.80 03/06/2019    GLC 93 07/24/2019    GLC 81 03/06/2019     COAGS: No results found for: PTT, INR, FIBR  POC:   Lab Results   Component Value Date    BGM 89 05/23/2020    HCG Negative 01/26/2021     HEPATIC:   Lab Results   Component Value Date    ALBUMIN 3.8 03/06/2019    PROTTOTAL 7.8 03/06/2019    ALT 14 03/06/2019    AST 14 03/06/2019    ALKPHOS 99 03/06/2019    BILITOTAL 0.4 03/06/2019     OTHER:   Lab Results   Component Value Date    LACT 0.5 03/06/2019    SAEED 8.9 07/24/2019    LIPASE 10 03/06/2019    TSH 0.96 04/22/2021    T4 0.87 10/22/2019       Anesthesia Plan    ASA Status:  2   NPO Status:  NPO Appropriate    Anesthesia Type: General.     - Airway: Native airway              Consents    Anesthesia Plan(s) and associated risks, benefits, and realistic alternatives discussed. Questions answered and patient/representative(s) expressed understanding.     - Discussed with:  Patient         Postoperative Care            Comments:                SUDHEER Miles CRNA

## 2021-11-09 ENCOUNTER — ANESTHESIA (OUTPATIENT)
Dept: GASTROENTEROLOGY | Facility: CLINIC | Age: 26
End: 2021-11-09
Payer: COMMERCIAL

## 2021-11-11 ENCOUNTER — LAB (OUTPATIENT)
Dept: LAB | Facility: CLINIC | Age: 26
End: 2021-11-11
Payer: COMMERCIAL

## 2021-11-11 DIAGNOSIS — Z11.59 ENCOUNTER FOR SCREENING FOR OTHER VIRAL DISEASES: ICD-10-CM

## 2021-11-11 PROCEDURE — U0005 INFEC AGEN DETEC AMPLI PROBE: HCPCS

## 2021-11-11 PROCEDURE — U0003 INFECTIOUS AGENT DETECTION BY NUCLEIC ACID (DNA OR RNA); SEVERE ACUTE RESPIRATORY SYNDROME CORONAVIRUS 2 (SARS-COV-2) (CORONAVIRUS DISEASE [COVID-19]), AMPLIFIED PROBE TECHNIQUE, MAKING USE OF HIGH THROUGHPUT TECHNOLOGIES AS DESCRIBED BY CMS-2020-01-R: HCPCS

## 2021-11-12 LAB — SARS-COV-2 RNA RESP QL NAA+PROBE: NEGATIVE

## 2021-11-15 ENCOUNTER — HOSPITAL ENCOUNTER (OUTPATIENT)
Facility: CLINIC | Age: 26
Discharge: HOME | End: 2021-11-15
Attending: SURGERY | Admitting: SURGERY
Payer: COMMERCIAL

## 2021-11-15 VITALS
WEIGHT: 175 LBS | BODY MASS INDEX: 33.04 KG/M2 | DIASTOLIC BLOOD PRESSURE: 88 MMHG | SYSTOLIC BLOOD PRESSURE: 108 MMHG | HEIGHT: 61 IN | TEMPERATURE: 97.8 F | HEART RATE: 76 BPM | OXYGEN SATURATION: 95 % | RESPIRATION RATE: 16 BRPM

## 2021-11-15 DIAGNOSIS — K21.9 GASTROESOPHAGEAL REFLUX DISEASE, UNSPECIFIED WHETHER ESOPHAGITIS PRESENT: Primary | ICD-10-CM

## 2021-11-15 LAB — UPPER GI ENDOSCOPY: NORMAL

## 2021-11-15 PROCEDURE — 250N000009 HC RX 250: Performed by: NURSE ANESTHETIST, CERTIFIED REGISTERED

## 2021-11-15 PROCEDURE — 88305 TISSUE EXAM BY PATHOLOGIST: CPT | Mod: TC | Performed by: SURGERY

## 2021-11-15 PROCEDURE — 250N000009 HC RX 250: Performed by: SURGERY

## 2021-11-15 PROCEDURE — 250N000011 HC RX IP 250 OP 636: Performed by: NURSE ANESTHETIST, CERTIFIED REGISTERED

## 2021-11-15 PROCEDURE — 258N000003 HC RX IP 258 OP 636: Performed by: SURGERY

## 2021-11-15 PROCEDURE — 43239 EGD BIOPSY SINGLE/MULTIPLE: CPT | Performed by: SURGERY

## 2021-11-15 PROCEDURE — 370N000017 HC ANESTHESIA TECHNICAL FEE, PER MIN: Performed by: SURGERY

## 2021-11-15 RX ORDER — LIDOCAINE HYDROCHLORIDE 10 MG/ML
INJECTION, SOLUTION EPIDURAL; INFILTRATION; INTRACAUDAL; PERINEURAL PRN
Status: DISCONTINUED | OUTPATIENT
Start: 2021-11-15 | End: 2021-11-15

## 2021-11-15 RX ORDER — LIDOCAINE 40 MG/G
CREAM TOPICAL
Status: DISCONTINUED | OUTPATIENT
Start: 2021-11-15 | End: 2021-11-15 | Stop reason: HOSPADM

## 2021-11-15 RX ORDER — OMEPRAZOLE 40 MG/1
40 CAPSULE, DELAYED RELEASE ORAL DAILY
Qty: 14 CAPSULE | Refills: 1 | Status: SHIPPED | OUTPATIENT
Start: 2021-11-15 | End: 2022-11-23

## 2021-11-15 RX ORDER — SODIUM CHLORIDE, SODIUM LACTATE, POTASSIUM CHLORIDE, CALCIUM CHLORIDE 600; 310; 30; 20 MG/100ML; MG/100ML; MG/100ML; MG/100ML
INJECTION, SOLUTION INTRAVENOUS CONTINUOUS
Status: DISCONTINUED | OUTPATIENT
Start: 2021-11-15 | End: 2021-11-15 | Stop reason: HOSPADM

## 2021-11-15 RX ORDER — ONDANSETRON 2 MG/ML
4 INJECTION INTRAMUSCULAR; INTRAVENOUS
Status: DISCONTINUED | OUTPATIENT
Start: 2021-11-15 | End: 2021-11-15 | Stop reason: HOSPADM

## 2021-11-15 RX ORDER — PROPOFOL 10 MG/ML
INJECTION, EMULSION INTRAVENOUS PRN
Status: DISCONTINUED | OUTPATIENT
Start: 2021-11-15 | End: 2021-11-15

## 2021-11-15 RX ADMIN — LIDOCAINE HYDROCHLORIDE 50 MG: 10 INJECTION, SOLUTION EPIDURAL; INFILTRATION; INTRACAUDAL; PERINEURAL at 14:19

## 2021-11-15 RX ADMIN — PROPOFOL 100 MG: 10 INJECTION, EMULSION INTRAVENOUS at 14:19

## 2021-11-15 RX ADMIN — SODIUM CHLORIDE, POTASSIUM CHLORIDE, SODIUM LACTATE AND CALCIUM CHLORIDE: 600; 310; 30; 20 INJECTION, SOLUTION INTRAVENOUS at 14:05

## 2021-11-15 RX ADMIN — PROPOFOL 50 MG: 10 INJECTION, EMULSION INTRAVENOUS at 14:21

## 2021-11-15 RX ADMIN — LIDOCAINE HYDROCHLORIDE 0.2 ML: 10 INJECTION, SOLUTION EPIDURAL; INFILTRATION; INTRACAUDAL; PERINEURAL at 14:04

## 2021-11-15 RX ADMIN — PROPOFOL 50 MG: 10 INJECTION, EMULSION INTRAVENOUS at 14:23

## 2021-11-15 ASSESSMENT — MIFFLIN-ST. JEOR: SCORE: 1471.17

## 2021-11-15 ASSESSMENT — LIFESTYLE VARIABLES: TOBACCO_USE: 1

## 2021-11-15 NOTE — H&P
26 year old year old female here for upper endoscopy for GERD        Patient Active Problem List   Diagnosis     Major depressive disorder, single episode     Anxiety state     History of syncope     Attention deficit hyperactivity disorder (ADHD), predominantly inattentive type     Chronic pain of both knees     Mild intermittent asthma without complication     S/P primary low transverse      Seizures (H)       Past Medical History:   Diagnosis Date     Cephalopelvic disproportion due to mixed maternal and fetal factors 2020     GDM, class A1 3/25/2020     Tonsillitis        Past Surgical History:   Procedure Laterality Date      SECTION N/A 2020    Procedure:  SECTION;  Surgeon: Jaqueline Reyes MD;  Location: [facility] OR     MOUTH SURGERY      wisdom teeth     TONSILLECTOMY         Family History   Problem Relation Age of Onset     Diabetes Maternal Grandfather      Coronary Artery Disease Maternal Grandfather      Cerebrovascular Disease Maternal Grandfather      Depression Mother      Anxiety Disorder Mother      Unknown/Adopted Father         unknown      Chronic Obstructive Pulmonary Disease Maternal Grandmother      Coronary Artery Disease Daughter      Other Cancer No family hx of        No current outpatient medications on file.       No Known Allergies    Pt reports that she has quit smoking. She smoked 0.20 packs per day. She has never used smokeless tobacco. She reports previous alcohol use. She reports that she does not use drugs.    Exam:    Awake, Alert OX3  Lungs - CTA bilaterally  CV - RRR, no murmurs, distal pulses intact  Abd - soft, non-distended, non-tender, +BS  Extr - No cyanosis or edema    A/P 26 year old year old female in need of upper endoscopy for GERD. Risks, benefits, alternatives, and complications were discussed including the possibility of perforation and the patient agreed to proceed.    Robi Rodriguez MD

## 2021-11-15 NOTE — ANESTHESIA CARE TRANSFER NOTE
Patient: Nargis Ruelas    Procedure: Procedure(s):  ESOPHAGOGASTRODUODENOSCOPY, WITH BIOPSY       Diagnosis: Gastroesophageal reflux disease without esophagitis [K21.9]  Diagnosis Additional Information: No value filed.    Anesthesia Type:   General     Note:    Oropharynx: oropharynx clear of all foreign objects and spontaneously breathing  Level of Consciousness: drowsy  Oxygen Supplementation: room air    Independent Airway: airway patency satisfactory and stable  Dentition: dentition unchanged  Vital Signs Stable: post-procedure vital signs reviewed and stable  Report to RN Given: handoff report given  Patient transferred to: Phase II    Handoff Report: Identifed the Patient, Identified the Reponsible Provider, Reviewed the pertinent medical history, Discussed the surgical course, Reviewed Intra-OP anesthesia mangement and issues during anesthesia, Set expectations for post-procedure period and Allowed opportunity for questions and acknowledgement of understanding      Vitals:  Vitals Value Taken Time   BP     Temp     Pulse     Resp     SpO2         Electronically Signed By: SUDHEER Dowling CRNA  November 15, 2021  2:27 PM

## 2021-11-15 NOTE — H&P
[facility]    Pre-Endoscopy History and Physical     Nargis Ruelas MRN# [id number]   YOB: 1995 Age: 26 year old     Date of Procedure: 11/15/2021  Primary care provider: Carol Jennings  Type of Endoscopy: Esophagogastroduodenoscopy with possible biopsy, possible dilation, possible foreign body removal  Reason for Procedure: GERD  Type of Anesthesia Anticipated: MAC    HPI:    Nargis is a 26 year old female who will be undergoing the above procedure.  GERD - intermittent pepcid; rarely uses PPI    A history and physical has been performed. The patient's medications and allergies have been reviewed. The risks and benefits of the procedure and the sedation options and risks were discussed with the patient.  All questions were answered and informed consent was obtained.      She denies a personal or family history of anesthesia complications or bleeding disorders.     Patient Active Problem List   Diagnosis     Major depressive disorder, single episode     Anxiety state     History of syncope     Attention deficit hyperactivity disorder (ADHD), predominantly inattentive type     Chronic pain of both knees     Mild intermittent asthma without complication     S/P primary low transverse      Seizures (H)        Past Medical History:   Diagnosis Date     Cephalopelvic disproportion due to mixed maternal and fetal factors 2020     GDM, class A1 3/25/2020     Tonsillitis         Past Surgical History:   Procedure Laterality Date      SECTION N/A 2020    Procedure:  SECTION;  Surgeon: Jaqueline Reyes MD;  Location: [facility]     MOUTH SURGERY      wisdom teeth     TONSILLECTOMY         Social History     Tobacco Use     Smoking status: Former Smoker     Packs/day: 0.20     Smokeless tobacco: Never Used     Tobacco comment: quit with pregnancy   Substance Use Topics     Alcohol use: Not Currently     Alcohol/week: 0.0 standard drinks      "Comment: 4 drinks per month-quit with pregnancy       Family History   Problem Relation Age of Onset     Diabetes Maternal Grandfather      Coronary Artery Disease Maternal Grandfather      Cerebrovascular Disease Maternal Grandfather      Depression Mother      Anxiety Disorder Mother      Unknown/Adopted Father         unknown      Chronic Obstructive Pulmonary Disease Maternal Grandmother      Coronary Artery Disease Daughter      Other Cancer No family hx of        Prior to Admission medications    Medication Sig Start Date End Date Taking? Authorizing Provider   busPIRone (BUSPAR) 10 MG tablet Take 1 tablet (10 mg) by mouth 2 times daily 10/11/21  Yes Carol Jennings PA-C   famotidine (PEPCID) 20 MG tablet Take 1 tablet (20 mg) by mouth 2 times daily 3/22/21  Yes Carol Jennings PA-C   lisdexamfetamine (VYVANSE) 40 MG capsule Take 1 capsule (40 mg) by mouth every morning 11/3/21  Yes Carol Jennings PA-C   LORazepam (ATIVAN) 0.5 MG tablet Take 1 tablet (0.5 mg) by mouth every 6 hours as needed for anxiety 1/4/21  Yes Carol Jennings PA-C   norethindrone-ethinyl estradiol (MICROGESTIN 1/20) 1-20 MG-MCG tablet Take 1 tablet by mouth daily 4/22/21  Yes Jacklyn Marsh APRN CNP   omeprazole (PRILOSEC) 20 MG DR capsule Take 1 capsule (20 mg) by mouth daily 3/22/21  Yes Carol Jennings PA-C   venlafaxine (EFFEXOR-XR) 75 MG 24 hr capsule Take 1 capsule (75 mg) by mouth daily 10/11/21  Yes Carol Jennings PA-C       No Known Allergies     REVIEW OF SYSTEMS:   5 point ROS negative except as noted above in HPI, including Gen., Resp., CV, GI &  system review.    PHYSICAL EXAM:   /73 (BP Location: Left arm)   Pulse 91   Temp 98.1  F (36.7  C) (Oral)   Ht 1.549 m (5' 1\")   Wt 79.4 kg (175 lb)   LMP 11/02/2021   SpO2 98%   BMI 33.07 kg/m   Estimated body mass index is 33.07 kg/m  as calculated from the following:    Height as of this encounter: 1.549 m (5' 1\").    Weight as of this " encounter: 79.4 kg (175 lb).   Constitutional: Awake, alert, no acute distress.  Eyes: No scleral icterus.  Conjunctiva are without injection.  ENMT: Mucous membranes moist, dentition and gums are intact.   Neck: Soft, supple, trachea midline.    Endocrine: n/a   Lymphatic: There is no cervical, submandibularadenopathy.  Respiratory: normal effortgs   Cardiovascular: S1, S2  Abdomen: Non-distended, non-tender,  No masses,  Musculoskeletal: No spinal or CVA tenderness. Full range of motion in the upper and lower extremities.    Skin: No skin rashes or lesions to inspection.  No petechia.    Neurologic: alerted and oriented 3x  Psychiatric: The patient's affect is not blunted and mood is appropriate.  DIAGNOSTICS:    Not indicated    IMPRESSION   ASA Class 2 - Mild systemic disease    PLAN:   Plan for Esophagogastroduodenoscopy with possible biopsy, possible dilation, possible foreign body removal. We discussed the risks, benefits and alternatives and the patient wished to proceed.  Patient is cleared for the above procedure.    The above has been forwarded to the consulting provider.    Critical access hospital, [facility]

## 2021-11-15 NOTE — ANESTHESIA POSTPROCEDURE EVALUATION
Patient: Nargis Ruelas    Procedure: Procedure(s):  ESOPHAGOGASTRODUODENOSCOPY, WITH BIOPSY       Diagnosis:Gastroesophageal reflux disease without esophagitis [K21.9]  Diagnosis Additional Information: No value filed.    Anesthesia Type:  General    Note:  Disposition: Outpatient   Postop Pain Control: Uneventful            Sign Out: Well controlled pain   PONV: No   Neuro/Psych: Uneventful            Sign Out: Acceptable/Baseline neuro status   Airway/Respiratory: Uneventful            Sign Out: Acceptable/Baseline resp. status   CV/Hemodynamics: Uneventful            Sign Out: Acceptable CV status; No obvious hypovolemia; No obvious fluid overload   Other NRE: NONE   DID A NON-ROUTINE EVENT OCCUR? No           Last vitals:  Vitals Value Taken Time   /88 11/15/21 1430   Temp 36.8  C (98.2  F) 11/15/21 1430   Pulse     Resp 14 11/15/21 1430   SpO2 94 % 11/15/21 1430   Vitals shown include unvalidated device data.    Electronically Signed By: SUDHEER Dowling CRNA  November 15, 2021  2:31 PM

## 2021-11-17 LAB
PATH REPORT.COMMENTS IMP SPEC: NORMAL
PATH REPORT.FINAL DX SPEC: NORMAL
PATH REPORT.GROSS SPEC: NORMAL
PATH REPORT.MICROSCOPIC SPEC OTHER STN: NORMAL
PATH REPORT.RELEVANT HX SPEC: NORMAL
PHOTO IMAGE: NORMAL

## 2021-11-17 PROCEDURE — 88305 TISSUE EXAM BY PATHOLOGIST: CPT | Mod: 26 | Performed by: PATHOLOGY

## 2021-11-29 ENCOUNTER — VIRTUAL VISIT (OUTPATIENT)
Dept: FAMILY MEDICINE | Facility: CLINIC | Age: 26
End: 2021-11-29
Payer: COMMERCIAL

## 2021-11-29 DIAGNOSIS — F32.1 CURRENT MODERATE EPISODE OF MAJOR DEPRESSIVE DISORDER WITHOUT PRIOR EPISODE (H): ICD-10-CM

## 2021-11-29 DIAGNOSIS — F90.0 ATTENTION DEFICIT HYPERACTIVITY DISORDER (ADHD), PREDOMINANTLY INATTENTIVE TYPE: Primary | ICD-10-CM

## 2021-11-29 DIAGNOSIS — F41.1 ANXIETY STATE: ICD-10-CM

## 2021-11-29 PROCEDURE — 99214 OFFICE O/P EST MOD 30 MIN: CPT | Mod: 95 | Performed by: FAMILY MEDICINE

## 2021-11-29 RX ORDER — LISDEXAMFETAMINE DIMESYLATE 40 MG/1
40 CAPSULE ORAL EVERY MORNING
Qty: 30 CAPSULE | Refills: 0 | Status: SHIPPED | OUTPATIENT
Start: 2021-11-29 | End: 2022-03-28

## 2021-11-29 ASSESSMENT — ANXIETY QUESTIONNAIRES
3. WORRYING TOO MUCH ABOUT DIFFERENT THINGS: NOT AT ALL
GAD7 TOTAL SCORE: 1
7. FEELING AFRAID AS IF SOMETHING AWFUL MIGHT HAPPEN: NOT AT ALL
GAD7 TOTAL SCORE: 1
5. BEING SO RESTLESS THAT IT IS HARD TO SIT STILL: NOT AT ALL
6. BECOMING EASILY ANNOYED OR IRRITABLE: NOT AT ALL
4. TROUBLE RELAXING: NOT AT ALL
8. IF YOU CHECKED OFF ANY PROBLEMS, HOW DIFFICULT HAVE THESE MADE IT FOR YOU TO DO YOUR WORK, TAKE CARE OF THINGS AT HOME, OR GET ALONG WITH OTHER PEOPLE?: NOT DIFFICULT AT ALL
1. FEELING NERVOUS, ANXIOUS, OR ON EDGE: SEVERAL DAYS
GAD7 TOTAL SCORE: 1
2. NOT BEING ABLE TO STOP OR CONTROL WORRYING: NOT AT ALL
7. FEELING AFRAID AS IF SOMETHING AWFUL MIGHT HAPPEN: NOT AT ALL

## 2021-11-29 ASSESSMENT — PATIENT HEALTH QUESTIONNAIRE - PHQ9
SUM OF ALL RESPONSES TO PHQ QUESTIONS 1-9: 2
10. IF YOU CHECKED OFF ANY PROBLEMS, HOW DIFFICULT HAVE THESE PROBLEMS MADE IT FOR YOU TO DO YOUR WORK, TAKE CARE OF THINGS AT HOME, OR GET ALONG WITH OTHER PEOPLE: SOMEWHAT DIFFICULT
SUM OF ALL RESPONSES TO PHQ QUESTIONS 1-9: 2

## 2021-11-29 NOTE — PROGRESS NOTES
Nargis is a 26 year old who is being evaluated via a billable video visit.      How would you like to obtain your AVS? Sharifa  If the video visit is dropped, the invitation should be resent by: Sharifa or else Text to cell phone: [phone]  Will anyone else be joining your video visit? No      Video Start Time: 5:17 PM    Assessment & Plan     Attention deficit hyperactivity disorder (ADHD), predominantly inattentive type  She was given a refill of Vyvanse which appears to be working well to control ADHD symptoms without unwanted side effects.  She is planning to schedule a follow-up with her PCP next month.  - lisdexamfetamine (VYVANSE) 40 MG capsule; Take 1 capsule (40 mg) by mouth every morning    Anxiety state  She reports that this issue has been stable on her current medications venlafaxine and buspirone.  She reports rarely needing the lorazepam.    Current moderate episode of major depressive disorder without prior episode (H)  She reports that depression symptoms have been stable on her the venlafaxine.                   Return in about 4 weeks (around 12/27/2021) for ADHD Follow Up.    Jimmy Huynh, [facility]   Nargis is a 26 year old who presents for the following health issues     History of Present Illness       She eats 2-3 servings of fruits and vegetables daily.She consumes 2 sweetened beverage(s) daily.She exercises with enough effort to increase her heart rate 20 to 29 minutes per day.  She exercises with enough effort to increase her heart rate 3 or less days per week.   She is taking medications regularly.       Depression and Anxiety Follow-Up    How are you doing with your depression since your last visit? No change    How are you doing with your anxiety since your last visit?  No change    Are you having other symptoms that might be associated with depression or anxiety? No    Have you had a significant life event? No     Do you have any  concerns with your use of alcohol or other drugs? No    Social History     Tobacco Use     Smoking status: Former Smoker     Packs/day: 0.20     Years: 0.00     Pack years: 0.00     Smokeless tobacco: Never Used     Tobacco comment: quit with pregnancy   Vaping Use     Vaping Use: Never used   Substance Use Topics     Alcohol use: Not Currently     Alcohol/week: 0.0 standard drinks     Comment: 4 drinks per month-quit with pregnancy     Drug use: No     PHQ 3/22/2021 10/11/2021 11/29/2021   PHQ-9 Total Score 8 4 2   Q9: Thoughts of better off dead/self-harm past 2 weeks Not at all Not at all Not at all     JERMAIN-7 SCORE 3/22/2021 10/11/2021 11/29/2021   Total Score 2 (minimal anxiety) 1 (minimal anxiety) 1 (minimal anxiety)   Total Score 2 1 1     Last PHQ-9 11/29/2021   1.  Little interest or pleasure in doing things 1   2.  Feeling down, depressed, or hopeless 0   3.  Trouble falling or staying asleep, or sleeping too much 0   4.  Feeling tired or having little energy 1   5.  Poor appetite or overeating 0   6.  Feeling bad about yourself 0   7.  Trouble concentrating 0   8.  Moving slowly or restless 0   Q9: Thoughts of better off dead/self-harm past 2 weeks 0   PHQ-9 Total Score 2   Difficulty at work, home, or with people -     JERMAIN-7  11/29/2021   1. Feeling nervous, anxious, or on edge 1   2. Not being able to stop or control worrying 0   3. Worrying too much about different things 0   4. Trouble relaxing 0   5. Being so restless that it is hard to sit still 0   6. Becoming easily annoyed or irritable 0   7. Feeling afraid, as if something awful might happen 0   JERMAIN-7 Total Score 1   If you checked any problems, how difficult have they made it for you to do your work, take care of things at home, or get along with other people? -     Answers for HPI/ROS submitted by the patient on 11/29/2021  If you checked off any problems, how difficult home, or get along with other people?: Somewhat difficult  PHQ9 TOTAL  SCORE: 2  JERMAIN 7 TOTAL SCORE: 1      ADHD Follow-Up    Date of last ADHD office visit: 10/11/21 VV with [facility]  Status since last visit: Stable  Taking controlled (daily) medications as prescribed: Yes                     Parent/Patient Concerns with Medications: None  ADHD Medication     Amphetamines Disp Start End     lisdexamfetamine (VYVANSE) 40 MG capsule    30 capsule 11/3/2021     Sig - Route: Take 1 capsule (40 mg) by mouth every morning - Oral    Class: E-Prescribe    Earliest Fill Date: 11/3/2021        Medication Benefits:   Controlled symptoms: Attention span, Distractability and Finishing tasks  Uncontrolled Symptoms: None    Medication side effects:  Side effects noted: none  Denies: appetite suppression, weight loss and insomnia            Review of Systems   Constitutional, HEENT, cardiovascular, pulmonary, GI, , musculoskeletal, neuro, skin, endocrine and psych systems are negative, except as otherwise noted.      Objective           Vitals:  No vitals were obtained today due to virtual visit.    Physical Exam   GENERAL: Healthy, alert and no distress  EYES: Eyes grossly normal to inspection.  No discharge or erythema, or obvious scleral/conjunctival abnormalities.  RESP: No audible wheeze, cough, or visible cyanosis.  No visible retractions or increased work of breathing.    SKIN: Visible skin clear. No significant rash, abnormal pigmentation or lesions.  NEURO: Cranial nerves grossly intact.  Mentation and speech appropriate for age.  PSYCH: Mentation appears normal, affect normal/bright, judgement and insight intact, normal speech and appearance well-groomed.                Video-Visit Details    Type of service:  Video Visit    Video End Time:5:24 PM    Originating Location (pt. Location): Home    Distant Location (provider location):  [facility]     Platform used for Video Visit: [employer]

## 2021-11-30 ASSESSMENT — PATIENT HEALTH QUESTIONNAIRE - PHQ9: SUM OF ALL RESPONSES TO PHQ QUESTIONS 1-9: 2

## 2021-11-30 ASSESSMENT — ANXIETY QUESTIONNAIRES: GAD7 TOTAL SCORE: 1

## 2021-12-02 ENCOUNTER — E-VISIT (OUTPATIENT)
Dept: URGENT CARE | Facility: CLINIC | Age: 26
End: 2021-12-02
Payer: COMMERCIAL

## 2021-12-02 DIAGNOSIS — Z20.822 CLOSE EXPOSURE TO 2019 NOVEL CORONAVIRUS: Primary | ICD-10-CM

## 2021-12-02 PROCEDURE — 99421 OL DIG E/M SVC 5-10 MIN: CPT | Performed by: PHYSICIAN ASSISTANT

## 2021-12-02 NOTE — PATIENT INSTRUCTIONS
"  Dear Nargis Ruelas,    Based on your exposure to COVID-19 (coronavirus), we would like to test you for this virus. I have placed an order for this test.The best time for testing is 5-7 days after the exposure.    How to schedule:  Go to your [employer] home page and scroll down to the section that says  You have an appointment that needs to be scheduled  and click the large green button that says  Schedule Now  and follow the steps to find the next available opening.     If you are unable to complete these [employer] scheduling steps, please call [phone] to schedule your testing.     Return to work/school/ guidance:   For people with high risk exposures outside the home    Please let your workplace manager and staffing office know when your quarantine ends.     We can not give you an exact date as it depends on the information below. You can calculate this on your own or work with your manager/staffing office to calculate this. (For example if you were exposed on 10/4, you would have to quarantine for 14 full days. That would be through 10/18. You could return on 10/19.)    Quarantine Guidelines:  Patients (\"contacts\") who have been in close prolonged contact of an infected person(s) (within six feet for at least 15 minutes within a 24 hour period), and remain asymptomatic should enter quarantine based on the following options:    14-day quarantine period (this remains the CDC recommendation for the greatest protection against spread of COVID-19) OR    Minimum 7-day quarantine with negative RT-PCR test collected on day 5 or later OR    10-day quarantine with no test  Quarantine Guideline exceptions are as follows:    People who have been fully vaccinated do not need to quarantine if the exposure was at least 2 weeks after the last vaccination. This includes vaccinated health care workers.    Not fully vaccinated and unvaccinated Individuals who work in health care, congregate care, or congregate living " should be off work for 14 days from their last date of exposure. Community activities for this group can be resumed based on options above. Fully vaccinated individuals in this group do not need to quarantine from work after exposure.    Not fully vaccinated and unvaccinated people whose high-risk exposure was a household member should always quarantine for 14 days from their last date of exposure. Fully vaccinated people in this category do not need to quarantine.    Not fully vaccinated or unvaccinated residents of congregate care and congregate living settings should always quarantine for 14 days from their last date of exposure. Fully vaccinated residents do not need to quarantine.  Note: If you have ongoing exposure to the covid positive person, this quarantine period may be more than 14 days. (For example, if you are continued to be exposed to your child who tested positive and cannot isolate from them, then the quarantine of 7-14 days can't start until your child is no longer contagious. This is typically 10 days from onset of the child's symptoms. So the total duration may be 17-24 days in this case.)    You should continue symptom monitoring until day 14 post-exposure. If you develop signs or symptoms of COVID-19, isolate and get tested (even if you have been tested already).    How to quarantine:   Stay home and away from others. Don't go to school or anywhere else. Generally quarantine means staying home from work but there are some exceptions to this. Please contact your workplace.  No hugging, kissing or shaking hands.  Don't let anyone visit.  Cover your mouth and nose with a mask, tissue or washcloth to avoid spreading germs.  Wash your hands and face often. Use soap and water.    What are the symptoms of COVID-19?  The most common symptoms are cough, fever and trouble breathing. Less common symptoms include headache, body aches, fatigue (feeling very tired), chills, sore throat, stuffy or runny nose,  diarrhea (loose poop), loss of taste or smell, belly pain, and nausea or vomiting (feeling sick to your stomach or throwing up).  After 14 days, if you have still don't have symptoms, you likely don't have this virus.  If you develop symptoms, follow these guidelines.  If you're normally healthy: Please start another eVisit.  If you have a serious health problem (like cancer, heart failure, an organ transplant or kidney disease): Call your specialty clinic. Let them know that you might have COVID-19.    Where can I get more information?  [facility] - About COVID-19: [email or web address]  CDC - What to Do If You're Sick: www.cdc.gov/coronavirus/2019-ncov/about/steps-when-sick.html  CDC - Ending Home Isolation: www.cdc.gov/coronavirus/2019-ncov/hcp/disposition-in-home-patients.html  CDC - Caring for Someone: www.cdc.gov/coronavirus/2019-ncov/if-you-are-sick/care-for-someone.html  [facility] clinical trials (COVID-19 research studies): clinicalaffairs.[facility]-clinical-trials  Below are the COVID-19 hotlines at the [facility] ([facility]). Interpreters are available.  For health questions: Call 479-226-1566 or 1-222.396.8746 (7 a.m. to 7 p.m.)  For questions about schools and childcare: Call 750-951-1081 or 1-267.871.8936 (7 a.m. to 7 p.m.)        December 2, 2021  RE:  Nargis Ruelas                                                                                                                   [id number]  [place] [address]      To whom it may concern:    I evaluated Nargis Ruelas on December 2, 2021. Nargis Ruelas should be excused from work/school.    They should let their workplace manager and staffing office know when their quarantine ends.    We can not give an exact date as it depends on the information below. They can calculate this on their own or work with their manager/staffing office to calculate this. (For example if they were exposed on  "10/04, they would have to quarantine for 14 full days. That would be through 10/18. They could return on 10/19.)    Quarantine Guidelines:    Patients (\"contacts\") who have been in close prolonged contact of an infected person(s) (within six feet for at least 15 minutes within a 24 hour period) and remain asymptomatic should enter quarantine based on the following options:      14-day quarantine period (this remains the CDC recommendation for the greatest protection against spread of COVID-19) OR    Minimum 7-day quarantine with negative RT-PCR test collected on day 5 or later OR    10-day quarantine with no test   Quarantine Guideline exceptions are as follows:    People who have been fully vaccinated do not need to quarantine if the exposure was at least 2 weeks after the last vaccination. This includes vaccinated health care workers.    Not fully vaccinated and unvaccinated Individuals who work in health care, congregate care, or congregate living should be off work for 14 days from their last date of exposure. Community activities for this group can be resumed based on options above. Fully vaccinated individuals in this group do not need to quarantine from work after exposure.    Not fully vaccinated and unvaccinated people whose high-risk exposure was a household member should always quarantine for 14 days from their last date of exposure. Fully vaccinated people in this category do not need to quarantine.    Not fully vaccinated or unvaccinated residents of congregate care and congregate living settings should always quarantine for 14 days from their last date of exposure. Fully vaccinated residents do not need to quarantine.    Note: If there is ongoing exposure to the covid positive person, this quarantine period may be longer than 14 days. (For example, if they are continually exposed to their child, who tested positive and cannot isolate from them, then the quarantine of 7-14 days can't start until their " child is no longer contagious. This is typically 10 days from onset to the child's symptoms. So the total duration may be 17-24 days in this case.)    Nargis YUE Ruelas should continue symptom monitoring until day 14 post-exposure. If they develop signs or symptoms of COVID-19, they should isolate and get tested (even if they have been tested already).    Sincerely,  Christopher Cummings PA-C

## 2021-12-20 ENCOUNTER — OFFICE VISIT (OUTPATIENT)
Dept: URGENT CARE | Facility: URGENT CARE | Age: 26
End: 2021-12-20
Payer: COMMERCIAL

## 2021-12-20 ENCOUNTER — E-VISIT (OUTPATIENT)
Dept: URGENT CARE | Facility: URGENT CARE | Age: 26
End: 2021-12-20
Payer: COMMERCIAL

## 2021-12-20 VITALS
RESPIRATION RATE: 20 BRPM | DIASTOLIC BLOOD PRESSURE: 82 MMHG | SYSTOLIC BLOOD PRESSURE: 120 MMHG | TEMPERATURE: 99.5 F | OXYGEN SATURATION: 100 % | HEART RATE: 94 BPM

## 2021-12-20 DIAGNOSIS — J45.21 MILD INTERMITTENT ASTHMA WITH EXACERBATION: ICD-10-CM

## 2021-12-20 DIAGNOSIS — R06.02 SOB (SHORTNESS OF BREATH): Primary | ICD-10-CM

## 2021-12-20 DIAGNOSIS — R50.9 FEVER, UNSPECIFIED: ICD-10-CM

## 2021-12-20 DIAGNOSIS — J06.9 VIRAL URI WITH COUGH: Primary | ICD-10-CM

## 2021-12-20 LAB
FLUAV AG SPEC QL IA: NEGATIVE
FLUBV AG SPEC QL IA: NEGATIVE

## 2021-12-20 PROCEDURE — 99207 PR NON-BILLABLE SERV PER CHARTING: CPT | Performed by: PHYSICIAN ASSISTANT

## 2021-12-20 PROCEDURE — 87804 INFLUENZA ASSAY W/OPTIC: CPT | Performed by: PHYSICIAN ASSISTANT

## 2021-12-20 PROCEDURE — 99214 OFFICE O/P EST MOD 30 MIN: CPT | Performed by: PHYSICIAN ASSISTANT

## 2021-12-20 RX ORDER — ALBUTEROL SULFATE 90 UG/1
AEROSOL, METERED RESPIRATORY (INHALATION)
Qty: 18 G | Refills: 0 | Status: SHIPPED | OUTPATIENT
Start: 2021-12-20 | End: 2024-08-28

## 2021-12-20 RX ORDER — PREDNISONE 20 MG/1
40 TABLET ORAL DAILY
Qty: 10 TABLET | Refills: 0 | Status: SHIPPED | OUTPATIENT
Start: 2021-12-20 | End: 2021-12-25

## 2021-12-20 NOTE — PATIENT INSTRUCTIONS
Dear Nargis Ruelas,    We are sorry you are not feeling well. Based on the responses you provided, it is recommended that you be seen in-person in urgent care so we can better evaluate your symptoms. Please click here to find the nearest urgent care location to you.   You will not be charged for this Visit. Thank you for trusting us with your care.    Matteo Newsome PA-C

## 2021-12-20 NOTE — LETTER
[facility] [place] [id number] [address]  Phone: [phone]  Fax: [phone]    December 20, 2021        Nargis Ruelas  [address]  [facility] [address]          To whom it may concern:    RE: Nargis Ruelas    Patient was seen and treated today at our clinic and missed work 12/21/21 through 12/23/21.    Please contact me for questions or concerns.      Sincerely,        Luda Bennett PA-C

## 2021-12-20 NOTE — PROGRESS NOTES
"  Assessment & Plan     Viral URI with cough  Influenza negative. Continue with supportive care. Get plenty of rest and push fluids. Can use Tylenol and/or ibuprofen as needed for pain and/or fever control. Discussed quarantine guidelines. Return to clinic if symptoms worsen or do not improve; otherwise follow up as needed       Mild intermittent asthma with exacerbation  Will treat with albuterol (PROAIR HFA/PROVENTIL HFA/VENTOLIN HFA) 108 (90 Base) MCG/ACT inhaler; Inhale 2 puffs every 4-6 hours as needed for cough, wheezing, or shortness of breath and predniSONE (DELTASONE) 20 MG tablet; Take 2 tablets (40 mg) by mouth daily for 5 days. Return to clinic if symptoms worsen or do not improve; otherwise follow up as needed      Fever, unspecified    - Influenza A & B Antigen - Clinic Collect             BMI:   Estimated body mass index is 33.07 kg/m  as calculated from the following:    Height as of 11/15/21: 1.549 m (5' 1\").    Weight as of 11/15/21: 79.4 kg (175 lb).           Return in about 1 week (around 12/27/2021), or if symptoms worsen or fail to improve.    Luda Bennett PA-C  [facility] [place]        Subjective   Chief Complaint   Patient presents with     Breathing Problem     x  4 days, chest tightness and congestion, low grade fever, wheezing, had covid in Sept 24       HPI     URI Adult    Onset of symptoms was 4 day(s) ago.  Course of illness is same.    Severity moderate  Current and Associated symptoms: fever, congestion, wheezing  Treatment measures tried include has tried albuterol inhaler.  Predisposing factors include history of asthma.              Review of Systems   Constitutional, HEENT, cardiovascular, pulmonary, gi and gu systems are negative, except as otherwise noted.      Objective    /82 (BP Location: Right arm, Patient Position: Sitting, Cuff Size: Adult Large)   Pulse 94   Temp 99.5  F (37.5  C) (Tympanic)   Resp 20   LMP 12/18/2021   SpO2 100% "   There is no height or weight on file to calculate BMI.     Physical Exam  Constitutional:       Appearance: She is well-developed.   HENT:      Head: Normocephalic.      Right Ear: Tympanic membrane and ear canal normal.      Left Ear: Tympanic membrane and ear canal normal.   Eyes:      Conjunctiva/sclera: Conjunctivae normal.   Cardiovascular:      Rate and Rhythm: Normal rate.      Heart sounds: Normal heart sounds.   Pulmonary:      Effort: Pulmonary effort is normal.      Breath sounds: Normal breath sounds.   Skin:     General: Skin is warm and dry.      Findings: No rash.   Psychiatric:         Behavior: Behavior normal.            Results for orders placed or performed in visit on 12/20/21 (from the past 24 hour(s))   Influenza A & B Antigen - Clinic Collect    Specimen: Nose; Swab   Result Value Ref Range    Influenza A antigen Negative Negative    Influenza B antigen Negative Negative    Narrative    Test results must be correlated with clinical data. If necessary, results should be confirmed by a molecular assay or viral culture.

## 2021-12-22 ENCOUNTER — VIRTUAL VISIT (OUTPATIENT)
Dept: FAMILY MEDICINE | Facility: CLINIC | Age: 26
End: 2021-12-22
Payer: COMMERCIAL

## 2021-12-22 DIAGNOSIS — F41.1 ANXIETY STATE: ICD-10-CM

## 2021-12-22 DIAGNOSIS — F90.0 ATTENTION DEFICIT HYPERACTIVITY DISORDER (ADHD), PREDOMINANTLY INATTENTIVE TYPE: ICD-10-CM

## 2021-12-22 DIAGNOSIS — F32.1 CURRENT MODERATE EPISODE OF MAJOR DEPRESSIVE DISORDER WITHOUT PRIOR EPISODE (H): Primary | ICD-10-CM

## 2021-12-22 PROCEDURE — 99214 OFFICE O/P EST MOD 30 MIN: CPT | Mod: 95 | Performed by: STUDENT IN AN ORGANIZED HEALTH CARE EDUCATION/TRAINING PROGRAM

## 2021-12-22 RX ORDER — LISDEXAMFETAMINE DIMESYLATE 40 MG/1
40 CAPSULE ORAL DAILY
Qty: 30 CAPSULE | Refills: 0 | Status: SHIPPED | OUTPATIENT
Start: 2021-12-30 | End: 2022-01-29

## 2021-12-22 RX ORDER — LISDEXAMFETAMINE DIMESYLATE 40 MG/1
40 CAPSULE ORAL DAILY
Qty: 30 CAPSULE | Refills: 0 | Status: SHIPPED | OUTPATIENT
Start: 2022-01-30 | End: 2022-03-01

## 2021-12-22 RX ORDER — LISDEXAMFETAMINE DIMESYLATE 40 MG/1
40 CAPSULE ORAL DAILY
Qty: 30 CAPSULE | Refills: 0 | Status: SHIPPED | OUTPATIENT
Start: 2022-03-02 | End: 2022-03-28

## 2021-12-22 RX ORDER — VENLAFAXINE HYDROCHLORIDE 75 MG/1
75 CAPSULE, EXTENDED RELEASE ORAL DAILY
Qty: 90 CAPSULE | Refills: 0 | Status: SHIPPED | OUTPATIENT
Start: 2021-12-22 | End: 2022-03-28

## 2021-12-22 RX ORDER — BUSPIRONE HYDROCHLORIDE 10 MG/1
10 TABLET ORAL 2 TIMES DAILY
Qty: 180 TABLET | Refills: 0 | Status: SHIPPED | OUTPATIENT
Start: 2021-12-22 | End: 2022-05-06

## 2021-12-22 ASSESSMENT — PATIENT HEALTH QUESTIONNAIRE - PHQ9
5. POOR APPETITE OR OVEREATING: NOT AT ALL
SUM OF ALL RESPONSES TO PHQ QUESTIONS 1-9: 2

## 2021-12-22 ASSESSMENT — ANXIETY QUESTIONNAIRES
IF YOU CHECKED OFF ANY PROBLEMS ON THIS QUESTIONNAIRE, HOW DIFFICULT HAVE THESE PROBLEMS MADE IT FOR YOU TO DO YOUR WORK, TAKE CARE OF THINGS AT HOME, OR GET ALONG WITH OTHER PEOPLE: NOT DIFFICULT AT ALL
3. WORRYING TOO MUCH ABOUT DIFFERENT THINGS: SEVERAL DAYS
2. NOT BEING ABLE TO STOP OR CONTROL WORRYING: NOT AT ALL
7. FEELING AFRAID AS IF SOMETHING AWFUL MIGHT HAPPEN: NOT AT ALL
GAD7 TOTAL SCORE: 2
1. FEELING NERVOUS, ANXIOUS, OR ON EDGE: NOT AT ALL
5. BEING SO RESTLESS THAT IT IS HARD TO SIT STILL: NOT AT ALL
6. BECOMING EASILY ANNOYED OR IRRITABLE: SEVERAL DAYS

## 2021-12-22 NOTE — PROGRESS NOTES
Nargis is a 26 year old who is being evaluated via a billable video visit.      How would you like to obtain your AVS? MyChart  If the video visit is dropped, the invitation should be resent by: Text to cell phone: [phone]  Will anyone else be joining your video visit? No      Video Start Time: 510 pm    Assessment & Plan     Current moderate episode of major depressive disorder without prior episode (H)  Symptoms are better controlled than they have been in the past however she still has some ongoing depressive symptoms. I am referring to psychiatry for assistance in medication management. She is in agreement with plan and will continue her current regimen until then. She will consider therapy.   - Adult Mental Health Referral  - busPIRone (BUSPAR) 10 MG tablet  Dispense: 180 tablet; Refill: 0  - venlafaxine (EFFEXOR-XR) 75 MG 24 hr capsule  Dispense: 90 capsule; Refill: 0    Attention deficit hyperactivity disorder (ADHD), predominantly inattentive type  Controlled on Vyvanse. PDMP reviewed and is as expected. I will supply 3 month fill due to PCP being on leave.   - lisdexamfetamine (VYVANSE) 40 MG capsule  Dispense: 30 capsule; Refill: 0  - lisdexamfetamine (VYVANSE) 40 MG capsule  Dispense: 30 capsule; Refill: 0  - lisdexamfetamine (VYVANSE) 40 MG capsule  Dispense: 30 capsule; Refill: 0    Anxiety state  Plan as above.   - busPIRone (BUSPAR) 10 MG tablet  Dispense: 180 tablet; Refill: 0  - venlafaxine (EFFEXOR-XR) 75 MG 24 hr capsule  Dispense: 90 capsule; Refill: 0      Sangeeta Jensen MD  [facility]   Nargis is a 26 year old who presents for the following health issues     History of Present Illness       She eats 2-3 servings of fruits and vegetables daily.She consumes 1 sweetened beverage(s) daily.She exercises with enough effort to increase her heart rate 30 to 60 minutes per day.  She exercises with enough effort to increase her heart rate 4 days per  week.   She is taking medications regularly.       Depression and Anxiety Follow-Up    How are you doing with your depression since your last visit? No change    How are you doing with your anxiety since your last visit?  No change    Are you having other symptoms that might be associated with depression or anxiety? No    Have you had a significant life event? No     Do you have any concerns with your use of alcohol or other drugs? No     She is on buspirone and venlafaxine. She has tried several medications in the past. Feels that her mood is doing pretty well. Feels like she could be better but also notes that her mood is a lot better than it has been in the past. Mood is the best it ever has been. Still struggles with getting herself motivated often. Notes chores don't always get done. Saw psychiatry when she was younger.     She is not currently seeing a therapist. She had a couple of negative experiences with therapists in the past.     Social History     Tobacco Use     Smoking status: Former Smoker     Packs/day: 0.20     Years: 0.00     Pack years: 0.00     Smokeless tobacco: Never Used     Tobacco comment: quit with pregnancy   Vaping Use     Vaping Use: Never used   Substance Use Topics     Alcohol use: Not Currently     Alcohol/week: 0.0 standard drinks     Comment: 4 drinks per month-quit with pregnancy     Drug use: No     PHQ 10/11/2021 11/29/2021 12/22/2021   PHQ-9 Total Score 4 2 2   Q9: Thoughts of better off dead/self-harm past 2 weeks Not at all Not at all Not at all     JERMAIN-7 SCORE 10/11/2021 11/29/2021 12/22/2021   Total Score 1 (minimal anxiety) 1 (minimal anxiety) -   Total Score 1 1 2       Suicide Assessment Five-step Evaluation and Treatment (SAFE-T)        ADHD Follow-Up    Date of last ADHD office visit: 11-29-21  Status since last visit: Improving  Taking controlled (daily) medications as prescribed: Yes                       Parent/Patient Concerns with Medications: None  ADHD  Medication     Amphetamines Disp Start End     lisdexamfetamine (VYVANSE) 40 MG capsule    30 capsule 11/29/2021     Sig - Route: Take 1 capsule (40 mg) by mouth every morning - Oral    Class: E-Prescribe    Earliest Fill Date: 11/29/2021        Switched from Adderall to Vyvanse in October. Really likes Vyvanse better. Adderall wasn't as effective. Feels she is doing better with focus, ability to get tasks done. Notes effects last longer during the day.     Sleep: no problems  Home/Family Concerns: None  Peer Concerns: None    Co-Morbid Diagnosis: Depression and Anxiety    Currently in counseling: No      Objective           Vitals:  No vitals were obtained today due to virtual visit.    Physical Exam   GENERAL: Healthy, alert and no distress  EYES: Eyes grossly normal to inspection.  No discharge or erythema, or obvious scleral/conjunctival abnormalities.  RESP: No audible wheeze, cough, or visible cyanosis.  No visible retractions or increased work of breathing.    SKIN: Visible skin clear. No significant rash, abnormal pigmentation or lesions.  NEURO: Cranial nerves grossly intact.  Mentation and speech appropriate for age.  PSYCH: Mentation appears normal, affect normal/bright, judgement and insight intact, normal speech and appearance well-groomed.        Video-Visit Details    Type of service:  Video Visit    Video End Time:5:22 PM    Originating Location (pt. Location): Home    Distant Location (provider location):  [facility]     Platform used for Video Visit: [employer]

## 2021-12-23 ASSESSMENT — ANXIETY QUESTIONNAIRES: GAD7 TOTAL SCORE: 2

## 2022-01-11 ENCOUNTER — MYC REFILL (OUTPATIENT)
Dept: FAMILY MEDICINE | Facility: CLINIC | Age: 27
End: 2022-01-11
Payer: COMMERCIAL

## 2022-01-11 DIAGNOSIS — F41.1 ANXIETY STATE: ICD-10-CM

## 2022-01-11 RX ORDER — LORAZEPAM 0.5 MG/1
0.5 TABLET ORAL EVERY 6 HOURS PRN
Qty: 10 TABLET | Refills: 0 | Status: SHIPPED | OUTPATIENT
Start: 2022-01-11 | End: 2022-06-15

## 2022-01-20 ENCOUNTER — VIRTUAL VISIT (OUTPATIENT)
Dept: FAMILY MEDICINE | Facility: CLINIC | Age: 27
End: 2022-01-20
Payer: COMMERCIAL

## 2022-01-20 ENCOUNTER — E-VISIT (OUTPATIENT)
Dept: FAMILY MEDICINE | Facility: CLINIC | Age: 27
End: 2022-01-20
Payer: COMMERCIAL

## 2022-01-20 ENCOUNTER — TELEPHONE (OUTPATIENT)
Dept: FAMILY MEDICINE | Facility: CLINIC | Age: 27
End: 2022-01-20

## 2022-01-20 DIAGNOSIS — M25.562 PAIN IN BOTH KNEES, UNSPECIFIED CHRONICITY: Primary | ICD-10-CM

## 2022-01-20 DIAGNOSIS — M25.561 PAIN IN BOTH KNEES, UNSPECIFIED CHRONICITY: Primary | ICD-10-CM

## 2022-01-20 DIAGNOSIS — G89.29 CHRONIC KNEE PAIN, UNSPECIFIED LATERALITY: Primary | ICD-10-CM

## 2022-01-20 DIAGNOSIS — M25.569 CHRONIC KNEE PAIN, UNSPECIFIED LATERALITY: Primary | ICD-10-CM

## 2022-01-20 PROCEDURE — 99207 PR NON-BILLABLE SERV PER CHARTING: CPT | Performed by: FAMILY MEDICINE

## 2022-01-20 PROCEDURE — 99213 OFFICE O/P EST LOW 20 MIN: CPT | Mod: 95 | Performed by: FAMILY MEDICINE

## 2022-01-20 RX ORDER — COVID-19 ANTIGEN TEST
220 KIT MISCELLANEOUS 2 TIMES DAILY WITH MEALS
COMMUNITY
End: 2022-03-30

## 2022-01-20 RX ORDER — ACETAMINOPHEN 325 MG/1
325-650 TABLET ORAL EVERY 6 HOURS PRN
COMMUNITY
End: 2022-03-30

## 2022-01-20 NOTE — PATIENT INSTRUCTIONS
Thank you for choosing us for your care. Based on your symptoms and length of illness, I do not think that you need a prescription at this time.  Please follow the care advise I've provided and use the over the counter medications to help relieve your symptoms.   View your full visit summary for details by clicking on the link below.     If you're not feeling better within 2-3 days, please respond to this message and we can consider if a prescription is needed.  You can schedule an appointment right here in [facility], or call [phone]  If the visit is for the same symptoms as your eVisit, we'll refund the cost of your eVisit if seen within seven days.

## 2022-01-20 NOTE — TELEPHONE ENCOUNTER
----- Message from Genny Lozano MD sent at 1/20/2022  3:52 PM CST -----  Renny Medina  Good afternoon and hope this e-mail finds you well. I had a virtual with the above patient and she was requesting vicodin for chronic knee pain. I told her I was uncomfortable filling the medication and I wanted to ask if you will be willing to fill this . She says you have filled it for her in the past.     Sorry to dump this on you. Thanks  Dr Lozano

## 2022-01-20 NOTE — PROGRESS NOTES
Nargis is a 27 year old who is being evaluated via a billable video visit.      How would you like to obtain your AVS? [employer]art  If the video visit is dropped, the invitation should be resent by: Text to cell phone: [phone] will be joining via Mochila at appointment time  Will anyone else be joining your video visit? No    Video Start Time: 3:34 PM    Assessment & Plan     Chronic knee pain, unspecified laterality  Declined pain medication refills on the patient.needs to f/u with her pcp       :[id number]}         FUTURE APPOINTMENTS:       - Follow-up visit in one month or sooner as needed.    Return in about 4 weeks (around 2/17/2022) for Follow up.    Genny Lozano MD  [facility]    Subjective   Nargis is a 27 year old who presents for the following health issues     HPI 27 yr old female here for chronic knee pain she says she had knee pain for years .she reports that the pain comes and goes and usually worse in the winter she does not recall any injury and has not seen a specialist for the knee pain she is requesting refill on Vicodin. I explained to the patient that I will not be able to refill the medication but will send a message to her pcp for consideration of refills.    Joint Pain - knee    Onset: chronic left knee pain for a couple years now. Gets worse during the winter due to the cold weather.    Description:   Location: left knee  Character: Sharp and Stabbing    Intensity: moderate    Progression of Symptoms: worse    Accompanying Signs & Symptoms:  Other symptoms: none    History:   Previous similar pain: YES      Precipitating factors:   Trauma or overuse: no     Alleviating factors:  Improved by: Vicodin usually helps but has not had any prescribed for a number of years    Therapies Tried and outcome: aleve, tylenol, ice and elevation have little to no effect         Medication Followup of vicodin - hasn't used since 2016 and was removed from her med list but  was on it previously for pain and wants it renewed    Taking Medication as prescribed: not applicable    Side Effects:  None    Medication Helping Symptoms:  yes         Review of Systems   Constitutional, HEENT, cardiovascular, pulmonary, gi and gu systems are negative, except as otherwise noted.      Objective           Vitals:  No vitals were obtained today due to virtual visit.    Physical Exam   GENERAL: Healthy, alert and no distress  EYES: Eyes grossly normal to inspection.  No discharge or erythema, or obvious scleral/conjunctival abnormalities.  RESP: No audible wheeze, cough, or visible cyanosis.  No visible retractions or increased work of breathing.    SKIN: Visible skin clear. No significant rash, abnormal pigmentation or lesions.  PSYCH: Mentation appears normal, affect normal/bright, judgement and insight intact, normal speech and appearance well-groomed.            Video-Visit Details    Type of service:  Video Visit    Video End Time:3:43 PM    Originating Location (pt. Location): Home    Distant Location (provider location):  [facility]     Platform used for Video Visit: Rollstream

## 2022-01-26 ENCOUNTER — MYC MEDICAL ADVICE (OUTPATIENT)
Dept: BEHAVIORAL HEALTH | Facility: CLINIC | Age: 27
End: 2022-01-26
Payer: COMMERCIAL

## 2022-03-14 ENCOUNTER — E-VISIT (OUTPATIENT)
Dept: FAMILY MEDICINE | Facility: CLINIC | Age: 27
End: 2022-03-14
Payer: COMMERCIAL

## 2022-03-14 DIAGNOSIS — K92.1 BLOOD IN STOOL: Primary | ICD-10-CM

## 2022-03-14 PROCEDURE — 99207 PR NON-BILLABLE SERV PER CHARTING: CPT | Performed by: FAMILY MEDICINE

## 2022-03-15 ENCOUNTER — TELEPHONE (OUTPATIENT)
Dept: FAMILY MEDICINE | Facility: CLINIC | Age: 27
End: 2022-03-15
Payer: COMMERCIAL

## 2022-03-15 NOTE — TELEPHONE ENCOUNTER
Can you please contact this patient in triage for E-visit.  She sent an E-visit stating that bowel movements have blood but no further information.    I have canceled the E-visit as I do not think it is necessarily appropriate but want to make sure that she is able to be seen or we give her the appropriate advice of where to be seen if needed sooner rather than later.    Thanks!    EB

## 2022-03-15 NOTE — TELEPHONE ENCOUNTER
"Marah:  Patient requests that these questions be routed to Marah, \"because she knows my knee pain concern.\"  Nargis had submitted an evisit for blood in stool to Dr. Christy asked if I would triage and Dr. Christy cancelled the evisit.    1. Nargis reports blood when she wiped yesterday. Nothing on the stool itself and no blood in the toilet. This happened just one time.  She denies  history of hemorrhoids.   2. Knee pain---\"physically hurts to walk on all day.\" \"I am a  and usually I can manage the pain with over the counter stuff but occasionally at the end of hte day it is really bad. Marah knows that 4 can last me a long time.\"   She is requesting at least 4 vicodin to manage her knee  pain;     How do you advise?  Thank you.  Navin Del Toro, RN      "

## 2022-03-15 NOTE — PATIENT INSTRUCTIONS
Thank you for choosing us for your care. I think an in-clinic visit would be best next steps based on your symptoms. Please schedule a clinic appointment; you won t be charged for this eVisit.      You can schedule an appointment right here in [facility], or call [phone]

## 2022-03-16 ENCOUNTER — MYC MEDICAL ADVICE (OUTPATIENT)
Dept: FAMILY MEDICINE | Facility: CLINIC | Age: 27
End: 2022-03-16
Payer: COMMERCIAL

## 2022-03-16 DIAGNOSIS — G89.29 CHRONIC PAIN OF BOTH KNEES: Primary | ICD-10-CM

## 2022-03-16 DIAGNOSIS — M25.562 CHRONIC PAIN OF BOTH KNEES: Primary | ICD-10-CM

## 2022-03-16 DIAGNOSIS — M25.561 CHRONIC PAIN OF BOTH KNEES: Primary | ICD-10-CM

## 2022-03-16 RX ORDER — HYDROCODONE BITARTRATE AND ACETAMINOPHEN 5; 325 MG/1; MG/1
1 TABLET ORAL EVERY 6 HOURS PRN
Qty: 4 TABLET | Refills: 0 | Status: SHIPPED | OUTPATIENT
Start: 2022-03-16 | End: 2022-03-19

## 2022-03-26 ENCOUNTER — MYC MEDICAL ADVICE (OUTPATIENT)
Dept: FAMILY MEDICINE | Facility: CLINIC | Age: 27
End: 2022-03-26
Payer: COMMERCIAL

## 2022-03-26 DIAGNOSIS — F41.1 ANXIETY STATE: ICD-10-CM

## 2022-03-26 DIAGNOSIS — F90.0 ATTENTION DEFICIT HYPERACTIVITY DISORDER (ADHD), PREDOMINANTLY INATTENTIVE TYPE: ICD-10-CM

## 2022-03-26 DIAGNOSIS — F32.1 CURRENT MODERATE EPISODE OF MAJOR DEPRESSIVE DISORDER WITHOUT PRIOR EPISODE (H): ICD-10-CM

## 2022-03-28 RX ORDER — VENLAFAXINE HYDROCHLORIDE 75 MG/1
75 CAPSULE, EXTENDED RELEASE ORAL DAILY
Qty: 90 CAPSULE | Refills: 0 | Status: SHIPPED | OUTPATIENT
Start: 2022-03-28 | End: 2022-05-06

## 2022-03-28 RX ORDER — LISDEXAMFETAMINE DIMESYLATE 40 MG/1
40 CAPSULE ORAL DAILY
Qty: 30 CAPSULE | Refills: 0 | Status: SHIPPED | OUTPATIENT
Start: 2022-04-01 | End: 2022-04-27

## 2022-04-11 ENCOUNTER — OFFICE VISIT (OUTPATIENT)
Dept: ORTHOPEDICS | Facility: CLINIC | Age: 27
End: 2022-04-11
Attending: PHYSICIAN ASSISTANT
Payer: COMMERCIAL

## 2022-04-11 VITALS
BODY MASS INDEX: 33.04 KG/M2 | SYSTOLIC BLOOD PRESSURE: 108 MMHG | DIASTOLIC BLOOD PRESSURE: 65 MMHG | HEIGHT: 61 IN | WEIGHT: 175 LBS

## 2022-04-11 DIAGNOSIS — M22.2X1 BILATERAL PATELLOFEMORAL SYNDROME: Primary | ICD-10-CM

## 2022-04-11 DIAGNOSIS — M25.562 CHRONIC PAIN OF BOTH KNEES: ICD-10-CM

## 2022-04-11 DIAGNOSIS — M25.561 CHRONIC PAIN OF BOTH KNEES: ICD-10-CM

## 2022-04-11 DIAGNOSIS — M22.2X2 BILATERAL PATELLOFEMORAL SYNDROME: Primary | ICD-10-CM

## 2022-04-11 DIAGNOSIS — G89.29 CHRONIC PAIN OF BOTH KNEES: ICD-10-CM

## 2022-04-11 PROCEDURE — 99244 OFF/OP CNSLTJ NEW/EST MOD 40: CPT | Performed by: FAMILY MEDICINE

## 2022-04-11 RX ORDER — NABUMETONE 500 MG/1
500 TABLET, FILM COATED ORAL 2 TIMES DAILY PRN
Qty: 30 TABLET | Refills: 0 | Status: SHIPPED | OUTPATIENT
Start: 2022-04-11 | End: 2022-05-06

## 2022-04-11 NOTE — PATIENT INSTRUCTIONS
# Bilateral Patellofemoral Pain Syndrome: Symptoms noted over the many years but has been worsening recently.  She does have pain over the patella facets bilaterally worse with patellar grind.  Reviewed x-rays today that are negative.  Likely cause of her pain acute flare of chronic patellofemoral pain syndrome.  Counseled patient on treatment options including home exercises, physical therapy, bracing, anti-inflammatory medications.  Given this plan to treat as below and follow-up if not improving.  Image Findings: Negative knee x-rays  Treatment: Activities as tolerated, home exercises given today, can brace as needed  Job: As tolerated  Medications/Injections: Stop taking Aleve, can take Relafen as needed for pain, no steroid injections today  Follow-up: In one month if symptoms do not improve, sooner if worsening  Can consider formal physical therapy    Please call [phone]   Ask for my team if you have any questions or concerns    If you have not yet received the influenza vaccine but would like to get one, please call  [phone] or you can schedule via [employer]    It was great seeing you today!    Ambrocio Bernstein MD, CAQSM     Knee Brace with Side Stabilizers & Patella Gel Pads for Knee Support

## 2022-04-11 NOTE — PROGRESS NOTES
ASSESSMENT & PLAN    Nargis was seen today for pain and pain.    Diagnoses and all orders for this visit:    Bilateral patellofemoral syndrome  -     nabumetone (RELAFEN) 500 MG tablet; Take 1 tablet (500 mg) by mouth 2 times daily as needed for moderate pain    Chronic pain of both knees  -     XR Knee Bilateral 3 vw; Future  -     Orthopedic  Referral  -     nabumetone (RELAFEN) 500 MG tablet; Take 1 tablet (500 mg) by mouth 2 times daily as needed for moderate pain      This issue is acute on chronic and Worsening.    # Bilateral Patellofemoral Pain Syndrome: Symptoms noted over the many years but has been worsening recently.  She does have pain over the patella facets bilaterally worse with patellar grind.  Reviewed x-rays today that are negative.  Likely cause of her pain acute flare of chronic patellofemoral pain syndrome.  Counseled patient on treatment options including home exercises, physical therapy, bracing, anti-inflammatory medications.  Given this plan to treat as below and follow-up if not improving.  Image Findings: Negative knee x-rays  Treatment: Activities as tolerated, home exercises given today, can brace as needed  Job: As tolerated  Medications/Injections: Stop taking Aleve, can take Relafen as needed for pain, no steroid injections today  Follow-up: In one month if symptoms do not improve, sooner if worsening  Can consider formal physical therapy      Ambrocio Bernstein MD  [facility] [place]    -----  Chief Complaint   Patient presents with     Right Knee - Pain     Left Knee - Pain       SUBJECTIVE  Nargis Ruelas is a/an 27 year old female who is seen in consultation at the request of  Carol Jennings PA-C for evaluation of bilateral knee pain.     The patient is seen by themselves.    Onset: many years(s) ago, its been getting worse. Reports insidious onset without acute precipitating event.  Location of Pain: bilateral knee pain; anterior,  "lateral patella, feels deep at times, throbby tooth-ache   Worsened by: flexion of the knee, getting off the floor, too much activity, kneeling on them, squatting  Better with: nothing   Treatments tried: rest/activity avoidance, elevation, ice, heat, Tylenol, Aleve, home exercises, physical therapy (few visits, years ago) and casting/splinting/bracing  Associated symptoms: swelling, warmth, weakness of bilateral knees, locking or catching and feeling of instability    Orthopedic/Surgical history: YES - had xr early on in adolescence and it was noted she had a leg length discrepancy  Social History/Occupation: teacher    No family history pertinent to patient's problem today.      REVIEW OF SYSTEMS:  Review of Systems  Constitutional, HEENT, cardiovascular, pulmonary, GI, , musculoskeletal, neuro, skin, endocrine and psych systems are negative, except as otherwise noted.    OBJECTIVE:  /65   Ht 1.549 m (5' 1\")   Wt 79.4 kg (175 lb)   BMI 33.07 kg/m     General: healthy, alert and in no distress  HEENT: no scleral icterus or conjunctival erythema  Skin: no suspicious lesions or rash. No jaundice.  CV: distal perfusion intact    Resp: normal respiratory effort without conversational dyspnea   Psych: normal mood and affect  Gait: normal steady gait with appropriate coordination and balance    Neuro: Normal light sensory exam of bilateral lower extremities    BILATERAL KNEE  Inspection:    Normal alignment; no edema, erythema, or ecchymosis present  Palpation:    Tender about the lateral patellar facet and medial patellar facet. Remainder of bony and ligamentous landmarks are nontender.    No effusion is present    Patellofemoral crepitus is Absent  Range of Motion:     00 extension to 1350 flexion  Strength:    Quadriceps 5/5, hamstrings 5/5, gastrocsoleus 5/5 and tibialis anterior 5/5    Extensor mechanism intact  Special Tests:    Positive: Patellar grind, positive J sign bilaterally    Negative: " MCL/valgus stress (0 & 30 deg), LCL/varus stress (0 & 30 deg), Lachman's, anterior drawer, posterior drawer         RADIOLOGY:  I independently ordered, visualized and reviewed these images with the patient  Negative knee x-rays bilaterally  XR KNEE BILATERAL 3 VIEWS  4/11/2022 3:43 PM      HISTORY: Chronic pain of both knees; Chronic pain of both knees;  Chronic pain of both knees     COMPARISON: None.                                                                      IMPRESSION:  No joint space narrowing. Normal patellar alignment. No  knee joint effusion. Benign bone island in the distal left femoral  metaphysis. No fractures are evident.      DOMENICA KAYE MD     Review of external notes as documented elsewhere in note  Review of the result(s) of each unique test - bilateral knee x-rays    Disclaimer: This note consists of symbols derived from keyboarding, dictation and/or voice recognition software. As a result, there may be errors in the script that have gone undetected. Please consider this when interpreting information found in this chart.

## 2022-04-11 NOTE — LETTER
4/11/2022         RE: Nargis Ruelas  [address]  [facility] [address]        Dear Colleague,    Thank you for referring your patient, Nargis Ruelas, to the [facility]. Please see a copy of my visit note below.    ASSESSMENT & PLAN    Nargis was seen today for pain and pain.    Diagnoses and all orders for this visit:    Bilateral patellofemoral syndrome  -     nabumetone (RELAFEN) 500 MG tablet; Take 1 tablet (500 mg) by mouth 2 times daily as needed for moderate pain    Chronic pain of both knees  -     XR Knee Bilateral 3 vw; Future  -     Orthopedic  Referral  -     nabumetone (RELAFEN) 500 MG tablet; Take 1 tablet (500 mg) by mouth 2 times daily as needed for moderate pain      This issue is acute on chronic and Worsening.    # Bilateral Patellofemoral Pain Syndrome: Symptoms noted over the many years but has been worsening recently.  She does have pain over the patella facets bilaterally worse with patellar grind.  Reviewed x-rays today that are negative.  Likely cause of her pain acute flare of chronic patellofemoral pain syndrome.  Counseled patient on treatment options including home exercises, physical therapy, bracing, anti-inflammatory medications.  Given this plan to treat as below and follow-up if not improving.  Image Findings: Negative knee x-rays  Treatment: Activities as tolerated, home exercises given today, can brace as needed  Job: As tolerated  Medications/Injections: Stop taking Aleve, can take Relafen as needed for pain, no steroid injections today  Follow-up: In one month if symptoms do not improve, sooner if worsening  Can consider formal physical therapy      Ambrocio Bernstein MD  [facility]    -----  Chief Complaint   Patient presents with     Right Knee - Pain     Left Knee - Pain       SUBJECTIVE  Nargis Ruelas is a/an 27 year old female who is seen in consultation at the request  "of  Carol Jennings PA-C for evaluation of bilateral knee pain.     The patient is seen by themselves.    Onset: many years(s) ago, its been getting worse. Reports insidious onset without acute precipitating event.  Location of Pain: bilateral knee pain; anterior, lateral patella, feels deep at times, throbby tooth-ache   Worsened by: flexion of the knee, getting off the floor, too much activity, kneeling on them, squatting  Better with: nothing   Treatments tried: rest/activity avoidance, elevation, ice, heat, Tylenol, Aleve, home exercises, physical therapy (few visits, years ago) and casting/splinting/bracing  Associated symptoms: swelling, warmth, weakness of bilateral knees, locking or catching and feeling of instability    Orthopedic/Surgical history: YES - had xr early on in adolescence and it was noted she had a leg length discrepancy  Social History/Occupation: teacher    No family history pertinent to patient's problem today.      REVIEW OF SYSTEMS:  Review of Systems  Constitutional, HEENT, cardiovascular, pulmonary, GI, , musculoskeletal, neuro, skin, endocrine and psych systems are negative, except as otherwise noted.    OBJECTIVE:  /65   Ht 1.549 m (5' 1\")   Wt 79.4 kg (175 lb)   BMI 33.07 kg/m     General: healthy, alert and in no distress  HEENT: no scleral icterus or conjunctival erythema  Skin: no suspicious lesions or rash. No jaundice.  CV: distal perfusion intact    Resp: normal respiratory effort without conversational dyspnea   Psych: normal mood and affect  Gait: normal steady gait with appropriate coordination and balance    Neuro: Normal light sensory exam of bilateral lower extremities    BILATERAL KNEE  Inspection:    Normal alignment; no edema, erythema, or ecchymosis present  Palpation:    Tender about the lateral patellar facet and medial patellar facet. Remainder of bony and ligamentous landmarks are nontender.    No effusion is present    Patellofemoral crepitus is " Absent  Range of Motion:     00 extension to 1350 flexion  Strength:    Quadriceps 5/5, hamstrings 5/5, gastrocsoleus 5/5 and tibialis anterior 5/5    Extensor mechanism intact  Special Tests:    Positive: Patellar grind, positive J sign bilaterally    Negative: MCL/valgus stress (0 & 30 deg), LCL/varus stress (0 & 30 deg), Lachman's, anterior drawer, posterior drawer         RADIOLOGY:  I independently ordered, visualized and reviewed these images with the patient  Negative knee x-rays bilaterally  XR KNEE BILATERAL 3 VIEWS  4/11/2022 3:43 PM      HISTORY: Chronic pain of both knees; Chronic pain of both knees;  Chronic pain of both knees     COMPARISON: None.                                                                      IMPRESSION:  No joint space narrowing. Normal patellar alignment. No  knee joint effusion. Benign bone island in the distal left femoral  metaphysis. No fractures are evident.      DOMENICA KAYE MD     Review of external notes as documented elsewhere in note  Review of the result(s) of each unique test - bilateral knee x-rays    Disclaimer: This note consists of symbols derived from keyboarding, dictation and/or voice recognition software. As a result, there may be errors in the script that have gone undetected. Please consider this when interpreting information found in this chart.         Again, thank you for allowing me to participate in the care of your patient.        Sincerely,        Ambrocio Bernstein MD

## 2022-05-06 ENCOUNTER — VIRTUAL VISIT (OUTPATIENT)
Dept: FAMILY MEDICINE | Facility: CLINIC | Age: 27
End: 2022-05-06
Payer: COMMERCIAL

## 2022-05-06 DIAGNOSIS — F41.1 ANXIETY STATE: ICD-10-CM

## 2022-05-06 DIAGNOSIS — Z30.09 GENERAL COUNSELING FOR PRESCRIPTION OF ORAL CONTRACEPTIVES: ICD-10-CM

## 2022-05-06 DIAGNOSIS — F90.0 ATTENTION DEFICIT HYPERACTIVITY DISORDER (ADHD), PREDOMINANTLY INATTENTIVE TYPE: Primary | ICD-10-CM

## 2022-05-06 DIAGNOSIS — F32.1 CURRENT MODERATE EPISODE OF MAJOR DEPRESSIVE DISORDER WITHOUT PRIOR EPISODE (H): ICD-10-CM

## 2022-05-06 PROCEDURE — 99213 OFFICE O/P EST LOW 20 MIN: CPT | Mod: 95 | Performed by: PHYSICIAN ASSISTANT

## 2022-05-06 RX ORDER — NORETHINDRONE ACETATE AND ETHINYL ESTRADIOL .02; 1 MG/1; MG/1
1 TABLET ORAL DAILY
Qty: 84 TABLET | Refills: 1 | Status: SHIPPED | OUTPATIENT
Start: 2022-05-06 | End: 2022-09-19

## 2022-05-06 RX ORDER — LISDEXAMFETAMINE DIMESYLATE 60 MG/1
60 CAPSULE ORAL DAILY
Qty: 30 CAPSULE | Refills: 0 | Status: SHIPPED | OUTPATIENT
Start: 2022-05-06 | End: 2022-06-05

## 2022-05-06 RX ORDER — BUSPIRONE HYDROCHLORIDE 10 MG/1
10 TABLET ORAL 2 TIMES DAILY
Qty: 180 TABLET | Refills: 1 | Status: SHIPPED | OUTPATIENT
Start: 2022-05-06 | End: 2023-10-04

## 2022-05-06 RX ORDER — VENLAFAXINE HYDROCHLORIDE 75 MG/1
75 CAPSULE, EXTENDED RELEASE ORAL DAILY
Qty: 90 CAPSULE | Refills: 1 | Status: SHIPPED | OUTPATIENT
Start: 2022-05-06 | End: 2023-02-01 | Stop reason: DRUGHIGH

## 2022-05-06 ASSESSMENT — ASTHMA QUESTIONNAIRES: ACT_TOTALSCORE: 25

## 2022-05-06 NOTE — PATIENT INSTRUCTIONS
Increase dose to vyvanse 60 mg  Let me know if side effects or concerns    Follow up in 1 month for recheck  Continue other medications

## 2022-05-06 NOTE — PROGRESS NOTES
Nargis is a 27 year old who is being evaluated via a billable video visit.      How would you like to obtain your AVS? MyChart  If the video visit is dropped, the invitation should be resent by: Text to cell phone: [phone]  Will anyone else be joining your video visit? No      Video Start Time: 2:04 PM    Assessment & Plan     ASSESSMENT/PLAN:      ICD-10-CM    1. Attention deficit hyperactivity disorder (ADHD), predominantly inattentive type  F90.0 lisdexamfetamine (VYVANSE) 60 MG capsule   2. Current moderate episode of major depressive disorder without prior episode (H)  F32.1 venlafaxine (EFFEXOR XR) 75 MG 24 hr capsule     busPIRone (BUSPAR) 10 MG tablet   3. Anxiety state  F41.1 venlafaxine (EFFEXOR XR) 75 MG 24 hr capsule     busPIRone (BUSPAR) 10 MG tablet   4. General counseling for prescription of oral contraceptives  Z30.09 norethindrone-ethinyl estradiol (MICROGESTIN 1/20) 1-20 MG-MCG tablet       Patient Instructions   Increase dose to vyvanse 60 mg  Let me know if side effects or concerns    Follow up in 1 month for recheck  Continue other medications    Also notified due for PAP/preventive this fall    Return in about 1 month (around 6/6/2022).    Carol Jennings PA-C  [facility]   Nargis is a 27 year old who presents for the following health issues     HPI   ADHD Follow-Up (Adult)  Concerns:  Would like to discuss increasing her vyvannse  Changes since last visit: Feels like medication has not been working as well as it has in the past.  Taking controlled (daily) medications as prescribed: Yes  Sleep: no problems  Adult ADHD Self-Reporting form given to patient?:  No  Currently in counseling: No    Medication Side Effects:  Reports:  none  Sleep Problems? no  ++++++++++++++++++++++++++++++++++++++++++++++++    The medication is wearing off too soon. Takes 6/6:30 and wears off around lunch - distractability, losing focus  Does like vyvanse better than adderall  "- works for focus better    * knee pain: gets into \"funks\" where it hurts more  Did not  relafen, feels the aleve generally works well  Has been doing home exercises, it is helping    Depression and Anxiety Follow-Up    How are you doing with your depression since your last visit? Improved, things are going really well on current medication    How are you doing with your anxiety since your last visit?  Improved     Are you having other symptoms that might be associated with depression or anxiety? No    Have you had a significant life event? No     Do you have any concerns with your use of alcohol or other drugs? No    Social History     Tobacco Use     Smoking status: Former Smoker     Packs/day: 0.20     Years: 0.00     Pack years: 0.00     Smokeless tobacco: Never Used     Tobacco comment: quit with pregnancy   Vaping Use     Vaping Use: Never used   Substance Use Topics     Alcohol use: Not Currently     Alcohol/week: 0.0 standard drinks     Comment: 4 drinks per month-quit with pregnancy     Drug use: No     PHQ 11/29/2021 12/22/2021 5/6/2022   PHQ-9 Total Score 2 2 3   Q9: Thoughts of better off dead/self-harm past 2 weeks Not at all Not at all Not at all     JERMAIN-7 SCORE 11/29/2021 12/22/2021 5/6/2022   Total Score 1 (minimal anxiety) - 1 (minimal anxiety)   Total Score 1 2 1     Last PHQ-9 5/6/2022   1.  Little interest or pleasure in doing things 1   2.  Feeling down, depressed, or hopeless 1   3.  Trouble falling or staying asleep, or sleeping too much 0   4.  Feeling tired or having little energy 1   5.  Poor appetite or overeating 0   6.  Feeling bad about yourself 0   7.  Trouble concentrating 0   8.  Moving slowly or restless 0   Q9: Thoughts of better off dead/self-harm past 2 weeks 0   PHQ-9 Total Score 3   Difficulty at work, home, or with people -     JERMAIN-7  5/6/2022   1. Feeling nervous, anxious, or on edge 1   2. Not being able to stop or control worrying 0   3. Worrying too much about " different things 0   4. Trouble relaxing 0   5. Being so restless that it is hard to sit still 0   6. Becoming easily annoyed or irritable 0   7. Feeling afraid, as if something awful might happen 0   JERMAIN-7 Total Score 1   If you checked any problems, how difficult have they made it for you to do your work, take care of things at home, or get along with other people? -       Suicide Assessment Five-step Evaluation and Treatment (SAFE-T)            Review of Systems   Other than noted above, general, HEENT, respiratory, cardiac, MS, and gastrointestinal systems are negative.       Objective           Vitals:  No vitals were obtained today due to virtual visit.    Physical Exam   GENERAL: Healthy, alert and no distress  EYES: Eyes grossly normal to inspection.  No discharge or erythema, or obvious scleral/conjunctival abnormalities.  RESP: No audible wheeze, cough, or visible cyanosis.  No visible retractions or increased work of breathing.    SKIN: Visible skin clear. No significant rash, abnormal pigmentation or lesions.  NEURO: Cranial nerves grossly intact.  Mentation and speech appropriate for age.  PSYCH: Mentation appears normal, affect normal/bright, judgement and insight intact, normal speech and appearance well-groomed.        Video-Visit Details    Type of service:  Video Visit    Video End Time:2:14 PM    Originating Location (pt. Location): Home    Distant Location (provider location):  [facility]     Platform used for Video Visit: [employer]

## 2022-05-27 ENCOUNTER — E-VISIT (OUTPATIENT)
Dept: FAMILY MEDICINE | Facility: CLINIC | Age: 27
End: 2022-05-27
Payer: COMMERCIAL

## 2022-05-27 ENCOUNTER — MYC MEDICAL ADVICE (OUTPATIENT)
Dept: FAMILY MEDICINE | Facility: CLINIC | Age: 27
End: 2022-05-27
Payer: COMMERCIAL

## 2022-05-27 DIAGNOSIS — J30.1 SEASONAL ALLERGIC RHINITIS DUE TO POLLEN: Primary | ICD-10-CM

## 2022-05-27 PROCEDURE — 99421 OL DIG E/M SVC 5-10 MIN: CPT | Performed by: PHYSICIAN ASSISTANT

## 2022-05-27 RX ORDER — FLUTICASONE PROPIONATE 50 MCG
1 SPRAY, SUSPENSION (ML) NASAL DAILY
Qty: 16 G | Refills: 1 | Status: SHIPPED | OUTPATIENT
Start: 2022-05-27 | End: 2023-07-17

## 2022-06-02 NOTE — PATIENT INSTRUCTIONS
Dear Nargis Ruelas        Thanks for choosing us as your health care partner,    Carol Jennings PA-C

## 2022-06-03 ENCOUNTER — VIRTUAL VISIT (OUTPATIENT)
Dept: FAMILY MEDICINE | Facility: CLINIC | Age: 27
End: 2022-06-03
Payer: COMMERCIAL

## 2022-06-03 DIAGNOSIS — F90.0 ATTENTION DEFICIT HYPERACTIVITY DISORDER (ADHD), PREDOMINANTLY INATTENTIVE TYPE: Primary | ICD-10-CM

## 2022-06-03 PROCEDURE — 99213 OFFICE O/P EST LOW 20 MIN: CPT | Mod: 95 | Performed by: PHYSICIAN ASSISTANT

## 2022-06-03 RX ORDER — LISDEXAMFETAMINE DIMESYLATE 60 MG/1
60 CAPSULE ORAL DAILY
Qty: 30 CAPSULE | Refills: 0 | Status: SHIPPED | OUTPATIENT
Start: 2022-08-04 | End: 2022-06-20 | Stop reason: DRUGHIGH

## 2022-06-03 RX ORDER — LISDEXAMFETAMINE DIMESYLATE 60 MG/1
60 CAPSULE ORAL DAILY
Qty: 30 CAPSULE | Refills: 0 | Status: SHIPPED | OUTPATIENT
Start: 2022-06-03 | End: 2022-06-20 | Stop reason: DRUGHIGH

## 2022-06-03 RX ORDER — LISDEXAMFETAMINE DIMESYLATE 60 MG/1
60 CAPSULE ORAL DAILY
Qty: 30 CAPSULE | Refills: 0 | Status: SHIPPED | OUTPATIENT
Start: 2022-07-04 | End: 2022-06-20 | Stop reason: DRUGHIGH

## 2022-06-03 NOTE — PROGRESS NOTES
Nargis is a 27 year old who is being evaluated via a billable video visit.      How would you like to obtain your AVS? MyChart  If the video visit is dropped, the invitation should be resent by: Text to cell phone: [phone]  Will anyone else be joining your video visit? No    Video Start Time: 1:26 PM    Assessment & Plan     ASSESSMENT/PLAN:      ICD-10-CM    1. Attention deficit hyperactivity disorder (ADHD), predominantly inattentive type  F90.0 lisdexamfetamine (VYVANSE) 60 MG capsule     lisdexamfetamine (VYVANSE) 60 MG capsule     lisdexamfetamine (VYVANSE) 60 MG capsule       Patient Instructions   Continue current medications without change    Return in about 4 months (around 10/3/2022) for Medication Check, Physical Exam.    Carol Jennings PA-C  [facility]   Nargis is a 27 year old who presents for the following health issues     HPI   ADHD Follow-Up (Adult)  Concerns: None, things are going really well   Changes since last visit: New medication has been working well for her.  Taking controlled (daily) medications as prescribed: Yes  Sleep: no problems  Adult ADHD Self-Reporting form given to patient?:  No    Medication Benefits:   Controlled symptoms: Distractability and focus  Uncontrolled symptoms:  None    Medication Side Effects:  Reports:  none  Sleep Problems? no  ++++++++++++++++++++++++++++++++++++++++++++++++    At the higher dose she feels it doesn't wear off. Works really well with no side effects    * her nasal congestion is resolved. flonase really helps    Review of Systems   Other than noted above, general, HEENT, respiratory, cardiac, MS, and gastrointestinal systems are negative.       Objective           Vitals:  No vitals were obtained today due to virtual visit.    Physical Exam   GENERAL: Healthy, alert and no distress  EYES: Eyes grossly normal to inspection.  No discharge or erythema, or obvious scleral/conjunctival abnormalities.  RESP: No  audible wheeze, cough, or visible cyanosis.  No visible retractions or increased work of breathing.    SKIN: Visible skin clear. No significant rash, abnormal pigmentation or lesions.  NEURO: Cranial nerves grossly intact.  Mentation and speech appropriate for age.  PSYCH: Mentation appears normal, affect normal/bright, judgement and insight intact, normal speech and appearance well-groomed.        Video-Visit Details    Type of service:  Video Visit    Video End Time:1:32 PM    Originating Location (pt. Location): Home    Distant Location (provider location):  [facility]     Platform used for Video Visit: [employer]

## 2022-06-13 ENCOUNTER — NURSE TRIAGE (OUTPATIENT)
Dept: NURSING | Facility: CLINIC | Age: 27
End: 2022-06-13
Payer: COMMERCIAL

## 2022-06-14 ENCOUNTER — MYC MEDICAL ADVICE (OUTPATIENT)
Dept: FAMILY MEDICINE | Facility: CLINIC | Age: 27
End: 2022-06-14
Payer: COMMERCIAL

## 2022-06-14 DIAGNOSIS — F41.1 ANXIETY STATE: ICD-10-CM

## 2022-06-14 NOTE — TELEPHONE ENCOUNTER
"Triage Call:    Caller: Patient    Patient is calling and \"not feeling good at all\".  A few days ago she was having episodes of nausea up until now and now she has been nauseated since 1400 today.   She is feeling lightheaded and dizzy.  Her hands are also tingling.   She reports feeling short of breath and feelign took weak to stand and cold skin      Call 911 recommended disposition.  Patient verbalized understanding about recommendations and disposition.  Encouraged to call back with any further questions.      Kristel Chavez RN on 6/13/2022 at 8:16 PM      COVID 19 Nurse Triage Plan/Patient Instructions    Please be aware that novel coronavirus (COVID-19) may be circulating in the community. If you develop symptoms such as fever, cough, or SOB or if you have concerns about the presence of another infection including coronavirus (COVID-19), please contact your health care provider or visit [email or web address].     Disposition/Instructions    Call to EMS/911 recommended. Follow protocol based instructions.     Bring Your Own Device:  Please also bring your smart device(s) (smart phones, tablets, laptops) and their charging cables for your personal use and to communicate with your care team during your visit.    Thank you for taking steps to prevent the spread of this virus.  o Limit your contact with others.  o Wear a simple mask to cover your cough.  o Wash your hands well and often.    Resources    [facility]: About COVID-19: [email or web address]    CDC: What to Do If You're Sick: www.cdc.gov/coronavirus/2019-ncov/about/steps-when-sick.html    CDC: Ending Home Isolation: www.cdc.gov/coronavirus/2019-ncov/hcp/disposition-in-home-patients.html     CDC: Caring for Someone: www.cdc.gov/coronavirus/2019-ncov/if-you-are-sick/care-for-someone.html     [facility]: Interim Guidance for Hospital Discharge to Home: www.health.[facility].[place]/diseases/coronavirus/hcp/hospdischarge.pdf    [facility] " clinical trials (COVID-19 research studies): [email or web address]/[email or web address]-clinical-trials     Below are the COVID-19 hotlines at the [place] Department of Health ([facility]). Interpreters are available.   o For health questions: Call 585-671-1546 or 1-895.784.4875 (7 a.m. to 7 p.m.)  o For questions about schools and childcare: Call 916-852-2193 or 1-802.397.1034 (7 a.m. to 7 p.m.)                   Reason for Disposition    Shock suspected (e.g., cold/pale/clammy skin, too weak to stand, low BP, rapid pulse)    Additional Information    Negative: Severe difficulty breathing (e.g., struggling for each breath, speaks in single words)    Negative: [1] Difficulty breathing or swallowing AND [2] started suddenly after medicine, an allergic food or bee sting    Protocols used: DIZZINESS - VHNNYSLMILOCBYT-C-GM

## 2022-06-15 RX ORDER — LORAZEPAM 0.5 MG/1
0.5 TABLET ORAL EVERY 6 HOURS PRN
Qty: 10 TABLET | Refills: 0 | Status: SHIPPED | OUTPATIENT
Start: 2022-06-15 | End: 2022-12-07

## 2022-06-20 ENCOUNTER — MYC MEDICAL ADVICE (OUTPATIENT)
Dept: FAMILY MEDICINE | Facility: CLINIC | Age: 27
End: 2022-06-20
Payer: COMMERCIAL

## 2022-07-13 ENCOUNTER — TELEPHONE (OUTPATIENT)
Dept: GASTROENTEROLOGY | Facility: CLINIC | Age: 27
End: 2022-07-13

## 2022-07-13 NOTE — TELEPHONE ENCOUNTER
[facility] Call Center    Phone Message    May a detailed message be left on voicemail: yes     Reason for Call: Appointment Intake    Referring Provider Name: Carol Jennings PA-C  Diagnosis and/or Symptoms: Eosinophilic esophagitis [K20.0]    Per triage notes patient is to be seen urgently and is currently scheduled on 02/21 with Quinten which is outside requested time frame. Please review per scheduling guidelines. Thanks!     Action Taken: Message routed to:  [facility] ([facility] GI    Travel Screening: Not Applicable

## 2022-07-20 DIAGNOSIS — K21.9 GASTROESOPHAGEAL REFLUX DISEASE WITHOUT ESOPHAGITIS: ICD-10-CM

## 2022-07-20 RX ORDER — FAMOTIDINE 20 MG/1
20 TABLET, FILM COATED ORAL 2 TIMES DAILY
Qty: 180 TABLET | Refills: 1 | Status: SHIPPED | OUTPATIENT
Start: 2022-07-20 | End: 2023-06-19

## 2022-07-20 NOTE — TELEPHONE ENCOUNTER
Routing refill request to provider for review/approval because:  PCP to determine refill     Margarito Bingham RN

## 2022-07-26 NOTE — TELEPHONE ENCOUNTER
Attempted to contact patient about a few openings asked 2x to press 1 for english pressed 1 2x and then nothing then a busy tone.

## 2022-08-10 NOTE — TELEPHONE ENCOUNTER
REFERRAL INFORMATION:    Referring Provider:  Carol Jennings PA-C     Referring Clinic:  [facility]     Reason for Visit/Diagnosis: EoE     FUTURE VISIT INFORMATION:    Appointment Date: 9/13/2022    Appointment Time: 11 AM      NOTES STATUS DETAILS   OFFICE NOTE from Referring Provider Internal 6/3/2022 Office visit with Carol Jennings PA-C      OFFICE NOTE from Other Specialist N/A    HOSPITAL DISCHARGE SUMMARY/  ED VISITS N/A    OPERATIVE REPORT N/A    MEDICATION LIST Internal         ENDOSCOPY  Internal EGD: 11/15/2021   COLONOSCOPY N/A    ERCP N/A    EUS N/A    STOOL TESTING N/A    PERTINENT LABS Internal    PATHOLOGY REPORTS (RELATED) Internal 11/15/2021   IMAGING (CT, MRI, EGD, MRCP, Small Bowel Follow Through/SBT, MR/CT Enterography) Internal US Thyroid: 5/10/18

## 2022-08-18 ENCOUNTER — MYC MEDICAL ADVICE (OUTPATIENT)
Dept: FAMILY MEDICINE | Facility: CLINIC | Age: 27
End: 2022-08-18

## 2022-08-18 DIAGNOSIS — F90.0 ATTENTION DEFICIT HYPERACTIVITY DISORDER (ADHD), PREDOMINANTLY INATTENTIVE TYPE: ICD-10-CM

## 2022-08-18 RX ORDER — LISDEXAMFETAMINE DIMESYLATE 70 MG/1
70 CAPSULE ORAL DAILY
Qty: 30 CAPSULE | Refills: 0 | Status: SHIPPED | OUTPATIENT
Start: 2022-08-21 | End: 2022-08-19

## 2022-08-19 RX ORDER — LISDEXAMFETAMINE DIMESYLATE 70 MG/1
70 CAPSULE ORAL DAILY
Qty: 30 CAPSULE | Refills: 0 | Status: SHIPPED | OUTPATIENT
Start: 2022-08-21 | End: 2022-09-19

## 2022-09-13 ENCOUNTER — PRE VISIT (OUTPATIENT)
Dept: GASTROENTEROLOGY | Facility: CLINIC | Age: 27
End: 2022-09-13

## 2022-09-13 ENCOUNTER — VIRTUAL VISIT (OUTPATIENT)
Dept: GASTROENTEROLOGY | Facility: CLINIC | Age: 27
End: 2022-09-13
Attending: PHYSICIAN ASSISTANT
Payer: COMMERCIAL

## 2022-09-13 ENCOUNTER — TELEPHONE (OUTPATIENT)
Dept: GASTROENTEROLOGY | Facility: CLINIC | Age: 27
End: 2022-09-13

## 2022-09-13 VITALS — BODY MASS INDEX: 30.21 KG/M2 | WEIGHT: 160 LBS | HEIGHT: 61 IN

## 2022-09-13 DIAGNOSIS — K20.0 EOSINOPHILIC ESOPHAGITIS: ICD-10-CM

## 2022-09-13 PROCEDURE — 99204 OFFICE O/P NEW MOD 45 MIN: CPT | Mod: 95 | Performed by: PHYSICIAN ASSISTANT

## 2022-09-13 ASSESSMENT — PAIN SCALES - GENERAL: PAINLEVEL: NO PAIN (0)

## 2022-09-13 NOTE — LETTER
9/13/2022         RE: Nargis Ruelas  [id number] [address] [place] [id number]        Dear Colleague,    Thank you for referring your patient, Nargis Ruelas, to the [facility] [place]. Please see a copy of my visit note below.    GI CLINIC VISIT    CC/REFERRING PROVIDER: Carol Jennings  REASON FOR CONSULTATION: dysphagia, GERD    HPI: 27 year old female with PMH of asthma presenting to GI clinic for GERD and dysphagia    For several years, Nargis has noted dysphagia primarily to solids and soft solids. She questioned if she wasn't chewing thoroughly during those instances. She then developed significant heartburn and reflux symptoms during pregnancy, with worsening of the dysphagia symptom. She used PPI for awhile, with some initial improvement, however stopped and continued famotidine. She then underwent EGD, which was endoscopically unremarkable, however biopsies x 2 of unspecified location of the esophagus with up to 80 eos/HPF.    At this time, she is not on PPI. She continue to have daily dysphagia symptoms solids>liquids, appreciated around the thyroid cartilage, and lasting at most for several minutes. The bolus can either pass or be regurgitated as a bland bolus. She relates the symptom to a choking sensation. This can, too, occur with liquids, though she notes it doesn't feel like the liquids are really getting stuck, but that there is some discomfort there. She continues to have heartburn and reflux symptoms throughout the week, taking famotidine at least every other day or more, with improvement in symptoms.    Today's Renuka Swallow Questionnaire (SSQ):    Brief Esophageal Dysphagia Questionnaire (BEDQ):  We would like to ask about your dysphagia symptoms over the past 30 days, 6 months, and 12 months.  Please think about your symptom experiences and choose the best answer.      Over the past 14 days, on average, how often have you had the following?  If your  response falls between two categories, please make your best guess.  If you are unable to eat the type of food in the question, please check  Cannot eat this.    Cannot  Eat This 0  Rarely/ Never 1  Once or twice a month 2  1-2 times per week 3  3-5 times per week 4  Daily or almost daily 5  Several times per day   Trouble eating solid food (meat, bread, vegetables)          5   Trouble eating soft foods (yogurt, jello, pudding)         4    Trouble swallowing liquids               Pain while swallowing  --- 0        Coughing or choking while swallowing foods or liquids ---    3       SCORE: 12    Over the past 14 days, on average, how would you rate your discomfort or pain during swallowing? If you are unable to eat the type of food in the question, please check  Cannot eat this.    Cannot Eat This 0  None 1  Very Mild 2  Mild 3  Moderate 4  Moderately Severe    5  Severe   Eating solid food   (meat, bread, vegetables)     3     Eating soft foods   (yogurt, jello, pudding)     3     Drinking liquids   1         SCORE:7    Approximately how many times in the past 12 months have you:   Had food stuck in your throat or esophagus for a period lasting longer than 30 minutes? none  Had to visit the emergency room because of food stuck in your throat or esophagus? none  Eckardt Score:   Score Dysphagia Regurgitation Retrosternal Pain Weight Loss (kg)   0 None None None None   1 Occasional Occasional Occasional <5   2 Daily Daily Daily 5-10   3 Each meal Each meal Each meal >10     SCORE:5    ROS: 10pt ROS performed and otherwise negative.    PAST MEDICAL HISTORY:  Past Medical History:   Diagnosis Date     Cephalopelvic disproportion due to mixed maternal and fetal factors 2020     Depressive disorder      GDM, class A1 3/25/2020     Tonsillitis      Uncomplicated asthma        PREVIOUS ABDOMINAL/GYNECOLOGIC SURGERIES:    Past Surgical History:   Procedure Laterality Date      SECTION N/A 2020     Procedure:  SECTION;  Surgeon: Jaqueline Reyes MD;  Location: [facility]     ESOPHAGOSCOPY, GASTROSCOPY, DUODENOSCOPY (EGD), COMBINED N/A 11/15/2021    Procedure: ESOPHAGOGASTRODUODENOSCOPY, WITH BIOPSY;  Surgeon: Robi Rodriguez MD;  Location: [facility]     MOUTH SURGERY      wisdom teeth     TONSILLECTOMY           PERTINENT MEDICATIONS:  Current Outpatient Medications   Medication Sig Dispense Refill     albuterol (PROAIR HFA/PROVENTIL HFA/VENTOLIN HFA) 108 (90 Base) MCG/ACT inhaler Inhale 2 puffs every 4-6 hours as needed for cough, wheezing, or shortness of breath (Patient not taking: No sig reported) 18 g 0     busPIRone (BUSPAR) 10 MG tablet Take 1 tablet (10 mg) by mouth 2 times daily 180 tablet 1     famotidine (PEPCID) 20 MG tablet TAKE 1 TABLET (20 MG) BY MOUTH 2 TIMES DAILY 180 tablet 1     fluticasone (FLONASE) 50 MCG/ACT nasal spray Spray 1 spray into both nostrils daily 16 g 1     lisdexamfetamine (VYVANSE) 70 MG capsule Take 1 capsule (70 mg) by mouth daily 30 capsule 0     LORazepam (ATIVAN) 0.5 MG tablet Take 1 tablet (0.5 mg) by mouth every 6 hours as needed for anxiety 10 tablet 0     naloxone (NARCAN) 4 MG/0.1ML nasal spray Spray 1 spray (4 mg) into one nostril alternating nostrils once as needed for opioid reversal every 2-3 minutes until assistance arrives (Patient not taking: No sig reported) 0.2 mL 0     norethindrone-ethinyl estradiol (MICROGESTIN ) 1-20 MG-MCG tablet Take 1 tablet by mouth daily 84 tablet 1     omeprazole (PRILOSEC) 40 MG DR capsule Take 1 capsule (40 mg) by mouth daily (Patient not taking: Reported on 6/3/2022) 14 capsule 1     venlafaxine (EFFEXOR XR) 75 MG 24 hr capsule Take 1 capsule (75 mg) by mouth daily 90 capsule 1       SOCIAL HISTORY:  Social History     Socioeconomic History     Marital status: Single     Spouse name: Not on file     Number of children: Not on file     Years of education: Not on file     Highest education level: Not on file    Occupational History     Not on file   Tobacco Use     Smoking status: Former Smoker     Packs/day: 0.20     Years: 0.00     Pack years: 0.00     Smokeless tobacco: Never Used     Tobacco comment: quit with pregnancy   Vaping Use     Vaping Use: Never used   Substance and Sexual Activity     Alcohol use: Not Currently     Alcohol/week: 0.0 standard drinks     Comment: 4 drinks per month-quit with pregnancy     Drug use: No     Sexual activity: Yes     Partners: Male     Birth control/protection: Injection   Other Topics Concern     Parent/sibling w/ CABG, MI or angioplasty before 65F 55M? No   Social History Narrative    ** Merged History Encounter **          Social Determinants of Health     Financial Resource Strain: Not on file   Food Insecurity: Not on file   Transportation Needs: Not on file   Physical Activity: Not on file   Stress: Not on file   Social Connections: Not on file   Intimate Partner Violence: Not on file   Housing Stability: Not on file       FAMILY HISTORY:  Family History   Problem Relation Age of Onset     Diabetes Maternal Grandfather      Coronary Artery Disease Maternal Grandfather      Cerebrovascular Disease Maternal Grandfather      Depression Mother      Anxiety Disorder Mother      Unknown/Adopted Father         unknown      Chronic Obstructive Pulmonary Disease Maternal Grandmother      Coronary Artery Disease Daughter      Other Cancer No family hx of        PHYSICAL EXAMINATION:  Vitals reviewed  There were no vitals taken for this visit.  Video physical exam  General: Patient appears well in no acute distress.   Skin: No visualized rash or lesions on visualized skin  Eyes: EOMI, no erythema, sclera icterus or discharge noted  Resp: Appears to be breathing comfortably without accessory muscle usage, speaking in full sentences, no cough  MSK: Appears to have normal range of motion based on visualized movements  Neurologic: No apparent tremors, facial movements symmetric  Psych:  affect normal, alert and oriented    The rest of a comprehensive physical examination is deferred due to PHE (public health emergency) video restrictions      PERTINENT STUDIES Reviewed in EMR    ASSESSMENT/PLAN:    # Esophageal dysphagia to solids > liquids  # Heartburn and reflux  Several year history of solid food dysphagia associated with intermittent bland regurgitation, as well as significant heartburn and reflux symptoms. EGD off therapy showed a normal examined esophagus, however esophageal biopsies x2 from unspecified location showed up to 80 eos/HPF, concerning for EoE.    We spent time discussing the natural history and treatment approach to EoE. In this case, I do think the diagnosis of EoE is likely given asthma history, however reflux can also increase eosinophils. We discussed several options today, including repeating the upper endoscopy with thorough biopsies to help differentiate EoE versus reflux, versus starting high dose PPI therapy and repeating the upper endoscopy after 2-3 months. In the latter scenario, should the eosinophils be cleared, we will not know with definitive certainty if she has EoE that responds to PPI, which occurs in about 40% of patients, versus eosinophilia secondary to acid reflux that is being effectively controlled with PPI. She prefers to repeat the endoscopy off therapy to obtain a more definitive diagnosis.     If repeat EGD does confirm EoE, plan to start high dose PPI and repeat scope in 2-3 months to evaluate response.          RTC 3 months  Thank you for this consultation. It was a pleasure to participate in the care of this patient; please contact us with any further questions.      Rula Santana PA-C    45 minutes spent on the date of the encounter doing chart review, review of test results, patient visit and documentation

## 2022-09-13 NOTE — PATIENT INSTRUCTIONS
It was a pleasure taking care of you today.  I've included a brief summary of our discussion and care plan from today's visit below.  Please review this information with your primary care provider.  _______________________________________________________________________    My recommendations are summarized as follows:    1) Order placed for repeat upper endoscopy with Dr. Shipley. The endoscopy scheduling team will typically call within 2-5 business days, or you can call [phone], option 2.  2) Depending on those results, if EoE is confirmed, we will plan to start a high dose of omeprazole for 2-3 months, and then repeat the scope with biopsies to see if you respond to the medication.  3) In the meantime, continue famotidine as needed for heartburn and reflux. Avoid omeprazole for now.    Return to GI Clinic in 3 months to review your progress.    ______________________________________________________________________    How do I schedule labs, imaging studies, or procedures that were ordered in clinic today?     Labs: To schedule lab appointment at the [facility], use my chart or call [phone]. If you have a [place] lab closer to home where you are regularly seen you can give them a call.     Procedures: If a colonoscopy, upper endoscopy, breath test, esophageal manometry, or pH impedence was ordered today, our endoscopy team will call you to schedule this. If you have not heard from our endoscopy team within a week, please call [phone], option 2. to schedule.     Imaging Studies: If you were scheduled for a CT scan, X-ray, MRI, ultrasound, HIDA scan or other imaging study, please call [phone] to have this scheduled.     Referral: If a referral to another specialty was ordered, expect a phone call or follow instructions above. If you have not heard from anyone regarding your referral in a week, please call our clinic to check the status.     Who do I call with any questions after my  visit?  Please be in touch if there are any further questions that arise following today's visit.  There are multiple ways to contact your gastroenterology care team.      During business hours, you may reach a Gastroenterology nurse at [phone]    To schedule or reschedule an appointment, please call [phone].     You can always send a secure message through [employer].  [employer] messages are answered by your nurse or doctor typically within 24 hours.  Please allow extra time on weekends and holidays.      For urgent/emergent questions after business hours, you may reach the on-call GI Fellow by contacting the [facility]  at [phone].     How will I get the results of any tests ordered?    You will receive all of your results.  If you have signed up for [employer], any tests ordered at your visit will be available to you after your physician reviews them.  Typically this takes 1-2 weeks.  If there are urgent results that require a change in your care plan, your physician or nurse will call you to discuss the next steps.      What is [employer]?  [employer] is a secure way for you to access all of your healthcare records from the [facility].  It is a web based computer program, so you can sign on to it from any location.  It also allows you to send secure messages to your care team.  I recommend signing up for [employer] access if you have not already done so and are comfortable with using a computer.      How to I schedule a follow-up visit?  If you did not schedule a follow-up visit today, please call [phone] to schedule a follow-up office visit.      Sincerely,    Rula Santana PA-C  Division of Gastroenterology, Hepatology & Nutrition  [facility]

## 2022-09-13 NOTE — PROGRESS NOTES
GI CLINIC VISIT    CC/REFERRING PROVIDER: Carol Jennings  REASON FOR CONSULTATION: dysphagia, GERD    HPI: 27 year old female with PMH of asthma presenting to GI clinic for GERD and dysphagia    For several years, Nargis has noted dysphagia primarily to solids and soft solids. She questioned if she wasn't chewing thoroughly during those instances. She then developed significant heartburn and reflux symptoms during pregnancy, with worsening of the dysphagia symptom. She used PPI for awhile, with some initial improvement, however stopped and continued famotidine. She then underwent EGD, which was endoscopically unremarkable, however biopsies x 2 of unspecified location of the esophagus with up to 80 eos/HPF.    At this time, she is not on PPI. She continue to have daily dysphagia symptoms solids>liquids, appreciated around the thyroid cartilage, and lasting at most for several minutes. The bolus can either pass or be regurgitated as a bland bolus. She relates the symptom to a choking sensation. This can, too, occur with liquids, though she notes it doesn't feel like the liquids are really getting stuck, but that there is some discomfort there. She continues to have heartburn and reflux symptoms throughout the week, taking famotidine at least every other day or more, with improvement in symptoms.    Today's Renuka Swallow Questionnaire (SSQ):    Brief Esophageal Dysphagia Questionnaire (BEDQ):  We would like to ask about your dysphagia symptoms over the past 30 days, 6 months, and 12 months.  Please think about your symptom experiences and choose the best answer.      Over the past 14 days, on average, how often have you had the following?  If your response falls between two categories, please make your best guess.  If you are unable to eat the type of food in the question, please check  Cannot eat this.    Cannot  Eat This 0  Rarely/ Never 1  Once or twice a month 2  1-2 times per week 3  3-5 times per week  4  Daily or almost daily 5  Several times per day   Trouble eating solid food (meat, bread, vegetables)          5   Trouble eating soft foods (yogurt, jello, pudding)         4    Trouble swallowing liquids               Pain while swallowing  --- 0        Coughing or choking while swallowing foods or liquids ---    3       SCORE: 12    Over the past 14 days, on average, how would you rate your discomfort or pain during swallowing? If you are unable to eat the type of food in the question, please check  Cannot eat this.    Cannot Eat This 0  None 1  Very Mild 2  Mild 3  Moderate 4  Moderately Severe    5  Severe   Eating solid food   (meat, bread, vegetables)     3     Eating soft foods   (yogurt, jello, pudding)     3     Drinking liquids   1         SCORE:7    Approximately how many times in the past 12 months have you:   Had food stuck in your throat or esophagus for a period lasting longer than 30 minutes? none  Had to visit the emergency room because of food stuck in your throat or esophagus? none  Eckardt Score:   Score Dysphagia Regurgitation Retrosternal Pain Weight Loss (kg)   0 None None None None   1 Occasional Occasional Occasional <5   2 Daily Daily Daily 5-10   3 Each meal Each meal Each meal >10     SCORE:5    ROS: 10pt ROS performed and otherwise negative.    PAST MEDICAL HISTORY:  Past Medical History:   Diagnosis Date     Cephalopelvic disproportion due to mixed maternal and fetal factors 2020     Depressive disorder      GDM, class A1 3/25/2020     Tonsillitis      Uncomplicated asthma        PREVIOUS ABDOMINAL/GYNECOLOGIC SURGERIES:    Past Surgical History:   Procedure Laterality Date      SECTION N/A 2020    Procedure:  SECTION;  Surgeon: aJqueline Reyes MD;  Location: [facility]     ESOPHAGOSCOPY, GASTROSCOPY, DUODENOSCOPY (EGD), COMBINED N/A 11/15/2021    Procedure: ESOPHAGOGASTRODUODENOSCOPY, WITH BIOPSY;  Surgeon: Robi Rodriguez MD;  Location: [facility]     MOUTH  SURGERY      wisdom teeth     TONSILLECTOMY           PERTINENT MEDICATIONS:  Current Outpatient Medications   Medication Sig Dispense Refill     albuterol (PROAIR HFA/PROVENTIL HFA/VENTOLIN HFA) 108 (90 Base) MCG/ACT inhaler Inhale 2 puffs every 4-6 hours as needed for cough, wheezing, or shortness of breath (Patient not taking: No sig reported) 18 g 0     busPIRone (BUSPAR) 10 MG tablet Take 1 tablet (10 mg) by mouth 2 times daily 180 tablet 1     famotidine (PEPCID) 20 MG tablet TAKE 1 TABLET (20 MG) BY MOUTH 2 TIMES DAILY 180 tablet 1     fluticasone (FLONASE) 50 MCG/ACT nasal spray Spray 1 spray into both nostrils daily 16 g 1     lisdexamfetamine (VYVANSE) 70 MG capsule Take 1 capsule (70 mg) by mouth daily 30 capsule 0     LORazepam (ATIVAN) 0.5 MG tablet Take 1 tablet (0.5 mg) by mouth every 6 hours as needed for anxiety 10 tablet 0     naloxone (NARCAN) 4 MG/0.1ML nasal spray Spray 1 spray (4 mg) into one nostril alternating nostrils once as needed for opioid reversal every 2-3 minutes until assistance arrives (Patient not taking: No sig reported) 0.2 mL 0     norethindrone-ethinyl estradiol (MICROGESTIN 1/20) 1-20 MG-MCG tablet Take 1 tablet by mouth daily 84 tablet 1     omeprazole (PRILOSEC) 40 MG DR capsule Take 1 capsule (40 mg) by mouth daily (Patient not taking: Reported on 6/3/2022) 14 capsule 1     venlafaxine (EFFEXOR XR) 75 MG 24 hr capsule Take 1 capsule (75 mg) by mouth daily 90 capsule 1       SOCIAL HISTORY:  Social History     Socioeconomic History     Marital status: Single     Spouse name: Not on file     Number of children: Not on file     Years of education: Not on file     Highest education level: Not on file   Occupational History     Not on file   Tobacco Use     Smoking status: Former Smoker     Packs/day: 0.20     Years: 0.00     Pack years: 0.00     Smokeless tobacco: Never Used     Tobacco comment: quit with pregnancy   Vaping Use     Vaping Use: Never used   Substance and Sexual  Activity     Alcohol use: Not Currently     Alcohol/week: 0.0 standard drinks     Comment: 4 drinks per month-quit with pregnancy     Drug use: No     Sexual activity: Yes     Partners: Male     Birth control/protection: Injection   Other Topics Concern     Parent/sibling w/ CABG, MI or angioplasty before 65F 55M? No   Social History Narrative    ** Merged History Encounter **          Social Determinants of Health     Financial Resource Strain: Not on file   Food Insecurity: Not on file   Transportation Needs: Not on file   Physical Activity: Not on file   Stress: Not on file   Social Connections: Not on file   Intimate Partner Violence: Not on file   Housing Stability: Not on file       FAMILY HISTORY:  Family History   Problem Relation Age of Onset     Diabetes Maternal Grandfather      Coronary Artery Disease Maternal Grandfather      Cerebrovascular Disease Maternal Grandfather      Depression Mother      Anxiety Disorder Mother      Unknown/Adopted Father         unknown      Chronic Obstructive Pulmonary Disease Maternal Grandmother      Coronary Artery Disease Daughter      Other Cancer No family hx of        PHYSICAL EXAMINATION:  Vitals reviewed  There were no vitals taken for this visit.  Video physical exam  General: Patient appears well in no acute distress.   Skin: No visualized rash or lesions on visualized skin  Eyes: EOMI, no erythema, sclera icterus or discharge noted  Resp: Appears to be breathing comfortably without accessory muscle usage, speaking in full sentences, no cough  MSK: Appears to have normal range of motion based on visualized movements  Neurologic: No apparent tremors, facial movements symmetric  Psych: affect normal, alert and oriented    The rest of a comprehensive physical examination is deferred due to PHE (public health emergency) video restrictions      PERTINENT STUDIES Reviewed in EMR    ASSESSMENT/PLAN:    # Esophageal dysphagia to solids > liquids  # Heartburn and  reflux  Several year history of solid food dysphagia associated with intermittent bland regurgitation, as well as significant heartburn and reflux symptoms. EGD off therapy showed a normal examined esophagus, however esophageal biopsies x2 from unspecified location showed up to 80 eos/HPF, concerning for EoE.    We spent time discussing the natural history and treatment approach to EoE. In this case, I do think the diagnosis of EoE is likely given asthma history, however reflux can also increase eosinophils. We discussed several options today, including repeating the upper endoscopy with thorough biopsies to help differentiate EoE versus reflux, versus starting high dose PPI therapy and repeating the upper endoscopy after 2-3 months. In the latter scenario, should the eosinophils be cleared, we will not know with definitive certainty if she has EoE that responds to PPI, which occurs in about 40% of patients, versus eosinophilia secondary to acid reflux that is being effectively controlled with PPI. She prefers to repeat the endoscopy off therapy to obtain a more definitive diagnosis.     If repeat EGD does confirm EoE, plan to start high dose PPI and repeat scope in 2-3 months to evaluate response.          RTC 3 months  Thank you for this consultation. It was a pleasure to participate in the care of this patient; please contact us with any further questions.    Rula Santana PA-C    45 minutes spent on the date of the encounter doing chart review, review of test results, patient visit and documentation

## 2022-09-13 NOTE — PROGRESS NOTES
Nargis is a 27 year old who is being evaluated via a billable video visit.      How would you like to obtain your AVS? MyChart  If the video visit is dropped, the invitation should be resent by: Text to cell phone: [phone]  Will anyone else be joining your video visit? No   No BP taken      Video-Visit Details    Video Start Time: 1115    Type of service:  Video Visit    Video End Time:11:43 AM    Originating Location (pt. Location): Home    Distant Location (provider location):  [facility] [place]     Platform used for Video Visit: [employer]

## 2022-09-13 NOTE — TELEPHONE ENCOUNTER
Screening Questions  BlueKIND OF PREP RedLOCATION [review exclusion criteria] GreenSEDATION TYPE      1.  Y Are you active on mychart?       2.  Rula Santana PA-C Ordering/Referring Provider:      3. BLUE PLUS  What insurance is in the chart?    4.  Y Do you have a legal guardian or medical Power of ?              Are you able to give consent for your medical care?                (Sedation review/consideration needed)      5.   30.23  BMI  [BMI OVER 40-EXTENDED PREP]  If greater than 40 review exclusion criteria [PAC APPT IF @ [facility]]       6.   N Have you had a positive covid test in the last 90 days?      7.   Respiratory Screening :  [If yes to any of the following HOSPITAL setting only]                     N Do you use daily home oxygen?   N Do you have mod to severe Obstructive Sleep Apnea?  [OKAY @ [facility] SH PH RI]                  N Do you have Pulmonary Hypertension?                   N Do you have UNCONTROLLED asthma?         8.   N Have you had a heart or lung transplant?       9.   N Are you currently on dialysis?   [ If yes, G-PREP & HOSPITAL setting only]      10.   N  Do you have chronic kidney disease?  [ If yes, G-PREP ]     11.  N Have you had a stroke or Transient ischemic attack (TIA - aka  mini stroke ) within 6 months?   (If yes, please review exclusion criteria)     12.   N In the past 6 months, have you had any heart related issues including cardiomyopathy or heart attack?           N If yes, did it require cardiac stenting or other implantable device?       13.   N Do you have any implantable devices in your body (pacemaker, defib, LVAD)?  (If yes, please review exclusion criteria)     14.   N Do you take nitroglycerin?            N If yes, how often? n  (if yes, HOSPITAL setting ONLY)     15.   N Are you currently taking any blood thinners?           [IF YES, INFORM PATIENT TO FOLLOW UP W/ ORDERING PROVIDER FOR BRIDGING INSTRUCTIONS]      16.    N  Do you have  a diagnosis of diabetes?  [ If yes, G-PREP ]     17.   [FEMALES] N Are you currently pregnant?    N If yes, how many weeks?      18.    N  Are you taking any prescription pain medications on a routine schedule?    [ If yes, EXTENDED PREP.] [If yes, MAC]    22.  Do you take the medication Phentermine?     Yes-> Hold for 7 days before procedure.  Please consult your prescribing provider if you have questions about holding this medication.     No-> Continue to next question.     19.   N Do you have any chemical dependencies such as alcohol, street drugs, or methadone?   [If yes, MAC]     20.   N Do you have any history of post-traumatic stress syndrome, severe anxiety or history of psychosis?   [If yes, MAC]     21.   Y Do you transfer independently?       22.   N  On a regular basis do you go 3-5 days between bowel movements?  [ If yes, EXTENDED PREP.]     23.    Preferred LOCAL Pharmacy for Pre Prescription       [facility] [place] [address]        Scheduling Details         Nargis : Caller   (Please ask for phone number if not scheduled by patient)    Upper Endoscopy [EGD] : Type of Procedure Scheduled    Which Colonoscopy Prep was Sent?      DELMIS Surgeon    09/19 Date of Procedure   [facility] Location    MAC Sedation Type     Conscious Sedation- Needs  for 6 hours after the procedure  MAC/General-Needs  for 24 hours after procedure     N Pre-op Required at [facility] [place] [place] and OR for MAC sedation   (advise patient they will need a pre-op prior to procedure -)       Y Informed patient they will need an adult    Cannot take any type of public or medical transportation alone     HOME Pre-Procedure Covid test to be completed at [facility] or Externally  [[facility] PCR Testing Required]     Y  Confirmed Nurse will call to complete assessment     Additional comments:    YVONNE IS BOOKING OUT INTO THE NEXT YEAR. PATIENT STATES PROVIDER ALSO  RECOMMENDED ANOTHER PROVIDER Vini TRUJILLO

## 2022-09-14 ENCOUNTER — TELEPHONE (OUTPATIENT)
Dept: GASTROENTEROLOGY | Facility: CLINIC | Age: 27
End: 2022-09-14

## 2022-09-14 NOTE — TELEPHONE ENCOUNTER
Patient scheduled for EGD on 9/19/22.     Discuss at home test option.     Arrival time: 0735    Facility location: [facility] [place]    Sedation type: MAC    Indication for procedure: index EGD with biopsies from one site with increased eosinophils, in context of signifiant heartburn and GERD symptoms. Plan repeat EGD off therapy with proximal and distal esophageal biopsies, 4 from each location    Anticoagulations? None     Prep instructions sent via [employer]    Pre visit planning completed.    Ro Eden RN

## 2022-09-14 NOTE — TELEPHONE ENCOUNTER
Pre assessment questions completed for upcoming EGD procedure scheduled on 9/19/22    COVID policy reviewed. Patient to complete rapid antigen test one to two days before their scheduled procedure. Patient to bring photo of the results when they come in for their procedure.    Reviewed procedural arrival time 0735 and [place] [facility] [place]    Designated  policy reviewed. Instructed to have someone stay 24 hours post procedure.     Prep instructions sent via [employer] -- Pt advised to stop PPI/Acid Reflux medications now. She has been holding.     Reviewed EGD prep instructions.     Anticoagulation/blood thinners? None    Electronic implanted devices? None    Patient verbalized understanding and had no questions or concerns at this time.    Ro Eden RN

## 2022-09-19 ENCOUNTER — ANESTHESIA EVENT (OUTPATIENT)
Dept: SURGERY | Facility: AMBULATORY SURGERY CENTER | Age: 27
End: 2022-09-19
Payer: COMMERCIAL

## 2022-09-19 ENCOUNTER — HOSPITAL ENCOUNTER (OUTPATIENT)
Facility: AMBULATORY SURGERY CENTER | Age: 27
Discharge: HOME | End: 2022-09-19
Attending: INTERNAL MEDICINE | Admitting: INTERNAL MEDICINE
Payer: COMMERCIAL

## 2022-09-19 ENCOUNTER — ANESTHESIA (OUTPATIENT)
Dept: SURGERY | Facility: AMBULATORY SURGERY CENTER | Age: 27
End: 2022-09-19
Payer: COMMERCIAL

## 2022-09-19 VITALS
RESPIRATION RATE: 16 BRPM | BODY MASS INDEX: 30.21 KG/M2 | HEART RATE: 95 BPM | SYSTOLIC BLOOD PRESSURE: 115 MMHG | HEIGHT: 61 IN | DIASTOLIC BLOOD PRESSURE: 75 MMHG | TEMPERATURE: 97.1 F | OXYGEN SATURATION: 100 % | WEIGHT: 160 LBS

## 2022-09-19 VITALS — HEART RATE: 84 BPM

## 2022-09-19 LAB
HCG UR QL: NEGATIVE
INTERNAL QC OK POCT: NORMAL
POCT KIT EXPIRATION DATE: NORMAL
POCT KIT LOT NUMBER: NORMAL
UPPER GI ENDOSCOPY: NORMAL

## 2022-09-19 PROCEDURE — 81025 URINE PREGNANCY TEST: CPT | Performed by: PATHOLOGY

## 2022-09-19 PROCEDURE — 88305 TISSUE EXAM BY PATHOLOGIST: CPT | Mod: TC | Performed by: INTERNAL MEDICINE

## 2022-09-19 PROCEDURE — 43239 EGD BIOPSY SINGLE/MULTIPLE: CPT

## 2022-09-19 RX ORDER — PROPOFOL 10 MG/ML
INJECTION, EMULSION INTRAVENOUS CONTINUOUS PRN
Status: DISCONTINUED | OUTPATIENT
Start: 2022-09-19 | End: 2022-09-19

## 2022-09-19 RX ORDER — SODIUM CHLORIDE, SODIUM LACTATE, POTASSIUM CHLORIDE, CALCIUM CHLORIDE 600; 310; 30; 20 MG/100ML; MG/100ML; MG/100ML; MG/100ML
INJECTION, SOLUTION INTRAVENOUS CONTINUOUS PRN
Status: DISCONTINUED | OUTPATIENT
Start: 2022-09-19 | End: 2022-09-19

## 2022-09-19 RX ORDER — LIDOCAINE HYDROCHLORIDE 20 MG/ML
INJECTION, SOLUTION INFILTRATION; PERINEURAL PRN
Status: DISCONTINUED | OUTPATIENT
Start: 2022-09-19 | End: 2022-09-19

## 2022-09-19 RX ORDER — LIDOCAINE 40 MG/G
CREAM TOPICAL
Status: DISCONTINUED | OUTPATIENT
Start: 2022-09-19 | End: 2022-09-20 | Stop reason: HOSPADM

## 2022-09-19 RX ORDER — NALOXONE HYDROCHLORIDE 0.4 MG/ML
0.4 INJECTION, SOLUTION INTRAMUSCULAR; INTRAVENOUS; SUBCUTANEOUS
Status: CANCELLED | OUTPATIENT
Start: 2022-09-19

## 2022-09-19 RX ORDER — NALOXONE HYDROCHLORIDE 0.4 MG/ML
0.2 INJECTION, SOLUTION INTRAMUSCULAR; INTRAVENOUS; SUBCUTANEOUS
Status: CANCELLED | OUTPATIENT
Start: 2022-09-19

## 2022-09-19 RX ORDER — ONDANSETRON 2 MG/ML
4 INJECTION INTRAMUSCULAR; INTRAVENOUS
Status: DISCONTINUED | OUTPATIENT
Start: 2022-09-19 | End: 2022-09-20 | Stop reason: HOSPADM

## 2022-09-19 RX ORDER — PROCHLORPERAZINE MALEATE 10 MG
10 TABLET ORAL EVERY 6 HOURS PRN
Status: CANCELLED | OUTPATIENT
Start: 2022-09-19

## 2022-09-19 RX ORDER — ONDANSETRON 2 MG/ML
4 INJECTION INTRAMUSCULAR; INTRAVENOUS EVERY 6 HOURS PRN
Status: CANCELLED | OUTPATIENT
Start: 2022-09-19

## 2022-09-19 RX ORDER — GLYCOPYRROLATE 0.2 MG/ML
INJECTION, SOLUTION INTRAMUSCULAR; INTRAVENOUS PRN
Status: DISCONTINUED | OUTPATIENT
Start: 2022-09-19 | End: 2022-09-19

## 2022-09-19 RX ORDER — ONDANSETRON 4 MG/1
4 TABLET, ORALLY DISINTEGRATING ORAL EVERY 6 HOURS PRN
Status: CANCELLED | OUTPATIENT
Start: 2022-09-19

## 2022-09-19 RX ORDER — PROPOFOL 10 MG/ML
INJECTION, EMULSION INTRAVENOUS PRN
Status: DISCONTINUED | OUTPATIENT
Start: 2022-09-19 | End: 2022-09-19

## 2022-09-19 RX ORDER — FLUMAZENIL 0.1 MG/ML
0.2 INJECTION, SOLUTION INTRAVENOUS
Status: CANCELLED | OUTPATIENT
Start: 2022-09-19 | End: 2022-09-19

## 2022-09-19 RX ADMIN — PROPOFOL 50 MG: 10 INJECTION, EMULSION INTRAVENOUS at 08:27

## 2022-09-19 RX ADMIN — LIDOCAINE HYDROCHLORIDE 40 MG: 20 INJECTION, SOLUTION INFILTRATION; PERINEURAL at 08:22

## 2022-09-19 RX ADMIN — PROPOFOL 150 MCG/KG/MIN: 10 INJECTION, EMULSION INTRAVENOUS at 08:25

## 2022-09-19 RX ADMIN — SODIUM CHLORIDE, SODIUM LACTATE, POTASSIUM CHLORIDE, CALCIUM CHLORIDE: 600; 310; 30; 20 INJECTION, SOLUTION INTRAVENOUS at 08:19

## 2022-09-19 RX ADMIN — PROPOFOL 50 MG: 10 INJECTION, EMULSION INTRAVENOUS at 08:24

## 2022-09-19 RX ADMIN — PROPOFOL 30 MG: 10 INJECTION, EMULSION INTRAVENOUS at 08:34

## 2022-09-19 RX ADMIN — GLYCOPYRROLATE 0.2 MG: 0.2 INJECTION, SOLUTION INTRAMUSCULAR; INTRAVENOUS at 08:22

## 2022-09-19 NOTE — ANESTHESIA POSTPROCEDURE EVALUATION
Patient: Nargis Ruelas    Procedure: Procedure(s):  ESOPHAGOGASTRODUODENOSCOPY, WITH BIOPSY       Anesthesia Type:  MAC    Note:  Disposition: Outpatient   Postop Pain Control: Uneventful            Sign Out: Well controlled pain   PONV: No   Neuro/Psych: Uneventful            Sign Out: Acceptable/Baseline neuro status   Airway/Respiratory: Uneventful            Sign Out: Acceptable/Baseline resp. status   CV/Hemodynamics: Uneventful            Sign Out: Acceptable CV status   Other NRE: NONE   DID A NON-ROUTINE EVENT OCCUR? No           Last vitals:  Vitals Value Taken Time   /75 09/19/22 0900   Temp 36.2  C (97.1  F) 09/19/22 0900   Pulse 95 09/19/22 0842   Resp 16 09/19/22 0900   SpO2 100 % 09/19/22 0900       Electronically Signed By: Amos Patterson MD  September 19, 2022  3:06 PM

## 2022-09-19 NOTE — ANESTHESIA PREPROCEDURE EVALUATION
Anesthesia Pre-Procedure Evaluation    Patient: Nargis Ruelas   MRN: [id number] : 1995        Procedure : Procedure(s):  ESOPHAGOGASTRODUODENOSCOPY (EGD)          Past Medical History:   Diagnosis Date     Cephalopelvic disproportion due to mixed maternal and fetal factors 2020     Depressive disorder      GDM, class A1 3/25/2020     Tonsillitis      Uncomplicated asthma       Past Surgical History:   Procedure Laterality Date      SECTION N/A 2020    Procedure:  SECTION;  Surgeon: Jaqueline Reyes MD;  Location: [facility]     ESOPHAGOSCOPY, GASTROSCOPY, DUODENOSCOPY (EGD), COMBINED N/A 11/15/2021    Procedure: ESOPHAGOGASTRODUODENOSCOPY, WITH BIOPSY;  Surgeon: Robi Rodriguez MD;  Location: [facility] GI     MOUTH SURGERY      wisdom teeth     TONSILLECTOMY        No Known Allergies   Social History     Tobacco Use     Smoking status: Former Smoker     Packs/day: 0.20     Years: 0.00     Pack years: 0.00     Smokeless tobacco: Never Used     Tobacco comment: quit with pregnancy   Substance Use Topics     Alcohol use: Not Currently     Alcohol/week: 0.0 standard drinks     Comment: 4 drinks per month-quit with pregnancy      Wt Readings from Last 1 Encounters:   22 72.6 kg (160 lb)        Anesthesia Evaluation            ROS/MED HX  ENT/Pulmonary:     (+) asthma     Neurologic:       Cardiovascular:       METS/Exercise Tolerance:     Hematologic:       Musculoskeletal:       GI/Hepatic:       Renal/Genitourinary:       Endo:     (+) type II DM,     Psychiatric/Substance Use:     (+) psychiatric history depression and anxiety     Infectious Disease:       Malignancy:       Other:            Physical Exam    Airway  airway exam normal      Mallampati: II   TM distance: > 3 FB   Neck ROM: full   Mouth opening: > 3 cm    Respiratory Devices and Support         Dental  no notable dental history         Cardiovascular          Rhythm and rate: regular and normal     Pulmonary    pulmonary exam normal        breath sounds clear to auscultation           OUTSIDE LABS:  CBC:   Lab Results   Component Value Date    WBC 14.5 (H) 05/22/2020    WBC 7.1 02/06/2020    HGB 8.8 (L) 05/24/2020    HGB 11.7 05/22/2020    HCT 35.4 05/22/2020    HCT 33.0 (L) 02/06/2020     05/22/2020     02/06/2020     BMP:   Lab Results   Component Value Date     07/24/2019     03/06/2019    POTASSIUM 3.8 07/24/2019    POTASSIUM 3.4 (L) 03/06/2019    CHLORIDE 106 07/24/2019    CHLORIDE 103 03/06/2019    CO2 27 07/24/2019    CO2 24 03/06/2019    BUN 9 07/24/2019    BUN 14 03/06/2019    CR 0.69 07/24/2019    CR 0.80 03/06/2019    GLC 93 07/24/2019    GLC 81 03/06/2019     COAGS: No results found for: PTT, INR, FIBR  POC:   Lab Results   Component Value Date    BGM 89 05/23/2020    HCG Negative 09/19/2022     HEPATIC:   Lab Results   Component Value Date    ALBUMIN 3.8 03/06/2019    PROTTOTAL 7.8 03/06/2019    ALT 14 03/06/2019    AST 14 03/06/2019    ALKPHOS 99 03/06/2019    BILITOTAL 0.4 03/06/2019     OTHER:   Lab Results   Component Value Date    LACT 0.5 03/06/2019    SAEED 8.9 07/24/2019    LIPASE 10 03/06/2019    TSH 0.96 04/22/2021    T4 0.87 10/22/2019       Anesthesia Plan    ASA Status:  2   NPO Status:  NPO Appropriate    Anesthesia Type: MAC.     - Reason for MAC: immobility needed, straight local not clinically adequate   Induction: Intravenous.   Maintenance: TIVA.        Consents    Anesthesia Plan(s) and associated risks, benefits, and realistic alternatives discussed. Questions answered and patient/representative(s) expressed understanding.    - Discussed:     - Discussed with:  Patient      - Extended Intubation/Ventilatory Support Discussed: No.      - Patient is DNR/DNI Status: No    Use of blood products discussed: No .     Postoperative Care    Pain management: IV analgesics, Oral pain medications, Multi-modal analgesia.   PONV prophylaxis: Ondansetron (or other 5HT-3)      Comments:                Amos Patterson MD

## 2022-09-19 NOTE — H&P
Nargis Ruelas  [id number]  female  27 year old      Reason for procedure/surgery: dysphagia    Patient Active Problem List   Diagnosis     Major depressive disorder, single episode     Anxiety state     History of syncope     Attention deficit hyperactivity disorder (ADHD), predominantly inattentive type     Chronic pain of both knees     Mild intermittent asthma without complication     S/P primary low transverse      Seizures (H)       Past Surgical History:    Past Surgical History:   Procedure Laterality Date      SECTION N/A 2020    Procedure:  SECTION;  Surgeon: Jaqueline Reyes MD;  Location: [facility]     ESOPHAGOSCOPY, GASTROSCOPY, DUODENOSCOPY (EGD), COMBINED N/A 11/15/2021    Procedure: ESOPHAGOGASTRODUODENOSCOPY, WITH BIOPSY;  Surgeon: Robi Rodriguez MD;  Location: [facility]     MOUTH SURGERY      wisdom teeth     TONSILLECTOMY         Past Medical History:   Past Medical History:   Diagnosis Date     Cephalopelvic disproportion due to mixed maternal and fetal factors 2020     Depressive disorder      GDM, class A1 3/25/2020     Tonsillitis      Uncomplicated asthma        Social History:   Social History     Tobacco Use     Smoking status: Former Smoker     Packs/day: 0.20     Years: 0.00     Pack years: 0.00     Smokeless tobacco: Never Used     Tobacco comment: quit with pregnancy   Substance Use Topics     Alcohol use: Not Currently     Alcohol/week: 0.0 standard drinks     Comment: 4 drinks per month-quit with pregnancy       Family History:   Family History   Problem Relation Age of Onset     Diabetes Maternal Grandfather      Coronary Artery Disease Maternal Grandfather      Cerebrovascular Disease Maternal Grandfather      Depression Mother      Anxiety Disorder Mother      Unknown/Adopted Father         unknown      Chronic Obstructive Pulmonary Disease Maternal Grandmother      Coronary Artery Disease Daughter      Other Cancer No family hx of   "      Allergies: No Known Allergies    Active Medications:   Current Outpatient Medications   Medication Sig Dispense Refill     albuterol (PROAIR HFA/PROVENTIL HFA/VENTOLIN HFA) 108 (90 Base) MCG/ACT inhaler Inhale 2 puffs every 4-6 hours as needed for cough, wheezing, or shortness of breath 18 g 0     busPIRone (BUSPAR) 10 MG tablet Take 1 tablet (10 mg) by mouth 2 times daily 180 tablet 1     famotidine (PEPCID) 20 MG tablet TAKE 1 TABLET (20 MG) BY MOUTH 2 TIMES DAILY 180 tablet 1     fluticasone (FLONASE) 50 MCG/ACT nasal spray Spray 1 spray into both nostrils daily 16 g 1     lisdexamfetamine (VYVANSE) 70 MG capsule Take 1 capsule (70 mg) by mouth daily 30 capsule 0     LORazepam (ATIVAN) 0.5 MG tablet Take 1 tablet (0.5 mg) by mouth every 6 hours as needed for anxiety 10 tablet 0     norethindrone-ethinyl estradiol (MICROGESTIN 1/20) 1-20 MG-MCG tablet Take 1 tablet by mouth daily 84 tablet 1     venlafaxine (EFFEXOR XR) 75 MG 24 hr capsule Take 1 capsule (75 mg) by mouth daily 90 capsule 1     naloxone (NARCAN) 4 MG/0.1ML nasal spray Spray 1 spray (4 mg) into one nostril alternating nostrils once as needed for opioid reversal every 2-3 minutes until assistance arrives (Patient not taking: No sig reported) 0.2 mL 0     omeprazole (PRILOSEC) 40 MG DR capsule Take 1 capsule (40 mg) by mouth daily (Patient not taking: No sig reported) 14 capsule 1       Systemic Review:   CONSTITUTIONAL: NEGATIVE for fever, chills, change in weight  ENT/MOUTH: NEGATIVE for ear, mouth and throat problems  RESP: NEGATIVE for significant cough or SOB  CV: NEGATIVE for chest pain, palpitations or peripheral edema    Physical Examination:   Vital Signs: /78   Pulse 80   Temp 97.8  F (36.6  C) (Temporal)   Resp 19   Ht 1.549 m (5' 0.98\")   Wt 72.6 kg (160 lb)   SpO2 100%   BMI 30.25 kg/m    GENERAL: healthy, alert and no distress  NECK: no adenopathy, no asymmetry, masses, or scars  RESP: lungs clear to auscultation - no " rales, rhonchi or wheezes  CV: regular rate and rhythm, normal S1 S2, no S3 or S4, no murmur, click or rub, no peripheral edema and peripheral pulses strong  ABDOMEN: soft, nontender, no hepatosplenomegaly, no masses and bowel sounds normal  MS: no gross musculoskeletal defects noted, no edema    Plan: Appropriate to proceed as scheduled.      David Palm MD  9/19/2022    PCP:  Carol Jennings

## 2022-09-19 NOTE — INTERVAL H&P NOTE
"I have reviewed the surgical (or preoperative) H&P that is linked to this encounter, and examined the patient. There are no significant changes    Clinical Conditions Present on Arrival:  Clinically Significant Risk Factors Present on Admission                   # Obesity: Estimated body mass index is 30.25 kg/m  as calculated from the following:    Height as of this encounter: 1.549 m (5' 0.98\").    Weight as of this encounter: 72.6 kg (160 lb).       "

## 2022-09-19 NOTE — ANESTHESIA CARE TRANSFER NOTE
Patient: Nargis Ruelas    Procedure: Procedure(s):  ESOPHAGOGASTRODUODENOSCOPY, WITH BIOPSY       Diagnosis: Eosinophilic esophagitis [K20.0]  Diagnosis Additional Information: No value filed.    Anesthesia Type:   MAC     Note:    Oropharynx: oropharynx clear of all foreign objects  Level of Consciousness: awake  Oxygen Supplementation: room air    Independent Airway: airway patency satisfactory and stable  Dentition: dentition unchanged  Vital Signs Stable: post-procedure vital signs reviewed and stable  Report to RN Given: handoff report given  Patient transferred to: PACU    Handoff Report: Identifed the Patient, Identified the Reponsible Provider, Reviewed the pertinent medical history, Discussed the surgical course, Reviewed Intra-OP anesthesia mangement and issues during anesthesia, Set expectations for post-procedure period and Allowed opportunity for questions and acknowledgement of understanding      Vitals:  Vitals Value Taken Time   BP     Temp     Pulse     Resp     SpO2         Electronically Signed By: SUDHEER Caputo CRNA  September 19, 2022  8:43 AM

## 2022-09-20 LAB
PATH REPORT.COMMENTS IMP SPEC: NORMAL
PATH REPORT.COMMENTS IMP SPEC: NORMAL
PATH REPORT.FINAL DX SPEC: NORMAL
PATH REPORT.GROSS SPEC: NORMAL
PATH REPORT.MICROSCOPIC SPEC OTHER STN: NORMAL
PATH REPORT.RELEVANT HX SPEC: NORMAL
PHOTO IMAGE: NORMAL

## 2022-09-20 PROCEDURE — 88305 TISSUE EXAM BY PATHOLOGIST: CPT | Mod: 26 | Performed by: PATHOLOGY

## 2022-09-23 DIAGNOSIS — K20.0 EOSINOPHILIC ESOPHAGITIS: Primary | ICD-10-CM

## 2022-09-23 RX ORDER — OMEPRAZOLE 40 MG/1
40 CAPSULE, DELAYED RELEASE ORAL
Qty: 90 CAPSULE | Refills: 3 | Status: SHIPPED | OUTPATIENT
Start: 2022-09-23 | End: 2023-02-01

## 2022-10-26 ENCOUNTER — PATIENT OUTREACH (OUTPATIENT)
Dept: FAMILY MEDICINE | Facility: CLINIC | Age: 27
End: 2022-10-26

## 2022-10-26 NOTE — TELEPHONE ENCOUNTER
Patient Quality Outreach    Patient is due for the following:   Asthma  -  ACT needed  Depression  -  Depression follow-up visit  Physical Preventive Adult Physical    Next Steps:   Patient has upcoming appointment, these items will be addressed at that time.    Type of outreach:    Chart review performed, no outreach needed.    Next Steps:  Reach out within 90 days via [employer].    Max number of attempts reached: No. Will try again in 90 days if patient still on fail list.    Questions for provider review:    None     Renea Alas CMA  Chart routed to Care Team.

## 2022-11-20 ENCOUNTER — HEALTH MAINTENANCE LETTER (OUTPATIENT)
Age: 27
End: 2022-11-20

## 2022-11-23 ENCOUNTER — ALLIED HEALTH/NURSE VISIT (OUTPATIENT)
Dept: FAMILY MEDICINE | Facility: CLINIC | Age: 27
End: 2022-11-23
Payer: COMMERCIAL

## 2022-11-23 VITALS — TEMPERATURE: 98.7 F | HEART RATE: 101 BPM | OXYGEN SATURATION: 99 %

## 2022-11-23 DIAGNOSIS — J45.20 MILD INTERMITTENT ASTHMA WITHOUT COMPLICATION: Primary | ICD-10-CM

## 2022-11-23 PROCEDURE — 99207 PR NO CHARGE NURSE ONLY: CPT

## 2022-11-23 NOTE — PROGRESS NOTES
Nargis Ruelas is a 27 year old female who calls with chest tightness.     PRESENTING PROBLEM:  Chest tightness. C/O pain upper left shoulder area and under right breast (lower rib area).  Thinks has pneumonia.  Says is anxious    NURSING ASSESSMENT:  Patient complains of chest tightness on and off.  Onset:  Had cold symptoms last week, sneezing and runny nose.  Now feels chest tightness  Pain is characterized as tightness   Severity mild.  Sleep interrupted at 0300 past two nights but not because of chest symptoms.     Radiates to none.  Duration intermittent.  Associated symptoms cough.  Exacerbated by exertion and deep inspiration or coughing.  Relieved by rest.  Cardiac risk factors: none.  Associated Medical History: asthma  Allergies: No Known Allergies    MEDICATIONS:  Taking medication(s) as prescribed? Yes  Taking over the counter medication(s) ?N/A  Any medication side effects? Not Applicable    Any barriers to taking medication(s) as prescribed?  No  Medication(s) improving/managing symptoms?  No  Medication reconciliation completed: Yes.  Using the albuterol inhaler and nebs as directed.  States is not over using them.      Last exam/Treatment:  Virtual visit 6/3/22    NURSING PLAN: Nursing advice to patient will stay for urgent care    RECOMMENDED DISPOSITION:  Urgent care to R/O pneumonia  Will comply with recommendation: Yes  If further questions/concerns or if symptoms do not improve, worsen or new symptoms develop, call your PCP or [facility] as soon as possible.          Maria Eugenia Kowalski RN

## 2022-12-01 ENCOUNTER — VIRTUAL VISIT (OUTPATIENT)
Dept: FAMILY MEDICINE | Facility: CLINIC | Age: 27
End: 2022-12-01
Payer: COMMERCIAL

## 2022-12-01 DIAGNOSIS — F32.1 CURRENT MODERATE EPISODE OF MAJOR DEPRESSIVE DISORDER WITHOUT PRIOR EPISODE (H): ICD-10-CM

## 2022-12-01 DIAGNOSIS — R56.9 SEIZURES (H): ICD-10-CM

## 2022-12-01 DIAGNOSIS — F90.0 ATTENTION DEFICIT HYPERACTIVITY DISORDER (ADHD), PREDOMINANTLY INATTENTIVE TYPE: Primary | ICD-10-CM

## 2022-12-01 DIAGNOSIS — Z30.09 GENERAL COUNSELING FOR PRESCRIPTION OF ORAL CONTRACEPTIVES: ICD-10-CM

## 2022-12-01 DIAGNOSIS — F41.1 ANXIETY STATE: ICD-10-CM

## 2022-12-01 PROCEDURE — 99213 OFFICE O/P EST LOW 20 MIN: CPT | Mod: 95 | Performed by: PHYSICIAN ASSISTANT

## 2022-12-01 RX ORDER — LISDEXAMFETAMINE DIMESYLATE 60 MG/1
60 CAPSULE ORAL DAILY
Qty: 30 CAPSULE | Refills: 0 | Status: SHIPPED | OUTPATIENT
Start: 2022-12-01 | End: 2022-12-14

## 2022-12-01 RX ORDER — NORETHINDRONE ACETATE AND ETHINYL ESTRADIOL .02; 1 MG/1; MG/1
1 TABLET ORAL DAILY
Qty: 84 TABLET | Refills: 1 | Status: SHIPPED | OUTPATIENT
Start: 2022-12-01 | End: 2023-07-17 | Stop reason: SINTOL

## 2022-12-01 ASSESSMENT — PATIENT HEALTH QUESTIONNAIRE - PHQ9
SUM OF ALL RESPONSES TO PHQ QUESTIONS 1-9: 5
10. IF YOU CHECKED OFF ANY PROBLEMS, HOW DIFFICULT HAVE THESE PROBLEMS MADE IT FOR YOU TO DO YOUR WORK, TAKE CARE OF THINGS AT HOME, OR GET ALONG WITH OTHER PEOPLE: SOMEWHAT DIFFICULT
SUM OF ALL RESPONSES TO PHQ QUESTIONS 1-9: 5

## 2022-12-01 NOTE — PROGRESS NOTES
Nargis is a 27 year old who is being evaluated via a billable video visit.      How would you like to obtain your AVS? MyChart  If the video visit is dropped, the invitation should be resent by: Text to cell phone: [phone]  Will anyone else be joining your video visit? No    Assessment & Plan     ASSESSMENT/PLAN:      ICD-10-CM    1. Attention deficit hyperactivity disorder (ADHD), predominantly inattentive type  F90.0 Adult Mental Health  Referral     lisdexamfetamine (VYVANSE) 60 MG capsule      2. General counseling for prescription of oral contraceptives  Z30.09 norethindrone-ethinyl estradiol (MICROGESTIN 1/20) 1-20 MG-MCG tablet      3. Current moderate episode of major depressive disorder without prior episode (H)  F32.1 Adult Mental Health  Referral      4. Anxiety state  F41.1 Adult Mental Health  Referral      5. Seizures (H)  R56.9         Depression: improved. Anxiety: had worsened - patient thought from stimulants but really thinks it was from all the changes/stressors. She is ready to talk with a therapist at this point, would like referral. Will go back to the higher dose of vyvanse, will send in 1 month for now so she can monitor.  She has disposed of old medications at [place] [employer] drop box.    Patient Instructions   Restart vyvanse 60 mg  Continue other medications  Follow up for your physical/med check    History of seizure: Saw neurologist when younger for seizures/syncope/?pseudoseizure  They couldn't figure out what it was, she was not put on medication.  Has not had seizure for several years    Return in about 3 months (around 3/1/2023) for Physical Exam, Medication Check.    Carol Jennings PA-C  [facility] SHEREE Barillas is a 27 year old, presenting for the following health issues:  Recheck Medication      HPI     Depression and Anxiety Follow-Up    How are you doing with your depression since your last visit? No change    How are  you doing with your anxiety since your last visit?  No change    Are you having other symptoms that might be associated with depression or anxiety? No    Have you had a significant life event? No     Do you have any concerns with your use of alcohol or other drugs? No    Social History     Tobacco Use     Smoking status: Former     Packs/day: 0.20     Years: 0.00     Pack years: 0.00     Types: Cigarettes     Smokeless tobacco: Never     Tobacco comments:     quit with pregnancy   Vaping Use     Vaping Use: Never used   Substance Use Topics     Alcohol use: Not Currently     Alcohol/week: 0.0 standard drinks     Comment: 4 drinks per month-quit with pregnancy     Drug use: No     PHQ 11/29/2021 12/22/2021 5/6/2022   PHQ-9 Total Score 2 2 3   Q9: Thoughts of better off dead/self-harm past 2 weeks Not at all Not at all Not at all     JERMAIN-7 SCORE 11/29/2021 12/22/2021 5/6/2022   Total Score 1 (minimal anxiety) - 1 (minimal anxiety)   Total Score 1 2 1     Suicide Assessment Five-step Evaluation and Treatment (SAFE-T)      How many servings of fruits and vegetables do you eat daily?  2-3    On average, how many sweetened beverages do you drink each day (Examples: soda, juice, sweet tea, etc.  Do NOT count diet or artificially sweetened beverages)?   2    How many days per week do you exercise enough to make your heart beat faster? 3 or less    How many minutes a day do you exercise enough to make your heart beat faster? 9 or less    How many days per week do you miss taking your medication? 0    Medication Followup of Vyvanse 50 mg capsule     Taking Medication as prescribed: yes    Side Effects:  None    Medication Helping Symptoms:  Wanting the dose bumped back up       In June we were treating with vyvanse 60 mg, then 70 mg  October trouble sleeping - back to 60 mg  November - down to 50 mg  Now would like to switch back to adderall XR 30 mg - chart review I see adderall XR and IR 20 mg. Or switch back to vyvanse 70  mg        She bought a house in [place] [facility], got a dog, son is now 2.5    Review of Systems   Other than noted above, general, HEENT, respiratory, cardiac, MS, and gastrointestinal systems are negative.       Objective           Vitals:  No vitals were obtained today due to virtual visit.    Physical Exam   GENERAL: Healthy, alert and no distress  EYES: Eyes grossly normal to inspection.  No discharge or erythema, or obvious scleral/conjunctival abnormalities.  RESP: No audible wheeze, cough, or visible cyanosis.  No visible retractions or increased work of breathing.    SKIN: Visible skin clear. No significant rash, abnormal pigmentation or lesions.  NEURO: Cranial nerves grossly intact.  Mentation and speech appropriate for age.  PSYCH: Mentation appears normal, affect normal/bright, judgement and insight intact, normal speech and appearance well-groomed.        Video-Visit Details    Video Start Time: 12:50 PM    Type of service:  Video Visit    Video End Time:1:05 PM    Originating Location (pt. Location): Home    Distant Location (provider location):  Off-site    Platform used for Video Visit: [facility]    Answers for HPI/ROS submitted by the patient on 12/1/2022  If you checked off any problems, how difficult have these problems made it for you to do your work, take care of things at home, or get along with other people?: Somewhat difficult  PHQ9 TOTAL SCORE: 5

## 2022-12-06 ENCOUNTER — HOSPITAL ENCOUNTER (EMERGENCY)
Facility: CLINIC | Age: 27
Discharge: HOME | End: 2022-12-06
Attending: EMERGENCY MEDICINE | Admitting: EMERGENCY MEDICINE
Payer: COMMERCIAL

## 2022-12-06 ENCOUNTER — MYC MEDICAL ADVICE (OUTPATIENT)
Dept: FAMILY MEDICINE | Facility: CLINIC | Age: 27
End: 2022-12-06

## 2022-12-06 VITALS
BODY MASS INDEX: 28.32 KG/M2 | TEMPERATURE: 98.1 F | DIASTOLIC BLOOD PRESSURE: 87 MMHG | HEART RATE: 86 BPM | RESPIRATION RATE: 16 BRPM | WEIGHT: 150 LBS | OXYGEN SATURATION: 98 % | HEIGHT: 61 IN | SYSTOLIC BLOOD PRESSURE: 139 MMHG

## 2022-12-06 DIAGNOSIS — R06.02 SHORTNESS OF BREATH: ICD-10-CM

## 2022-12-06 DIAGNOSIS — F41.1 ANXIETY STATE: ICD-10-CM

## 2022-12-06 DIAGNOSIS — Z86.59 HISTORY OF ANXIETY: ICD-10-CM

## 2022-12-06 LAB
ANION GAP SERPL CALCULATED.3IONS-SCNC: 9 MMOL/L (ref 7–15)
BASOPHILS # BLD AUTO: 0 10E3/UL (ref 0–0.2)
BASOPHILS NFR BLD AUTO: 1 %
BUN SERPL-MCNC: 13 MG/DL (ref 6–20)
CALCIUM SERPL-MCNC: 9.2 MG/DL (ref 8.6–10)
CHLORIDE SERPL-SCNC: 105 MMOL/L (ref 98–107)
CREAT SERPL-MCNC: 0.76 MG/DL (ref 0.51–0.95)
D DIMER PPP FEU-MCNC: 0.27 UG/ML FEU (ref 0–0.5)
DEPRECATED HCO3 PLAS-SCNC: 23 MMOL/L (ref 22–29)
EOSINOPHIL # BLD AUTO: 0.4 10E3/UL (ref 0–0.7)
EOSINOPHIL NFR BLD AUTO: 4 %
ERYTHROCYTE [DISTWIDTH] IN BLOOD BY AUTOMATED COUNT: 13.1 % (ref 10–15)
GFR SERPL CREATININE-BSD FRML MDRD: >90 ML/MIN/1.73M2
GLUCOSE SERPL-MCNC: 100 MG/DL (ref 70–99)
HCT VFR BLD AUTO: 34.2 % (ref 35–47)
HGB BLD-MCNC: 11.4 G/DL (ref 11.7–15.7)
IMM GRANULOCYTES # BLD: 0 10E3/UL
IMM GRANULOCYTES NFR BLD: 0 %
LYMPHOCYTES # BLD AUTO: 1.6 10E3/UL (ref 0.8–5.3)
LYMPHOCYTES NFR BLD AUTO: 19 %
MCH RBC QN AUTO: 29.4 PG (ref 26.5–33)
MCHC RBC AUTO-ENTMCNC: 33.3 G/DL (ref 31.5–36.5)
MCV RBC AUTO: 88 FL (ref 78–100)
MONOCYTES # BLD AUTO: 1 10E3/UL (ref 0–1.3)
MONOCYTES NFR BLD AUTO: 12 %
NEUTROPHILS # BLD AUTO: 5.6 10E3/UL (ref 1.6–8.3)
NEUTROPHILS NFR BLD AUTO: 64 %
NRBC # BLD AUTO: 0 10E3/UL
NRBC BLD AUTO-RTO: 0 /100
PLATELET # BLD AUTO: 268 10E3/UL (ref 150–450)
POTASSIUM SERPL-SCNC: 3.7 MMOL/L (ref 3.4–5.3)
RBC # BLD AUTO: 3.88 10E6/UL (ref 3.8–5.2)
SODIUM SERPL-SCNC: 137 MMOL/L (ref 136–145)
WBC # BLD AUTO: 8.6 10E3/UL (ref 4–11)

## 2022-12-06 PROCEDURE — 36415 COLL VENOUS BLD VENIPUNCTURE: CPT | Performed by: EMERGENCY MEDICINE

## 2022-12-06 PROCEDURE — 85041 AUTOMATED RBC COUNT: CPT | Performed by: EMERGENCY MEDICINE

## 2022-12-06 PROCEDURE — 85379 FIBRIN DEGRADATION QUANT: CPT | Performed by: EMERGENCY MEDICINE

## 2022-12-06 PROCEDURE — 99283 EMERGENCY DEPT VISIT LOW MDM: CPT | Performed by: EMERGENCY MEDICINE

## 2022-12-06 PROCEDURE — 80048 BASIC METABOLIC PNL TOTAL CA: CPT | Performed by: EMERGENCY MEDICINE

## 2022-12-06 PROCEDURE — 250N000013 HC RX MED GY IP 250 OP 250 PS 637: Performed by: EMERGENCY MEDICINE

## 2022-12-06 PROCEDURE — 99284 EMERGENCY DEPT VISIT MOD MDM: CPT | Performed by: EMERGENCY MEDICINE

## 2022-12-06 RX ORDER — LORAZEPAM 1 MG/1
1 TABLET ORAL ONCE
Status: COMPLETED | OUTPATIENT
Start: 2022-12-06 | End: 2022-12-06

## 2022-12-06 RX ADMIN — LORAZEPAM 1 MG: 1 TABLET ORAL at 06:07

## 2022-12-06 ASSESSMENT — ACTIVITIES OF DAILY LIVING (ADL): ADLS_ACUITY_SCORE: 35

## 2022-12-06 NOTE — ED TRIAGE NOTES
Patient presents with concerns of shortness of breath. Pt awoke from her sleep with this. Patient felt fine prior to going to bed. Pt is on meds for anxiety- last use of Ativan was awhile ago. Pt has asthma- has not taken any medication for this.     Triage Assessment     Row Name 12/06/22 0521       Triage Assessment (Adult)    Airway WDL WDL       Respiratory WDL    Respiratory WDL WDL       Skin Circulation/Temperature WDL    Skin Circulation/Temperature WDL WDL       Cardiac WDL    Cardiac WDL WDL       Peripheral/Neurovascular WDL    Peripheral Neurovascular WDL WDL       Cognitive/Neuro/Behavioral WDL    Cognitive/Neuro/Behavioral WDL mood/behavior;speech    Speech rambling    Mood/Behavior anxious

## 2022-12-06 NOTE — DISCHARGE INSTRUCTIONS
1) Your evaluation today did not suggest an emergency diagnosis.  We discussed the importance of medication management with your care team including medication refills to help manage her symptoms of anxiety as reported.    2) You appear stable for discharge to home at this time.  Be sure to follow-up with your care team as reviewed.

## 2022-12-06 NOTE — ED PROVIDER NOTES
History     Chief Complaint   Patient presents with     Shortness of Breath     Patient presents with concerns of shortness of breath. Pt awoke from her sleep with this. Patient felt fine prior to going to bed. Pt is on meds for anxiety- last use of Ativan was awhile ago. Pt has asthma- has not taken any medication for this.      HILTON Ruelas is a 27 year old female who who presents with sudden onset of shortness of breath upon awakening from sleep prior to ED arrival.    Patient has medical record for her diagnosis of anxiety, depression, ADHD, mild intermittent asthma and a history of seizures.    On examination patient reports she felt anxious and shortness of breath upon awakening.  She also reports concern that she has been out of her antianxiety medications.  She was concerned about persistent shortness of breath and feeling anxious since awakening and presents with her partner for further assessment and care.    Allergies:  No Known Allergies    Problem List:    Patient Active Problem List    Diagnosis Date Noted     Seizures (H) 2020     Priority: Medium     Saw neurologist when younger for seizures/syncope/?pseudoseizure  They couldn't figure out what it was, she was not put on medication.  Has not had seizure for several years       S/P primary low transverse  2020     Priority: Medium     Chronic pain of both knees 2016     Priority: Medium     History of syncope 10/06/2015     Priority: Medium     Major depressive disorder, single episode 04/10/2015     Priority: Medium     Anxiety state 04/10/2015     Priority: Medium     Mild intermittent asthma without complication 2014     Priority: Medium     Attention deficit hyperactivity disorder (ADHD), predominantly inattentive type 2010     Priority: Medium        Past Medical History:    Past Medical History:   Diagnosis Date     Cephalopelvic disproportion due to mixed maternal and fetal factors 2020      Depressive disorder      GDM, class A1 3/25/2020     Tonsillitis      Uncomplicated asthma        Past Surgical History:    Past Surgical History:   Procedure Laterality Date      SECTION N/A 2020    Procedure:  SECTION;  Surgeon: Jaqueline Reyes MD;  Location: [facility] OR     ESOPHAGOSCOPY, GASTROSCOPY, DUODENOSCOPY (EGD), COMBINED N/A 11/15/2021    Procedure: ESOPHAGOGASTRODUODENOSCOPY, WITH BIOPSY;  Surgeon: Robi Rodriguez MD;  Location: [facility]     ESOPHAGOSCOPY, GASTROSCOPY, DUODENOSCOPY (EGD), COMBINED N/A 2022    Procedure: ESOPHAGOGASTRODUODENOSCOPY, WITH BIOPSY;  Surgeon: David Palm MD;  Location: [facility] OR     MOUTH SURGERY      wisdom teeth     TONSILLECTOMY         Family History:    Family History   Problem Relation Age of Onset     Diabetes Maternal Grandfather      Coronary Artery Disease Maternal Grandfather      Cerebrovascular Disease Maternal Grandfather      Depression Mother      Anxiety Disorder Mother      Unknown/Adopted Father         unknown      Chronic Obstructive Pulmonary Disease Maternal Grandmother      Coronary Artery Disease Daughter      Other Cancer No family hx of        Social History:  Marital Status:  Single [1]  Social History     Tobacco Use     Smoking status: Former     Packs/day: 0.20     Years: 0.00     Pack years: 0.00     Types: Cigarettes     Smokeless tobacco: Never     Tobacco comments:     quit with pregnancy   Vaping Use     Vaping Use: Never used   Substance Use Topics     Alcohol use: Not Currently     Alcohol/week: 0.0 standard drinks     Comment: 4 drinks per month-quit with pregnancy     Drug use: No        Medications:    albuterol (PROAIR HFA/PROVENTIL HFA/VENTOLIN HFA) 108 (90 Base) MCG/ACT inhaler  busPIRone (BUSPAR) 10 MG tablet  famotidine (PEPCID) 20 MG tablet  fluticasone (FLONASE) 50 MCG/ACT nasal spray  lisdexamfetamine (VYVANSE) 60 MG capsule  LORazepam (ATIVAN) 0.5 MG tablet  naloxone (NARCAN) 4 MG/0.1ML  "nasal spray  norethindrone-ethinyl estradiol (MICROGESTIN 1/20) 1-20 MG-MCG tablet  omeprazole (PRILOSEC) 40 MG DR capsule  venlafaxine (EFFEXOR XR) 75 MG 24 hr capsule          Review of Systems   Constitutional: Negative.    HENT: Negative.    Eyes: Negative.    Respiratory: Positive for shortness of breath.    Cardiovascular: Negative.    Gastrointestinal: Negative.    Endocrine: Negative.    Genitourinary: Negative.    Musculoskeletal: Negative.    Allergic/Immunologic: Negative.    Neurological: Negative.    Hematological: Negative.    Psychiatric/Behavioral: The patient is nervous/anxious.    All other systems reviewed and are negative.      Physical Exam   BP: 139/87  Pulse: 86  Temp: 98.1  F (36.7  C)  Resp: 16  Height: 154.9 cm (5' 1\")  Weight: 68 kg (150 lb)  SpO2: 98 %      Physical Exam  Constitutional:       General: She is not in acute distress.     Appearance: She is well-developed. She is not ill-appearing, toxic-appearing or diaphoretic.   HENT:      Head: Normocephalic and atraumatic.   Eyes:      Extraocular Movements: Extraocular movements intact.      Pupils: Pupils are equal, round, and reactive to light.   Cardiovascular:      Rate and Rhythm: Normal rate and regular rhythm.   Pulmonary:      Effort: Pulmonary effort is normal. No tachypnea or bradypnea.      Breath sounds: Normal breath sounds.   Musculoskeletal:      Right lower leg: No tenderness. No edema.      Left lower leg: No tenderness. No edema.   Skin:     Capillary Refill: Capillary refill takes less than 2 seconds.      Coloration: Skin is not cyanotic or pale.      Findings: No ecchymosis, erythema or rash.      Nails: There is no clubbing.   Neurological:      General: No focal deficit present.      Mental Status: She is alert and oriented to person, place, and time.   Psychiatric:         Mood and Affect: Mood normal. Mood is not anxious.         Behavior: Behavior normal. Behavior is not agitated.         ED Course     Mental " "Health Risk Assessment      PSS-3    Date and Time Over the past 2 weeks have you felt down, depressed, or hopeless? Over the past 2 weeks have you had thoughts of killing yourself? Have you ever attempted to kill yourself? When did this last happen? User   12/06/22 0523 no no yes more than 6 months ago [facility]                Item Assessment   Suicidal Ideation None   Plan none   Intent none   Suicidal or self-harm behaviors none reported   Risk Factors Anxiety   Protective Factors stable relationship. Supportive  care              Procedures              Critical Care time:  none               ED medications:  Medications   LORazepam (ATIVAN) tablet 1 mg (1 mg Oral Given 12/6/22 0607)       ED Vitals:  Vitals:    12/06/22 0523   BP: 139/87   Pulse: 86   Resp: 16   Temp: 98.1  F (36.7  C)   TempSrc: Oral   SpO2: 98%   Weight: 68 kg (150 lb)   Height: 1.549 m (5' 1\")     ED labs and imaging:  Results for orders placed or performed during the hospital encounter of 12/06/22   Basic metabolic panel     Status: Abnormal   Result Value Ref Range    Sodium 137 136 - 145 mmol/L    Potassium 3.7 3.4 - 5.3 mmol/L    Chloride 105 98 - 107 mmol/L    Carbon Dioxide (CO2) 23 22 - 29 mmol/L    Anion Gap 9 7 - 15 mmol/L    Urea Nitrogen 13.0 6.0 - 20.0 mg/dL    Creatinine 0.76 0.51 - 0.95 mg/dL    Calcium 9.2 8.6 - 10.0 mg/dL    Glucose 100 (H) 70 - 99 mg/dL    GFR Estimate >90 >60 mL/min/1.73m2   D dimer quantitative     Status: Normal   Result Value Ref Range    D-Dimer Quantitative 0.27 0.00 - 0.50 ug/mL FEU    Narrative    This D-dimer assay is intended for use in conjunction with a clinical pretest probability assessment model to exclude pulmonary embolism (PE) and deep venous thrombosis (DVT) in outpatients suspected of PE or DVT. The cut-off value is 0.50 ug/mL FEU.   CBC with platelets and differential     Status: Abnormal   Result Value Ref Range    WBC Count 8.6 4.0 - 11.0 10e3/uL    RBC Count 3.88 3.80 - 5.20 10e6/uL    " Hemoglobin 11.4 (L) 11.7 - 15.7 g/dL    Hematocrit 34.2 (L) 35.0 - 47.0 %    MCV 88 78 - 100 fL    MCH 29.4 26.5 - 33.0 pg    MCHC 33.3 31.5 - 36.5 g/dL    RDW 13.1 10.0 - 15.0 %    Platelet Count 268 150 - 450 10e3/uL    % Neutrophils 64 %    % Lymphocytes 19 %    % Monocytes 12 %    % Eosinophils 4 %    % Basophils 1 %    % Immature Granulocytes 0 %    NRBCs per 100 WBC 0 <1 /100    Absolute Neutrophils 5.6 1.6 - 8.3 10e3/uL    Absolute Lymphocytes 1.6 0.8 - 5.3 10e3/uL    Absolute Monocytes 1.0 0.0 - 1.3 10e3/uL    Absolute Eosinophils 0.4 0.0 - 0.7 10e3/uL    Absolute Basophils 0.0 0.0 - 0.2 10e3/uL    Absolute Immature Granulocytes 0.0 <=0.4 10e3/uL    Absolute NRBCs 0.0 10e3/uL   CBC with platelets differential     Status: Abnormal    Narrative    The following orders were created for panel order CBC with platelets differential.  Procedure                               Abnormality         Status                     ---------                               -----------         ------                     CBC with platelets and d...[[id number]]  Abnormal            Final result                 Please view results for these tests on the individual orders.       Assessments & Plan (with Medical Decision Making)   Assessment Summary and Clinical Impression: 27-year-old female present with sudden onset of shortness of breath upon awakening from sleep.  She has a history of anxiety and ADHD and prior episodes of syncope and depression and seizures.  She is also known to have mild intermittent asthma.  Patient arrived by car with her significant other reporting that at 2:30 AM she woke up feeling short of breath and anxious.  She admits that she has a history of anxiety and she is on Vyvanse Effexor or BuSpar and smokes a pack per week and does use lorazepam as needed but she is out of her lorazepam.  On my examination she was feeling somewhat improved but requested some help with anxiolysis.  She arrived afebrile and  hemodynamically normal.  Lungs were clear to auscultation.  We discussed her anxiety and her sudden onset of dyspnea and chest discomfort.  She was offered oral lorazepam.  She was reassured by her work-up and expressed comfort going home with ongoing care as an outpatient and medication management needs with her prescribing provider.    ED course and Plan:  Reviewed the medical record.  Patient had a virtual visit on 12/1/22.  She was offered oral lorazepam has requested for anxiolysis.  We agreed to exclude other causes for her symptoms given she is on birth control and smokes-a pack per week.  We discussed the need to secure medication refills with her prescribing provider.  She was reassured by her evaluation including negative D-dimer with low concern for acute pulmonary embolism or DVT.  She reported feeling improved after oral therapy given during her visit.  She informed that she has a scheduled follow-up visit with her primary care provider in 1 week.  She was given a work note.  We discussed and reviewed reasons to return for evaluation she expressed comfort, understanding and agreement with plan of care.    ADDENDUM- on 12/22/22-addition of review of systems and physical examination. Follow-up from coding    Disclaimer: This note consists of symbols derived from keyboarding, dictation and/or voice recognition software. As a result, there may be errors in the script that have gone undetected. Please consider this when interpreting information found in this chart.  I have reviewed the nursing notes.    I have reviewed the findings, diagnosis, plan and need for follow up with the patient.       New Prescriptions    No medications on file       Final diagnoses:   Shortness of breath   History of anxiety       12/6/2022   [facility] EMERGENCY DEPT     Pan Santizo MD  12/06/22 0712       Pan Santizo MD  12/22/22 6203

## 2022-12-06 NOTE — LETTER
December 6, 2022      To Whom It May Concern:      Nargis Ruelas was seen in our Emergency Department today, 12/06/22.  Please excuse her from missing work due to her visit in the emergency department.  This work note is valid until December 7, 2022.  Further follow-up care with her care team is advised.    Sincerely,         Nico Santizo MD

## 2022-12-07 RX ORDER — LORAZEPAM 0.5 MG/1
0.5 TABLET ORAL EVERY 6 HOURS PRN
Qty: 10 TABLET | Refills: 0 | Status: SHIPPED | OUTPATIENT
Start: 2022-12-07 | End: 2023-01-18

## 2022-12-08 ENCOUNTER — E-VISIT (OUTPATIENT)
Dept: FAMILY MEDICINE | Facility: CLINIC | Age: 27
End: 2022-12-08
Payer: COMMERCIAL

## 2022-12-08 DIAGNOSIS — Z53.9 ERRONEOUS ENCOUNTER--DISREGARD: Primary | ICD-10-CM

## 2022-12-09 ENCOUNTER — NURSE TRIAGE (OUTPATIENT)
Dept: FAMILY MEDICINE | Facility: CLINIC | Age: 27
End: 2022-12-09

## 2022-12-09 NOTE — TELEPHONE ENCOUNTER
Forwarding to PCP and Nuha Treviño as FYI.  Patient was scheduled for E-Visit with PCP.  RN scheduled in-person with Nuha for Monday.     Patient called clinic and fielded by writing [place] RN.  Patient reports some moderate back and neck pain for the past few days.  She states some of the symptoms she reported for E-Visit below are inaccurate or have resolved today.  See further triage notes and recommendations.     Disposition:  Office visit within 3 days. Scheduled with available same-day provider for Monday, 12/12/22.  Patient was advised to call back or present to urgent care/ER in the meantime with new or worsening symptoms.    Reason for Disposition    MODERATE back pain (e.g., interferes with normal activities) and present > 3 days    Additional Information    Negative: Passed out (i.e., fainted, collapsed and was not responding)    Negative: Shock suspected (e.g., cold/pale/clammy skin, too weak to stand, low BP, rapid pulse)    Negative: Sounds like a life-threatening emergency to the triager    Negative: Major injury to the back (e.g., MVA, fall > 10 feet or 3 meters, penetrating injury, etc.)    Negative: Pain in the upper back over the ribs (rib cage) that radiates (travels) into the chest    Negative: Pain in the upper back over the ribs (rib cage) and worsened by coughing (or clearly increases with breathing)    Negative: Back pain during pregnancy    Negative: SEVERE back pain of sudden onset and age > 60 years    Negative: SEVERE abdominal pain (e.g., excruciating)    Negative: Abdominal pain and age > 60 years    Negative: Unable to urinate (or only a few drops) and bladder feels very full    Negative: Loss of bladder or bowel control (urine or bowel incontinence; wetting self, leaking stool) of new-onset    Negative: Numbness (loss of sensation) in groin or rectal area    Negative: Pain radiates into groin, scrotum    Negative: Blood in urine (red, pink, or tea-colored)    Negative:  Vomiting and pain over lower ribs of back (i.e., flank - kidney area)    Negative: Weakness of a leg or foot (e.g., unable to bear weight, dragging foot)    Negative: Patient sounds very sick or weak to the triager    Negative: Fever > 100.4 F (38.0 C) and flank pain    Negative: Pain or burning with passing urine (urination)    Negative: SEVERE back pain (e.g., excruciating, unable to do any normal activities) and not improved after pain medicine and CARE ADVICE    Negative: Numbness in an arm or hand (i.e., loss of sensation) and upper back pain    Negative: Numbness in a leg or foot (i.e., loss of sensation)    Negative: High-risk adult (e.g., history of cancer, history of HIV, or history of IV Drug Use)    Negative: Soft tissue infection (e.g., abscess, cellulitis) or other serious infection (e.g., bacteremia) in last 2 weeks    Negative: Painful rash with multiple small blisters grouped together (i.e., dermatomal distribution or 'band' or 'stripe')    Negative: Pain radiates into the thigh or further down the leg, and in both legs    Negative: Age > 50 and no history of prior similar back pain    Answer Assessment - Initial Assessment Questions  See my notes.    Protocols used: BACK PAIN-A-[place]    Wanda Cisneros RN  [facility]

## 2022-12-09 NOTE — TELEPHONE ENCOUNTER
Received evisit with some concerning symptoms, please call to triage. May need in person appointment next week, may need urgent care/ER visit.    Areas that are numb or have a strange sensation     Difficulty in passing urine   What makes the pain worse? Bending over    Sitting down   What makes the pain better? Lying in bed   Have you ever been diagnosed with cancer? No   Have you ever been diagnosed with arthritis? No   Have you ever been diagnosed with osteoporosis or any other bone weakness? No   Have you ever had surgery on your back or spine? No   What is your usual health status? I am active and can move normally   Wrap up    Anything else you would like to add? Its not so much back pain but but discomfort.. strange feeling in my stomach my left arm is a little bit tingly my vision sometimes seems weird . I dont know how much of this is anxiety but could be the reasom im having anxiety attacks . I do crack my neck and back way to much . I also have some tingling in my left leg and my upper back its not consistent  when i bend forward my waist line feels different, again i dont know how much my anxiety is contributing to this     Marah Jennings PA-C

## 2022-12-12 ENCOUNTER — ANCILLARY PROCEDURE (OUTPATIENT)
Dept: GENERAL RADIOLOGY | Facility: CLINIC | Age: 27
End: 2022-12-12
Attending: NURSE PRACTITIONER
Payer: COMMERCIAL

## 2022-12-12 ENCOUNTER — OFFICE VISIT (OUTPATIENT)
Dept: FAMILY MEDICINE | Facility: CLINIC | Age: 27
End: 2022-12-12
Payer: COMMERCIAL

## 2022-12-12 VITALS
RESPIRATION RATE: 16 BRPM | TEMPERATURE: 99.2 F | HEIGHT: 61 IN | DIASTOLIC BLOOD PRESSURE: 80 MMHG | HEART RATE: 92 BPM | OXYGEN SATURATION: 99 % | SYSTOLIC BLOOD PRESSURE: 130 MMHG | BODY MASS INDEX: 29.83 KG/M2 | WEIGHT: 158 LBS

## 2022-12-12 DIAGNOSIS — M54.16 LUMBAR BACK PAIN WITH RADICULOPATHY AFFECTING LEFT LOWER EXTREMITY: ICD-10-CM

## 2022-12-12 DIAGNOSIS — M54.16 LUMBAR BACK PAIN WITH RADICULOPATHY AFFECTING LEFT LOWER EXTREMITY: Primary | ICD-10-CM

## 2022-12-12 DIAGNOSIS — M54.2 CERVICAL PAIN (NECK): ICD-10-CM

## 2022-12-12 PROCEDURE — 72100 X-RAY EXAM L-S SPINE 2/3 VWS: CPT | Mod: TC | Performed by: RADIOLOGY

## 2022-12-12 PROCEDURE — 99214 OFFICE O/P EST MOD 30 MIN: CPT | Performed by: NURSE PRACTITIONER

## 2022-12-12 PROCEDURE — 72040 X-RAY EXAM NECK SPINE 2-3 VW: CPT | Mod: TC | Performed by: RADIOLOGY

## 2022-12-12 RX ORDER — METHYLPREDNISOLONE 4 MG
TABLET, DOSE PACK ORAL
Qty: 21 TABLET | Refills: 0 | Status: SHIPPED | OUTPATIENT
Start: 2022-12-12 | End: 2023-02-01

## 2022-12-12 ASSESSMENT — ASTHMA QUESTIONNAIRES
ACT_TOTALSCORE: 23
QUESTION_3 LAST FOUR WEEKS HOW OFTEN DID YOUR ASTHMA SYMPTOMS (WHEEZING, COUGHING, SHORTNESS OF BREATH, CHEST TIGHTNESS OR PAIN) WAKE YOU UP AT NIGHT OR EARLIER THAN USUAL IN THE MORNING: NOT AT ALL
QUESTION_2 LAST FOUR WEEKS HOW OFTEN HAVE YOU HAD SHORTNESS OF BREATH: NOT AT ALL
QUESTION_1 LAST FOUR WEEKS HOW MUCH OF THE TIME DID YOUR ASTHMA KEEP YOU FROM GETTING AS MUCH DONE AT WORK, SCHOOL OR AT HOME: NONE OF THE TIME
QUESTION_5 LAST FOUR WEEKS HOW WOULD YOU RATE YOUR ASTHMA CONTROL: WELL CONTROLLED
QUESTION_4 LAST FOUR WEEKS HOW OFTEN HAVE YOU USED YOUR RESCUE INHALER OR NEBULIZER MEDICATION (SUCH AS ALBUTEROL): ONCE A WEEK OR LESS
ACT_TOTALSCORE: 23

## 2022-12-12 ASSESSMENT — ENCOUNTER SYMPTOMS: BACK PAIN: 1

## 2022-12-12 NOTE — PROGRESS NOTES
Assessment/Plan:   1. Lumbar back pain with radiculopathy affecting left lower extremity  DD: Spondylitis , herniated disc, nerve impingement. Will check an x-ray first to rule out spondylitis and other vertebral alignment causes; if no improvement with PT, muscle relaxants, and prednisone consider seeing [facility]. I can place a referral as needed.   kinesiotape  - Physical Therapy Referral; Future  - methylPREDNISolone (MEDROL DOSEPAK) 4 MG tablet therapy pack; Follow Package Directions  Dispense: 21 tablet; Refill: 0  - XR Lumbar Spine 2/3 Views; Future  - tiZANidine (ZANAFLEX) 4 MG tablet; Take 1 tablet (4 mg) by mouth 3 times daily for 30 days  Dispense: 90 tablet; Refill: 0    2. Cervical pain (neck)  See above  - XR Cervical Spine 2/3 Views; Future  - tiZANidine (ZANAFLEX) 4 MG tablet; Take 1 tablet (4 mg) by mouth 3 times daily for 30 days  Dispense: 90 tablet; Refill: 0      Return in about 1 week (around 12/19/2022) for Follow up.        Opal Barillas is a 27 year old presenting for the following health issues:  Back Pain (Low back pain x 4 days - ) and Neck Pain      Back Pain     History of Present Illness       Back Pain:  She presents for follow up of back pain. Patient's back pain is a recurring problem.  Location of back pain:  Right lower back, left lower back and other  Description of back pain: sharp, shooting and stabbing  Back pain spreads: left buttocks, right side of neck and left side of neck    Since patient first noticed back pain, pain is: gradually worsening  Does back pain interfere with her job:  Yes      She eats 0-1 servings of fruits and vegetables daily.She consumes 2 sweetened beverage(s) daily.She exercises with enough effort to increase her heart rate 30 to 60 minutes per day.  She exercises with enough effort to increase her heart rate 4 days per week.   She is taking medications regularly.       4 days of lower lumbar back pain, with radiation down left posterior leg to  "calf. No loss of bowel or bladder function, no history of cancer or history of fevers.    Neck pain that goes up her posterior scalp and paresthesias that are down her left arm.     Tylenol every 4 hours and aleve    No history of previous trauma or back injury. Has a 2 year old that she is lifting throughout the day.    Review of Systems   Musculoskeletal: Positive for back pain.          Objective    /80 (BP Location: Right leg, Patient Position: Sitting, Cuff Size: Adult Large)   Pulse 92   Temp 99.2  F (37.3  C) (Tympanic)   Resp 16   Ht 1.549 m (5' 1\")   Wt 71.7 kg (158 lb)   LMP 11/29/2022 (Approximate)   SpO2 99%   BMI 29.85 kg/m    Body mass index is 29.85 kg/m .  Physical Exam   General: Patient appears to be in no acute distress.  Mental Status: cooperative and well groomed. Cognitive: Oriented to time, place, and person. Reasoning and memory intact. Normal affect. Speech clear and well enunciated.  Back: gait normal, heel to toe walking intact. Spine is tender in lumbar back  paraspinal muscles are non tender to palpation. Sensation intact at medial dorsal and lateral aspects of the feet. Strength equal 5/5 bilaterally in lower extremities. Straight leg raising negative on both legs. Limited range of motion with flexion, extension, side bending and rotation. DTRs + 2 bilateral knees and ankles.     "

## 2022-12-12 NOTE — PATIENT INSTRUCTIONS
Take Ibuprofen 400 mg with Acetaminophen (Tylenol) 1000 mg every 6 hours for acute pain.  Do not take more than 4000 mg of acetaminophen (Tylenol) in a 24 hour period.     Kinesiotape on YouTube

## 2022-12-13 ENCOUNTER — MYC MEDICAL ADVICE (OUTPATIENT)
Dept: FAMILY MEDICINE | Facility: CLINIC | Age: 27
End: 2022-12-13

## 2022-12-13 DIAGNOSIS — F90.0 ATTENTION DEFICIT HYPERACTIVITY DISORDER (ADHD), PREDOMINANTLY INATTENTIVE TYPE: ICD-10-CM

## 2022-12-14 RX ORDER — LISDEXAMFETAMINE DIMESYLATE 70 MG/1
70 CAPSULE ORAL DAILY
Qty: 30 CAPSULE | Refills: 0 | Status: SHIPPED | OUTPATIENT
Start: 2022-12-14 | End: 2023-01-10

## 2022-12-22 ASSESSMENT — ENCOUNTER SYMPTOMS
NEUROLOGICAL NEGATIVE: 1
ENDOCRINE NEGATIVE: 1
EYES NEGATIVE: 1
GASTROINTESTINAL NEGATIVE: 1
HEMATOLOGIC/LYMPHATIC NEGATIVE: 1
CARDIOVASCULAR NEGATIVE: 1
MUSCULOSKELETAL NEGATIVE: 1
CONSTITUTIONAL NEGATIVE: 1
SHORTNESS OF BREATH: 1
ALLERGIC/IMMUNOLOGIC NEGATIVE: 1
NERVOUS/ANXIOUS: 1

## 2023-01-06 NOTE — ADDENDUM NOTE
Addended by: FORTINO WHITAKER on: 3/24/2020 03:36 PM     Modules accepted: Orders     Head,  normocephalic,  atraumatic,  Face,  Face within normal limits,  Ears,  External ears within normal limits,  Nose/Nasopharynx,  External nose  normal appearance,  nares patent,  no nasal discharge,  Mouth and Throat,  Oral cavity appearance normal,  Breath odor normal,  Lips,  Appearance normal

## 2023-01-10 ENCOUNTER — MYC MEDICAL ADVICE (OUTPATIENT)
Dept: FAMILY MEDICINE | Facility: CLINIC | Age: 28
End: 2023-01-10

## 2023-01-10 DIAGNOSIS — F90.0 ATTENTION DEFICIT HYPERACTIVITY DISORDER (ADHD), PREDOMINANTLY INATTENTIVE TYPE: ICD-10-CM

## 2023-01-10 RX ORDER — LISDEXAMFETAMINE DIMESYLATE 70 MG/1
70 CAPSULE ORAL DAILY
Qty: 30 CAPSULE | Refills: 0 | Status: SHIPPED | OUTPATIENT
Start: 2023-01-10 | End: 2023-02-01 | Stop reason: DRUGHIGH

## 2023-01-15 ENCOUNTER — MYC MEDICAL ADVICE (OUTPATIENT)
Dept: FAMILY MEDICINE | Facility: CLINIC | Age: 28
End: 2023-01-15
Payer: COMMERCIAL

## 2023-01-15 DIAGNOSIS — F41.1 ANXIETY STATE: ICD-10-CM

## 2023-01-18 ENCOUNTER — MYC MEDICAL ADVICE (OUTPATIENT)
Dept: FAMILY MEDICINE | Facility: CLINIC | Age: 28
End: 2023-01-18

## 2023-01-18 ENCOUNTER — VIRTUAL VISIT (OUTPATIENT)
Dept: FAMILY MEDICINE | Facility: CLINIC | Age: 28
End: 2023-01-18
Payer: COMMERCIAL

## 2023-01-18 DIAGNOSIS — M54.50 LOW BACK PAIN, UNSPECIFIED BACK PAIN LATERALITY, UNSPECIFIED CHRONICITY, UNSPECIFIED WHETHER SCIATICA PRESENT: Primary | ICD-10-CM

## 2023-01-18 PROCEDURE — 99213 OFFICE O/P EST LOW 20 MIN: CPT | Mod: 95 | Performed by: FAMILY MEDICINE

## 2023-01-18 RX ORDER — CYCLOBENZAPRINE HCL 5 MG
5 TABLET ORAL
Qty: 10 TABLET | Refills: 0 | Status: SHIPPED | OUTPATIENT
Start: 2023-01-18 | End: 2023-02-01

## 2023-01-18 RX ORDER — LORAZEPAM 0.5 MG/1
0.5 TABLET ORAL EVERY 6 HOURS PRN
Qty: 10 TABLET | Refills: 0 | Status: SHIPPED | OUTPATIENT
Start: 2023-01-18 | End: 2023-07-03

## 2023-01-18 NOTE — PROGRESS NOTES
Nargis is a 28 year old who is being evaluated via a billable video visit.      How would you like to obtain your AVS? MyChart  If the video visit is dropped, the invitation should be resent by: Text to cell phone: [phone]  Will anyone else be joining your video visit? No      Assessment & Plan     Low back pain, unspecified back pain laterality, unspecified chronicity, unspecified whether sciatica present  Continue conservative cares with relative rest, ice, heat, stretching, Tylenol, naproxen. Will prescribe flexeril for pain at night and muscle spasms. If symptoms persist would recommend repeat in person evaluation as last evaluated over 1 month ago.   - cyclobenzaprine (FLEXERIL) 5 MG tablet  Dispense: 10 tablet; Refill: 0    The risks, benefits and treatment options of prescribed medications or other treatments have been discussed with the patient. The patient verbalized their understanding and should call or follow up if no improvement or if they develop further problems.    Follow up with PCP.       Kaden Mcduffie, [facility]    Subjective   Nargis is a 28 year old, presenting for the following health issues:  Back Pain    HPI     Patient is having a hard time sleeping due to severe back pain. Has upcoming chiropractor appointment. Was seen recently by Nuha Treviño, and was prescribed medrol dosepak and a muscle relaxer. Patient states that these medications manage her pain during the day but night time is where she's having the most pain. Was told to reach out if current regimen isn't helping.    Was recommended to do PT and chiropractor care.  Looking for something to help her at night so she can get rest until her chiropractor care.    Chronic/Recurring Back Pain Follow Up      Where is your back pain located? (Select all that apply) low back both    How would you describe your back pain?  dull ache and sharp    Where does your back pain spread? nowhere    Since your  last clinic visit for back pain, how has your pain changed? gradually worsening    Does your back pain interfere with your job? YES    Since your last visit, have you tried any new treatment? No-upcoming chiropractor appt, taking medrol dosepak      Reports excruciating pain at night.    Taking naproxen and tylenol.   Using ice and heating pad.     Not interested in using muscle relaxants as reports doesn't make her feel great. Has never tried flexeril.     When asked what helps,  She states will occasionally get short courses of opioids for her chronic knee pain and this is helpful. Discussed I won't be prescribing opioids for her pain.       Review of Systems   Constitutional, HEENT, cardiovascular, pulmonary, gi and gu systems are negative, except as otherwise noted.      Objective           Vitals:  No vitals were obtained today due to virtual visit.    Physical Exam   GENERAL: Healthy, alert and no distress  EYES: Eyes grossly normal to inspection.  No discharge or erythema, or obvious scleral/conjunctival abnormalities.  RESP: No audible wheeze, cough, or visible cyanosis.  No visible retractions or increased work of breathing.    SKIN: Visible skin clear. No significant rash, abnormal pigmentation or lesions.  NEURO: Cranial nerves grossly intact.  Mentation and speech appropriate for age.  PSYCH: Mentation appears normal, affect normal/bright, judgement and insight intact, normal speech and appearance well-groomed.          Video-Visit Details    Type of service:  Video Visit   Video Start Time: 4:11  Video End Time:4:18 PM    Originating Location (pt. Location): Home    Distant Location (provider location):  On-site  Platform used for Video Visit: Rosal[employer]

## 2023-01-20 ENCOUNTER — TELEPHONE (OUTPATIENT)
Dept: FAMILY MEDICINE | Facility: CLINIC | Age: 28
End: 2023-01-20
Payer: COMMERCIAL

## 2023-01-20 DIAGNOSIS — M54.16 LUMBAR BACK PAIN WITH RADICULOPATHY AFFECTING LEFT LOWER EXTREMITY: ICD-10-CM

## 2023-01-20 RX ORDER — LIDOCAINE 50 MG/G
1 PATCH TOPICAL EVERY 24 HOURS
Qty: 30 PATCH | Refills: 1 | Status: CANCELLED | OUTPATIENT
Start: 2023-01-20

## 2023-01-20 NOTE — TELEPHONE ENCOUNTER
Prior Authorization Retail Medication Request    Medication/Dose: Lidocaine 5% patch  ICD code (if different than what is on RX):  na  Previously Tried and Failed:  na  Rationale:  na    Insurance Name:  [employer]  Insurance ID:  [id number]      Pharmacy Information (if different than what is on RX)  Name:  [facility] [place]  Phone:  [phone]

## 2023-01-25 ENCOUNTER — VIRTUAL VISIT (OUTPATIENT)
Dept: FAMILY MEDICINE | Facility: CLINIC | Age: 28
End: 2023-01-25
Payer: COMMERCIAL

## 2023-01-25 DIAGNOSIS — F32.1 CURRENT MODERATE EPISODE OF MAJOR DEPRESSIVE DISORDER WITHOUT PRIOR EPISODE (H): ICD-10-CM

## 2023-01-25 DIAGNOSIS — F90.0 ATTENTION DEFICIT HYPERACTIVITY DISORDER (ADHD), PREDOMINANTLY INATTENTIVE TYPE: Primary | ICD-10-CM

## 2023-01-25 PROCEDURE — 99213 OFFICE O/P EST LOW 20 MIN: CPT | Mod: 95 | Performed by: FAMILY MEDICINE

## 2023-01-25 RX ORDER — LISDEXAMFETAMINE DIMESYLATE 60 MG/1
60 CAPSULE ORAL EVERY MORNING
Qty: 30 CAPSULE | Refills: 0 | Status: SHIPPED | OUTPATIENT
Start: 2023-01-25 | End: 2023-02-01 | Stop reason: DRUGHIGH

## 2023-01-25 NOTE — TELEPHONE ENCOUNTER
Central Prior Authorization Team   Phone: [phone]      PA Initiation    Medication: Lidocaine 5% patch  Insurance Company: [employer] - Phone [phone] Fax [phone]  Pharmacy Filling the Rx: [place], [place] - [address]  Filling Pharmacy Phone: [phone]  Filling Pharmacy Fax:    Start Date: 1/25/2023

## 2023-01-25 NOTE — PROGRESS NOTES
"Nargis is a 28 year old who is being evaluated via a billable video visit.      How would you like to obtain your AVS? MyChart  If the video visit is dropped, the invitation should be resent by: Text to cell phone: [phone]  Will anyone else be joining your video visit? No          Assessment & Plan     Attention deficit hyperactivity disorder (ADHD), predominantly inattentive type  Worsening side effect including palpitation and anxiety , which is also associated wit family crisis,   Will have her to try lowering the dose of vyvanse from 70 to 60  Will have her to recheck with PCP in 1 month for f/u   - lisdexamfetamine (VYVANSE) 60 MG capsule; Take 1 capsule (60 mg) by mouth every morning           BMI:   Estimated body mass index is 29.85 kg/m  as calculated from the following:    Height as of 12/12/22: 1.549 m (5' 1\").    Weight as of 12/12/22: 71.7 kg (158 lb).   Weight management plan: Discussed healthy diet and exercise guidelines    FUTURE APPOINTMENTS:       - Follow-up visit in 1 month with pcp for med check     No follow-ups on file.    Anam Mackay MD  [facility]    Opal Barillas is a 28 year old presenting for the following health issues:  Medication Follow-up      HPI     ADHD medication Vyvanse - wanting to lower dose. Currently at 70 mg.   - PCP and her have been going over the doses over time.  Increased anxiety, feels the 70 mg may be TOO MUCH and may be the potential reason for her increased anxiety at night.     How many servings of fruits and vegetables do you eat daily?  0-1    On average, how many sweetened beverages do you drink each day (Examples: soda, juice, sweet tea, etc.  Do NOT count diet or artificially sweetened beverages)?   0    How many days per week do you exercise enough to make your heart beat faster? 7    How many minutes a day do you exercise enough to make your heart beat faster? 30 - 60    How many days per week do you miss taking your " medication? 0        Review of Systems   Constitutional, HEENT, cardiovascular, pulmonary, gi and gu systems are negative, except as otherwise noted.      Objective           Vitals:  No vitals were obtained today due to virtual visit.    Physical Exam   GENERAL: Healthy, alert and no distress  EYES: Eyes grossly normal to inspection.  No discharge or erythema, or obvious scleral/conjunctival abnormalities.  RESP: No audible wheeze, cough, or visible cyanosis.  No visible retractions or increased work of breathing.    SKIN: Visible skin clear. No significant rash, abnormal pigmentation or lesions.  NEURO: Cranial nerves grossly intact.  Mentation and speech appropriate for age.  PSYCH: Mentation appears normal, affect normal/bright, judgement and insight intact, normal speech and appearance well-groomed.              Video-Visit Details    Type of service:  Video Visit   Video Start Time: 10:00  Video End Time:10:55 AM    Originating Location (pt. Location): Home    Distant Location (provider location):  On-site  Platform used for Video Visit: [employer]

## 2023-01-26 NOTE — TELEPHONE ENCOUNTER
PRIOR AUTHORIZATION DENIED    Medication: Lidocaine 5% patch-DENIED    Denial Date: 1/25/2023    Denial Rational:         Appeal Information:

## 2023-02-01 ENCOUNTER — MYC MEDICAL ADVICE (OUTPATIENT)
Dept: FAMILY MEDICINE | Facility: CLINIC | Age: 28
End: 2023-02-01

## 2023-02-01 ENCOUNTER — OFFICE VISIT (OUTPATIENT)
Dept: FAMILY MEDICINE | Facility: CLINIC | Age: 28
End: 2023-02-01
Payer: COMMERCIAL

## 2023-02-01 VITALS
SYSTOLIC BLOOD PRESSURE: 143 MMHG | TEMPERATURE: 99.5 F | WEIGHT: 158 LBS | HEART RATE: 100 BPM | DIASTOLIC BLOOD PRESSURE: 97 MMHG | BODY MASS INDEX: 29.85 KG/M2

## 2023-02-01 DIAGNOSIS — F32.1 CURRENT MODERATE EPISODE OF MAJOR DEPRESSIVE DISORDER WITHOUT PRIOR EPISODE (H): Primary | ICD-10-CM

## 2023-02-01 DIAGNOSIS — F90.0 ATTENTION DEFICIT HYPERACTIVITY DISORDER (ADHD), PREDOMINANTLY INATTENTIVE TYPE: Primary | ICD-10-CM

## 2023-02-01 DIAGNOSIS — F90.0 ATTENTION DEFICIT HYPERACTIVITY DISORDER (ADHD), PREDOMINANTLY INATTENTIVE TYPE: ICD-10-CM

## 2023-02-01 PROCEDURE — 99213 OFFICE O/P EST LOW 20 MIN: CPT | Performed by: PHYSICIAN ASSISTANT

## 2023-02-01 RX ORDER — VENLAFAXINE HYDROCHLORIDE 37.5 MG/1
112.5 CAPSULE, EXTENDED RELEASE ORAL DAILY
Qty: 90 CAPSULE | Refills: 1 | Status: SHIPPED | OUTPATIENT
Start: 2023-02-01 | End: 2023-05-01

## 2023-02-01 RX ORDER — LISDEXAMFETAMINE DIMESYLATE 50 MG/1
50 CAPSULE ORAL EVERY MORNING
Qty: 15 CAPSULE | Refills: 0 | Status: SHIPPED | OUTPATIENT
Start: 2023-02-01 | End: 2023-03-10 | Stop reason: DRUGHIGH

## 2023-02-01 NOTE — PROGRESS NOTES
Assessment & Plan     ASSESSMENT/PLAN:      ICD-10-CM    1. Current moderate episode of major depressive disorder without prior episode (H)  F32.1 venlafaxine (EFFEXOR XR) 37.5 MG 24 hr capsule      2. Attention deficit hyperactivity disorder (ADHD), predominantly inattentive type  F90.0 lisdexamfetamine (VYVANSE) 50 MG capsule        * note: Patient appointment was held during clinic power outage. Due to miscommunication did not see blood pressure reading until after visit.    We discussed patient's significant anxiety. She has appointment for therapy in March, does think this will be helpful. She has significant stressors right now.    Will increase effexor to 112.5 mg daily  Will decrease vyvanse to 50 mg. 2 week fill sent in however patient wants to give the lower dose longer to work. She has been disposing of previous prescriptions. Reviewed  database. UDS performed today.      Return in about 1 month (around 3/1/2023).    Carol Jennings PA-C  [facility]    [facility]   Nargis is a 28 year old, presenting for the following health issues:  No chief complaint on file.      HPI     * ADHD/anxiety  Anxiety is high. 2 year old, relationship, toxic work environment  Has been having a tough time with it all.   Feels the vyvanse is too much and is worsening anxiety. She has had a hard time figuring out anxiety vs ADHD symptoms and has been trying to trouble shoot by changing dose of vyvanse but thinks at this point anxiety is harder to control and would like to decrease vyvanse.  She has mostly cut out caffeine  Not doing self care    Review of Systems   Other than noted above, general, HEENT, respiratory, cardiac, MS, and gastrointestinal systems are negative.       Objective    BP (!) 143/97 (BP Location: Right arm)   Pulse 100   Temp 99.5  F (37.5  C)   Wt 71.7 kg (158 lb)   BMI 29.85 kg/m    Body mass index is 29.85 kg/m .  Physical Exam   GENERAL: healthy, alert and no  distress  RESP: lungs clear to auscultation - no rales, rhonchi or wheezes  CV: regular rate and rhythm, normal S1 S2, no S3 or S4, no murmur, click or rub, no peripheral edema and peripheral pulses strong  ABDOMEN: soft, nontender, no hepatosplenomegaly, no masses and bowel sounds normal  MS: no gross musculoskeletal defects noted, no edema  PSYCH: Alert and oriented times 3; speech- coherent , normal rate and volume; able to articulate logical thoughts, able to abstract reason, no tangential thoughts, no hallucinations or delusions, affect- anxious

## 2023-02-08 RX ORDER — LISDEXAMFETAMINE DIMESYLATE 50 MG/1
50 CAPSULE ORAL DAILY
Qty: 30 CAPSULE | Refills: 0 | Status: SHIPPED | OUTPATIENT
Start: 2023-02-08 | End: 2023-03-10 | Stop reason: DRUGHIGH

## 2023-02-08 RX ORDER — LISDEXAMFETAMINE DIMESYLATE 50 MG/1
50 CAPSULE ORAL DAILY
Qty: 30 CAPSULE | Refills: 0 | Status: SHIPPED | OUTPATIENT
Start: 2023-03-11 | End: 2023-03-10 | Stop reason: DRUGHIGH

## 2023-02-08 RX ORDER — LISDEXAMFETAMINE DIMESYLATE 50 MG/1
50 CAPSULE ORAL DAILY
Qty: 30 CAPSULE | Refills: 0 | Status: SHIPPED | OUTPATIENT
Start: 2023-04-11 | End: 2023-03-10 | Stop reason: DRUGHIGH

## 2023-03-10 ENCOUNTER — MYC MEDICAL ADVICE (OUTPATIENT)
Dept: FAMILY MEDICINE | Facility: CLINIC | Age: 28
End: 2023-03-10
Payer: COMMERCIAL

## 2023-03-10 DIAGNOSIS — F90.0 ATTENTION DEFICIT HYPERACTIVITY DISORDER (ADHD), PREDOMINANTLY INATTENTIVE TYPE: Primary | ICD-10-CM

## 2023-03-10 RX ORDER — LISDEXAMFETAMINE DIMESYLATE 60 MG/1
60 CAPSULE ORAL EVERY MORNING
Qty: 30 CAPSULE | Refills: 0 | Status: SHIPPED | OUTPATIENT
Start: 2023-03-10 | End: 2023-03-20 | Stop reason: DRUGHIGH

## 2023-03-24 ENCOUNTER — VIRTUAL VISIT (OUTPATIENT)
Dept: PSYCHOLOGY | Facility: CLINIC | Age: 28
End: 2023-03-24
Attending: PHYSICIAN ASSISTANT
Payer: COMMERCIAL

## 2023-03-24 DIAGNOSIS — F90.0 ATTENTION DEFICIT HYPERACTIVITY DISORDER (ADHD), PREDOMINANTLY INATTENTIVE TYPE: ICD-10-CM

## 2023-03-24 PROCEDURE — 90834 PSYTX W PT 45 MINUTES: CPT | Mod: VID | Performed by: PSYCHOLOGIST

## 2023-03-24 ASSESSMENT — PATIENT HEALTH QUESTIONNAIRE - PHQ9
5. POOR APPETITE OR OVEREATING: SEVERAL DAYS
10. IF YOU CHECKED OFF ANY PROBLEMS, HOW DIFFICULT HAVE THESE PROBLEMS MADE IT FOR YOU TO DO YOUR WORK, TAKE CARE OF THINGS AT HOME, OR GET ALONG WITH OTHER PEOPLE: SOMEWHAT DIFFICULT
SUM OF ALL RESPONSES TO PHQ QUESTIONS 1-9: 5
SUM OF ALL RESPONSES TO PHQ QUESTIONS 1-9: 5

## 2023-03-24 ASSESSMENT — COLUMBIA-SUICIDE SEVERITY RATING SCALE - C-SSRS
FIRST ATTEMPT DATE: 63949
ATTEMPT LIFETIME: YES
MOST RECENT DATE: 63949
2. HAVE YOU ACTUALLY HAD ANY THOUGHTS OF KILLING YOURSELF?: NO
TOTAL  NUMBER OF INTERRUPTED ATTEMPTS LIFETIME: NO
LETHALITY/MEDICAL DAMAGE CODE FIRST ACTUAL ATTEMPT: MODERATE PHYSICAL DAMAGE, MEDICAL ATTENTION NEEDED
LETHALITY/MEDICAL DAMAGE CODE FIRST POTENTIAL ATTEMPT: BEHAVIOR LIKELY TO RESULT IN INJURY BUT NOT LIKELY TO CAUSE DEATH
LETHALITY/MEDICAL DAMAGE CODE MOST LETHAL ACTUAL ATTEMPT: MODERATE PHYSICAL DAMAGE, MEDICAL ATTENTION NEEDED
TOTAL  NUMBER OF ABORTED OR SELF INTERRUPTED ATTEMPTS LIFETIME: NO
6. HAVE YOU EVER DONE ANYTHING, STARTED TO DO ANYTHING, OR PREPARED TO DO ANYTHING TO END YOUR LIFE?: NO
LETHALITY/MEDICAL DAMAGE CODE MOST RECENT ACTUAL ATTEMPT: MODERATE PHYSICAL DAMAGE, MEDICAL ATTENTION NEEDED
LETHALITY/MEDICAL DAMAGE CODE MOST RECENT POTENTIAL ATTEMPT: BEHAVIOR LIKELY TO RESULT IN INJURY BUT NOT LIKELY TO CAUSE DEATH
TOTAL  NUMBER OF ACTUAL ATTEMPTS LIFETIME: 1
1. HAVE YOU WISHED YOU WERE DEAD OR WISHED YOU COULD GO TO SLEEP AND NOT WAKE UP?: NO
MOST LETHAL DATE: 63949
ATTEMPT PAST THREE MONTHS: NO

## 2023-03-24 ASSESSMENT — ANXIETY QUESTIONNAIRES
3. WORRYING TOO MUCH ABOUT DIFFERENT THINGS: SEVERAL DAYS
GAD7 TOTAL SCORE: 5
1. FEELING NERVOUS, ANXIOUS, OR ON EDGE: SEVERAL DAYS
2. NOT BEING ABLE TO STOP OR CONTROL WORRYING: SEVERAL DAYS
5. BEING SO RESTLESS THAT IT IS HARD TO SIT STILL: NOT AT ALL
7. FEELING AFRAID AS IF SOMETHING AWFUL MIGHT HAPPEN: NOT AT ALL
6. BECOMING EASILY ANNOYED OR IRRITABLE: SEVERAL DAYS
GAD7 TOTAL SCORE: 5
IF YOU CHECKED OFF ANY PROBLEMS ON THIS QUESTIONNAIRE, HOW DIFFICULT HAVE THESE PROBLEMS MADE IT FOR YOU TO DO YOUR WORK, TAKE CARE OF THINGS AT HOME, OR GET ALONG WITH OTHER PEOPLE: SOMEWHAT DIFFICULT

## 2023-03-24 NOTE — PROGRESS NOTES
"[facility]   Mental Health & Addiction Services     Progress Note - Initial Visit    Patient  Name:  Nargis Ruelas Date: 3/24/23         Service Type: Individual     Visit Start Time: 11:03am Visit End Time: 11:55am    Visit #1: 52 minutes    Attendees: Client attended alone    Service Modality:  Video Visit:      Provider verified identity through the following two step process.  Patient provided:  Patient photo and Patient     Telemedicine Visit: The patient's condition can be safely assessed and treated via synchronous audio and visual telemedicine encounter.      Reason for Telemedicine Visit: Services only offered telehealth    Originating Site (Patient Location): Patient's home    Distant Site (Provider Location): Provider Remote Setting- Home Office    Consent:  The patient/guardian has verbally consented to: the potential risks and benefits of telemedicine (video visit) versus in person care; bill my insurance or make self-payment for services provided; and responsibility for payment of non-covered services.     Patient would like the video invitation sent by:  Send to e-mail at: [email or web address]    Mode of Communication:  Video Conference via Amwell    Distant Location (Provider):  Off-site    As the provider I attest to compliance with applicable laws and regulations related to telemedicine.       DATA:   Interactive Complexity: No   Crisis: No     Presenting Concerns/  Current Stressors:   \"Manange anxiety and depression and adhd\".       ASSESSMENT:  Mental Status Assessment:  Appearance:   Appropriate   Eye Contact:   Fair   Psychomotor Behavior: Normal   Attitude:   Cooperative  Pleasant  Orientation:   All  Speech   Rate / Production: Normal/ Responsive   Volume:  Normal   Mood:    Anxious   Affect:    Appropriate   Thought Content:  Clear   Thought Form:  Coherent   Insight:    Good  and Fair       Safety Issues and Plan for Safety and Risk Management:     [place] " "Suicide Severity Rating Scale (Lifetime/Recent)  [place] Suicide Severity Rating (Lifetime/Recent) 3/24/2023   1. Wish to be Dead (Lifetime) 0   2. Non-Specific Active Suicidal Thoughts (Lifetime) 0   Actual Attempt (Lifetime) 1   Total Number of Actual Attempts (Lifetime) 1   Actual Attempt Description (Lifetime) MRE: 6-7 years ago:  \"I was going through alot, my parents weren't around, and I took an Ativan, and I went into the bathroom and tried cutting my wrists.\"   Actual Attempt (Past 3 Months) 0   Has subject engaged in non-suicidal self-injurious behavior? (Lifetime) 0   Interrupted Attempts (Lifetime) 0   Aborted or Self-Interrupted Attempt (Lifetime) 0   Preparatory Acts or Behavior (Lifetime) 0   Most Recent Attempt Date 47507   Actual Lethality/Medical Damage Code (Most Recent Attempt) 2   Potential Lethality Code (Most Recent Attempt) 1   Most Lethal Attempt Date 60042   Actual Lethality/Medical Damage Code (Most Lethal Attempt) 2   Initial/First Attempt Date 91588   Actual Lethality/Medical Damage Code (Initial/First Attempt) 2   Potential Lethality Code (Initial/First Attempt) 1   Calculated C-SSRS Risk Score (Lifetime/Recent) Moderate Risk           Patient denies current fears or concerns for personal safety.  Patient denies current or recent suicidal ideation or behaviors.  Patient denies current or recent homicidal ideation or behaviors.  Patient denies current or recent self injurious behavior or ideation.  Patient denies other safety concerns.  Recommended that patient call 911 or go to the local ED should there be a change in any of these risk factors.  Patient reports there are firearms in the house. The firearms are secured in a locked space.     Diagnostic Criteria:  Attention Deficit Hyperactivity Disorder  A) A persistent pattern of inattention and/or hyperactivity-impulsivity that interferes with functioning or development, as characterized by (1) Inattention and/or (2) Hyperactivity and " Impulsivity  (1) Inattention: 6 or more of the following symptoms have persisted for at least 6 months to a degree that is inconsistent with developmental level and that negatively impacts directly on social and academic/occupational activities:  - Often has difficulty sustaining attention in tasks or play activities  - Often does not follow through on instructions and fails to finish schoolwork, chores, or duties in the workplace  - Often has difficulty organizing tasks and activities  - Often avoids, dislikes, or is reluctant to engage in tasks that require sustained mental effort  - Often loses things necessary for tasks or activities  - Is often easily distractedby extraneous stimuli  - Is often forgetful in daily activities  B) Several inattentive or hyperactive-impulsive symptoms were present prior to age 12 years  C) Several inattentive or hyperactive-impulsive symptoms are present in two or more settings  D) There is clear evidence that the symptoms interfere with, or reduce the quality of, social academic, or occupational functioning  E) The Symptoms do not occur exclusively during the course of schizophrenia or another psychotic disorder and are not better explained by another mental disorder      DSM5 Diagnoses: (Sustained by DSM5 Criteria Listed Above)  Diagnoses: Attention-Deficit/Hyperactivity Disorder  314.00 (F90.0) Predominantly inattentive presentation (By History)  Psychosocial & Contextual Factors: Client works full-time and lives with her partner.  Assessments completed prior to visit:  The following assessments were completed by patient for this visit:  PHQ9:   PHQ-9 SCORE 10/11/2021 11/29/2021 12/22/2021 5/6/2022 12/1/2022 12/1/2022 3/24/2023   PHQ-9 Total Score MyChart 4 (Minimal depression) 2 (Minimal depression) - 3 (Minimal depression) - 5 (Mild depression) 5 (Mild depression)   PHQ-9 Total Score 4 2 2 3 5 5 5     GAD7:   JERMAIN-7 SCORE 3/22/2021 10/11/2021 11/29/2021 12/22/2021 5/6/2022  12/1/2022 3/24/2023   Total Score 2 (minimal anxiety) 1 (minimal anxiety) 1 (minimal anxiety) - 1 (minimal anxiety) 6 (mild anxiety) -   Total Score 2 1 1 2 1 6 5     CAGE-AID:   CAGE-AID Total Score 3/24/2023   Total Score 0   Total Score MyChart 0 (A total score of 2 or greater is considered clinically significant)     PROMIS 10-Global Health (all questions and answers displayed):   PROMIS 10 3/24/2023   In general, would you say your health is: Good   In general, would you say your quality of life is: Good   In general, how would you rate your physical health? Poor   In general, how would you rate your mental health, including your mood and your ability to think? Fair   In general, how would you rate your satisfaction with your social activities and relationships? Good   In general, please rate how well you carry out your usual social activities and roles Good   To what extent are you able to carry out your everyday physical activities such as walking, climbing stairs, carrying groceries, or moving a chair? Completely   How often have you been bothered by emotional problems such as feeling anxious, depressed or irritable? Often   How would you rate your fatigue on average? Severe   How would you rate your pain on average?   0 = No Pain  to  10 = Worst Imaginable Pain 3   In general, would you say your health is: 3   In general, would you say your quality of life is: 3   In general, how would you rate your physical health? 1   In general, how would you rate your mental health, including your mood and your ability to think? 2   In general, how would you rate your satisfaction with your social activities and relationships? 3   In general, please rate how well you carry out your usual social activities and roles. (This includes activities at home, at work and in your community, and responsibilities as a parent, child, spouse, employee, friend, etc.) 3   To what extent are you able to carry out your everyday physical  "activities such as walking, climbing stairs, carrying groceries, or moving a chair? 5   In the past 7 days, how often have you been bothered by emotional problems such as feeling anxious, depressed, or irritable? 4   In the past 7 days, how would you rate your fatigue on average? 4   In the past 7 days, how would you rate your pain on average, where 0 means no pain, and 10 means worst imaginable pain? 3   Global Mental Health Score 10   Global Physical Health Score 12   PROMIS TOTAL - SUBSCORES 22   Some recent data might be hidden     [place] Suicide Severity Rating Scale (Lifetime/Recent)  [place] Suicide Severity Rating (Lifetime/Recent) 3/24/2023   1. Wish to be Dead (Lifetime) 0   2. Non-Specific Active Suicidal Thoughts (Lifetime) 0   Actual Attempt (Lifetime) 1   Total Number of Actual Attempts (Lifetime) 1   Actual Attempt Description (Lifetime) MRE: 6-7 years ago:  \"I was going through alot, my parents weren't around, and I took an Ativan, and I went into the bathroom and tried cutting my wrists.\"   Actual Attempt (Past 3 Months) 0   Has subject engaged in non-suicidal self-injurious behavior? (Lifetime) 0   Interrupted Attempts (Lifetime) 0   Aborted or Self-Interrupted Attempt (Lifetime) 0   Preparatory Acts or Behavior (Lifetime) 0   Most Recent Attempt Date 82925   Actual Lethality/Medical Damage Code (Most Recent Attempt) 2   Potential Lethality Code (Most Recent Attempt) 1   Most Lethal Attempt Date 86385   Actual Lethality/Medical Damage Code (Most Lethal Attempt) 2   Initial/First Attempt Date 73612   Actual Lethality/Medical Damage Code (Initial/First Attempt) 2   Potential Lethality Code (Initial/First Attempt) 1   Calculated C-SSRS Risk Score (Lifetime/Recent) Moderate Risk       WHODAS 2.0 (12 item): No flowsheet data found.  Intervention:   Psychoeducation; Skill review/identification & recommendation; Pattern recognition; Socratic Questioning; Active listening, validation, and other rapport " "building skills; Administer and explain screeners; Collaborated on Safety Plan and emailed plan to client.    Collateral Reports Completed:  Not Applicable      PLAN: (Homework, other):  1. Provider will continue Diagnostic Assessment.  Patient was given the following to do until next session:  Client will think of clear goals and how she knows she met them.    2. Provider recommended the following referrals: N/A.      3.  Suicide Risk and Safety Concerns were assessed for Nargis Ruelas.    Patient meets the following risk assessment and triage:            Moderate Risk is identified when the patient has one of the following:  Suicidal behavior more than three months ago ( C-SSRS Suicidal Behavior Lifetime)    The following has been recommended:  Complete/Review/Update Safety Plan    Safety Plan:  Adult Short Safety Plan:   Name: Nargis Ruelas  YOB: 1995  Date: March 24, 2023   My primary care provider: Carol Jennings  My primary care clinic: [facility]  My prescriber: Carol Jennings  Other care team support:  N/A   My Triggers:  Relationship conflict \"We constantly were moving when I was a kid.  In the middle of Preston year in High School, my mom moved me to another school.  After graduation they wanted to move to [place] (the family moved and I didn't)\" and Work  difficulties : Stressful.     Additional People, Places, and Things that I can access for support: \"My spouse/partner, we're not , my partner's family, my sister and mom, friends, and a co-worker.\"         What is important to me and makes life worth living: \"My son, how far I've came in life, I bought a house, I have a good job.\"         GREEN    Good Control  1. I feel good  2. No suicidal thoughts   3. Can work, sleep and play  4. \"I'm expressing my needs to my partner.\"     Action Steps  1. Self-care: balanced meals, exercising, sleep practices, etc.  2. Take your medications as prescribed.  3. Continue meetings with " "therapist and prescriber.  4.  Do the healthy things that I enjoy.  5. \"Expressing my needs.\"           YELLOW  Getting Worse  I have ANY of these:  1. I do not feel good  2. Difficulty Concentrating  3. Sleep is changing  4. Increase/Change in my thoughts to hurt self and/or others, but I can still manage and not act on it.   5. Not taking care of self.  6. \"I'm not showering as much as I should.  Neglecting my self care.\"  7. \"I'm in a slump and feel down or sad.\"             Action Steps (in addition to the above):  1. Inform your therapist and psychiatric prescriber/PCP.  2. Keep taking your medications as prescribed.    3. Turn to people you can ask for help.  4. Use internal coping strategies -see below.  5. Create safe environment: notify friends/family of increase in symptoms  6. \"Smoke cigarettes.\"  7.  \"CBD gummies.\"           RED  Get Help  If I have ANY of these:  1. Current and uncontrollable thoughts and/or behaviors to hurt self and/or others.    Actions to manage my safety  1. Contact your emergency person \"my partner\"  2. Call or Text   3. Call my crisis team- [facility] [phone]  3. Or Call 911 or go to the emergency room right away  4.  \"My sister-in-law\"        My Internal Coping Strategies include the following:  Breathe, meditate, listen to calming music, maybe go outside, play with her son    [End for Brief Safety Plan]     Safety Concerns  How To Identify Situations That Make Your Mental Health Worse:  Triggers are things that make your mental health worse.  Look at the list below to help you find your triggers and what you can do about them.     1. Identify Early Warning Signs:    Sometimes symptoms return, even when people do their best to stay well. Symptoms can develop over a short period of time with little or no warning, but most of the time they emerge gradually over several weeks.  Early warning signs are changes that people experience when a relapse is starting. Some " "early warning signs are common and others are not as common.   Common Early Warning Signs:    \"Tightness in my neck so I don't sleep as well.\"     2. Identify action steps to take when warning signs are noticed:    Taking Action- It is important to take action if you are experiencing early warning signs of a relapse.  The faster you act, the more likely it is that you can avoid a full relapse.  It is helpful to identify several specific ways to cope with symptoms.      The following is my list of symptoms and coping strategies that I can use when they are present:    Symptom Coping Strategies   Anxiety -Talk with someone in your support system and let him or her know how you are feeling.  -Use relaxation techniques such as deep breathing or imagery.  -Use positive affirmations to counteract negative self-talk such as  I am learning to let go of worry.    Depression - Schedule your day; include activities you have to do and activities you enjoy doing.  - Get some exercise - walk, run, bike, or swim.  - Give yourself credit for even the smallest things you get done.   Sleep Difficulties   - Go to sleep at the same time every day.  - Do something relaxing before bed, such as drinking herbal tea or listening to music.  - Avoid having discussions about upsetting topics before going to bed.   Delusions   - Distract yourself from the disturbing thought by doing something that requires your attention such as a puzzle.  - Check out your beliefs by talking to someone you trust.    Hallucinations   - Use headphones to listen to music.  - Tell voices to  stop  or say to yourself,  I am safe.   - Ignore the hallucinations as much as possible; focus on other things.   Concentration Difficulties - Minimize distractions so there is only one thing for you to focus on at a time.    - Ask the person you are having a conversation with to slow down or repeat things you are unsure of.                 Deangelo Childress, Vivien  March 24, " 2023

## 2023-03-28 ENCOUNTER — MYC MEDICAL ADVICE (OUTPATIENT)
Dept: FAMILY MEDICINE | Facility: CLINIC | Age: 28
End: 2023-03-28
Payer: COMMERCIAL

## 2023-03-28 DIAGNOSIS — F90.0 ATTENTION DEFICIT HYPERACTIVITY DISORDER (ADHD), PREDOMINANTLY INATTENTIVE TYPE: ICD-10-CM

## 2023-03-30 RX ORDER — LISDEXAMFETAMINE DIMESYLATE 70 MG/1
70 CAPSULE ORAL EVERY MORNING
Qty: 30 CAPSULE | Refills: 0 | Status: SHIPPED | OUTPATIENT
Start: 2023-04-03 | End: 2023-08-24

## 2023-04-19 ENCOUNTER — MYC MEDICAL ADVICE (OUTPATIENT)
Dept: FAMILY MEDICINE | Facility: CLINIC | Age: 28
End: 2023-04-19
Payer: COMMERCIAL

## 2023-04-19 DIAGNOSIS — F90.0 ATTENTION DEFICIT HYPERACTIVITY DISORDER (ADHD), PREDOMINANTLY INATTENTIVE TYPE: Primary | ICD-10-CM

## 2023-04-19 RX ORDER — LISDEXAMFETAMINE DIMESYLATE 70 MG/1
70 CAPSULE ORAL DAILY
Qty: 30 CAPSULE | Refills: 0 | Status: SHIPPED | OUTPATIENT
Start: 2023-04-19 | End: 2023-05-19

## 2023-04-20 ENCOUNTER — VIRTUAL VISIT (OUTPATIENT)
Dept: PSYCHOLOGY | Facility: CLINIC | Age: 28
End: 2023-04-20
Payer: COMMERCIAL

## 2023-04-20 DIAGNOSIS — F90.0 ATTENTION DEFICIT HYPERACTIVITY DISORDER, INATTENTIVE TYPE: ICD-10-CM

## 2023-04-20 DIAGNOSIS — F33.0 MAJOR DEPRESSIVE DISORDER, RECURRENT EPISODE, MILD (H): ICD-10-CM

## 2023-04-20 DIAGNOSIS — F41.1 GENERALIZED ANXIETY DISORDER: Primary | ICD-10-CM

## 2023-04-20 PROCEDURE — 90791 PSYCH DIAGNOSTIC EVALUATION: CPT | Mod: VID | Performed by: PSYCHOLOGIST

## 2023-04-20 NOTE — PROGRESS NOTES
"[facility] Counseling    Provider Name:  Deangelo Childress PsyD     Credentials:        UNIVERSAL ADULT ADHD/Mental Health DIAGNOSTIC ASSESSMENT      Patient Name:  Nargis Ruelas  Date: 23  PREFERRED NAME: Nargis  PRONOUNS:   she/her    MRN: [id number]  : 1995  ADDRESS: [address] [address] [place], [place] [id number]  ACCT. NUMBER:  [id number]  PREFERRED PHONE: [phone]  May we leave a program related message: Yes         Service Type: Individual      Session Start Time: 4:03pm Session End Time: 4:54pm     Session Length: 51 minutes    Session #: 2    Attendees: Client attended alone    Service Modality:  Video Visit:      Provider verified identity through the following two step process.  Patient provided:  Patient photo    Telemedicine Visit: The patient's condition can be safely assessed and treated via synchronous audio and visual telemedicine encounter.      Reason for Telemedicine Visit: Services only offered telehealth    Originating Site (Patient Location): Patient's home    Distant Site (Provider Location): Provider Remote Setting- Home Office    Consent:  The patient/guardian has verbally consented to: the potential risks and benefits of telemedicine (video visit) versus in person care; bill my insurance or make self-payment for services provided; and responsibility for payment of non-covered services.     Patient would like the video invitation sent by:  Send to e-mail at: [email or web address]    Mode of Communication:  Video Conference via Amwell    Distant Location (Provider):  Off-site    As the provider I attest to compliance with applicable laws and regulations related to telemedicine.    DATA  Interactive Complexity: No  Crisis: No      Identifying Information:  Patient is a 28 year old,  individual.  Patient was referred for an assessment by self.  Patient attended the session alone.    Chief Complaint:   The reason for seeking services at this time is: \"Manange " "anxiety and depression and adhd\".  The problem(s) began 03/24/10.    Reason for Referral:  \"I noticed that my brain tends to go to the negative.  I have a very negative mindset and I want to change that.  Sometimes that makes me angry that I have such a negative mindset.  That's another thing, I am not a patient.\"    Patient has attempted to resolve these concerns in the past through medication and therapy.    Social/Family History:  Patient reported they grew up in other Moved around [facility].  They were raised by biological mother; stepfather.  Parents  / .  \"When my mom was pregnant with me he was out of the picture.  I call him the sperm donor.\"  Patient reported that their childhood was \"good.  We didn't get beat or anything like that.  We always had shoes and nice things.  It was just a stressful environment.\"  Patient described their current relationships with family of origin as (my 1/2-sister, my stepbrother, and stepsister, and my mom took on my cousin):  \"My dad was always at work.  My mom was at home with us as well as working at the hospital.  I talk to my cousin still.  We have a really great relationship.  My sister, we have an amazing relationship.  My brother, he's , our relationship is great.  My relationship with my oldest sister is not good at all.  She's kind of battling addiction right now.\"     The patient describes their cultural background as .  Cultural influences and impact on patient's life structure, values, norms, and healthcare: No.  Contextual influences on patient's health include: None reported.    These factors will be addressed in the Preliminary Treatment plan. Patient identified their preferred language to be English. Patient reported they does not need the assistance of an  or other support involved in therapy.     Patient reported had no significant delays in developmental tasks.   Patient's highest education level was high school " "graduate.  Patient identified the following learning problems: attention and concentration (ADHD).  Modifications will not be used to assist communication in therapy. Patient reports they are  able to understand written materials.    Patient reported the following relationship history of \"four\" committed and significant relationship.  Patient's current relationship status is has a partner or significant other for \"almost six years.\"   Patient identified their sexual orientation as heterosexual.  Patient reported having 1 child(izabella). Patient identified partner; mother; siblings; friends; co-worker as part of their support system.  Patient identified the quality of these relationships as good.      Patient's current living/housing situation involves staying in own home/apartment.  The immediate members of family and household include Glenn Rosenbaum,Partner and \"he'll be 3 in may\" and they report that housing is stable.    Patient is currently employed fulltime.  Patient reports their finances are obtained through employment; partner. Patient does identify finances as a current stressor.      Patient reported that they have not been involved with the legal system.  Patient does not report being under probation/ parole/ jurisdiction. They are not under any current court jurisdiction.   \"I got two DWI's when I was younger.  My record is expunged.\"    Patient's Strengths and Limitations:  Patient identified the following strengths or resources that will help them succeed in treatment: commitment to health and well being, family support and motivation. Things that may interfere with the patient's success in treatment include: \"Sometimes I have a hard time putting myself first.\".     Assessments:  The following assessments were completed by patient for this visit:    None.    Personal and Family Medical History:  Patient does not report a family history of mental health concerns.  Patient reports family history includes Anxiety " "Disorder in her mother; Cerebrovascular Disease in her maternal grandfather; Chronic Obstructive Pulmonary Disease in her maternal grandmother; Coronary Artery Disease in her daughter and maternal grandfather; Depression in her mother; Diabetes in her maternal grandfather; Unknown/Adopted in her father.     Patient does report Mental Health Diagnosis and/or Treatment.  Patient Patient reported the following previous diagnoses which include(s):  Patient reported symptoms began ADHD (by Hx);  DEPRESSION (MDD, Rec, Mild):  Started around the same time as anxiety (15 y.o.); Used to be more intense. Sleeping more, change appetite, apathy, depression, isolate/withdraw;  ANXIETY (JERMAIN):   \"After my papa  (maternal grandfather).  He passed away in .  That's when my mental health problems started (client was 15).  I was at his house every day.\"  Client worries, down for a couple days or a period of time, used to be hard to control the worry thoughts, \"I don't do well under stress.\"  .  Patient has received mental health services in the past:   medication and therapy  .  Psychiatric Hospitalizations:  None. Patient denies a history of civil commitment.  Currently, patient is receiving other mental health services.  These include medication and therapy.         Patient has not had a physical exam to rule out medical causes for current symptoms.  Date of last physical exam was greater than a year ago and client was encouraged to schedule an exam with PCP. The patient has a [place] Primary Care Provider, who is named Carol Jennings..  Patient reports no current medical and/or dental concerns.  Patient reports pain concerns including \"my lower back.\".  Patient does not want help addressing pain concerns.  Has seen a chiropractor.   There are not significant appetite / nutritional concerns / weight changes.   Patient does not report a history of head injury / trauma / cognitive impairment.    Patient reports current " "meds as:   No outpatient medications have been marked as taking for the 4/20/23 encounter (Appointment) with Deangelo Childress PsyD.       Medication Adherence:  Patient reports taking.    Patient Allergies:  No Known Allergies    Medical History:    Past Medical History:   Diagnosis Date     Cephalopelvic disproportion due to mixed maternal and fetal factors 5/23/2020     Depressive disorder      GDM, class A1 3/25/2020     Tonsillitis      Uncomplicated asthma          Current Mental Status Exam:   Appearance:  Appropriate    Eye Contact:  Good   Psychomotor:  Normal       Gait / station:  no problem  Attitude / Demeanor: Cooperative  Pleasant  Speech      Rate / Production: Normal/ Responsive      Volume:  Normal  volume      Language:  intact  Mood:   Normal  Affect:   Appropriate    Thought Content: Clear   Thought Process: Coherent       Associations: No loosening of associations  Insight:   Good   Judgment:  Intact   Orientation:  All  Attention/concentration: Good      Substance Use:  Patient did report a family history of substance use concerns; see medical history section for details.  Patient has received chemical dependency treatment in the past at [facility].  Patient has not ever been to detox.      Patient is not currently receiving any chemical dependency treatment.           Substance History of use Age of first use Date of last use     Pattern and duration of use (include amounts and frequency)   Alcohol used in the past   15 03/17/23   Client received two DWI's at the age of 18. REPORTS SUBSTANCE USE: reports using substance 1 times per week and has 1-2 wine or beer at a time.   Patient reports heaviest use was \"early 20s\".   Cannabis   currently use 18 03/23/23 REPORTS SUBSTANCE USE: reports using substance 1 times per day and has \"maybe a couple hits when I get home and a couple before I go to bed.\" \"A couple hits at a time\" at a time.   Patient reports heaviest use was " "\"maybe in high school\".     Amphetamines   used in the past   02/24/20 REPORTS SUBSTANCE USE: N/A   Prescribed.   Cocaine/crack    never used       REPORTS SUBSTANCE USE: N/A   Hallucinogens never used         REPORTS SUBSTANCE USE: N/A   Inhalants never used         REPORTS SUBSTANCE USE: N/A   Heroin never used         REPORTS SUBSTANCE USE: N/A   Other Opiates never used     REPORTS SUBSTANCE USE: N/A   Benzodiazepine   currently use 25 01/11/23 REPORTS SUBSTANCE USE: N/A   Barbiturates never used     REPORTS SUBSTANCE USE: N/A   Over the counter meds used in the past 18 01/16/23 REPORTS SUBSTANCE USE: N/A   Caffeine currently use 15   REPORTS SUBSTANCE USE: reports using substance 1 times per day and has 1 \"coffee or Red Bull in the morning\" at a time.   Patient reports heaviest use is current use.   Nicotine  currently use 16 03/23/23 REPORTS SUBSTANCE USE: reports using substance and vaping times per day and has 1 cigarette or hit/vape at a time.   Patient reports heaviest use was \"probably in my mid-20s\".   Other substances not listed above:  Identify:  never used     REPORTS SUBSTANCE USE: N/A     Patient reported the following problems as a result of their substance use: no problems, not applicable.    Substance Use: No symptoms    Based on the negative CAGE score and clinical interview there  are not indications of drug or alcohol abuse.      Significant Losses / Trauma / Abuse / Neglect Issues:   Patient did not serve in the .  There are indications or report of significant loss, trauma, abuse or neglect issues related to: job loss \"my papa (grandfather) in 2010\" and grieved never having a father (biological) and client's experience of emotional abuse \"My mom and dad would cheat on each other.  Once time my mom left and my dad was out in the garage talking about killing himself.  Or, at times, my dad would break chairs and stuff.\"  Concerns for possible neglect are not present.    Safety " Assessment:   Patient denies current homicidal ideation and behaviors.  Patient denies current self-injurious ideation and behaviors.    Patient denied risk behaviors associated with substance use.  Patient denies any high risk behaviors associated with mental health symptoms.  Patient reports the following current concerns for their personal safety: None.  Patient reports there are firearms in the house.     yes, they are secured.    History of Safety Concerns:  Patient denied a history of homicidal ideation.     Patient denied a history of personal safety concerns.    Patient denied a history of assaultive behaviors.    Patient denied a history of sexual assault behaviors.     Patient denied a history of risk behaviors associated with substance use.  Patient denies any history of high risk behaviors associated with mental health symptoms.  Patient reports the following protective factors: forward or future oriented thinking; dedication to family or friends; safe and stable environment; purpose; daily obligations; positive social skills    Risk Plan:  See Recommendations for Safety and Risk Management Plan    Review of Symptoms per patient report:   Depression: Change in sleep, Lack of interest, Change in energy level, Change in appetite, Psychomotor slowing or agitation and Feeling sad, down, or depressed  Shereen:  No Symptoms  Psychosis: No Symptoms  Anxiety: Excessive worry, Nervousness, Psychomotor agitation and Irritability  Panic:  No symptoms  Post Traumatic Stress Disorder:  No Symptoms   Eating Disorder: No Symptoms  ADD / ADHD:  No symptoms  Conduct Disorder: No symptoms  Autism Spectrum Disorder: No symptoms  Obsessive Compulsive Disorder: No Symptoms    Patient reports the following compulsive behaviors and treatment history: None.      Diagnostic Criteria:   Generalized Anxiety Disorder  A. Excessive anxiety and worry about a number of events or activities (such as work or school performance).   B. The  person finds it difficult to control the worry.  C. Select 3 or more symptoms (required for diagnosis). Only one item is required in children.   - Restlessness or feeling keyed up or on edge.    - Difficulty concentrating or mind going blank.    - Irritability.    - Sleep disturbance (difficulty falling or staying asleep, or restless unsatisfying sleep).   D. The focus of the anxiety and worry is not confined to features of an Axis I disorder.  E. The anxiety, worry, or physical symptoms cause clinically significant distress or impairment in social, occupational, or other important areas of functioning.   F. The disturbance is not due to the direct physiological effects of a substance (e.g., a drug of abuse, a medication) or a general medical condition (e.g., hyperthyroidism) and does not occur exclusively during a Mood Disorder, a Psychotic Disorder, or a Pervasive Developmental Disorder.    - The aformentioned symptoms began 13 year(s) ago and occurs 4 days per week and is experienced as mild. Major Depressive Disorder  CRITERIA (A-C) REPRESENT A MAJOR DEPRESSIVE EPISODE - SELECT THESE CRITERIA  A) Recurrent episode(s) - symptoms have been present during the same 2-week period and represent a change from previous functioning 5 or more symptoms (required for diagnosis)   - Depressed mood. Note: In children and adolescents, can be irritable mood.     - Diminished interest or pleasure in all, or almost all, activities.    - Psychomotor activity retardation.    - Fatigue or loss of energy.   B) The symptoms cause clinically significant distress or impairment in social, occupational, or other important areas of functioning  C) The episode is not attributable to the physiological effects of a substance or to another medical condition  D) The occurence of major depressive episode is not better explained by other thought / psychotic disorders  E) There has never been a manic episode or hypomanic episode    Functional  "Status:  Patient reports the following functional impairments:  home life with \"significant other.\" and relationship(s).     Nonprogrammatic care:  Patient is requesting basic services to address current mental health concerns.    Clinical Summary:  1. Reason for assessment: ADHD assessment/Clarify diagnosis.  2. Psychosocial, Cultural and Contextual Factors: Client lives with her partner and their child, client works full-time.  3. Principal DSM5 Diagnoses  (Sustained by DSM5 Criteria Listed Above):   300.02 (F41.1) Generalized Anxiety Disorder.  4. Other Diagnoses that is relevant to services:   Attention-Deficit/Hyperactivity Disorder  314.00 (F90.0) Predominantly inattentive presentation.  (by Hx);  296.31 (F33.0) Major Depressive Disorder, Recurrent Episode, Mild With melancholic features  5. Provisional Diagnosis:  None at this time.  6. Prognosis: Expect Improvement.  7. Likely consequences of symptoms if not treated: If left untreated, the client is likely continue to struggle with maintaining regulating emotions which affects her relationships.  8. Client strengths include:  caring, employed, goal-focused, good listener, motivated and open to learning .     Recommendations:     1. Plan for Safety and Risk Management:   Safety and Risk: Recommended that patient call 911 or go to the local ED should there be a change in any of these risk factors..          Report to child / adult protection services was NA.     2. Patient's identified no cultural infuences to address at this time.     3. Initial Treatment will focus on:    Mood Instability - Client would like to learn skills so she is not triggered as frequently.  She would also like to learn skills to be herself and \"not have to impress people.\".   \"I feel like I have to impress people and they come first.\"   \"Being triggered by men because my dad left me.\"  4. Resources/Service Plan:    services are not indicated.   Modifications to assist " communication are not indicated.   Additional disability accommodations are not indicated.      5. Collaboration:   Collaboration / coordination of treatment will be initiated with the following  support professionals: None at this time.      6.  Referrals:   The following referral(s) will be initiated: None at this time.    Next Scheduled Appointment: April 27, 2023.      A Release of Information has been obtained for the following: None at this time.     Emergency Contact was confirmed.      Clinical Substantiation/medical necessity for the above recommendations:  N/A.    7. CHARLIE:   CHARLIE:  Discussed the general effects of drugs and alcohol on health and well-being. Provider gave patient printed information about the effects of chemical use on their health and well being. Recommendations:  Continue with individual therapy.     8. Records:   These were reviewed at time of assessment.   Information in this assessment was obtained from the medical record and provided by patient who is a good historian.      Patient will have open access to their mental health medical record.    9.   Interactive Complexity: No      Provider Name/ Credentials:  Deangelo Childress PsyD, LP  April 20, 2023

## 2023-04-27 ENCOUNTER — VIRTUAL VISIT (OUTPATIENT)
Dept: PSYCHOLOGY | Facility: CLINIC | Age: 28
End: 2023-04-27
Payer: COMMERCIAL

## 2023-04-27 DIAGNOSIS — F33.0 MAJOR DEPRESSIVE DISORDER, RECURRENT EPISODE, MILD (H): ICD-10-CM

## 2023-04-27 DIAGNOSIS — F90.0 ATTENTION DEFICIT HYPERACTIVITY DISORDER, INATTENTIVE TYPE: ICD-10-CM

## 2023-04-27 DIAGNOSIS — F41.1 GENERALIZED ANXIETY DISORDER: Primary | ICD-10-CM

## 2023-05-16 NOTE — PROGRESS NOTES
This provider did not meet with the client this day.  The client was a No Call/No Show.  There will be No Billing for today.      Deangelo Childress PsyD, LP  May 16, 2023

## 2023-05-26 ENCOUNTER — TELEPHONE (OUTPATIENT)
Dept: FAMILY MEDICINE | Facility: CLINIC | Age: 28
End: 2023-05-26

## 2023-05-26 ENCOUNTER — MYC MEDICAL ADVICE (OUTPATIENT)
Dept: FAMILY MEDICINE | Facility: CLINIC | Age: 28
End: 2023-05-26
Payer: COMMERCIAL

## 2023-05-26 DIAGNOSIS — F90.0 ATTENTION DEFICIT HYPERACTIVITY DISORDER (ADHD), PREDOMINANTLY INATTENTIVE TYPE: ICD-10-CM

## 2023-05-26 RX ORDER — LISDEXAMFETAMINE DIMESYLATE 70 MG/1
70 CAPSULE ORAL DAILY
Qty: 30 CAPSULE | Refills: 0 | Status: SHIPPED | OUTPATIENT
Start: 2023-07-27 | End: 2023-08-24

## 2023-05-26 RX ORDER — LISDEXAMFETAMINE DIMESYLATE 70 MG/1
70 CAPSULE ORAL DAILY
Qty: 30 CAPSULE | Refills: 0 | Status: SHIPPED | OUTPATIENT
Start: 2023-05-26 | End: 2023-06-25

## 2023-05-26 RX ORDER — LISDEXAMFETAMINE DIMESYLATE 70 MG/1
70 CAPSULE ORAL DAILY
Qty: 30 CAPSULE | Refills: 0 | Status: SHIPPED | OUTPATIENT
Start: 2023-06-26 | End: 2023-07-26

## 2023-05-26 RX ORDER — LISDEXAMFETAMINE DIMESYLATE 70 MG/1
CAPSULE ORAL
Qty: 30 CAPSULE | Refills: 0 | OUTPATIENT
Start: 2023-05-26

## 2023-05-26 NOTE — TELEPHONE ENCOUNTER
Patient going out of town, do we have permission to fill her Vyvanse early? Soonest insurance will pay for it is on 5/27. Please contact patient with questions.    Thanks,   Kristel Arteaga  Certified Pharmacy Technician  [facility]  [phone]

## 2023-06-13 ENCOUNTER — VIRTUAL VISIT (OUTPATIENT)
Dept: FAMILY MEDICINE | Facility: CLINIC | Age: 28
End: 2023-06-13
Payer: COMMERCIAL

## 2023-06-13 DIAGNOSIS — E66.9 OBESITY WITHOUT SERIOUS COMORBIDITY, UNSPECIFIED CLASSIFICATION, UNSPECIFIED OBESITY TYPE: Primary | ICD-10-CM

## 2023-06-13 PROCEDURE — 99213 OFFICE O/P EST LOW 20 MIN: CPT | Mod: VID | Performed by: FAMILY MEDICINE

## 2023-06-13 ASSESSMENT — ASTHMA QUESTIONNAIRES
QUESTION_2 LAST FOUR WEEKS HOW OFTEN HAVE YOU HAD SHORTNESS OF BREATH: NOT AT ALL
QUESTION_1 LAST FOUR WEEKS HOW MUCH OF THE TIME DID YOUR ASTHMA KEEP YOU FROM GETTING AS MUCH DONE AT WORK, SCHOOL OR AT HOME: NONE OF THE TIME
QUESTION_3 LAST FOUR WEEKS HOW OFTEN DID YOUR ASTHMA SYMPTOMS (WHEEZING, COUGHING, SHORTNESS OF BREATH, CHEST TIGHTNESS OR PAIN) WAKE YOU UP AT NIGHT OR EARLIER THAN USUAL IN THE MORNING: NOT AT ALL
ACT_TOTALSCORE: 25
QUESTION_4 LAST FOUR WEEKS HOW OFTEN HAVE YOU USED YOUR RESCUE INHALER OR NEBULIZER MEDICATION (SUCH AS ALBUTEROL): NOT AT ALL
QUESTION_5 LAST FOUR WEEKS HOW WOULD YOU RATE YOUR ASTHMA CONTROL: COMPLETELY CONTROLLED
ACT_TOTALSCORE: 25

## 2023-06-13 NOTE — PROGRESS NOTES
Nargis is a 28 year old who is being evaluated via a billable video visit.      How would you like to obtain your AVS? [employer]  If the video visit is dropped, the invitation should be resent by: Text to cell phone: [phone]  Will anyone else be joining your video visit? No        Assessment & Plan     Obesity without serious comorbidity, unspecified classification, unspecified obesity type  - Recommended to schedule an Annual Physical with routine fasting labs.  - Update weight and BMI on file.   -- Discussed healthy diet and exercise guidelines  Weight loss goals:   1. Prevent further weight gain  2.  Aim to lose at least 1- 2 lbs/week.  3.  Eat a well balanced heart healthy diet, cut out soda/sugary drinks and control portion sizes.   4. Avoid missing breakfast.  5. Exercise regularly; increase exercise gradually as tolerated to a goal of  30-45 minutes/5 days a week.    Recommended to check with insurance on coverage for Weight loss medications.     Schedule a follow up visit after labs are completed to discuss weight loss medication options. Patient verbalized understanding.      Return in about 2 weeks (around 6/27/2023) for a Physical Exam at your earliest convenience..      Daily Chicas MD  [facility] FIDEL Barillas is a 28 year old, presenting for the following health issues:  Weight Check        6/13/2023    12:51 PM   Additional Questions   Roomed by Pt completed questions via coComment     History of Present Illness       Reason for visit:  Want to talk about possibly going on a weight loss medication i am having trouble lossing the weight i gained from my pregnancy 3 years ago, I ve tried working out dieting and starting to affect my joints and my knees, mostly my lower back    She eats 2-3 servings of fruits and vegetables daily.She consumes 1 sweetened beverage(s) daily.She exercises with enough effort to increase her heart rate 60 or more minutes per day.  She  "exercises with enough effort to increase her heart rate 6 days per week.   She is taking medications regularly.     Patient reports that she's struggled to lose her post pregnancy weight since 2020- despite diet and regular exercise. Now looking for other weight loss options.     States that her Pre-pregnancy weight was: 125 lbs  Current weight: 168 lbs.     States that she's not very tall.     Last weights on file: 5' 1\"    Wt Readings from Last 4 Encounters:   02/01/23 71.7 kg (158 lb)   12/12/22 71.7 kg (158 lb)   12/06/22 68 kg (150 lb)   09/19/22 72.6 kg (160 lb)     There is no height or weight on file to calculate BMI.     Last vitals on file    BP Readings from Last 3 Encounters:   02/01/23 (!) 143/97   12/12/22 130/80   12/06/22 139/87     HEALTH CARE MAINTENANCE: due for an annual Physical with a Pap and labs.       Review of Systems   Constitutional, HEENT, cardiovascular, pulmonary, gi and gu systems are negative, except as otherwise noted.      Objective           Vitals:  No vitals were obtained today due to virtual visit.    Physical Exam   GENERAL: Healthy, alert and no distress  PSYCH: Mentation appears normal, affect normal/bright, judgement and insight intact, normal speech and appearance well-groomed.    DATA  Previous labs reviewed.         Video-Visit Details    Type of service:  Video Visit   Video Start Time: 5:00 pm   Video End Time:5:15 pm     Originating Location (pt. Location): Home    Distant Location (provider location):  On-site  Platform used for Video Visit: Rosalba    "

## 2023-06-18 ENCOUNTER — MYC MEDICAL ADVICE (OUTPATIENT)
Dept: FAMILY MEDICINE | Facility: CLINIC | Age: 28
End: 2023-06-18
Payer: COMMERCIAL

## 2023-06-18 DIAGNOSIS — K21.9 GASTROESOPHAGEAL REFLUX DISEASE WITHOUT ESOPHAGITIS: ICD-10-CM

## 2023-06-18 DIAGNOSIS — F32.1 CURRENT MODERATE EPISODE OF MAJOR DEPRESSIVE DISORDER WITHOUT PRIOR EPISODE (H): ICD-10-CM

## 2023-06-19 RX ORDER — FAMOTIDINE 20 MG/1
20 TABLET, FILM COATED ORAL 2 TIMES DAILY
Qty: 180 TABLET | Refills: 1 | Status: SHIPPED | OUTPATIENT
Start: 2023-06-19 | End: 2023-10-04

## 2023-06-19 NOTE — TELEPHONE ENCOUNTER
Routing refill request to provider for review/approval because:    Effexor increased to 112.5 on 2/1/23.  Effexor 75 mg not on medication list.  Kelby Stanford RN

## 2023-06-21 RX ORDER — VENLAFAXINE HYDROCHLORIDE 75 MG/1
75 CAPSULE, EXTENDED RELEASE ORAL DAILY
Qty: 90 CAPSULE | Refills: 0 | Status: SHIPPED | OUTPATIENT
Start: 2023-06-21 | End: 2023-10-04

## 2023-06-21 RX ORDER — VENLAFAXINE HYDROCHLORIDE 37.5 MG/1
CAPSULE, EXTENDED RELEASE ORAL
Qty: 90 CAPSULE | Refills: 0 | Status: SHIPPED | OUTPATIENT
Start: 2023-06-21 | End: 2023-10-04

## 2023-07-03 ENCOUNTER — MYC REFILL (OUTPATIENT)
Dept: FAMILY MEDICINE | Facility: CLINIC | Age: 28
End: 2023-07-03
Payer: COMMERCIAL

## 2023-07-03 ENCOUNTER — MYC MEDICAL ADVICE (OUTPATIENT)
Dept: FAMILY MEDICINE | Facility: CLINIC | Age: 28
End: 2023-07-03
Payer: COMMERCIAL

## 2023-07-03 DIAGNOSIS — F41.1 ANXIETY STATE: ICD-10-CM

## 2023-07-03 NOTE — TELEPHONE ENCOUNTER
Routing refill request to provider for review/approval because:  Drug not on the [facility] refill protocol     Requested Prescriptions   Pending Prescriptions Disp Refills    LORazepam (ATIVAN) 0.5 MG tablet 10 tablet 0     Sig: Take 1 tablet (0.5 mg) by mouth every 6 hours as needed for anxiety       There is no refill protocol information for this order              Talia Redmond RN 07/03/23 5:48 PM

## 2023-07-04 ENCOUNTER — HOSPITAL ENCOUNTER (EMERGENCY)
Facility: CLINIC | Age: 28
Discharge: HOME | End: 2023-07-04
Payer: COMMERCIAL

## 2023-07-04 VITALS
OXYGEN SATURATION: 100 % | HEART RATE: 112 BPM | SYSTOLIC BLOOD PRESSURE: 148 MMHG | RESPIRATION RATE: 18 BRPM | DIASTOLIC BLOOD PRESSURE: 90 MMHG | TEMPERATURE: 99.3 F

## 2023-07-04 DIAGNOSIS — S61.412A LACERATION OF LEFT PALM, INITIAL ENCOUNTER: ICD-10-CM

## 2023-07-04 PROCEDURE — 12001 RPR S/N/AX/GEN/TRNK 2.5CM/<: CPT

## 2023-07-04 PROCEDURE — 99213 OFFICE O/P EST LOW 20 MIN: CPT | Mod: 25

## 2023-07-04 PROCEDURE — G0463 HOSPITAL OUTPT CLINIC VISIT: HCPCS | Mod: 25

## 2023-07-04 RX ORDER — LORAZEPAM 0.5 MG/1
0.5 TABLET ORAL EVERY 6 HOURS PRN
Qty: 10 TABLET | Refills: 0 | Status: SHIPPED | OUTPATIENT
Start: 2023-07-04 | End: 2024-01-03

## 2023-07-04 ASSESSMENT — ENCOUNTER SYMPTOMS: WOUND: 1

## 2023-07-04 NOTE — DISCHARGE INSTRUCTIONS
Sutures should be removed in 7 to 10 days.  Monitor for any signs of infection including fever, redness, discharge.  Try to leave bandage on for about 24 hours before removing.  Upon removing the bandage, wash gently with gentle soap and water.  Apply a bacitracin or Neosporin 1-2 times daily and cover with a bandage.  Return for any other new concerns. Happy 4th!

## 2023-07-04 NOTE — ED PROVIDER NOTES
History     Chief Complaint   Patient presents with     Laceration     Left hand on the palm. Preparing vegetables for dinner.     HPI  Nargis Ruelas is a 28 year old female who presents urgent care for concern of laceration to the left palm.  Patient states she was cutting vegetables and while cleaning a knife and slipped causing a laceration to the ulnar aspect of the left palm.  Patient reports full mobility of the affected hand and fingers.  Incident happened approximately prior to arrival.  The status is up-to-date.  Bleeding is controlled.  No other new concerns at this time.    Allergies:  No Known Allergies    Problem List:    Patient Active Problem List    Diagnosis Date Noted     Seizures (H) 2020     Priority: Medium     Saw neurologist when younger for seizures/syncope/?pseudoseizure  They couldn't figure out what it was, she was not put on medication.  Has not had seizure for several years       S/P primary low transverse  2020     Priority: Medium     Chronic pain of both knees 2016     Priority: Medium     History of syncope 10/06/2015     Priority: Medium     Current moderate episode of major depressive disorder without prior episode (H)      Priority: Medium     Anxiety state 04/10/2015     Priority: Medium     Mild intermittent asthma without complication 2014     Priority: Medium     Attention deficit hyperactivity disorder (ADHD), predominantly inattentive type 2010     Priority: Medium        Past Medical History:    Past Medical History:   Diagnosis Date     Cephalopelvic disproportion due to mixed maternal and fetal factors 2020     Depressive disorder      GDM, class A1 3/25/2020     Tonsillitis      Uncomplicated asthma        Past Surgical History:    Past Surgical History:   Procedure Laterality Date      SECTION N/A 2020    Procedure:  SECTION;  Surgeon: Jaqueline Reyes MD;  Location: [facility]     ESOPHAGOSCOPY,  GASTROSCOPY, DUODENOSCOPY (EGD), COMBINED N/A 11/15/2021    Procedure: ESOPHAGOGASTRODUODENOSCOPY, WITH BIOPSY;  Surgeon: Robi Rodriguez MD;  Location: [facility]     ESOPHAGOSCOPY, GASTROSCOPY, DUODENOSCOPY (EGD), COMBINED N/A 9/19/2022    Procedure: ESOPHAGOGASTRODUODENOSCOPY, WITH BIOPSY;  Surgeon: David Palm MD;  Location: [facility] OR     MOUTH SURGERY      wisdom teeth     TONSILLECTOMY         Family History:    Family History   Problem Relation Age of Onset     Diabetes Maternal Grandfather      Coronary Artery Disease Maternal Grandfather      Cerebrovascular Disease Maternal Grandfather      Depression Mother      Anxiety Disorder Mother      Unknown/Adopted Father         unknown      Chronic Obstructive Pulmonary Disease Maternal Grandmother      Coronary Artery Disease Daughter      Other Cancer No family hx of        Social History:  Marital Status:  Single [1]  Social History     Tobacco Use     Smoking status: Former     Packs/day: 0.20     Years: 0.00     Pack years: 0.00     Types: Cigarettes     Smokeless tobacco: Never     Tobacco comments:     quit with pregnancy   Vaping Use     Vaping Use: Never used   Substance Use Topics     Alcohol use: Not Currently     Alcohol/week: 0.0 standard drinks of alcohol     Comment: 4 drinks per month-quit with pregnancy     Drug use: No        Medications:    albuterol (PROAIR HFA/PROVENTIL HFA/VENTOLIN HFA) 108 (90 Base) MCG/ACT inhaler  busPIRone (BUSPAR) 10 MG tablet  diclofenac (VOLTAREN) 1 % topical gel  famotidine (PEPCID) 20 MG tablet  fluticasone (FLONASE) 50 MCG/ACT nasal spray  lisdexamfetamine (VYVANSE) 70 MG capsule  [START ON 7/27/2023] lisdexamfetamine (VYVANSE) 70 MG capsule  lisdexamfetamine (VYVANSE) 70 MG capsule  LORazepam (ATIVAN) 0.5 MG tablet  norethindrone-ethinyl estradiol (MICROGESTIN 1/20) 1-20 MG-MCG tablet  venlafaxine (EFFEXOR XR) 37.5 MG 24 hr capsule  venlafaxine (EFFEXOR XR) 75 MG 24 hr capsule          Review of Systems    Skin: Positive for wound.   All other systems reviewed and are negative.      Physical Exam   BP: (!) 148/90  Pulse: 112  Temp: 99.3  F (37.4  C)  Resp: 18  SpO2: 100 %      Physical Exam  Constitutional:       General: She is not in acute distress.     Appearance: Normal appearance. She is well-developed.   HENT:      Head: Normocephalic and atraumatic.   Eyes:      General: No scleral icterus.     Conjunctiva/sclera: Conjunctivae normal.   Cardiovascular:      Rate and Rhythm: Normal rate.   Pulmonary:      Effort: Pulmonary effort is normal. No respiratory distress.   Abdominal:      General: Abdomen is flat.   Musculoskeletal:        Hands:       Cervical back: Normal range of motion and neck supple.      Comments: Centimeter laceration with well approximated edges.  Bleeding is controlled.  Patient displays full mobility of the fingers on the left hand.  No tendon damage visualized.  No foreign bodies.   Skin:     General: Skin is warm and dry.      Findings: No rash.   Neurological:      Mental Status: She is alert and oriented to person, place, and time.         ED Course                 [facility]    -Laceration Repair    Date/Time: 7/4/2023 7:26 PM    Performed by: Dominic Bravo APRN CNP  Authorized by: Dominic Bravo APRN CNP    Risks, benefits and alternatives discussed.      ANESTHESIA (see MAR for exact dosages):     Anesthesia method:  Local infiltration    Local anesthetic:  Bupivacaine 0.25% WITH epi  LACERATION DETAILS     Location:  Hand    Hand location:  L palm    Length (cm):  2    REPAIR TYPE:     Repair type:  Simple      EXPLORATION:     Hemostasis achieved with:  Direct pressure and epinephrine    Contaminated: no      TREATMENT:     Area cleansed with:  Saline    Amount of cleaning:  Standard    SKIN REPAIR     Repair method:  Sutures    Suture size:  4-0    Suture material:  Nylon    Suture technique:  Simple interrupted    Number of sutures:   4    POST-PROCEDURE DETAILS     Dressing:  Antibiotic ointment and adhesive bandage            No results found for this or any previous visit (from the past 24 hour(s)).    Medications - No data to display    Assessments & Plan (with Medical Decision Making)   Patient presented for concern of laceration to the Left palm. No tendon involvement.  Full active and passive ROM of finger at all joints. No indication for xray at this time. CMS remain intact and there are no signs of neurovascular compromise. Sensation intact. Laceration was repaired as above with 4 sutures after achieving appropriate anesthetization.  Antibiotic ointment and sterile dressing applied prior to discharge.  Sutures to be removed in 7 to 10 days.  Wound care instructions were provided including keeping the wound dressed for approximately 24 hours.  Upon removing the dressing, area should be washed with a gentle soap.  Antibiotic ointment 1-2 times daily.  No indications for oral antibiotic prophylaxis at this time.  Patient should return sooner for any signs of infection such as redness, discharge, fever or any other new concerns.  Patient was discharged in good condition is agreeable to the above plan. Tetanus UTD and not indicated today.    I have reviewed the nursing notes.    I have reviewed the findings, diagnosis, plan and need for follow up with the patient.          New Prescriptions    No medications on file       Final diagnoses:   Laceration of left palm, initial encounter       7/4/2023   [facility] EMERGENCY DEPT    Disclaimer:  This note consists of symbols derived from keyboarding, dictation and/or voice recognition software.  As a result, there may be errors in the script that have gone undetected.  Please consider this when interpreting information found in this chart.       Dominic Bravo APRN CNP  07/04/23 1929

## 2023-07-17 ENCOUNTER — TELEPHONE (OUTPATIENT)
Dept: FAMILY MEDICINE | Facility: CLINIC | Age: 28
End: 2023-07-17

## 2023-07-17 ENCOUNTER — OFFICE VISIT (OUTPATIENT)
Dept: FAMILY MEDICINE | Facility: CLINIC | Age: 28
End: 2023-07-17
Payer: COMMERCIAL

## 2023-07-17 ENCOUNTER — LAB (OUTPATIENT)
Dept: LAB | Facility: CLINIC | Age: 28
End: 2023-07-17
Payer: COMMERCIAL

## 2023-07-17 VITALS
WEIGHT: 174.8 LBS | HEART RATE: 82 BPM | OXYGEN SATURATION: 99 % | HEIGHT: 62 IN | BODY MASS INDEX: 32.17 KG/M2 | SYSTOLIC BLOOD PRESSURE: 122 MMHG | TEMPERATURE: 98.6 F | DIASTOLIC BLOOD PRESSURE: 82 MMHG | RESPIRATION RATE: 18 BRPM

## 2023-07-17 DIAGNOSIS — R20.2 PARESTHESIA: ICD-10-CM

## 2023-07-17 DIAGNOSIS — Z13.6 CARDIOVASCULAR SCREENING; LDL GOAL LESS THAN 130: ICD-10-CM

## 2023-07-17 DIAGNOSIS — Z00.00 ROUTINE PHYSICAL EXAMINATION: Primary | ICD-10-CM

## 2023-07-17 DIAGNOSIS — Z30.41 ENCOUNTER FOR SURVEILLANCE OF CONTRACEPTIVE PILLS: ICD-10-CM

## 2023-07-17 DIAGNOSIS — Z00.00 ROUTINE PHYSICAL EXAMINATION: ICD-10-CM

## 2023-07-17 DIAGNOSIS — Z12.4 CERVICAL CANCER SCREENING: ICD-10-CM

## 2023-07-17 DIAGNOSIS — J30.1 SEASONAL ALLERGIC RHINITIS DUE TO POLLEN: ICD-10-CM

## 2023-07-17 LAB
ANION GAP SERPL CALCULATED.3IONS-SCNC: 16 MMOL/L (ref 7–15)
BUN SERPL-MCNC: 7.9 MG/DL (ref 6–20)
CALCIUM SERPL-MCNC: 10 MG/DL (ref 8.6–10)
CHLORIDE SERPL-SCNC: 101 MMOL/L (ref 98–107)
CHOLEST SERPL-MCNC: 184 MG/DL
CREAT SERPL-MCNC: 0.73 MG/DL (ref 0.51–0.95)
DEPRECATED HCO3 PLAS-SCNC: 21 MMOL/L (ref 22–29)
FOLATE SERPL-MCNC: 24 NG/ML (ref 4.6–34.8)
GFR SERPL CREATININE-BSD FRML MDRD: >90 ML/MIN/1.73M2
GLUCOSE SERPL-MCNC: 87 MG/DL (ref 70–99)
HBA1C MFR BLD: 5.4 % (ref 0–5.6)
HCG UR QL: NEGATIVE
HDLC SERPL-MCNC: 65 MG/DL
LDLC SERPL CALC-MCNC: 103 MG/DL
MAGNESIUM SERPL-MCNC: 1.7 MG/DL (ref 1.7–2.3)
NONHDLC SERPL-MCNC: 119 MG/DL
POTASSIUM SERPL-SCNC: 3.8 MMOL/L (ref 3.4–5.3)
SODIUM SERPL-SCNC: 138 MMOL/L (ref 136–145)
TRIGL SERPL-MCNC: 81 MG/DL
VIT B12 SERPL-MCNC: 510 PG/ML (ref 232–1245)

## 2023-07-17 PROCEDURE — 80048 BASIC METABOLIC PNL TOTAL CA: CPT

## 2023-07-17 PROCEDURE — 36415 COLL VENOUS BLD VENIPUNCTURE: CPT

## 2023-07-17 PROCEDURE — 99214 OFFICE O/P EST MOD 30 MIN: CPT | Mod: 25 | Performed by: NURSE PRACTITIONER

## 2023-07-17 PROCEDURE — 83036 HEMOGLOBIN GLYCOSYLATED A1C: CPT

## 2023-07-17 PROCEDURE — 82607 VITAMIN B-12: CPT

## 2023-07-17 PROCEDURE — 81025 URINE PREGNANCY TEST: CPT

## 2023-07-17 PROCEDURE — 82746 ASSAY OF FOLIC ACID SERUM: CPT

## 2023-07-17 PROCEDURE — 83735 ASSAY OF MAGNESIUM: CPT

## 2023-07-17 PROCEDURE — 99395 PREV VISIT EST AGE 18-39: CPT | Performed by: NURSE PRACTITIONER

## 2023-07-17 PROCEDURE — 80061 LIPID PANEL: CPT

## 2023-07-17 PROCEDURE — G0145 SCR C/V CYTO,THINLAYER,RESCR: HCPCS | Performed by: NURSE PRACTITIONER

## 2023-07-17 RX ORDER — DROSPIRENONE AND ETHINYL ESTRADIOL 0.02-3(28)
1 KIT ORAL DAILY
Qty: 84 TABLET | Refills: 4 | Status: SHIPPED | OUTPATIENT
Start: 2023-07-17 | End: 2023-08-01 | Stop reason: SINTOL

## 2023-07-17 RX ORDER — FLUTICASONE PROPIONATE 50 MCG
1 SPRAY, SUSPENSION (ML) NASAL DAILY
Qty: 16 G | Refills: 11 | Status: SHIPPED | OUTPATIENT
Start: 2023-07-17

## 2023-07-17 ASSESSMENT — ENCOUNTER SYMPTOMS
WEAKNESS: 0
HEARTBURN: 1
PARESTHESIAS: 0
NERVOUS/ANXIOUS: 0
NAUSEA: 0
FREQUENCY: 0
JOINT SWELLING: 0
ABDOMINAL PAIN: 0
FEVER: 0
PALPITATIONS: 0
HEADACHES: 0
HEMATOCHEZIA: 0
BREAST MASS: 0
CONSTIPATION: 0
HEMATURIA: 0
DIARRHEA: 0
DYSURIA: 0
SHORTNESS OF BREATH: 0
ARTHRALGIAS: 1
SORE THROAT: 0
DIZZINESS: 0
CHILLS: 0
MYALGIAS: 0
EYE PAIN: 0
COUGH: 0

## 2023-07-17 NOTE — PROGRESS NOTES
SUBJECTIVE:   CC: Nargis is an 28 year old who presents for preventive health visit.       7/17/2023     2:26 PM   Additional Questions   Roomed by Fabi CHAVEZ     Healthy Habits:     Getting at least 3 servings of Calcium per day:  Yes    Bi-annual eye exam:  NO    Dental care twice a year:  Yes    Sleep apnea or symptoms of sleep apnea:  None    Frequency of exercise:  4-5 days/week    Duration of exercise:  45-60 minutes    Taking medications regularly:  Yes    Medication side effects:  None    Additional concerns today:  Yes    Other concerns-BC makes her emotional, could like to change it.   Refill on flonase.  Discuss weight loss options. Has been using diet and exercises changes but it is not helping.  Numbness in tip of great toe      Have you ever done Advance Care Planning? (For example, a Health Directive, POLST, or a discussion with a medical provider or your loved ones about your wishes): No, advance care planning information given to patient to review.  Patient declined advance care planning discussion at this time.    Social History     Tobacco Use     Smoking status: Former     Packs/day: 0.20     Years: 0.00     Pack years: 0.00     Types: Cigarettes     Smokeless tobacco: Never     Tobacco comments:     quit with pregnancy   Substance Use Topics     Alcohol use: Not Currently     Comment: 4 drinks per month-quit with pregnancy             7/17/2023    11:35 AM   Alcohol Use   Prescreen: >3 drinks/day or >7 drinks/week? No     Reviewed orders with patient.  Reviewed health maintenance and updated orders accordingly - Yes  BP Readings from Last 3 Encounters:   07/17/23 122/82   07/04/23 (!) 148/90   02/01/23 (!) 143/97    Wt Readings from Last 3 Encounters:   07/17/23 79.3 kg (174 lb 12.8 oz)   02/01/23 71.7 kg (158 lb)   12/12/22 71.7 kg (158 lb)                  Patient Active Problem List   Diagnosis     Current moderate episode of major depressive disorder without prior episode (H)     Anxiety  state     History of syncope     Attention deficit hyperactivity disorder (ADHD), predominantly inattentive type     Chronic pain of both knees     Mild intermittent asthma without complication     S/P primary low transverse      Seizures (H)     Past Surgical History:   Procedure Laterality Date      SECTION N/A 2020    Procedure:  SECTION;  Surgeon: Jaqueline Reyes MD;  Location: [facility]     ESOPHAGOSCOPY, GASTROSCOPY, DUODENOSCOPY (EGD), COMBINED N/A 11/15/2021    Procedure: ESOPHAGOGASTRODUODENOSCOPY, WITH BIOPSY;  Surgeon: Robi Rodriguez MD;  Location: [facility]     ESOPHAGOSCOPY, GASTROSCOPY, DUODENOSCOPY (EGD), COMBINED N/A 2022    Procedure: ESOPHAGOGASTRODUODENOSCOPY, WITH BIOPSY;  Surgeon: David Palm MD;  Location: [facility]     MOUTH SURGERY      wisdom teeth     TONSILLECTOMY         Social History     Tobacco Use     Smoking status: Former     Packs/day: 0.20     Years: 0.00     Pack years: 0.00     Types: Cigarettes     Smokeless tobacco: Never     Tobacco comments:     quit with pregnancy   Substance Use Topics     Alcohol use: Not Currently     Comment: 4 drinks per month-quit with pregnancy     Family History   Problem Relation Age of Onset     Diabetes Maternal Grandfather      Coronary Artery Disease Maternal Grandfather      Cerebrovascular Disease Maternal Grandfather      Depression Mother      Anxiety Disorder Mother      Unknown/Adopted Father         unknown      Chronic Obstructive Pulmonary Disease Maternal Grandmother      Coronary Artery Disease Daughter      Other Cancer No family hx of          Current Outpatient Medications   Medication Sig Dispense Refill     albuterol (PROAIR HFA/PROVENTIL HFA/VENTOLIN HFA) 108 (90 Base) MCG/ACT inhaler Inhale 2 puffs every 4-6 hours as needed for cough, wheezing, or shortness of breath 18 g 0     busPIRone (BUSPAR) 10 MG tablet Take 1 tablet (10 mg) by mouth 2 times daily 180 tablet 1      diclofenac (VOLTAREN) 1 % topical gel Apply 4 g topically 4 times daily 350 g 1     drospirenone-ethinyl estradiol (TWYLA) 3-0.02 MG tablet Take 1 tablet by mouth daily 84 tablet 4     famotidine (PEPCID) 20 MG tablet Take 1 tablet (20 mg) by mouth 2 times daily 180 tablet 1     fluticasone (FLONASE) 50 MCG/ACT nasal spray Spray 1 spray into both nostrils daily 16 g 11     insulin pen needle (32G X 4 MM) 32G X 4 MM miscellaneous Use 1 pen needles daily or as directed. 100 each 4     liraglutide - Weight Management (SAXENDA) 18 MG/3ML pen Inject 0.6 mg Subcutaneous daily for 7 days, THEN 1.2 mg daily for 7 days, THEN 1.8 mg daily for 7 days, THEN 2.4 mg daily for 7 days, THEN 3 mg daily for 62 days. 38 mL 0     lisdexamfetamine (VYVANSE) 70 MG capsule Take 1 capsule (70 mg) by mouth daily for 30 days 30 capsule 0     [START ON 7/27/2023] lisdexamfetamine (VYVANSE) 70 MG capsule Take 1 capsule (70 mg) by mouth daily for 30 days 30 capsule 0     lisdexamfetamine (VYVANSE) 70 MG capsule Take 1 capsule (70 mg) by mouth every morning 30 capsule 0     LORazepam (ATIVAN) 0.5 MG tablet Take 1 tablet (0.5 mg) by mouth every 6 hours as needed for anxiety 10 tablet 0     venlafaxine (EFFEXOR XR) 37.5 MG 24 hr capsule TAKE ONE CAPSULE BY MOUTH ONCE DAILY ALONG WITH 75MG CAPSULE 90 capsule 0     venlafaxine (EFFEXOR XR) 75 MG 24 hr capsule Take 1 capsule (75 mg) by mouth daily 90 capsule 0       Breast Cancer Screening:    FHS-7:        No data to display                Patient under 40 years of age: Routine Mammogram Screening not recommended.   Pertinent mammograms are reviewed under the imaging tab.    History of abnormal Pap smear: NO - age 21-29 PAP every 3 years recommended      10/22/2019     9:30 AM 7/18/2016    12:00 AM   PAP / HPV   PAP (Historical) NIL  NIL      Reviewed and updated as needed this visit by clinical staff   Tobacco  Allergies  Meds              Reviewed and updated as needed this visit by Provider       "               Review of Systems   Constitutional: Negative for chills and fever.   HENT: Negative for congestion, ear pain, hearing loss and sore throat.    Eyes: Negative for pain and visual disturbance.   Respiratory: Negative for cough and shortness of breath.    Cardiovascular: Negative for chest pain, palpitations and peripheral edema.   Gastrointestinal: Positive for heartburn. Negative for abdominal pain, constipation, diarrhea, hematochezia and nausea.   Breasts:  Negative for tenderness, breast mass and discharge.   Genitourinary: Negative for dysuria, frequency, genital sores, hematuria, pelvic pain, urgency, vaginal bleeding and vaginal discharge.   Musculoskeletal: Positive for arthralgias. Negative for joint swelling and myalgias.   Skin: Negative for rash.   Neurological: Negative for dizziness, weakness, headaches and paresthesias.   Psychiatric/Behavioral: Negative for mood changes. The patient is not nervous/anxious.           OBJECTIVE:   /82   Pulse 82   Temp 98.6  F (37  C) (Tympanic)   Resp 18   Ht 1.575 m (5' 2\")   Wt 79.3 kg (174 lb 12.8 oz)   LMP  (LMP Unknown)   SpO2 99%   BMI 31.97 kg/m    Physical Exam  GENERAL: healthy, alert and no distress  EYES: Eyes grossly normal to inspection and conjunctivae and sclerae normal  HENT: ear canals and TM's normal, nose and mouth without ulcers or lesions  NECK: no adenopathy, no asymmetry, masses, or scars and thyroid normal to palpation  RESP: lungs clear to auscultation - no rales, rhonchi or wheezes  CV: regular rates and rhythm, normal S1 S2, no S3 or S4, no murmur, click or rub and no peripheral edema  ABDOMEN: soft, nontender and no palpable or pulsatile masses   (female): normal female external genitalia, normal urethral meatus, vaginal mucosa, normal cervix/adnexa/uterus without masses or discharge  MS: no gross musculoskeletal defects noted, no edema  SKIN: no suspicious lesions or rashes  NEURO: Normal strength and tone, " "mentation intact and speech normal  PSYCH: mentation appears normal, affect normal/bright    Diagnostic Test Results:  Labs reviewed in Epic    ASSESSMENT/PLAN:   Nargis was seen today for physical.    Diagnoses and all orders for this visit:    Routine physical examination  -     Basic metabolic panel  (Ca, Cl, CO2, Creat, Gluc, K, Na, BUN); Future  -     Hemoglobin A1c; Future    Cervical cancer screening  -     Pap Screen reflex to HPV if ASCUS - recommend age 25 - 29    Seasonal allergic rhinitis due to pollen  -     fluticasone (FLONASE) 50 MCG/ACT nasal spray; Spray 1 spray into both nostrils daily    Encounter for surveillance of contraceptive pills  -     drospirenone-ethinyl estradiol (TWYLA) 3-0.02 MG tablet; Take 1 tablet by mouth daily  -     HCG Qual, Urine ([id number]); Future    BMI 32.0-32.9,adult  -     liraglutide - Weight Management (SAXENDA) 18 MG/3ML pen; Inject 0.6 mg Subcutaneous daily for 7 days, THEN 1.2 mg daily for 7 days, THEN 1.8 mg daily for 7 days, THEN 2.4 mg daily for 7 days, THEN 3 mg daily for 62 days.  -     insulin pen needle (32G X 4 MM) 32G X 4 MM miscellaneous; Use 1 pen needles daily or as directed.  -     Hemoglobin A1c; Future    CARDIOVASCULAR SCREENING; LDL GOAL LESS THAN 130  -     Lipid panel reflex to direct LDL Non-fasting; Future    Paresthesia  -     Vitamin B12; Future  -     Folate; Future  -     Magnesium; Future              COUNSELING:  Reviewed preventive health counseling, as reflected in patient instructions      BMI:   Estimated body mass index is 31.97 kg/m  as calculated from the following:    Height as of this encounter: 1.575 m (5' 2\").    Weight as of this encounter: 79.3 kg (174 lb 12.8 oz).   Weight management plan: Discussed healthy diet and exercise guidelines      She reports that she has quit smoking. Her smoking use included cigarettes. She smoked an average of 0.20 packs per day. She has never used smokeless tobacco.          Jacklyn Marsh, " SUDHEER CHAVEZ [facility]   V-Y Plasty Text: The defect edges were debeveled with a #15 scalpel blade.  Given the location of the defect, shape of the defect and the proximity to free margins an V-Y advancement flap was deemed most appropriate.  Using a sterile surgical marker, an appropriate advancement flap was drawn incorporating the defect and placing the expected incisions within the relaxed skin tension lines where possible.    The area thus outlined was incised deep to adipose tissue with a #15 scalpel blade.  The skin margins were undermined to an appropriate distance in all directions utilizing iris scissors.

## 2023-07-17 NOTE — TELEPHONE ENCOUNTER
Prior Authorization Retail Medication Request    Medication/Dose: Saxenda  ICD code (if different than what is on RX):  na  Previously Tried and Failed:  na  Rationale:  na    Insurance Name:  [employer]  Insurance ID: [id number]        Pharmacy Information (if different than what is on RX)  Name:  [facility]  Phone:  [phone]

## 2023-07-18 ENCOUNTER — MYC MEDICAL ADVICE (OUTPATIENT)
Dept: FAMILY MEDICINE | Facility: CLINIC | Age: 28
End: 2023-07-18
Payer: COMMERCIAL

## 2023-07-18 ENCOUNTER — TELEPHONE (OUTPATIENT)
Dept: FAMILY MEDICINE | Facility: CLINIC | Age: 28
End: 2023-07-18
Payer: COMMERCIAL

## 2023-07-18 NOTE — RESULT ENCOUNTER NOTE
"Felisa Nargis    Your lab results came back within normal limits. No abnormal findings to explain the toe numbness. Please let us know if you have any questions.     Take care,    SUDHEER Guadalupe CNP    TEST DESCRIPTIONS   CBC (Complete Blood Count) includes hemoglobin, hematocrit, white blood cells, etc. This test can be used to detect anemia, infection, and abnormalities in blood cells.   Cholesterol is one of the blood fats (lipids) and is the building block used by the body for cell wall and hormone production. Increased levels of cholesterol have been proven to directly contribute to heart disease and strokes.   Triglycerides are one of the blood fats (lipids) and are thought to be associated with heart disease. If you have had anything to eat or drink other than water for 12 hours before the test and your level is high, you should have the test repeated after a 12 hour fast. Abnormally high results may also be associated with diabetes, kidney and liver diseases.   LDL is the low density lipoprotein component of the cholesterol. It is the harmful substance that deposits cholesterol on the artery walls contributing to heart attacks and strokes. A high LDL level is associated with higher risk of coronary heart disease.   HDL stands for high density lipoprotein and refers to the so-called \"good\" cholesterol. HDL picks up excess cholesterol in the bloodstream and carries it back to the liver for disposal. Individuals with higher than average HDL seem to have a lower risk of coronary disease. Vigorous exercise will help increase the blood levels of HDL.   Risk Factor (Total cholesterol/HDL) is a commonly used ratio for cardiac risk assessment. Less than 5.0 for men and 4.4 for women is ideal.   Sodium is an electrolyte useful in diagnosis of dehydration, diabetes, hypertension, or other diseases involving electrolyte imbalance. It also preserves the balance between calcium and potassium to maintain normal heart " action and equilibrium of the body.   Potassium is also an electrolyte that works with sodium to regulate the body's water balance and normalize heart rhythm.   Glucose is a measure of blood sugar and is one of the tests for diabetes. If you have not been fasting, your level will often be high. A low glucose level may be a cause of weakness or dizziness. Blood sugar ranks with cholesterol as a causative factor in arteriosclerosis and heart attacks.   BUN & Creatinine are waste products excreted by the kidneys. A high BUN can be related to a high protein diet, heavy exercise, fever and infections, dehydration, kidney stones, and kidney disease. Creatinine elevation is less dependent on diet or exercise and better represents kidney impairment. Low values are not generally significant.   Calcium is a mineral in the blood controlled by the parathyroid glands and kidneys. It is important in the formation of bone, in muscle and nerve function, and in blood clotting. Disease of the parathyroid gland, diseased bones or kidneys, or defective absorption of calcium may cause abnormal levels from the intestine.   ALT, AST, Alk Phos are liver tests. Enzymes found in the liver as well as skeletal and cardiac muscle. Elevations can often be seen in alcoholism, liver or heart disease. Slightly abnormal values are not considered significant.   TSH stands for Thyroid Stimulating Hormone. A sensitive test used to determine how the thyroid gland is functioning. The thyroid gland produces hormones that control the body's metabolism. When too few hormones are produced (increased TSH), hypothyroidism occurs which can cause fatigue, sensitivity to cold, and weight gain - an overall slowing down of bodily functions. When too many thyroid hormones are produces (or decreased TSH), it creates a condition call hyperthyroidism (such as Graves' disease) which causes rapid heartbeat, weight loss, and dizziness, among other symptoms.   PSA stands  for Prostate Specific Antigen. Elevated levels of PSA can increase with trauma, infection, inflammation, or disease processes in the prostate such as BPH (Benigh Prostatic Hypertrophy) or cancer.   Glycohemoglobin or HgBA1C is a 3-month average of blood sugar

## 2023-07-18 NOTE — TELEPHONE ENCOUNTER
See my chart message    Requesting Wegovy instead of Saxenda due to insurance    Pended lowest dose    Stella Lundberg RN on 7/18/2023 at 12:08 PM

## 2023-07-18 NOTE — TELEPHONE ENCOUNTER
Prior Authorization Retail Medication Request    Medication/Dose: Wegovy 0.25mg/0.5ml  ICD code (if different than what is on RX):  na  Previously Tried and Failed:  na  Rationale:  na    Insurance Name:  manish paraad  Insurance ID:  [id number]        Pharmacy Information (if different than what is on RX)  Name:   [facility]  Phone:  [phone]

## 2023-07-19 ENCOUNTER — MYC MEDICAL ADVICE (OUTPATIENT)
Dept: FAMILY MEDICINE | Facility: CLINIC | Age: 28
End: 2023-07-19
Payer: COMMERCIAL

## 2023-07-20 NOTE — TELEPHONE ENCOUNTER
Central Prior Authorization Team   Phone: [phone]    PA Initiation    Medication: SAXENDA 18 MG/3ML SC SOPN  Insurance Company: [employer] [place] - Phone [phone] Fax [phone]  Pharmacy Filling the Rx: [place], [place] - [address]  Filling Pharmacy Phone: [phone]  Filling Pharmacy Fax:    Start Date: 7/20/2023

## 2023-07-21 LAB
BKR LAB AP GYN ADEQUACY: NORMAL
BKR LAB AP GYN INTERPRETATION: NORMAL
BKR LAB AP HPV REFLEX: NORMAL
BKR LAB AP PREVIOUS ABNORMAL: NORMAL
PATH REPORT.COMMENTS IMP SPEC: NORMAL
PATH REPORT.COMMENTS IMP SPEC: NORMAL
PATH REPORT.RELEVANT HX SPEC: NORMAL

## 2023-07-21 NOTE — TELEPHONE ENCOUNTER
Prior Authorization Approval    Medication: SAXENDA 18 MG/3ML SC SOPN  Authorization Effective Date: 4/21/2023  Authorization Expiration Date: 11/20/2023  Insurance Company: [employer] [place] - Phone [phone] Fax [phone]  Which Pharmacy is filling the prescription: [facility] [place], [place] - [address]  Pharmacy Notified: Yes  Patient Notified: Yes (pharmacy will notify patient when ready)

## 2023-07-31 ENCOUNTER — MYC MEDICAL ADVICE (OUTPATIENT)
Dept: FAMILY MEDICINE | Facility: CLINIC | Age: 28
End: 2023-07-31
Payer: COMMERCIAL

## 2023-08-24 ENCOUNTER — MYC MEDICAL ADVICE (OUTPATIENT)
Dept: FAMILY MEDICINE | Facility: CLINIC | Age: 28
End: 2023-08-24
Payer: COMMERCIAL

## 2023-08-24 DIAGNOSIS — F90.0 ATTENTION DEFICIT HYPERACTIVITY DISORDER (ADHD), PREDOMINANTLY INATTENTIVE TYPE: ICD-10-CM

## 2023-08-24 RX ORDER — LISDEXAMFETAMINE DIMESYLATE 70 MG/1
70 CAPSULE ORAL DAILY
Qty: 30 CAPSULE | Refills: 0 | Status: SHIPPED | OUTPATIENT
Start: 2023-08-24 | End: 2023-09-18

## 2023-08-30 ENCOUNTER — MYC REFILL (OUTPATIENT)
Dept: FAMILY MEDICINE | Facility: CLINIC | Age: 28
End: 2023-08-30
Payer: COMMERCIAL

## 2023-09-18 ENCOUNTER — MYC MEDICAL ADVICE (OUTPATIENT)
Dept: FAMILY MEDICINE | Facility: CLINIC | Age: 28
End: 2023-09-18
Payer: COMMERCIAL

## 2023-09-18 DIAGNOSIS — F90.0 ATTENTION DEFICIT HYPERACTIVITY DISORDER (ADHD), PREDOMINANTLY INATTENTIVE TYPE: Primary | ICD-10-CM

## 2023-09-18 RX ORDER — DEXTROAMPHETAMINE SACCHARATE, AMPHETAMINE ASPARTATE, DEXTROAMPHETAMINE SULFATE AND AMPHETAMINE SULFATE 5; 5; 5; 5 MG/1; MG/1; MG/1; MG/1
20 TABLET ORAL 2 TIMES DAILY
Qty: 60 TABLET | Refills: 0 | Status: SHIPPED | OUTPATIENT
Start: 2023-09-18 | End: 2023-10-04 | Stop reason: SINTOL

## 2023-09-26 ENCOUNTER — MYC MEDICAL ADVICE (OUTPATIENT)
Dept: FAMILY MEDICINE | Facility: CLINIC | Age: 28
End: 2023-09-26
Payer: COMMERCIAL

## 2023-09-26 NOTE — TELEPHONE ENCOUNTER
Please have her make a virtual visit appointment. Marah has been changing her medications without a visit and she needs to discuss this with the patient. This will have to wait until Marah gets back anyway. Esme Diallo M.D.

## 2023-10-03 ASSESSMENT — PATIENT HEALTH QUESTIONNAIRE - PHQ9
10. IF YOU CHECKED OFF ANY PROBLEMS, HOW DIFFICULT HAVE THESE PROBLEMS MADE IT FOR YOU TO DO YOUR WORK, TAKE CARE OF THINGS AT HOME, OR GET ALONG WITH OTHER PEOPLE: NOT DIFFICULT AT ALL
SUM OF ALL RESPONSES TO PHQ QUESTIONS 1-9: 5
SUM OF ALL RESPONSES TO PHQ QUESTIONS 1-9: 5

## 2023-10-04 ENCOUNTER — VIRTUAL VISIT (OUTPATIENT)
Dept: FAMILY MEDICINE | Facility: CLINIC | Age: 28
End: 2023-10-04
Payer: COMMERCIAL

## 2023-10-04 DIAGNOSIS — F90.0 ATTENTION DEFICIT HYPERACTIVITY DISORDER (ADHD), PREDOMINANTLY INATTENTIVE TYPE: Primary | ICD-10-CM

## 2023-10-04 DIAGNOSIS — F32.1 CURRENT MODERATE EPISODE OF MAJOR DEPRESSIVE DISORDER WITHOUT PRIOR EPISODE (H): ICD-10-CM

## 2023-10-04 DIAGNOSIS — R56.9 SEIZURES (H): ICD-10-CM

## 2023-10-04 DIAGNOSIS — K21.9 GASTROESOPHAGEAL REFLUX DISEASE WITHOUT ESOPHAGITIS: ICD-10-CM

## 2023-10-04 DIAGNOSIS — F41.1 ANXIETY STATE: ICD-10-CM

## 2023-10-04 DIAGNOSIS — Z30.41 ENCOUNTER FOR SURVEILLANCE OF CONTRACEPTIVE PILLS: ICD-10-CM

## 2023-10-04 PROCEDURE — 99213 OFFICE O/P EST LOW 20 MIN: CPT | Mod: VID | Performed by: PHYSICIAN ASSISTANT

## 2023-10-04 RX ORDER — VENLAFAXINE HYDROCHLORIDE 75 MG/1
75 CAPSULE, EXTENDED RELEASE ORAL DAILY
Qty: 90 CAPSULE | Refills: 1 | Status: SHIPPED | OUTPATIENT
Start: 2023-10-04 | End: 2024-04-11

## 2023-10-04 RX ORDER — DROSPIRENONE AND ETHINYL ESTRADIOL 0.02-3(28)
1 KIT ORAL DAILY
Qty: 90 TABLET | Refills: 3 | Status: SHIPPED | OUTPATIENT
Start: 2023-10-04 | End: 2024-04-25 | Stop reason: SINTOL

## 2023-10-04 RX ORDER — LISDEXAMFETAMINE DIMESYLATE 70 MG/1
70 CAPSULE ORAL DAILY
Qty: 30 CAPSULE | Refills: 0 | Status: SHIPPED | OUTPATIENT
Start: 2023-12-05 | End: 2023-12-26

## 2023-10-04 RX ORDER — BUSPIRONE HYDROCHLORIDE 10 MG/1
10 TABLET ORAL 2 TIMES DAILY
Qty: 180 TABLET | Refills: 1 | Status: SHIPPED | OUTPATIENT
Start: 2023-10-04 | End: 2023-12-26

## 2023-10-04 RX ORDER — VENLAFAXINE HYDROCHLORIDE 37.5 MG/1
CAPSULE, EXTENDED RELEASE ORAL
Qty: 90 CAPSULE | Refills: 1 | Status: SHIPPED | OUTPATIENT
Start: 2023-10-04 | End: 2024-04-11

## 2023-10-04 RX ORDER — LISDEXAMFETAMINE DIMESYLATE 70 MG/1
70 CAPSULE ORAL DAILY
Qty: 30 CAPSULE | Refills: 0 | Status: SHIPPED | OUTPATIENT
Start: 2023-10-04 | End: 2023-11-03

## 2023-10-04 RX ORDER — LISDEXAMFETAMINE DIMESYLATE 70 MG/1
70 CAPSULE ORAL DAILY
Qty: 30 CAPSULE | Refills: 0 | Status: SHIPPED | OUTPATIENT
Start: 2023-11-04 | End: 2023-12-04

## 2023-10-04 RX ORDER — VENLAFAXINE HYDROCHLORIDE 75 MG/1
75 CAPSULE, EXTENDED RELEASE ORAL DAILY
Qty: 90 CAPSULE | Refills: 0 | Status: SHIPPED | OUTPATIENT
Start: 2023-10-04 | End: 2023-10-04

## 2023-10-04 RX ORDER — FAMOTIDINE 20 MG/1
20 TABLET, FILM COATED ORAL 2 TIMES DAILY
Qty: 180 TABLET | Refills: 1 | Status: SHIPPED | OUTPATIENT
Start: 2023-10-04 | End: 2023-12-26

## 2023-10-04 RX ORDER — VENLAFAXINE HYDROCHLORIDE 37.5 MG/1
CAPSULE, EXTENDED RELEASE ORAL
Qty: 90 CAPSULE | Refills: 0 | Status: SHIPPED | OUTPATIENT
Start: 2023-10-04 | End: 2023-10-04

## 2023-10-04 NOTE — PROGRESS NOTES
Nargis is a 28 year old who is being evaluated via a billable video visit.      How would you like to obtain your AVS? MyChart  If the video visit is dropped, the invitation should be resent by: Text to cell phone: [phone]  Will anyone else be joining your video visit? No          Assessment & Plan   ASSESSMENT/PLAN:      ICD-10-CM    1. Attention deficit hyperactivity disorder (ADHD), predominantly inattentive type  F90.0 lisdexamfetamine (VYVANSE) 70 MG capsule     lisdexamfetamine (VYVANSE) 70 MG capsule     lisdexamfetamine (VYVANSE) 70 MG capsule      2. Current moderate episode of major depressive disorder without prior episode (H)  F32.1 busPIRone (BUSPAR) 10 MG tablet     venlafaxine (EFFEXOR XR) 37.5 MG 24 hr capsule     venlafaxine (EFFEXOR XR) 75 MG 24 hr capsule     DISCONTINUED: venlafaxine (EFFEXOR XR) 37.5 MG 24 hr capsule     DISCONTINUED: venlafaxine (EFFEXOR XR) 75 MG 24 hr capsule      3. Anxiety state  F41.1 busPIRone (BUSPAR) 10 MG tablet      4. Seizures (H)  R56.9       5. Encounter for surveillance of contraceptive pills  Z30.41 drospirenone-ethinyl estradiol (TWYLA) 3-0.02 MG tablet      6. BMI 32.0-32.9,adult  Z68.32 liraglutide - Weight Management (SAXENDA) 18 MG/3ML pen        ADHD: Will go back to vyvanse. This worked quite well for her. Discussed concerns with shortages.    Contraception: continue Twyla. Tolerating.    Anxiety/depression: controlled.    Obesity: does not have current weight, but is tolerating Saxenda and losing weight/feeling benefits.    History of seizure: Saw neurologist when younger for seizures/syncope/?pseudoseizure  They couldn't figure out what it was, she was not put on medication.  Has not had seizure for several years    6 month follow up.    [facility] is a 28 year [facility], presenting for the following health issues:  No chief complaint on file.        7/17/2023     2:26 PM   Additional  Questions   Roomed by Fabi CANELA       ADHD:   She switched from vyvanse to adderall (her previous dose as of 2021) due to shortages  However adderall Made her feeling jittery and anxious  Did not work as well for ADHD  Would like to switch back to vyvanse    Overall anxiety and depression are controlled with effexor and buspar, no concerns.    She started saxenda this summer. No major side effects= - a little fatigue in the beginning which has resolved  She has lower appetite, feeling full faster, not wanting to snack as much  She has lost weight, she is not sure of her current weight    She was having irritability this summer she attributed to Shazia OCP, however this resolved; she has been continuing to take it with no concerns.    Review of Systems   Other than noted above, general, HEENT, respiratory, cardiac, MS, and gastrointestinal systems are negative.       Objective           Vitals:  No vitals were obtained today due to virtual visit.    Physical Exam   GENERAL: Healthy, alert and no distress  EYES: Eyes grossly normal to inspection.  No discharge or erythema, or obvious scleral/conjunctival abnormalities.  RESP: No audible wheeze, cough, or visible cyanosis.  No visible retractions or increased work of breathing.    SKIN: Visible skin clear. No significant rash, abnormal pigmentation or lesions.  NEURO: Cranial nerves grossly intact.  Mentation and speech appropriate for age.  PSYCH: Mentation appears normal, affect normal/bright, judgement and insight intact, normal speech and appearance well-groomed.                Video-Visit Details    Type of service:  Video Visit   Video Start Time: 7:57 AM  Video End Time:8:07 AM    Originating Location (pt. Location): Home    Distant Location (provider location):  On-site  Platform used for Video Visit: Rosalba

## 2023-11-17 ENCOUNTER — TELEPHONE (OUTPATIENT)
Dept: FAMILY MEDICINE | Facility: CLINIC | Age: 28
End: 2023-11-17
Payer: COMMERCIAL

## 2023-11-22 NOTE — TELEPHONE ENCOUNTER
There is no weight recorded in chart since 07/2023 when she started Saxenda. I need to have current weight to renew PA.     Central Prior Authorization Team   Phone: [phone]    PA Initiation    [facility]: [facility]  Insurance Company: [employer] - Phone [phone] Fax [phone]  Pharmacy Filling the Rx: [place], [place] - [address]  Filling Pharmacy Phone: [phone]  Filling Pharmacy Fax:    Start Date: 11/22/2023

## 2023-11-22 NOTE — TELEPHONE ENCOUNTER
I spoke to patient to get her weight and she states she currently weighs 160#. She also, however stated that she has gained weight since she's been off of Saxenda for the past 1 1/2 months due to backorder. I did confirm with pharmacy that is also on backorder.

## 2023-11-28 ENCOUNTER — MYC MEDICAL ADVICE (OUTPATIENT)
Dept: FAMILY MEDICINE | Facility: CLINIC | Age: 28
End: 2023-11-28
Payer: COMMERCIAL

## 2023-11-29 NOTE — TELEPHONE ENCOUNTER
Prior Authorization Approval    Medication: SAXENDA 18 MG/3ML SC SOPN  Authorization Effective Date: 8/24/2023  Authorization Expiration Date: 11/22/2024  Approved Dose/Quantity:   Reference #:     Insurance Company: [employer] - Phone [phone] Fax [phone]  Expected CoPay: $    CoPay Card Available:      Financial Assistance Needed:   Which Pharmacy is filling the prescription: [place], [place] - [address]  Pharmacy Notified: Yes  Patient Notified:

## 2023-11-29 NOTE — TELEPHONE ENCOUNTER
Please give pharmacy verbal order that she can pick it up on Friday. The script is already there  HUAN Knox MD

## 2023-12-26 ENCOUNTER — MYC MEDICAL ADVICE (OUTPATIENT)
Dept: FAMILY MEDICINE | Facility: CLINIC | Age: 28
End: 2023-12-26
Payer: COMMERCIAL

## 2023-12-26 DIAGNOSIS — F32.1 CURRENT MODERATE EPISODE OF MAJOR DEPRESSIVE DISORDER WITHOUT PRIOR EPISODE (H): ICD-10-CM

## 2023-12-26 DIAGNOSIS — F41.1 ANXIETY STATE: ICD-10-CM

## 2023-12-26 DIAGNOSIS — F90.0 ATTENTION DEFICIT HYPERACTIVITY DISORDER (ADHD), PREDOMINANTLY INATTENTIVE TYPE: ICD-10-CM

## 2023-12-26 DIAGNOSIS — K21.9 GASTROESOPHAGEAL REFLUX DISEASE WITHOUT ESOPHAGITIS: ICD-10-CM

## 2023-12-26 RX ORDER — LISDEXAMFETAMINE DIMESYLATE 70 MG/1
70 CAPSULE ORAL DAILY
Qty: 30 CAPSULE | Refills: 0 | Status: SHIPPED | OUTPATIENT
Start: 2023-12-26 | End: 2023-12-28

## 2023-12-26 RX ORDER — BUSPIRONE HYDROCHLORIDE 10 MG/1
10 TABLET ORAL 2 TIMES DAILY
Qty: 180 TABLET | Refills: 1 | Status: SHIPPED | OUTPATIENT
Start: 2023-12-26 | End: 2024-04-11

## 2023-12-26 RX ORDER — FAMOTIDINE 20 MG/1
20 TABLET, FILM COATED ORAL 2 TIMES DAILY
Qty: 180 TABLET | Refills: 1 | Status: SHIPPED | OUTPATIENT
Start: 2023-12-26 | End: 2024-04-22

## 2023-12-27 DIAGNOSIS — F90.0 ATTENTION DEFICIT HYPERACTIVITY DISORDER (ADHD), PREDOMINANTLY INATTENTIVE TYPE: ICD-10-CM

## 2023-12-28 RX ORDER — LISDEXAMFETAMINE DIMESYLATE 70 MG/1
70 CAPSULE ORAL DAILY
Qty: 30 CAPSULE | Refills: 0 | Status: SHIPPED | OUTPATIENT
Start: 2023-12-28 | End: 2024-04-11

## 2024-01-03 ENCOUNTER — MYC REFILL (OUTPATIENT)
Dept: FAMILY MEDICINE | Facility: CLINIC | Age: 29
End: 2024-01-03
Payer: COMMERCIAL

## 2024-01-03 DIAGNOSIS — F41.1 ANXIETY STATE: ICD-10-CM

## 2024-01-04 RX ORDER — LORAZEPAM 0.5 MG/1
0.5 TABLET ORAL EVERY 6 HOURS PRN
Qty: 10 TABLET | Refills: 0 | Status: SHIPPED | OUTPATIENT
Start: 2024-01-04 | End: 2024-05-14

## 2024-01-25 ENCOUNTER — MYC MEDICAL ADVICE (OUTPATIENT)
Dept: FAMILY MEDICINE | Facility: CLINIC | Age: 29
End: 2024-01-25
Payer: COMMERCIAL

## 2024-01-25 DIAGNOSIS — F90.0 ATTENTION DEFICIT HYPERACTIVITY DISORDER (ADHD), PREDOMINANTLY INATTENTIVE TYPE: ICD-10-CM

## 2024-01-25 RX ORDER — LISDEXAMFETAMINE DIMESYLATE 70 MG/1
70 CAPSULE ORAL DAILY
Qty: 30 CAPSULE | Refills: 0 | Status: CANCELLED | OUTPATIENT
Start: 2024-01-25

## 2024-01-26 RX ORDER — LISDEXAMFETAMINE DIMESYLATE 70 MG/1
70 CAPSULE ORAL DAILY
Qty: 30 CAPSULE | Refills: 0 | Status: SHIPPED | OUTPATIENT
Start: 2024-03-27 | End: 2024-04-11

## 2024-01-26 RX ORDER — LISDEXAMFETAMINE DIMESYLATE 70 MG/1
70 CAPSULE ORAL DAILY
Qty: 30 CAPSULE | Refills: 0 | Status: SHIPPED | OUTPATIENT
Start: 2024-01-26 | End: 2024-02-25

## 2024-01-26 RX ORDER — LISDEXAMFETAMINE DIMESYLATE 70 MG/1
70 CAPSULE ORAL DAILY
Qty: 30 CAPSULE | Refills: 0 | Status: SHIPPED | OUTPATIENT
Start: 2024-02-25 | End: 2024-03-26

## 2024-04-07 ENCOUNTER — HOSPITAL ENCOUNTER (EMERGENCY)
Facility: CLINIC | Age: 29
Discharge: HOME | End: 2024-04-07
Attending: EMERGENCY MEDICINE | Admitting: EMERGENCY MEDICINE
Payer: COMMERCIAL

## 2024-04-07 VITALS
RESPIRATION RATE: 20 BRPM | HEIGHT: 61 IN | TEMPERATURE: 97.8 F | WEIGHT: 174 LBS | OXYGEN SATURATION: 100 % | BODY MASS INDEX: 32.85 KG/M2 | HEART RATE: 120 BPM

## 2024-04-07 DIAGNOSIS — S61.217A LACERATION OF LEFT LITTLE FINGER WITHOUT FOREIGN BODY WITHOUT DAMAGE TO NAIL, INITIAL ENCOUNTER: ICD-10-CM

## 2024-04-07 PROCEDURE — 99283 EMERGENCY DEPT VISIT LOW MDM: CPT | Mod: 25 | Performed by: EMERGENCY MEDICINE

## 2024-04-07 PROCEDURE — 12001 RPR S/N/AX/GEN/TRNK 2.5CM/<: CPT | Performed by: EMERGENCY MEDICINE

## 2024-04-07 PROCEDURE — 12001 RPR S/N/AX/GEN/TRNK 2.5CM/<: CPT

## 2024-04-07 PROCEDURE — 99283 EMERGENCY DEPT VISIT LOW MDM: CPT

## 2024-04-07 PROCEDURE — 250N000013 HC RX MED GY IP 250 OP 250 PS 637: Performed by: EMERGENCY MEDICINE

## 2024-04-07 RX ORDER — LORAZEPAM 0.5 MG/1
0.5 TABLET ORAL ONCE
Status: DISCONTINUED | OUTPATIENT
Start: 2024-04-07 | End: 2024-04-07

## 2024-04-07 RX ORDER — HYDROCODONE BITARTRATE AND ACETAMINOPHEN 5; 325 MG/1; MG/1
1 TABLET ORAL ONCE
Status: COMPLETED | OUTPATIENT
Start: 2024-04-07 | End: 2024-04-07

## 2024-04-07 RX ADMIN — HYDROCODONE BITARTRATE AND ACETAMINOPHEN 1 TABLET: 5; 325 TABLET ORAL at 15:26

## 2024-04-07 ASSESSMENT — COLUMBIA-SUICIDE SEVERITY RATING SCALE - C-SSRS
2. HAVE YOU ACTUALLY HAD ANY THOUGHTS OF KILLING YOURSELF IN THE PAST MONTH?: NO
6. HAVE YOU EVER DONE ANYTHING, STARTED TO DO ANYTHING, OR PREPARED TO DO ANYTHING TO END YOUR LIFE?: NO
1. IN THE PAST MONTH, HAVE YOU WISHED YOU WERE DEAD OR WISHED YOU COULD GO TO SLEEP AND NOT WAKE UP?: NO

## 2024-04-07 NOTE — ED PROVIDER NOTES
History     Chief Complaint   Patient presents with    Laceration     HPI  Nargis Ruelas is a 29 year old female who presents with a left pinky laceration. Is right-handed. Was cutting tomatoes and slipped and cut the left fingertip.  No bleeding issues or blood thinner use.  Based on my review of her primary care notes dated 10/4/23, patient has a history of ADHD.  Also has a history of major depressive disorder.  I reviewed another primary care note dated 23 and the patient was started on weight management program then. Based on my review of a telephone encounter on 23, the patient is on Saxenda.    Of note, the patient recently had her kitchen knives professionally sharpened.     Allergies:  No Known Allergies    Problem List:    Patient Active Problem List    Diagnosis Date Noted    Seizures (H) 2020     Priority: Medium     Saw neurologist when younger for seizures/syncope/?pseudoseizure  They couldn't figure out what it was, she was not put on medication.  Has not had seizure for several years      S/P primary low transverse  2020     Priority: Medium    Chronic pain of both knees 2016     Priority: Medium    History of syncope 10/06/2015     Priority: Medium    Current moderate episode of major depressive disorder without prior episode (H)      Priority: Medium    Anxiety state 04/10/2015     Priority: Medium    Mild intermittent asthma without complication 2014     Priority: Medium    Attention deficit hyperactivity disorder (ADHD), predominantly inattentive type 2010     Priority: Medium        Past Medical History:    Past Medical History:   Diagnosis Date    Cephalopelvic disproportion due to mixed maternal and fetal factors 2020    Depressive disorder     GDM, class A1 3/25/2020    Tonsillitis     Uncomplicated asthma        Past Surgical History:    Past Surgical History:   Procedure Laterality Date     SECTION N/A 2020     Procedure:  SECTION;  Surgeon: Jaqueline Reyes MD;  Location: [facility]    ESOPHAGOSCOPY, GASTROSCOPY, DUODENOSCOPY (EGD), COMBINED N/A 11/15/2021    Procedure: ESOPHAGOGASTRODUODENOSCOPY, WITH BIOPSY;  Surgeon: Robi Rodriguez MD;  Location: [facility]    ESOPHAGOSCOPY, GASTROSCOPY, DUODENOSCOPY (EGD), COMBINED N/A 2022    Procedure: ESOPHAGOGASTRODUODENOSCOPY, WITH BIOPSY;  Surgeon: David Palm MD;  [place] [facility] [place]    MOUTH SURGERY      wisdom teeth    TONSILLECTOMY         Family History:    Family History   Problem Relation Age of Onset    Diabetes Maternal Grandfather     Coronary Artery Disease Maternal Grandfather     Cerebrovascular Disease Maternal Grandfather     Depression Mother     Anxiety Disorder Mother     Unknown/Adopted Father         unknown     Chronic Obstructive Pulmonary Disease Maternal Grandmother     Coronary Artery Disease Daughter     Other Cancer No family hx of        Social History:  Marital Status:  Single [1]  Social History     Tobacco Use    Smoking status: Former     Packs/day: 0.20     Years: 0.00     Additional pack years: 0.00     Total pack years: 0.00     Types: Cigarettes    Smokeless tobacco: Never    Tobacco comments:     quit with pregnancy   Vaping Use    Vaping Use: Never used   Substance Use Topics    Alcohol use: Not Currently     Comment: 4 drinks per month-quit with pregnancy    Drug use: No        Medications:    albuterol (PROAIR HFA/PROVENTIL HFA/VENTOLIN HFA) 108 (90 Base) MCG/ACT inhaler  busPIRone (BUSPAR) 10 MG tablet  diclofenac (VOLTAREN) 1 % topical gel  drospirenone-ethinyl estradiol (TWYLA) 3-0.02 MG tablet  famotidine (PEPCID) 20 MG tablet  fluticasone (FLONASE) 50 MCG/ACT nasal spray  insulin pen needle (32G X 4 MM) 32G X 4 MM miscellaneous  lisdexamfetamine (VYVANSE) 70 MG capsule  LORazepam (ATIVAN) 0.5 MG tablet  venlafaxine (EFFEXOR XR) 37.5 MG 24 hr capsule  venlafaxine (EFFEXOR XR) 75 MG 24 hr capsule  VYVANSE 70 MG  "capsule          Review of Systems    Physical Exam   Pulse: 120  Temp: 97.8  F (36.6  C)  Resp: 20  Height: 154.9 cm (5' 1\")  Weight: 78.9 kg (174 lb)  SpO2: 100 %      Physical Exam  Constitutional:       General: She is not in acute distress.     Appearance: She is not toxic-appearing.   HENT:      Head: Normocephalic and atraumatic.      Right Ear: External ear normal.      Left Ear: External ear normal.      Nose: No congestion.   Musculoskeletal:      Comments: Left pinky with distal partial lateral tuft laceration without nail or nailbed involvement   Psychiatric:      Comments: Cursing         ED Course     ED Course as of 04/07/24 1549   Sun Apr 07, 2024   1515 I performed a digital block.  The patient cursed a great deal but tolerated it.  She asked for sugars are pain medicine.  I had ordered some Ativan but she did not want this.  I ordered a Norco.   1545 I repaired the laceration.  He is two 5-0 Vicryl Rapide sutures.  I repaired the area that is underneath the nailbed but does not fall the nailbed with glue.  He looks excellent.  I then applied Band-Aids and told her to leave the Band-Aids on but if they cough it is fine.  I told her to get the sutures out in 10 days.  I gave her ER precautions.  She expressed understanding.  She wants to go home and is very anxious to leave.  She is given a ride.  I will discharge her at this time.     [facility]    -Laceration Repair    Date/Time: 4/7/2024 3:46 PM    Performed by: Edmond Madrigal MD  Authorized by: Edmond Madrigal MD    Risks, benefits and alternatives discussed.      ANESTHESIA (see MAR for exact dosages):     Anesthesia method:  Nerve block    Block location:  5th left digit    Block needle gauge:  30 G    Block anesthetic:  Bupivacaine 0.25% w/o epi    Block technique:  Digital block    Block injection procedure:  Anatomic landmarks identified, negative aspiration for blood, introduced needle, incremental " injection and anatomic landmarks palpated    Block outcome:  Anesthesia achieved    LACERATION DETAILS     Location:  Finger    Finger location:  L small finger    Length (cm):  0.9    Depth (mm):  1    REPAIR TYPE:     Repair type:  Simple    EXPLORATION:     Hemostasis achieved with:  Direct pressure    Wound extent: areolar tissue not violated, fascia not violated, no foreign body, no signs of injury, no nerve damage, no tendon damage, no underlying fracture and no vascular damage      Contaminated: no      TREATMENT:     Area cleansed with:  Betadine, saline and soap and water    Amount of cleaning:  Extensive    Irrigation solution:  Sterile saline    Irrigation volume:  10    Irrigation method:  Syringe    Visualized foreign bodies/material removed: no      SKIN REPAIR     Repair method:  Sutures and tissue adhesive    Suture size:  5-0    Wound skin closure material used: Vicryl Rapide.    Suture technique:  Simple interrupted    Number of sutures:  2    APPROXIMATION     Approximation:  Close    POST-PROCEDURE DETAILS     Dressing: Band Aid.      PROCEDURE  Describe Procedure: Repaired with 5-0 Vicryl Rapide. Applied skin glue on top. Then covered in Band-Aid for protection. Tolerated well.       No results found for this or any previous visit (from the past 24 hour(s)).    Medications   HYDROcodone-acetaminophen (NORCO) 5-325 MG per tablet 1 tablet (1 tablet Oral $Given 4/7/24 3682)       Assessments & Plan (with Medical Decision Making)     The laceration would probably be amenable to glue but the patient wants me to suture it because she says she is very active.  I performed a digital block in the room and I will wait for the take effect.  At this time the wound is soaking.  Will order some Ativan as she is quite anxious.    I have reviewed the nursing notes.    I have reviewed the findings, diagnosis, plan and need for follow up with the patient.        Medical Decision Making  The patient's presentation  was of moderate complexity (an acute complicated injury).    The patient's evaluation involved:  history and exam without other MDM data elements    The patient's management necessitated moderate risk (prescription drug management including medications given in the ED) and moderate risk (a decision regarding minor procedure (laceration repair) with risk factors of none).        New Prescriptions    No medications on file       Final diagnoses:   Laceration of left little finger without foreign body without damage to nail, initial encounter       4/7/2024   [facility] EMERGENCY DEPT       Edmond Madrigal MD  04/07/24 3989

## 2024-04-07 NOTE — DISCHARGE INSTRUCTIONS
Get the sutures out in 10 days.    Leave the band aid on. If it comes off, that is fine.    If the glue comes off, it is fine.     Keep it out of sunlight for 6 months to minimize scarring.    Keep it dry until the sutures come out.    Have a good rest of your day.

## 2024-04-07 NOTE — ED TRIAGE NOTES
Patient reports she cut her left pinky finger with a knife while slicing tomatoes. Bleeding controlled.  Last tetanus 2020.   Triage Assessment (Adult)       Row Name 04/07/24 1503          Triage Assessment    Airway WDL WDL        Respiratory WDL    Respiratory WDL WDL        Skin Circulation/Temperature WDL    Skin Circulation/Temperature WDL X  Laceration to pinkly finger on left        Cardiac WDL    Cardiac WDL rhythm     Pulse Rate & Regularity tachycardic        Peripheral/Neurovascular WDL    Peripheral Neurovascular WDL WDL        Cognitive/Neuro/Behavioral WDL    Cognitive/Neuro/Behavioral WDL WDL

## 2024-04-11 ENCOUNTER — VIRTUAL VISIT (OUTPATIENT)
Dept: FAMILY MEDICINE | Facility: CLINIC | Age: 29
End: 2024-04-11
Payer: COMMERCIAL

## 2024-04-11 PROCEDURE — 99207 PR NON-BILLABLE SERV PER CHARTING: CPT | Mod: 95 | Performed by: PHYSICIAN ASSISTANT

## 2024-04-11 ASSESSMENT — ASTHMA QUESTIONNAIRES
ACT_TOTALSCORE: 25
QUESTION_4 LAST FOUR WEEKS HOW OFTEN HAVE YOU USED YOUR RESCUE INHALER OR NEBULIZER MEDICATION (SUCH AS ALBUTEROL): NOT AT ALL
QUESTION_5 LAST FOUR WEEKS HOW WOULD YOU RATE YOUR ASTHMA CONTROL: COMPLETELY CONTROLLED
QUESTION_1 LAST FOUR WEEKS HOW MUCH OF THE TIME DID YOUR ASTHMA KEEP YOU FROM GETTING AS MUCH DONE AT WORK, SCHOOL OR AT HOME: NONE OF THE TIME
QUESTION_2 LAST FOUR WEEKS HOW OFTEN HAVE YOU HAD SHORTNESS OF BREATH: NOT AT ALL
ACT_TOTALSCORE: 25
QUESTION_3 LAST FOUR WEEKS HOW OFTEN DID YOUR ASTHMA SYMPTOMS (WHEEZING, COUGHING, SHORTNESS OF BREATH, CHEST TIGHTNESS OR PAIN) WAKE YOU UP AT NIGHT OR EARLIER THAN USUAL IN THE MORNING: NOT AT ALL

## 2024-04-11 ASSESSMENT — ANXIETY QUESTIONNAIRES
4. TROUBLE RELAXING: NOT AT ALL
6. BECOMING EASILY ANNOYED OR IRRITABLE: NOT AT ALL
GAD7 TOTAL SCORE: 0
IF YOU CHECKED OFF ANY PROBLEMS ON THIS QUESTIONNAIRE, HOW DIFFICULT HAVE THESE PROBLEMS MADE IT FOR YOU TO DO YOUR WORK, TAKE CARE OF THINGS AT HOME, OR GET ALONG WITH OTHER PEOPLE: NOT DIFFICULT AT ALL
2. NOT BEING ABLE TO STOP OR CONTROL WORRYING: NOT AT ALL
5. BEING SO RESTLESS THAT IT IS HARD TO SIT STILL: NOT AT ALL
7. FEELING AFRAID AS IF SOMETHING AWFUL MIGHT HAPPEN: NOT AT ALL
GAD7 TOTAL SCORE: 0
GAD7 TOTAL SCORE: 0
7. FEELING AFRAID AS IF SOMETHING AWFUL MIGHT HAPPEN: NOT AT ALL
1. FEELING NERVOUS, ANXIOUS, OR ON EDGE: NOT AT ALL
8. IF YOU CHECKED OFF ANY PROBLEMS, HOW DIFFICULT HAVE THESE MADE IT FOR YOU TO DO YOUR WORK, TAKE CARE OF THINGS AT HOME, OR GET ALONG WITH OTHER PEOPLE?: NOT DIFFICULT AT ALL
3. WORRYING TOO MUCH ABOUT DIFFERENT THINGS: NOT AT ALL

## 2024-04-11 ASSESSMENT — PATIENT HEALTH QUESTIONNAIRE - PHQ9
SUM OF ALL RESPONSES TO PHQ QUESTIONS 1-9: 0
SUM OF ALL RESPONSES TO PHQ QUESTIONS 1-9: 0
10. IF YOU CHECKED OFF ANY PROBLEMS, HOW DIFFICULT HAVE THESE PROBLEMS MADE IT FOR YOU TO DO YOUR WORK, TAKE CARE OF THINGS AT HOME, OR GET ALONG WITH OTHER PEOPLE: NOT DIFFICULT AT ALL

## 2024-04-11 NOTE — PROGRESS NOTES
"Nargis is a 29 year old who is being evaluated via a billable video visit.    How would you like to obtain your AVS? Sharifa  If the video visit is dropped, the invitation should be resent by: Text to cell phone: [phone]  Will anyone else be joining your video visit? No      Assessment & Plan     BMI 32.0-32.9,adult  Unfortunately the patient would like to start Phentermine since Saxenda has been hard to find. I am not able to prescribe her a controlled substance since I will not be following her long-term. This should be started by her PCP. Recommended to [facility] PCP about this and see if she needs an appointment with them to start it.   BMI  Estimated body mass index is 32.88 kg/m  as calculated from the following:    Height as of 4/7/24: 1.549 m (5' 1\").    Weight as of 4/7/24: 78.9 kg (174 lb).       Subjective   Nargis is a 29 year old, presenting for the following health issues:  Recheck Medication        4/11/2024    11:02 AM   Additional Questions   Roomed by Sapna SIMMONS CMA     History of Present Illness       Reason for visit:  Talk about stopping my shots and talk about other options for weight loss-was having a hard time getting Saxenda consistently due to drug shortages.  She states she stopped Saxenda about a month or so ago.  Would like to discuss possibly starting Phentermine for weight loss since she stopped taking her Vyvanse    She eats 2-3 servings of fruits and vegetables daily.She consumes 1 sweetened beverage(s) daily.She exercises with enough effort to increase her heart rate 60 or more minutes per day.  She exercises with enough effort to increase her heart rate 4 days per week.   She is taking medications regularly.     Review of Systems  See HPI       Objective           Vitals:  No vitals were obtained today due to virtual visit.    Physical Exam   GENERAL: alert and no distress  EYES: Eyes grossly normal to inspection.  No discharge or erythema, or obvious scleral/conjunctival " abnormalities.  RESP: No audible wheeze, cough, or visible cyanosis.    SKIN: Visible skin clear. No significant rash, abnormal pigmentation or lesions.  NEURO: Cranial nerves grossly intact.  Mentation and speech appropriate for age.  PSYCH: Appropriate affect, tone, and pace of words        Video-Visit Details    Type of service:  Video Visit   Originating Location (pt. Location): Home    Distant Location (provider location):  On-site  Platform used for Video Visit: Rosalba  Signed Electronically by: Jhony Real PA-C

## 2024-04-22 ENCOUNTER — MYC REFILL (OUTPATIENT)
Dept: FAMILY MEDICINE | Facility: CLINIC | Age: 29
End: 2024-04-22
Payer: COMMERCIAL

## 2024-04-22 DIAGNOSIS — K21.9 GASTROESOPHAGEAL REFLUX DISEASE WITHOUT ESOPHAGITIS: ICD-10-CM

## 2024-04-23 RX ORDER — FAMOTIDINE 20 MG/1
20 TABLET, FILM COATED ORAL 2 TIMES DAILY
Qty: 180 TABLET | Refills: 1 | Status: SHIPPED | OUTPATIENT
Start: 2024-04-23 | End: 2024-08-28

## 2024-04-25 ENCOUNTER — OFFICE VISIT (OUTPATIENT)
Dept: FAMILY MEDICINE | Facility: CLINIC | Age: 29
End: 2024-04-25
Payer: COMMERCIAL

## 2024-04-25 VITALS
SYSTOLIC BLOOD PRESSURE: 116 MMHG | BODY MASS INDEX: 33.12 KG/M2 | RESPIRATION RATE: 16 BRPM | TEMPERATURE: 98.1 F | OXYGEN SATURATION: 99 % | HEIGHT: 61 IN | DIASTOLIC BLOOD PRESSURE: 70 MMHG | HEART RATE: 70 BPM | WEIGHT: 175.4 LBS

## 2024-04-25 DIAGNOSIS — F32.1 CURRENT MODERATE EPISODE OF MAJOR DEPRESSIVE DISORDER WITHOUT PRIOR EPISODE (H): ICD-10-CM

## 2024-04-25 DIAGNOSIS — Z30.09 ENCOUNTER FOR OTHER GENERAL COUNSELING OR ADVICE ON CONTRACEPTION: ICD-10-CM

## 2024-04-25 DIAGNOSIS — R56.9 SEIZURES (H): ICD-10-CM

## 2024-04-25 PROCEDURE — 99214 OFFICE O/P EST MOD 30 MIN: CPT | Performed by: PHYSICIAN ASSISTANT

## 2024-04-25 RX ORDER — PHENTERMINE HYDROCHLORIDE 15 MG/1
15 CAPSULE ORAL EVERY MORNING
Qty: 30 CAPSULE | Refills: 2 | Status: SHIPPED | OUTPATIENT
Start: 2024-04-25 | End: 2024-08-13

## 2024-04-25 RX ORDER — TOPIRAMATE 25 MG/1
25 TABLET, FILM COATED ORAL AT BEDTIME
Qty: 30 TABLET | Refills: 2 | Status: SHIPPED | OUTPATIENT
Start: 2024-04-25 | End: 2024-08-20

## 2024-04-25 RX ORDER — LIRAGLUTIDE 6 MG/ML
INJECTION, SOLUTION SUBCUTANEOUS
COMMUNITY
Start: 2024-01-08 | End: 2024-04-25 | Stop reason: ALTCHOICE

## 2024-04-25 ASSESSMENT — PAIN SCALES - GENERAL: PAINLEVEL: NO PAIN (0)

## 2024-04-25 NOTE — PROGRESS NOTES
"  Assessment & Plan   BMI 32.0-32.9,adult  Had done well on Saxenda, but it has not been available due to drug shortages. She would like to switch to Phentermine and Topamax.Rx sent. Follow-up in 3 months. Stressed important of contraception with Topamax. See below.   - phentermine 15 MG capsule; Take 1 capsule (15 mg) by mouth every morning  - topiramate (TOPAMAX) 25 MG tablet; Take 1 tablet (25 mg) by mouth at bedtime    Encounter for other general counseling or advice on contraception  Pt interested in Copper IUD after discussion. Scheduled with provider for insertion.     Current moderate episode of major depressive disorder without prior episode (H)  Moods stable. Continue to monitor.     Seizures (H)  Remote hx of this. No medication for this. Does not follow with Neurology.     BMI  Estimated body mass index is 33.14 kg/m  as calculated from the following:    Height as of this encounter: 1.549 m (5' 1\").    Weight as of this encounter: 79.6 kg (175 lb 6.4 oz).     Opal Barillas is a 29 year old, presenting for the following health issues:  Weight Problem        4/25/2024    11:07 AM   Additional Questions   Roomed by Jade BOLAND CMA   Accompanied by Self       History of Present Illness       Reason for visit:  Want to switch weight loss medication    She eats 2-3 servings of fruits and vegetables daily.She consumes 1 sweetened beverage(s) daily.She exercises with enough effort to increase her heart rate 60 or more minutes per day.  She exercises with enough effort to increase her heart rate 5 days per week.   She is taking medications regularly.      Review of Systems  See HPI      Objective    /70 (BP Location: Right arm, Patient Position: Sitting, Cuff Size: Adult Large)   Pulse 70   Temp 98.1  F (36.7  C) (Tympanic)   Resp 16   Ht 1.549 m (5' 1\")   Wt 79.6 kg (175 lb 6.4 oz)   LMP  (LMP Unknown)   SpO2 99%   BMI 33.14 kg/m    Body mass index is 33.14 kg/m .  Physical Exam "   Constitutional: healthy, alert, and no distress  Head: Normocephalic. Atraumatic  Eyes: No conjunctival injection, sclera anicteric  Respiratory: No resp distress.  Musculoskeletal: extremities normal- no gross deformities noted, and normal muscle tone  Neurologic: Gait normal. CN 2-12 grossly intact  Psychiatric: mentation appears normal and affect normal/bright           Signed Electronically by: Jhony Real PA-C

## 2024-05-08 ENCOUNTER — OFFICE VISIT (OUTPATIENT)
Dept: FAMILY MEDICINE | Facility: CLINIC | Age: 29
End: 2024-05-08
Payer: COMMERCIAL

## 2024-05-08 VITALS
HEIGHT: 61 IN | HEART RATE: 63 BPM | RESPIRATION RATE: 14 BRPM | SYSTOLIC BLOOD PRESSURE: 123 MMHG | BODY MASS INDEX: 33.04 KG/M2 | WEIGHT: 175 LBS | OXYGEN SATURATION: 99 % | DIASTOLIC BLOOD PRESSURE: 78 MMHG

## 2024-05-08 DIAGNOSIS — Z30.430 ENCOUNTER FOR INSERTION OF INTRAUTERINE CONTRACEPTIVE DEVICE: ICD-10-CM

## 2024-05-08 DIAGNOSIS — Z30.430 ENCOUNTER FOR IUD INSERTION: Primary | ICD-10-CM

## 2024-05-08 LAB — HCG UR QL: NEGATIVE

## 2024-05-08 PROCEDURE — 81025 URINE PREGNANCY TEST: CPT | Performed by: FAMILY MEDICINE

## 2024-05-08 PROCEDURE — 99207 PR DROP WITH A PROCEDURE: CPT | Performed by: FAMILY MEDICINE

## 2024-05-08 PROCEDURE — 58300 INSERT INTRAUTERINE DEVICE: CPT | Performed by: FAMILY MEDICINE

## 2024-05-08 RX ORDER — COPPER 313.4 MG/1
1 INTRAUTERINE DEVICE INTRAUTERINE ONCE
COMMUNITY

## 2024-05-08 RX ORDER — COPPER 313.4 MG/1
1 INTRAUTERINE DEVICE INTRAUTERINE ONCE
Status: COMPLETED
Start: 2024-05-08 | End: 2024-05-08

## 2024-05-08 RX ADMIN — COPPER 1 EACH: 313.4 INTRAUTERINE DEVICE INTRAUTERINE at 16:14

## 2024-05-08 NOTE — PROGRESS NOTES
"IUD Insertion:  CONSULT:    Is a pregnancy test required: Yes.  Was it positive or negative?  Negative  Was a consent obtained?  Yes    Subjective: Nargis Ruelas is a 29 year old  presents for IUD and desires Paragard type IUD.    Patient has been given the opportunity to ask questions about all forms of birth control, including all options appropriate for Nargis Ruelas. Discussed that no method of birth control, except abstinence is 100% effective against pregnancy or sexually transmitted infection.     Nargis Ruelas understands she may have the IUD removed at any time. IUD should be removed by a health care provider.    The entire insertion procedure was reviewed with the patient, including care after placement.    Patient's last menstrual period was 2024. Has current contraception uses oral contraception. No allergy to betadine or shellfish.   HCG Qual Urine   Date Value Ref Range Status   2022 Negative Negative Final     hCG Urine Qualitative   Date Value Ref Range Status   2024 Negative Negative Final     Comment:     This test is for screening purposes.  Results should be interpreted along with the clinical picture.  Confirmation testing is available if warranted by ordering [id number], HCG Quantitative Pregnancy.         /78   Pulse 63   Resp 14   Ht 1.549 m (5' 1\")   Wt 79.4 kg (175 lb)   LMP 2024   SpO2 99%   BMI 33.07 kg/m      Pelvic Exam:   EG/BUS: normal genital architecture without lesions, erythema or abnormal secretions.   Vagina: moist, pink, rugae with physiologic discharge and secretions  Cervix: parous no lesions and pink, moist, closed, without lesion or CMT  Uterus: position, mobile, no pain  Adnexa: within normal limits and no masses, nodularity, tenderness    PROCEDURE NOTE: -- IUD Insertion    Reason for Insertion: contraception    Under sterile technique, cervix was visualized with speculum and prepped with Betadine solution swab x 3.  The " uterus was sounded to 7.0 cm. IUD prepared for placement, and IUD inserted according to 's instructions without difficulty or significant resitance, and deployed at the fundus. The strings were visualized and trimmed to 2.5.0 cm from the external os. Speculum removed.  Patient tolerated procedure well.    EBL: minimal    Complications: none    ASSESSMENT:     ICD-10-CM    1. Encounter for IUD insertion  Z30.430 HCG Qual, Urine ([id number])     HCG Qual, Urine ([id number])      2. Encounter for insertion of intrauterine contraceptive device  Z30.430              PLAN:    Given 's handouts, including when to have IUD removed, list of danger s/sx, side effects and follow up recommended. Encouraged condom use for prevention of STD. Back up contraception advised for 7 days if progestin method. Advised to call for any fever, for prolonged or severe pain or bleeding, abnormal vaginal discharge, or unable to palpate strings. She was advised to use pain medications (ibuprofen) as needed for mild to moderate pain. Advised to follow-up in clinic in 4-6 weeks for IUD string check if unable to find strings or as directed by provider.     Lizette Vazquez MD

## 2024-05-14 ENCOUNTER — MYC REFILL (OUTPATIENT)
Dept: FAMILY MEDICINE | Facility: CLINIC | Age: 29
End: 2024-05-14
Payer: COMMERCIAL

## 2024-05-14 DIAGNOSIS — F41.1 ANXIETY STATE: ICD-10-CM

## 2024-05-15 RX ORDER — LORAZEPAM 0.5 MG/1
0.5 TABLET ORAL EVERY 6 HOURS PRN
Qty: 10 TABLET | Refills: 0 | Status: SHIPPED | OUTPATIENT
Start: 2024-05-15 | End: 2024-06-25

## 2024-05-15 NOTE — TELEPHONE ENCOUNTER
Refilled. Please schedule preventive visit with me for after 7/17/24.  If she is transferring care to other clinic, schedule with them and recommend further refills come from them as well.  Marah Jennings PA-C

## 2024-05-20 ENCOUNTER — E-VISIT (OUTPATIENT)
Dept: FAMILY MEDICINE | Facility: CLINIC | Age: 29
End: 2024-05-20
Payer: COMMERCIAL

## 2024-05-20 DIAGNOSIS — F32.1 CURRENT MODERATE EPISODE OF MAJOR DEPRESSIVE DISORDER WITHOUT PRIOR EPISODE (H): Primary | ICD-10-CM

## 2024-05-20 PROCEDURE — 99207 PR NON-BILLABLE SERV PER CHARTING: CPT | Performed by: PHYSICIAN ASSISTANT

## 2024-05-20 ASSESSMENT — ANXIETY QUESTIONNAIRES
3. WORRYING TOO MUCH ABOUT DIFFERENT THINGS: MORE THAN HALF THE DAYS
7. FEELING AFRAID AS IF SOMETHING AWFUL MIGHT HAPPEN: SEVERAL DAYS
GAD7 TOTAL SCORE: 10
7. FEELING AFRAID AS IF SOMETHING AWFUL MIGHT HAPPEN: SEVERAL DAYS
2. NOT BEING ABLE TO STOP OR CONTROL WORRYING: MORE THAN HALF THE DAYS
GAD7 TOTAL SCORE: 10
4. TROUBLE RELAXING: SEVERAL DAYS
8. IF YOU CHECKED OFF ANY PROBLEMS, HOW DIFFICULT HAVE THESE MADE IT FOR YOU TO DO YOUR WORK, TAKE CARE OF THINGS AT HOME, OR GET ALONG WITH OTHER PEOPLE?: VERY DIFFICULT
1. FEELING NERVOUS, ANXIOUS, OR ON EDGE: MORE THAN HALF THE DAYS
IF YOU CHECKED OFF ANY PROBLEMS ON THIS QUESTIONNAIRE, HOW DIFFICULT HAVE THESE PROBLEMS MADE IT FOR YOU TO DO YOUR WORK, TAKE CARE OF THINGS AT HOME, OR GET ALONG WITH OTHER PEOPLE: VERY DIFFICULT
5. BEING SO RESTLESS THAT IT IS HARD TO SIT STILL: NOT AT ALL
6. BECOMING EASILY ANNOYED OR IRRITABLE: MORE THAN HALF THE DAYS
GAD7 TOTAL SCORE: 10

## 2024-05-20 ASSESSMENT — PATIENT HEALTH QUESTIONNAIRE - PHQ9
10. IF YOU CHECKED OFF ANY PROBLEMS, HOW DIFFICULT HAVE THESE PROBLEMS MADE IT FOR YOU TO DO YOUR WORK, TAKE CARE OF THINGS AT HOME, OR GET ALONG WITH OTHER PEOPLE: VERY DIFFICULT
SUM OF ALL RESPONSES TO PHQ QUESTIONS 1-9: 8
SUM OF ALL RESPONSES TO PHQ QUESTIONS 1-9: 8

## 2024-05-20 NOTE — TELEPHONE ENCOUNTER
Provider E-Visit time total (minutes): 5  Scheduled patient for video visit for tomorrow.  Marah Jennings PA-C

## 2024-05-21 ASSESSMENT — PATIENT HEALTH QUESTIONNAIRE - PHQ9: SUM OF ALL RESPONSES TO PHQ QUESTIONS 1-9: 8

## 2024-05-22 ENCOUNTER — VIRTUAL VISIT (OUTPATIENT)
Dept: FAMILY MEDICINE | Facility: CLINIC | Age: 29
End: 2024-05-22
Payer: COMMERCIAL

## 2024-05-22 DIAGNOSIS — F90.0 ATTENTION DEFICIT HYPERACTIVITY DISORDER (ADHD), PREDOMINANTLY INATTENTIVE TYPE: ICD-10-CM

## 2024-05-22 DIAGNOSIS — F32.1 CURRENT MODERATE EPISODE OF MAJOR DEPRESSIVE DISORDER WITHOUT PRIOR EPISODE (H): Primary | ICD-10-CM

## 2024-05-22 DIAGNOSIS — R56.9 SEIZURES (H): ICD-10-CM

## 2024-05-22 PROCEDURE — 99213 OFFICE O/P EST LOW 20 MIN: CPT | Mod: 95 | Performed by: PHYSICIAN ASSISTANT

## 2024-05-22 NOTE — PROGRESS NOTES
"Nargis is a 29 year old who is being evaluated via a billable video visit.    How would you like to obtain your AVS? MyChart  If the video visit is dropped, the invitation should be resent by: Text to cell phone: [phone]  Will anyone else be joining your video visit? No      Assessment & Plan     Current moderate episode of major depressive disorder without prior episode (H)  Patient would be a good candidate for therapy to help her with figuring out her emotions and purpose. She is now interested in this and thinks it would be helpful; would like to pursue scheduling this prior to medication changes.  We had discussed her previous medications and potential for retrial of one of  them. Did briefly discuss abilify however with her working on weight management that may not be helpful for that.   Did discuss psychiatry consult, she defers for now. Would like to think about the medications as well as look into therapy options through insurance and will mychart me.    Past medication trials:  - 9715-6047: buspirone,discontinued by patient I think as she was doing better  - 4011-8474 effexor: discontinued as she was doing better  - 5696-4512: zoloft. Reviewed notes from 8/2020 that this helped more than other previous medications. However by October felt it was not helpful and used effexor as she had \"failed \" 4 SSRIs.  - 1786-4713: prozac  - 2016: celexa. Only on 1-2 months  - 2015- 2016: lexapro:   - 2016: bupropion: unknown if cause of seizure but did have a seizure so she stopped this  shortly after starting.    My notes from 10/2020: \"Feels her depression has never truly been treated well. In the past: celexa, prozac, lexapro - doesn't remember what they were like\"    Attention deficit hyperactivity disorder (ADHD), predominantly inattentive type  She discontinued vyvanse as she started phentermine for weight management. Helping somewhat for ADHD treatment, she isn't sure.    Seizures (H)  Resolved. Remote " "history, never on medication and has not seen neurology in years. Would not restart buproprion due to this history.        BMI  Estimated body mass index is 33.07 kg/m  as calculated from the following:    Height as of 5/8/24: 1.549 m (5' 1\").    Weight as of 5/8/24: 79.4 kg (175 lb).   Weight management plan: Patient referred to endocrine and/or weight management specialty      Opal Barillas is a 29 year old, presenting for the following health issues:  Depression and Anxiety      5/22/2024     2:57 PM   Additional Questions   Roomed by Renea   Accompanied by Self     HPI     Depression and Anxiety   How are you doing with your depression since your last visit? Would like to discuss restarting medication  How are you doing with your anxiety since your last visit?  Worsened  Are you having other symptoms that might be associated with depression or anxiety? No  Have you had a significant life event? Grief or Loss Grandmother recently passed away  Do you have any concerns with your use of alcohol or other drugs? No    Social History     Tobacco Use    Smoking status: Former     Current packs/day: 0.20     Types: Cigarettes    Smokeless tobacco: Never    Tobacco comments:     quit with pregnancy   Vaping Use    Vaping status: Never Used   Substance Use Topics    Alcohol use: Not Currently     Comment: 4 drinks per month-quit with pregnancy    Drug use: No         10/3/2023     9:50 AM 4/11/2024    10:38 AM 5/20/2024     3:08 PM   PHQ   PHQ-9 Total Score 5 0 8   Q9: Thoughts of better off dead/self-harm past 2 weeks Not at all Not at all Not at all         3/24/2023    11:29 AM 4/11/2024    10:39 AM 5/20/2024     3:08 PM   JERMAIN-7 SCORE   Total Score  0 (minimal anxiety) 10 (moderate anxiety)   Total Score 5 0 10         5/20/2024     3:08 PM   Last PHQ-9   1.  Little interest or pleasure in doing things 1   2.  Feeling down, depressed, or hopeless 1   3.  Trouble falling or staying asleep, or sleeping too much " "2   4.  Feeling tired or having little energy 1   5.  Poor appetite or overeating 1   6.  Feeling bad about yourself 1   7.  Trouble concentrating 1   8.  Moving slowly or restless 0   Q9: Thoughts of better off dead/self-harm past 2 weeks 0   PHQ-9 Total Score 8         2024     3:08 PM   JERMAIN-7    1. Feeling nervous, anxious, or on edge 2   2. Not being able to stop or control worrying 2   3. Worrying too much about different things 2   4. Trouble relaxing 1   5. Being so restless that it is hard to sit still 0   6. Becoming easily annoyed or irritable 2   7. Feeling afraid, as if something awful might happen 1   JERMAIN-7 Total Score 10   If you checked any problems, how difficult have they made it for you to do your work, take care of things at home, or get along with other people? Very difficult   Suicide Assessment Five-step Evaluation and Treatment (SAFE-T)    She felt she was doing well without the effexor but when her grandmother  the depression worsened again. She is feeling indecisive, lack of motivation/ambition. Has a hard time know what she \"should\" feel like without depression and knowing if the medications have helped or now.  Hard with timing to commit to therapy.    She discontinued vyvanse as she started phentermine for weight management. Originally was on saxenda but could not get it with shortages.    Other than noted above, general, HEENT, respiratory, cardiac, MS, and gastrointestinal systems are negative.       Objective           Vitals:  No vitals were obtained today due to virtual visit.    Physical Exam   GENERAL: alert and no distress  EYES: Eyes grossly normal to inspection.  No discharge or erythema, or obvious scleral/conjunctival abnormalities.  RESP: No audible wheeze, cough, or visible cyanosis.    SKIN: Visible skin clear. No significant rash, abnormal pigmentation or lesions.  NEURO: Cranial nerves grossly intact.  Mentation and speech appropriate for age.  PSYCH: Appropriate " affect, tone, and pace of words      Video-Visit Details    Type of service:  Video Visit   Originating Location (pt. Location): Home    Distant Location (provider location):  On-site  Platform used for Video Visit: Rosalba  Signed Electronically by: Carol Jennings PA-C

## 2024-06-17 ENCOUNTER — PATIENT OUTREACH (OUTPATIENT)
Dept: CARE COORDINATION | Facility: CLINIC | Age: 29
End: 2024-06-17
Payer: COMMERCIAL

## 2024-06-19 ENCOUNTER — MYC MEDICAL ADVICE (OUTPATIENT)
Dept: FAMILY MEDICINE | Facility: CLINIC | Age: 29
End: 2024-06-19
Payer: COMMERCIAL

## 2024-06-19 DIAGNOSIS — E66.811 CLASS 1 OBESITY DUE TO EXCESS CALORIES WITHOUT SERIOUS COMORBIDITY WITH BODY MASS INDEX (BMI) OF 33.0 TO 33.9 IN ADULT: Primary | ICD-10-CM

## 2024-06-19 DIAGNOSIS — F32.1 CURRENT MODERATE EPISODE OF MAJOR DEPRESSIVE DISORDER WITHOUT PRIOR EPISODE (H): ICD-10-CM

## 2024-06-19 DIAGNOSIS — E66.09 CLASS 1 OBESITY DUE TO EXCESS CALORIES WITHOUT SERIOUS COMORBIDITY WITH BODY MASS INDEX (BMI) OF 33.0 TO 33.9 IN ADULT: Primary | ICD-10-CM

## 2024-06-19 DIAGNOSIS — F41.1 ANXIETY STATE: ICD-10-CM

## 2024-06-20 RX ORDER — BUSPIRONE HYDROCHLORIDE 10 MG/1
10 TABLET ORAL 2 TIMES DAILY
Qty: 180 TABLET | Refills: 1 | Status: SHIPPED | OUTPATIENT
Start: 2024-06-20 | End: 2024-08-28

## 2024-06-23 ENCOUNTER — APPOINTMENT (OUTPATIENT)
Dept: GENERAL RADIOLOGY | Facility: CLINIC | Age: 29
End: 2024-06-23
Attending: EMERGENCY MEDICINE
Payer: COMMERCIAL

## 2024-06-23 ENCOUNTER — HOSPITAL ENCOUNTER (EMERGENCY)
Facility: CLINIC | Age: 29
Discharge: HOME | End: 2024-06-23
Attending: EMERGENCY MEDICINE | Admitting: EMERGENCY MEDICINE
Payer: COMMERCIAL

## 2024-06-23 VITALS
HEART RATE: 105 BPM | DIASTOLIC BLOOD PRESSURE: 93 MMHG | RESPIRATION RATE: 18 BRPM | WEIGHT: 175 LBS | OXYGEN SATURATION: 97 % | TEMPERATURE: 98.5 F | HEIGHT: 61 IN | SYSTOLIC BLOOD PRESSURE: 122 MMHG | BODY MASS INDEX: 33.04 KG/M2

## 2024-06-23 DIAGNOSIS — S93.492A SPRAIN OF ANTERIOR TALOFIBULAR LIGAMENT OF LEFT ANKLE, INITIAL ENCOUNTER: ICD-10-CM

## 2024-06-23 DIAGNOSIS — S93.602A FOOT SPRAIN, LEFT, INITIAL ENCOUNTER: ICD-10-CM

## 2024-06-23 PROCEDURE — 250N000013 HC RX MED GY IP 250 OP 250 PS 637: Performed by: EMERGENCY MEDICINE

## 2024-06-23 PROCEDURE — 99284 EMERGENCY DEPT VISIT MOD MDM: CPT | Performed by: EMERGENCY MEDICINE

## 2024-06-23 PROCEDURE — 73610 X-RAY EXAM OF ANKLE: CPT | Mod: LT

## 2024-06-23 PROCEDURE — 99283 EMERGENCY DEPT VISIT LOW MDM: CPT

## 2024-06-23 PROCEDURE — 73630 X-RAY EXAM OF FOOT: CPT | Mod: LT

## 2024-06-23 RX ORDER — IBUPROFEN 400 MG/1
400 TABLET, FILM COATED ORAL
Status: COMPLETED | OUTPATIENT
Start: 2024-06-23 | End: 2024-06-23

## 2024-06-23 RX ORDER — ACETAMINOPHEN 500 MG
1000 TABLET ORAL
Status: COMPLETED | OUTPATIENT
Start: 2024-06-23 | End: 2024-06-23

## 2024-06-23 RX ADMIN — IBUPROFEN 400 MG: 400 TABLET ORAL at 19:51

## 2024-06-23 RX ADMIN — ACETAMINOPHEN 1000 MG: 500 TABLET, FILM COATED ORAL at 19:51

## 2024-06-23 ASSESSMENT — COLUMBIA-SUICIDE SEVERITY RATING SCALE - C-SSRS
6. HAVE YOU EVER DONE ANYTHING, STARTED TO DO ANYTHING, OR PREPARED TO DO ANYTHING TO END YOUR LIFE?: NO
2. HAVE YOU ACTUALLY HAD ANY THOUGHTS OF KILLING YOURSELF IN THE PAST MONTH?: NO
1. IN THE PAST MONTH, HAVE YOU WISHED YOU WERE DEAD OR WISHED YOU COULD GO TO SLEEP AND NOT WAKE UP?: NO

## 2024-06-23 ASSESSMENT — ENCOUNTER SYMPTOMS
MUSCULOSKELETAL NEGATIVE: 1
GASTROINTESTINAL NEGATIVE: 1
RESPIRATORY NEGATIVE: 1
PSYCHIATRIC NEGATIVE: 1
ALLERGIC/IMMUNOLOGIC NEGATIVE: 1
CARDIOVASCULAR NEGATIVE: 1
HEMATOLOGIC/LYMPHATIC NEGATIVE: 1
ENDOCRINE NEGATIVE: 1
EYES NEGATIVE: 1
NEUROLOGICAL NEGATIVE: 1

## 2024-06-23 ASSESSMENT — ACTIVITIES OF DAILY LIVING (ADL)
ADLS_ACUITY_SCORE: 35
ADLS_ACUITY_SCORE: 35

## 2024-06-24 NOTE — ED TRIAGE NOTES
Patient stepped on dog bone and injured left ankle. Previous fx in same ankle. Has swelling and severe pain.     Triage Assessment (Adult)       Row Name 06/23/24 1315          Triage Assessment    Airway WDL WDL        Respiratory WDL    Respiratory WDL WDL        Skin Circulation/Temperature WDL    Skin Circulation/Temperature WDL WDL        Cardiac WDL    Cardiac WDL WDL        Peripheral/Neurovascular WDL    Peripheral Neurovascular WDL WDL        Cognitive/Neuro/Behavioral WDL    Cognitive/Neuro/Behavioral WDL WDL

## 2024-06-24 NOTE — TELEPHONE ENCOUNTER
Reviewed chart. She had an office visit 4/25 most recently for weight management. Started saxenda 7/2023. Has IUD in place.  Wt Readings from Last 4 Encounters:   06/23/24 79.4 kg (175 lb)   05/08/24 79.4 kg (175 lb)   04/25/24 79.6 kg (175 lb 6.4 oz)   04/07/24 78.9 kg (174 lb)

## 2024-06-24 NOTE — DISCHARGE INSTRUCTIONS
1) Your patient today did not reveal any evidence for fracture in your ankle or foot  On x-ray imaging.  For home you are placed in a cam boot to wear with weightbearing as tolerated over the next 4 to 6 weeks as needed.    2) For home consider ice to help with swelling, keep your foot elevated, you can use Tylenol 1000 mg every 8 hours as needed for pain and Motrin ibuprofen with food if able 400 mg every 12 hours.    3) If symptoms or pain in the next 4 weeks repeat imaging may be helpful to exclude any new processes

## 2024-06-24 NOTE — ED PROVIDER NOTES
History     Chief Complaint   Patient presents with    ANKLE INJURY     HPI  Nargis Ruelas is a 29 year old female who presents for evaluation after left ankle injury patient reports stepping on a dog bowl injuring her left ankle resulting in swelling and pain on arrival she reported she had previously fractured the same ankle in the past    Reviewed the medical record.  Visit on 24-prior diagnosis of anxiety, chronic pain in both knees, history of intermittent asthma seizure disorder and depression.,     On examination patient arrived by car with friend- Jo after rolling her foot and ankle. Patient reports stepping stone while wrestling with her son.  She has had pain over the left lateral foot and base of the ankle and has had discomfort weightbearing and presents for care.  No knee injury no hip pain no other injuries.  She takes active medications and did not take any medications after her injury    Allergies:  No Known Allergies    Problem List:    Patient Active Problem List    Diagnosis Date Noted    Seizures (H) 2020     Priority: Medium     Saw neurologist when younger for seizures/syncope/?pseudoseizure  They couldn't figure out what it was, she was not put on medication.  Has not had seizure for several years      S/P primary low transverse  2020     Priority: Medium    Chronic pain of both knees 2016     Priority: Medium    History of syncope 10/06/2015     Priority: Medium    Current moderate episode of major depressive disorder without prior episode (H)      Priority: Medium    Anxiety state 04/10/2015     Priority: Medium    Mild intermittent asthma without complication 2014     Priority: Medium    Attention deficit hyperactivity disorder (ADHD), predominantly inattentive type 2010     Priority: Medium        Past Medical History:    Past Medical History:   Diagnosis Date    Cephalopelvic disproportion due to mixed maternal and fetal factors 2020     Depressive disorder     GDM, class A1 3/25/2020    Tonsillitis     Uncomplicated asthma        Past Surgical History:    Past Surgical History:   Procedure Laterality Date     SECTION N/A 2020    Procedure:  SECTION;  Surgeon: Jaqueline Reyes MD;  Location: [facility] OR    ESOPHAGOSCOPY, GASTROSCOPY, DUODENOSCOPY (EGD), COMBINED N/A 11/15/2021    Procedure: ESOPHAGOGASTRODUODENOSCOPY, WITH BIOPSY;  Surgeon: Robi Rodriguez MD;  Location: [facility]    ESOPHAGOSCOPY, GASTROSCOPY, DUODENOSCOPY (EGD), COMBINED N/A 2022    Procedure: ESOPHAGOGASTRODUODENOSCOPY, WITH BIOPSY;  Surgeon: David Palm MD;  Location: [facility] OR    MOUTH SURGERY      wisdom teeth    TONSILLECTOMY         Family History:    Family History   Problem Relation Age of Onset    Diabetes Maternal Grandfather     Coronary Artery Disease Maternal Grandfather     Cerebrovascular Disease Maternal Grandfather     Depression Mother     Anxiety Disorder Mother     Unknown/Adopted Father         unknown     Chronic Obstructive Pulmonary Disease Maternal Grandmother     Coronary Artery Disease Daughter     Other Cancer No family hx of        Social History:  Marital Status:  Single [1]  Social History     Tobacco Use    Smoking status: Former     Current packs/day: 0.20     Types: Cigarettes    Smokeless tobacco: Never    Tobacco comments:     quit with pregnancy   Vaping Use    Vaping status: Never Used   Substance Use Topics    Alcohol use: Not Currently     Comment: 4 drinks per month-quit with pregnancy    Drug use: No        Medications:    albuterol (PROAIR HFA/PROVENTIL HFA/VENTOLIN HFA) 108 (90 Base) MCG/ACT inhaler  busPIRone (BUSPAR) 10 MG tablet  famotidine (PEPCID) 20 MG tablet  fluticasone (FLONASE) 50 MCG/ACT nasal spray  LORazepam (ATIVAN) 0.5 MG tablet  paragard intrauterine copper device  phentermine 15 MG capsule  topiramate (TOPAMAX) 25 MG tablet          Review of Systems   Constitutional:         Rolled  "left foot and ankle on dog bone   HENT: Negative.     Eyes: Negative.    Respiratory: Negative.     Cardiovascular: Negative.    Gastrointestinal: Negative.    Endocrine: Negative.    Genitourinary: Negative.    Musculoskeletal: Negative.         Left ankle and foot pain and swelling    Skin: Negative.    Allergic/Immunologic: Negative.    Neurological: Negative.    Hematological: Negative.    Psychiatric/Behavioral: Negative.     All other systems reviewed and are negative.      Physical Exam   BP: (!) 125/93  Pulse: (!) 128  Temp: 98.5  F (36.9  C)  Resp: 18  Height: 154.9 cm (5' 1\")  Weight: 79.4 kg (175 lb)  SpO2: 97 %      Physical Exam  Constitutional:       General: She is not in acute distress.     Appearance: Normal appearance. She is not ill-appearing, toxic-appearing or diaphoretic.   HENT:      Head: Normocephalic and atraumatic.      Mouth/Throat:      Mouth: Mucous membranes are moist.   Eyes:      Extraocular Movements: Extraocular movements intact.      Pupils: Pupils are equal, round, and reactive to light.   Cardiovascular:      Rate and Rhythm: Normal rate and regular rhythm.   Pulmonary:      Effort: Pulmonary effort is normal. No respiratory distress.      Breath sounds: Normal breath sounds. No stridor. No wheezing, rhonchi or rales.   Chest:      Chest wall: No tenderness.   Musculoskeletal:         General: Swelling, tenderness and signs of injury present.      Cervical back: Normal range of motion and neck supple.        Legs:    Skin:     Capillary Refill: Capillary refill takes less than 2 seconds.      Coloration: Skin is not jaundiced or pale.      Findings: No bruising, erythema, lesion or rash.   Neurological:      General: No focal deficit present.      Mental Status: She is alert and oriented to person, place, and time.      Cranial Nerves: No cranial nerve deficit.      Sensory: No sensory deficit.      Motor: No weakness.      Coordination: Coordination normal.      Gait: Gait " "normal.      Deep Tendon Reflexes: Reflexes normal.   Psychiatric:         Mood and Affect: Mood normal.         Behavior: Behavior normal.         Thought Content: Thought content normal.         Judgment: Judgment normal.         ED Course        Procedures              Critical Care time:  none             ED medications:  Medications   acetaminophen (TYLENOL) tablet 1,000 mg (1,000 mg Oral $Given 6/23/24 1951)   ibuprofen (ADVIL/MOTRIN) tablet 400 mg (400 mg Oral $Given 6/23/24 1951)     ED vitals:  Vitals:    06/23/24 1914 06/23/24 1928 06/23/24 1930   BP: (!) 125/93  (!) 122/93   Pulse: (!) 128  105   Resp: 18     Temp: 98.5  F (36.9  C)     TempSrc: Oral     SpO2: 97% 97%    Weight: 79.4 kg (175 lb)     Height: 1.549 m (5' 1\")        ED labs and imaging:  Results for orders placed or performed during the hospital encounter of 06/23/24   XR Ankle Left G/E 3 Views     Status: None    Narrative    EXAM: XR ANKLE LEFT G/E 3 VIEWS  LOCATION: [facility]  DATE: 6/23/2024    INDICATION: Stepped on dog bone. Left ankle and foot pain and swelling. Evaluate for acute bony process.  COMPARISON: 5/26/2021      Impression    IMPRESSION: Chronic corticated ossicle along the inferior and lateral aspect of the medial malleolus tip relates to remote trauma, new since 2021. Chronic healed oblique lateral malleolus fracture deformity. No acute left ankle or hindfoot fracture.   Intact appearing ankle mortise and distal syndesmosis. No ankle soft tissue swelling. Tibiotalar and hindfoot joint spaces are normal.   Foot XR, G/E 3 views, left     Status: None    Narrative    EXAM: XR FOOT LEFT G/E 3 VIEWS  LOCATION: [facility]  DATE: 6/23/2024    INDICATION: Stepped on dog bone. Left ankle and foot pain and swelling. Evaluate for acute bony process.  COMPARISON: None.      Impression    IMPRESSION: Mild hallux valgus deformity with bunion. No acute displaced left foot fracture " or dislocation is identified. Accessory os navicularis. No advanced joint space narrowing. No localizing soft tissue swelling.         Assessments & Plan (with Medical Decision Making)   Assessment Summary and clinical Impression: 29 female who presented with left ankle and foot injury after accidentally stepping on a dog bone while wrestling with her son.  She reported she previously fractured her fibula playing hockey 3 years ago and arrived with pain with weightbearing and swelling bruising around the left lateral foot and lateral malleolus. On arrival she was afebrile blood pressure was 124/93.  She was offered medications for pain and discomfort.X-ray imaging of the ankle and foot revealed no acute bony fracture or dislocation.  She was discharged with plan for weightbearing as tolerated with a cam boot for her left foot sprain and left ankle sprain.    ED course and plan:  Reviewed the medical record.  She was offered medications for pain and discomfort is rated and reported.  x-ray of the left foot and ankle reviewed independently and the radiology report reviewed.  No acute bony fracture or dislocation.  Patient was offered a cam boot/orthopedic shoe and she elected a cam boot. Supportive care measures reviewed including RICE therapy  Pain management over-the-counter Tylenol and Motrin/ ibuprofen.  Follow-up imaging in 1 month if needed if persistent symptoms.  She expressed understanding and comfort with plan of care.      Disclaimer: This note consists of symbols derived from keyboarding, dictation and/or voice recognition software. As a result, there may be errors in the script that have gone undetected. Please consider this when interpreting information found in this chart.   I have reviewed the nursing notes.    I have reviewed the findings, diagnosis, plan and need for follow up with the patient.           Medical Decision Making  The patient's presentation was of moderate complexity (foot and ankle  injury).    The patient's evaluation involved:Physical examination, diagnostic imaging and x-ray    The patient's management necessitated moderate risk (disposition planning.).        New Prescriptions    No medications on file       Final diagnoses:   Sprain of anterior talofibular ligament of left ankle, initial encounter   Foot sprain, left, initial encounter       6/23/2024   [facility] EMERGENCY DEPT       Pan Santizo MD  06/23/24 5635

## 2024-06-25 ENCOUNTER — MYC REFILL (OUTPATIENT)
Dept: FAMILY MEDICINE | Facility: CLINIC | Age: 29
End: 2024-06-25
Payer: COMMERCIAL

## 2024-06-25 DIAGNOSIS — F41.1 ANXIETY STATE: ICD-10-CM

## 2024-06-26 RX ORDER — LORAZEPAM 0.5 MG/1
0.5 TABLET ORAL EVERY 6 HOURS PRN
Qty: 10 TABLET | Refills: 0 | Status: SHIPPED | OUTPATIENT
Start: 2024-06-26

## 2024-07-11 ENCOUNTER — TELEPHONE (OUTPATIENT)
Dept: FAMILY MEDICINE | Facility: CLINIC | Age: 29
End: 2024-07-11
Payer: COMMERCIAL

## 2024-07-11 NOTE — TELEPHONE ENCOUNTER
Patient's insurance requires a PA for Zepbound 2.5mg  Please submit to patient's insurance    Margarito Bingham, Clinic RN  [facility]

## 2024-07-15 NOTE — TELEPHONE ENCOUNTER
Central Prior Authorization Team - Phone: [phone]     PA Initiation    Medication: ZEPBOUND 2.5 MG/0.5ML SC SOAJ  Insurance Company: [employer] [place] - Phone [phone] Fax [phone]  Pharmacy Filling the Rx: [facility] [place], [place] - [address]  Filling Pharmacy Phone: [phone]  Filling Pharmacy Fax:    Start Date: 7/15/2024

## 2024-07-16 NOTE — TELEPHONE ENCOUNTER
Central Prior Authorization Team - Phone: [phone]     Prior Authorization Approval    Medication: ZEPBOUND 2.5 MG/0.5ML SC SOAJ  Authorization Effective Date: 4/16/2024  Authorization Expiration Date: 7/15/2025  Approved Dose/Quantity: 2  Reference #:     Insurance Company: [employer] [place] - Phone [phone] Fax [phone]  Expected CoPay: $    CoPay Card Available:      Financial Assistance Needed:   Which Pharmacy is filling the prescription: [facility] [place], [place] - [address]  Pharmacy Notified: YES  Patient Notified: YES zanda message sent and pharmacy will notify when ready

## 2024-07-22 ENCOUNTER — MYC MEDICAL ADVICE (OUTPATIENT)
Dept: FAMILY MEDICINE | Facility: CLINIC | Age: 29
End: 2024-07-22
Payer: COMMERCIAL

## 2024-07-22 DIAGNOSIS — E66.811 CLASS 1 OBESITY DUE TO EXCESS CALORIES WITHOUT SERIOUS COMORBIDITY WITH BODY MASS INDEX (BMI) OF 33.0 TO 33.9 IN ADULT: ICD-10-CM

## 2024-07-22 DIAGNOSIS — E66.09 CLASS 1 OBESITY DUE TO EXCESS CALORIES WITHOUT SERIOUS COMORBIDITY WITH BODY MASS INDEX (BMI) OF 33.0 TO 33.9 IN ADULT: ICD-10-CM

## 2024-07-24 ENCOUNTER — TELEPHONE (OUTPATIENT)
Dept: FAMILY MEDICINE | Facility: CLINIC | Age: 29
End: 2024-07-24
Payer: COMMERCIAL

## 2024-07-24 DIAGNOSIS — E66.09 CLASS 1 OBESITY DUE TO EXCESS CALORIES WITHOUT SERIOUS COMORBIDITY WITH BODY MASS INDEX (BMI) OF 33.0 TO 33.9 IN ADULT: ICD-10-CM

## 2024-07-24 DIAGNOSIS — E66.811 CLASS 1 OBESITY DUE TO EXCESS CALORIES WITHOUT SERIOUS COMORBIDITY WITH BODY MASS INDEX (BMI) OF 33.0 TO 33.9 IN ADULT: ICD-10-CM

## 2024-07-24 NOTE — TELEPHONE ENCOUNTER
I think this needs a quantity limit prior authorization.   Thank you.     Prior Authorization Retail Medication Request    Medication/Dose: Zepbound 2.5mg  Diagnosis and ICD code (if different than what is on RX):  na  New/renewal/insurance change PA/secondary ins. PA:  Previously Tried and Failed:  na  Rationale:  na    Insurance   [place] [employer] [place]  Insurance ID:  [id number]    Secondary (if applicable):macie  Insurance ID:  macie    Pharmacy Information (if different than what is on RX)  Name:  [facility]  Phone:  [phone]  Fax:[phone]

## 2024-07-26 NOTE — TELEPHONE ENCOUNTER
PA Initiation    Medication: ZEPBOUND 2.5 MG/0.5ML SC SOAJ  Insurance Company: [employer] - Phone [phone] Fax [phone]  Pharmacy Filling the Rx: [facility] [place], [place] - [address]  Filling Pharmacy Phone: [phone]  Filling Pharmacy Fax: [phone]  Start Date: 7/26/2024    QTY LIMITS FORM FAXED TO [employer]

## 2024-07-29 ENCOUNTER — MYC MEDICAL ADVICE (OUTPATIENT)
Dept: FAMILY MEDICINE | Facility: CLINIC | Age: 29
End: 2024-07-29
Payer: COMMERCIAL

## 2024-07-29 DIAGNOSIS — F32.1 CURRENT MODERATE EPISODE OF MAJOR DEPRESSIVE DISORDER WITHOUT PRIOR EPISODE (H): Primary | ICD-10-CM

## 2024-07-29 NOTE — TELEPHONE ENCOUNTER
PRIOR AUTHORIZATION DENIED    Medication: ZEPBOUND 2.5 MG/0.5ML SC SOAJ  Insurance Company: [employer] - Phone [phone] Fax [phone]  Denial Date: 7/27/2024  Denial Reason(s):       Appeal Information:       Patient Notified: NO

## 2024-07-30 RX ORDER — VENLAFAXINE HYDROCHLORIDE 75 MG/1
75 CAPSULE, EXTENDED RELEASE ORAL DAILY
Qty: 60 CAPSULE | Refills: 0 | Status: SHIPPED | OUTPATIENT
Start: 2024-07-30 | End: 2024-08-28

## 2024-08-08 ENCOUNTER — MYC MEDICAL ADVICE (OUTPATIENT)
Dept: FAMILY MEDICINE | Facility: CLINIC | Age: 29
End: 2024-08-08
Payer: COMMERCIAL

## 2024-08-08 DIAGNOSIS — F90.0 ATTENTION DEFICIT HYPERACTIVITY DISORDER (ADHD), PREDOMINANTLY INATTENTIVE TYPE: Primary | ICD-10-CM

## 2024-08-09 RX ORDER — LISDEXAMFETAMINE DIMESYLATE 30 MG/1
30 CAPSULE ORAL EVERY MORNING
Qty: 30 CAPSULE | Refills: 0 | Status: SHIPPED | OUTPATIENT
Start: 2024-08-09 | End: 2024-08-20

## 2024-08-12 ENCOUNTER — MYC REFILL (OUTPATIENT)
Dept: FAMILY MEDICINE | Facility: CLINIC | Age: 29
End: 2024-08-12
Payer: COMMERCIAL

## 2024-08-12 DIAGNOSIS — E66.09 CLASS 1 OBESITY DUE TO EXCESS CALORIES WITHOUT SERIOUS COMORBIDITY WITH BODY MASS INDEX (BMI) OF 33.0 TO 33.9 IN ADULT: ICD-10-CM

## 2024-08-12 DIAGNOSIS — E66.811 CLASS 1 OBESITY DUE TO EXCESS CALORIES WITHOUT SERIOUS COMORBIDITY WITH BODY MASS INDEX (BMI) OF 33.0 TO 33.9 IN ADULT: ICD-10-CM

## 2024-08-13 NOTE — TELEPHONE ENCOUNTER
Requested Prescriptions   Pending Prescriptions Disp Refills    tirzepatide-Weight Management (ZEPBOUND) 2.5 MG/0.5ML prefilled pen 3 mL 0     Sig: Inject 0.5 mLs (2.5 mg) subcutaneously every 7 days       There is no refill protocol information for this order

## 2024-08-18 ENCOUNTER — MYC REFILL (OUTPATIENT)
Dept: FAMILY MEDICINE | Facility: CLINIC | Age: 29
End: 2024-08-18
Payer: COMMERCIAL

## 2024-08-18 ENCOUNTER — MYC MEDICAL ADVICE (OUTPATIENT)
Dept: FAMILY MEDICINE | Facility: CLINIC | Age: 29
End: 2024-08-18
Payer: COMMERCIAL

## 2024-08-18 DIAGNOSIS — F90.0 ATTENTION DEFICIT HYPERACTIVITY DISORDER (ADHD), PREDOMINANTLY INATTENTIVE TYPE: Primary | ICD-10-CM

## 2024-08-18 DIAGNOSIS — E66.811 CLASS 1 OBESITY DUE TO EXCESS CALORIES WITHOUT SERIOUS COMORBIDITY WITH BODY MASS INDEX (BMI) OF 33.0 TO 33.9 IN ADULT: ICD-10-CM

## 2024-08-18 DIAGNOSIS — E66.09 CLASS 1 OBESITY DUE TO EXCESS CALORIES WITHOUT SERIOUS COMORBIDITY WITH BODY MASS INDEX (BMI) OF 33.0 TO 33.9 IN ADULT: ICD-10-CM

## 2024-08-20 RX ORDER — LISDEXAMFETAMINE DIMESYLATE 50 MG/1
50 CAPSULE ORAL EVERY MORNING
Qty: 7 CAPSULE | Refills: 0 | Status: SHIPPED | OUTPATIENT
Start: 2024-08-20 | End: 2024-08-28 | Stop reason: DRUGHIGH

## 2024-08-28 ENCOUNTER — OFFICE VISIT (OUTPATIENT)
Dept: FAMILY MEDICINE | Facility: CLINIC | Age: 29
End: 2024-08-28
Attending: PHYSICIAN ASSISTANT
Payer: COMMERCIAL

## 2024-08-28 VITALS
RESPIRATION RATE: 16 BRPM | BODY MASS INDEX: 30.43 KG/M2 | DIASTOLIC BLOOD PRESSURE: 82 MMHG | WEIGHT: 161.2 LBS | HEART RATE: 73 BPM | TEMPERATURE: 98.5 F | OXYGEN SATURATION: 98 % | HEIGHT: 61 IN | SYSTOLIC BLOOD PRESSURE: 121 MMHG

## 2024-08-28 DIAGNOSIS — F90.0 ATTENTION DEFICIT HYPERACTIVITY DISORDER (ADHD), PREDOMINANTLY INATTENTIVE TYPE: ICD-10-CM

## 2024-08-28 DIAGNOSIS — E66.09 CLASS 1 OBESITY DUE TO EXCESS CALORIES WITHOUT SERIOUS COMORBIDITY WITH BODY MASS INDEX (BMI) OF 33.0 TO 33.9 IN ADULT: ICD-10-CM

## 2024-08-28 DIAGNOSIS — E66.811 CLASS 1 OBESITY DUE TO EXCESS CALORIES WITHOUT SERIOUS COMORBIDITY WITH BODY MASS INDEX (BMI) OF 33.0 TO 33.9 IN ADULT: ICD-10-CM

## 2024-08-28 DIAGNOSIS — Z13.1 SCREENING FOR DIABETES MELLITUS: ICD-10-CM

## 2024-08-28 DIAGNOSIS — F41.1 ANXIETY STATE: ICD-10-CM

## 2024-08-28 DIAGNOSIS — F32.1 CURRENT MODERATE EPISODE OF MAJOR DEPRESSIVE DISORDER WITHOUT PRIOR EPISODE (H): ICD-10-CM

## 2024-08-28 DIAGNOSIS — Z00.00 ROUTINE GENERAL MEDICAL EXAMINATION AT A HEALTH CARE FACILITY: Primary | ICD-10-CM

## 2024-08-28 DIAGNOSIS — R22.32 NODULE OF FINGER OF LEFT HAND: ICD-10-CM

## 2024-08-28 DIAGNOSIS — K21.9 GASTROESOPHAGEAL REFLUX DISEASE WITHOUT ESOPHAGITIS: ICD-10-CM

## 2024-08-28 DIAGNOSIS — Z13.220 SCREENING FOR LIPOID DISORDERS: ICD-10-CM

## 2024-08-28 DIAGNOSIS — J45.20 MILD INTERMITTENT ASTHMA WITHOUT COMPLICATION: ICD-10-CM

## 2024-08-28 PROCEDURE — 99395 PREV VISIT EST AGE 18-39: CPT | Performed by: PHYSICIAN ASSISTANT

## 2024-08-28 PROCEDURE — 99214 OFFICE O/P EST MOD 30 MIN: CPT | Mod: 25 | Performed by: PHYSICIAN ASSISTANT

## 2024-08-28 RX ORDER — BUSPIRONE HYDROCHLORIDE 10 MG/1
10 TABLET ORAL 2 TIMES DAILY
Qty: 180 TABLET | Refills: 1 | Status: SHIPPED | OUTPATIENT
Start: 2024-08-28

## 2024-08-28 RX ORDER — ALBUTEROL SULFATE 90 UG/1
AEROSOL, METERED RESPIRATORY (INHALATION)
Qty: 18 G | Refills: 0 | Status: SHIPPED | OUTPATIENT
Start: 2024-08-28

## 2024-08-28 RX ORDER — VENLAFAXINE HYDROCHLORIDE 37.5 MG/1
37.5 CAPSULE, EXTENDED RELEASE ORAL DAILY
COMMUNITY
Start: 2024-08-28

## 2024-08-28 RX ORDER — LISDEXAMFETAMINE DIMESYLATE 70 MG/1
70 CAPSULE ORAL EVERY MORNING
Qty: 30 CAPSULE | Refills: 0 | Status: SHIPPED | OUTPATIENT
Start: 2024-08-28

## 2024-08-28 RX ORDER — VENLAFAXINE HYDROCHLORIDE 75 MG/1
75 CAPSULE, EXTENDED RELEASE ORAL DAILY
Qty: 60 CAPSULE | Refills: 0 | Status: SHIPPED | OUTPATIENT
Start: 2024-08-28

## 2024-08-28 RX ORDER — FAMOTIDINE 20 MG/1
20 TABLET, FILM COATED ORAL 2 TIMES DAILY
Qty: 180 TABLET | Refills: 1 | Status: SHIPPED | OUTPATIENT
Start: 2024-08-28

## 2024-08-28 SDOH — HEALTH STABILITY: PHYSICAL HEALTH: ON AVERAGE, HOW MANY DAYS PER WEEK DO YOU ENGAGE IN MODERATE TO STRENUOUS EXERCISE (LIKE A BRISK WALK)?: 5 DAYS

## 2024-08-28 SDOH — HEALTH STABILITY: PHYSICAL HEALTH: ON AVERAGE, HOW MANY MINUTES DO YOU ENGAGE IN EXERCISE AT THIS LEVEL?: 30 MIN

## 2024-08-28 ASSESSMENT — PATIENT HEALTH QUESTIONNAIRE - PHQ9
SUM OF ALL RESPONSES TO PHQ QUESTIONS 1-9: 5
SUM OF ALL RESPONSES TO PHQ QUESTIONS 1-9: 5
10. IF YOU CHECKED OFF ANY PROBLEMS, HOW DIFFICULT HAVE THESE PROBLEMS MADE IT FOR YOU TO DO YOUR WORK, TAKE CARE OF THINGS AT HOME, OR GET ALONG WITH OTHER PEOPLE: SOMEWHAT DIFFICULT

## 2024-08-28 ASSESSMENT — SOCIAL DETERMINANTS OF HEALTH (SDOH): HOW OFTEN DO YOU GET TOGETHER WITH FRIENDS OR RELATIVES?: ONCE A WEEK

## 2024-08-28 NOTE — PATIENT INSTRUCTIONS
Leonora: Insomnia    Book: Say alexis to insomnia    Follow up in 3 months, sooner if you would like to make changes    - increase effexor to 75 mg + 37.5 mg (you have the 37.5 mg at home)  - increase vyvanse to 70 mg daily  - at next refill increase zepbound to 7.5 mg    Fasting labwork and urine test

## 2024-08-28 NOTE — PROGRESS NOTES
Preventive Care Visit  [facility] SHEREE Jennings PA-C, Family Medicine  Aug 28, 2024      Assessment & Plan     Routine general medical examination at a health care facility  Discussed lifestyle changes, routine healthcare. Patient declines immunizations.    Attention deficit hyperactivity disorder (ADHD), predominantly inattentive type  Will increase back to previous dose of 70 mg.  Recommended 3 month follow up unless this is not working for her.  - lisdexamfetamine (VYVANSE) 70 MG capsule; Take 1 capsule (70 mg) by mouth every morning.    Anxiety state  Sleep has been an issue since before restarting vyvanse. Has tried sleep hygiene changes. Resources recommended in handout. Increase effexor, see below.  - busPIRone (BUSPAR) 10 MG tablet; Take 1 tablet (10 mg) by mouth 2 times daily.  - venlafaxine (EFFEXOR XR) 75 MG 24 hr capsule; Take 1 capsule (75 mg) by mouth daily.  - venlafaxine (EFFEXOR XR) 37.5 MG 24 hr capsule; Take 1 capsule (37.5 mg) by mouth daily. 37.5 mg + 75 mg effexor  - TSH with free T4 reflex; Future  - CBC with platelets and differential; Future    Current moderate episode of major depressive disorder without prior episode (H)  Worsening anxiety, would like to go back to previous dose of effexor (112.5 mg). She has supply of 37.5 mg at home.  - busPIRone (BUSPAR) 10 MG tablet; Take 1 tablet (10 mg) by mouth 2 times daily.  - venlafaxine (EFFEXOR XR) 75 MG 24 hr capsule; Take 1 capsule (75 mg) by mouth daily.  - venlafaxine (EFFEXOR XR) 37.5 MG 24 hr capsule; Take 1 capsule (37.5 mg) by mouth daily. 37.5 mg + 75 mg effexor    Class 1 obesity due to excess calories without serious comorbidity with body mass index (BMI) of 33.0 to 33.9 in adult  Doing well with zepbound, no side effects. Getting packer more quickly, smaller portion size. Less cravings and snacks.  Significant anxiety with phentermine, and topiramate gave side effects. Previously was on saxenda before  that.  Will increase to 7.5 mg dose at next refill. Discussed protein intake, expected weight loss.    Wt Readings from Last 4 Encounters:   08/28/24 73.1 kg (161 lb 3.2 oz)   06/23/24 79.4 kg (175 lb)   05/08/24 79.4 kg (175 lb)   04/25/24 79.6 kg (175 lb 6.4 oz)     - tirzepatide-Weight Management (ZEPBOUND) 7.5 MG/0.5ML prefilled pen; Inject 0.5 mLs (7.5 mg) subcutaneously every 7 days.    Mild intermittent asthma without complication  No concerns, well controlled.  - albuterol (PROAIR HFA/PROVENTIL HFA/VENTOLIN HFA) 108 (90 Base) MCG/ACT inhaler; Inhale 2 puffs every 4-6 hours as needed for cough, wheezing, or shortness of breath    Screening for diabetes mellitus  - Comprehensive metabolic panel (BMP + Alb, Alk Phos, ALT, AST, Total. Bili, TP); Future  - Hemoglobin A1c; Future    Gastroesophageal reflux disease without esophagitis  Well controlled with chronic pepcid use. Had egd 2022.  - famotidine (PEPCID) 20 MG tablet; Take 1 tablet (20 mg) by mouth 2 times daily.    Screening for lipoid disorders  - Lipid panel reflex to direct LDL Fasting; Future    Nodule of finger of left hand  Improving per patient. No triggering or pain. Continue to monitor - previously wore a ring on this finger, will continue not using ring.    Counseling  Appropriate preventive services were addressed with this patient via screening, questionnaire, or discussion as appropriate for fall prevention, nutrition, physical activity, Tobacco-use cessation, social engagement, weight loss and cognition.  Checklist reviewing preventive services available has been given to the patient.  Reviewed patient's diet, addressing concerns and/or questions.   The patient's PHQ-9 score is consistent with mild depression. She was provided with information regarding depression.     Opal Barillas is a 29 year old, presenting for the following:  Physical        8/28/2024    12:56 PM   Additional Questions   Roomed by Renea   Accompanied by Self         Health Care Directive  Patient does not have a Health Care Directive or Living Will: Discussed advance care planning with patient; however, patient declined at this time.    HPI  Discuss increasing dose of vyvanse  Has been having hard time staying asleep, she has no problems with falling asleep    Tried the vyvanse 50 mg        8/28/2024   General Health   How would you rate your overall physical health? (!) FAIR   Feel stress (tense, anxious, or unable to sleep) Rather much      (!) STRESS CONCERN      8/28/2024   Nutrition   Three or more servings of calcium each day? (!) I DON'T KNOW   Diet: Regular (no restrictions)    Carbohydrate counting    Breakfast skipped   How many servings of fruit and vegetables per day? (!) 2-3   How many sweetened beverages each day? (!) 2       Multiple values from one day are sorted in reverse-chronological order         8/28/2024   Exercise   Days per week of moderate/strenous exercise 5 days   Average minutes spent exercising at this level 30 min            8/28/2024   Social Factors   Frequency of gathering with friends or relatives Once a week   Worry food won't last until get money to buy more No   Food not last or not have enough money for food? No   Do you have housing? (Housing is defined as stable permanent housing and does not include staying ouside in a car, in a tent, in an abandoned building, in an overnight shelter, or couch-surfing.) Yes   Are you worried about losing your housing? No   Lack of transportation? No   Unable to get utilities (heat,electricity)? No            8/28/2024   Dental   Dentist two times every year? Yes             Today's PHQ-9 Score:       8/28/2024    10:59 AM   PHQ-9 SCORE   PHQ-9 Total Score MyChart 5 (Mild depression)   PHQ-9 Total Score 5         8/28/2024   Substance Use   Alcohol more than 3/day or more than 7/wk No   Do you use any other substances recreationally? (!) CANNABIS PRODUCTS        Social History     Tobacco Use     Smoking status: Former     Current packs/day: 0.20     Types: Cigarettes    Smokeless tobacco: Never    Tobacco comments:     quit with pregnancy   Vaping Use    Vaping status: Never Used   Substance Use Topics    Alcohol use: Not Currently     Comment: 4 drinks per month-quit with pregnancy    Drug use: No          Mammogram Screening - Patient under 40 years of age: Routine Mammogram Screening not recommended.         2024   STI Screening   New sexual partner(s) since last STI/HIV test? No        History of abnormal Pap smear: No - age 21-29 PAP every 3 years recommended        2023     2:47 PM 10/22/2019     9:30 AM 2016    12:00 AM   PAP / HPV   PAP Negative for Intraepithelial Lesion or Malignancy (NILM)      PAP (Historical)  NIL  NIL            2024   Contraception/Family Planning   Questions about contraception or family planning No           Reviewed and updated as needed this visit by Provider                    Past Medical History:   Diagnosis Date    Cephalopelvic disproportion due to mixed maternal and fetal factors 2020    Depressive disorder     GDM, class A1 3/25/2020    Tonsillitis     Uncomplicated asthma      Past Surgical History:   Procedure Laterality Date     SECTION N/A 2020    Procedure:  SECTION;  Surgeon: Jaqueline Reyes MD;  Location: [facility] OR    ESOPHAGOSCOPY, GASTROSCOPY, DUODENOSCOPY (EGD), COMBINED N/A 11/15/2021    Procedure: ESOPHAGOGASTRODUODENOSCOPY, WITH BIOPSY;  Surgeon: Robi Rodriguez MD;  Location: [facility] GI    ESOPHAGOSCOPY, GASTROSCOPY, DUODENOSCOPY (EGD), COMBINED N/A 2022    Procedure: ESOPHAGOGASTRODUODENOSCOPY, WITH BIOPSY;  Surgeon: David Palm MD;  Location: [facility] OR    MOUTH SURGERY      wisdom teeth    TONSILLECTOMY       Lab work is in process  Labs reviewed in EPIC  BP Readings from Last 3 Encounters:   24 121/82   24 (!) 122/93   24 123/78    Wt Readings from Last 3 Encounters:    24 73.1 kg (161 lb 3.2 oz)   24 79.4 kg (175 lb)   24 79.4 kg (175 lb)                  Patient Active Problem List   Diagnosis    Current moderate episode of major depressive disorder without prior episode (H)    Anxiety state    History of syncope    Attention deficit hyperactivity disorder (ADHD), predominantly inattentive type    Chronic pain of both knees    Mild intermittent asthma without complication    S/P primary low transverse     Seizures (H)     Past Surgical History:   Procedure Laterality Date     SECTION N/A 2020    Procedure:  SECTION;  Surgeon: Jaqueline Reyes MD;  Location: [facility] OR    ESOPHAGOSCOPY, GASTROSCOPY, DUODENOSCOPY (EGD), COMBINED N/A 11/15/2021    Procedure: ESOPHAGOGASTRODUODENOSCOPY, WITH BIOPSY;  Surgeon: Robi Rodriguez MD;  Location: [facility]    ESOPHAGOSCOPY, GASTROSCOPY, DUODENOSCOPY (EGD), COMBINED N/A 2022    Procedure: ESOPHAGOGASTRODUODENOSCOPY, WITH BIOPSY;  Surgeon: David Palm MD;  Location: [facility]    MOUTH SURGERY      wisdom teeth    TONSILLECTOMY         Social History     Tobacco Use    Smoking status: Former     Current packs/day: 0.20     Types: Cigarettes    Smokeless tobacco: Never    Tobacco comments:     quit with pregnancy   Substance Use Topics    Alcohol use: Not Currently     Comment: 4 drinks per month-quit with pregnancy     Family History   Problem Relation Age of Onset    Diabetes Maternal Grandfather     Coronary Artery Disease Maternal Grandfather     Cerebrovascular Disease Maternal Grandfather     Depression Mother     Anxiety Disorder Mother     Unknown/Adopted Father         unknown     Chronic Obstructive Pulmonary Disease Maternal Grandmother     Coronary Artery Disease Daughter     Other Cancer No family hx of              Review of Systems  CONSTITUTIONAL: NEGATIVE for fever, chills, change in weight  INTEGUMENTARY/SKIN: NEGATIVE for worrisome rashes, moles or  "lesions  EYES: NEGATIVE for vision changes or irritation  ENT/MOUTH: NEGATIVE for ear, mouth and throat problems  RESP: NEGATIVE for significant cough or SOB  BREAST: NEGATIVE for masses, tenderness or discharge  CV: NEGATIVE for chest pain, palpitations or peripheral edema  GI: NEGATIVE for nausea, abdominal pain, heartburn, or change in bowel habits  : NEGATIVE for frequency, dysuria, or hematuria  MUSCULOSKELETAL: NEGATIVE for significant arthralgias or myalgia  NEURO: NEGATIVE for weakness, dizziness or paresthesias  ENDOCRINE: NEGATIVE for temperature intolerance, skin/hair changes  HEME: NEGATIVE for bleeding problems  PSYCHIATRIC: NEGATIVE for changes in mood or affect     Objective    Exam  /82   Pulse 73   Temp 98.5  F (36.9  C) (Tympanic)   Resp 16   Ht 1.549 m (5' 0.98\")   Wt 73.1 kg (161 lb 3.2 oz)   LMP 08/20/2024 (Approximate)   SpO2 98%   BMI 30.47 kg/m     Estimated body mass index is 30.47 kg/m  as calculated from the following:    Height as of this encounter: 1.549 m (5' 0.98\").    Weight as of this encounter: 73.1 kg (161 lb 3.2 oz).    Physical Exam  GENERAL: alert and no distress  EYES: Eyes grossly normal to inspection, PERRL and conjunctivae and sclerae normal  HENT: ear canals and TM's normal, nose and mouth without ulcers or lesions  NECK: no adenopathy, no asymmetry, masses, or scars  RESP: lungs clear to auscultation - no rales, rhonchi or wheezes  CV: regular rate and rhythm, normal S1 S2, no S3 or S4, no murmur, click or rub, no peripheral edema  ABDOMEN: soft, nontender, no hepatosplenomegaly, no masses and bowel sounds normal  MS: no gross musculoskeletal defects noted, no edema  POSITIVE left middle finger small nontender nodule at MCP joint no triggering or pain  SKIN: no suspicious lesions or rashes  NEURO: Normal strength and tone, mentation intact and speech normal  PSYCH: mentation appears normal, affect normal/bright        Signed Electronically by: Carol" NASREEN Jennings    Answers submitted by the patient for this visit:  Patient Health Questionnaire (Submitted on 8/28/2024)  If you checked off any problems, how difficult have these problems made it for you to do your work, take care of things at home, or get along with other people?: Somewhat difficult  PHQ9 TOTAL SCORE: 5

## 2024-09-25 ENCOUNTER — MYC MEDICAL ADVICE (OUTPATIENT)
Dept: FAMILY MEDICINE | Facility: CLINIC | Age: 29
End: 2024-09-25
Payer: COMMERCIAL

## 2024-09-25 DIAGNOSIS — F90.0 ATTENTION DEFICIT HYPERACTIVITY DISORDER (ADHD), PREDOMINANTLY INATTENTIVE TYPE: Primary | ICD-10-CM

## 2024-09-25 RX ORDER — LISDEXAMFETAMINE DIMESYLATE 70 MG/1
70 CAPSULE ORAL DAILY
Qty: 30 CAPSULE | Refills: 0 | Status: SHIPPED | OUTPATIENT
Start: 2024-11-24 | End: 2024-12-24

## 2024-09-25 RX ORDER — LISDEXAMFETAMINE DIMESYLATE 70 MG/1
70 CAPSULE ORAL DAILY
Qty: 30 CAPSULE | Refills: 0 | Status: SHIPPED | OUTPATIENT
Start: 2024-10-25 | End: 2024-11-24

## 2024-09-25 RX ORDER — LISDEXAMFETAMINE DIMESYLATE 70 MG/1
70 CAPSULE ORAL DAILY
Qty: 30 CAPSULE | Refills: 0 | Status: SHIPPED | OUTPATIENT
Start: 2024-09-25 | End: 2024-10-25

## 2024-10-17 PROBLEM — K20.0 EOSINOPHILIC ESOPHAGITIS: Status: ACTIVE | Noted: 2024-10-17

## 2024-12-02 ENCOUNTER — VIRTUAL VISIT (OUTPATIENT)
Dept: FAMILY MEDICINE | Facility: CLINIC | Age: 29
End: 2024-12-02
Attending: PHYSICIAN ASSISTANT
Payer: COMMERCIAL

## 2024-12-02 DIAGNOSIS — E66.09 CLASS 1 OBESITY DUE TO EXCESS CALORIES WITHOUT SERIOUS COMORBIDITY WITH BODY MASS INDEX (BMI) OF 33.0 TO 33.9 IN ADULT: Primary | ICD-10-CM

## 2024-12-02 DIAGNOSIS — E66.811 CLASS 1 OBESITY DUE TO EXCESS CALORIES WITHOUT SERIOUS COMORBIDITY WITH BODY MASS INDEX (BMI) OF 33.0 TO 33.9 IN ADULT: Primary | ICD-10-CM

## 2024-12-02 DIAGNOSIS — F90.0 ATTENTION DEFICIT HYPERACTIVITY DISORDER (ADHD), PREDOMINANTLY INATTENTIVE TYPE: ICD-10-CM

## 2024-12-02 DIAGNOSIS — F41.1 ANXIETY STATE: ICD-10-CM

## 2024-12-02 DIAGNOSIS — R56.9 SEIZURES (H): ICD-10-CM

## 2024-12-02 DIAGNOSIS — F32.1 CURRENT MODERATE EPISODE OF MAJOR DEPRESSIVE DISORDER WITHOUT PRIOR EPISODE (H): ICD-10-CM

## 2024-12-02 DIAGNOSIS — K21.9 GASTROESOPHAGEAL REFLUX DISEASE WITHOUT ESOPHAGITIS: ICD-10-CM

## 2024-12-02 DIAGNOSIS — K20.0 EOSINOPHILIC ESOPHAGITIS: ICD-10-CM

## 2024-12-02 PROCEDURE — 99214 OFFICE O/P EST MOD 30 MIN: CPT | Mod: 95 | Performed by: PHYSICIAN ASSISTANT

## 2024-12-02 RX ORDER — TIRZEPATIDE 7.5 MG/.5ML
7.5 INJECTION, SOLUTION SUBCUTANEOUS
Qty: 3 ML | Refills: 0 | Status: SHIPPED | OUTPATIENT
Start: 2024-12-02

## 2024-12-02 RX ORDER — FAMOTIDINE 20 MG/1
20 TABLET, FILM COATED ORAL 2 TIMES DAILY
Qty: 180 TABLET | Refills: 1 | Status: SHIPPED | OUTPATIENT
Start: 2024-12-02

## 2024-12-02 RX ORDER — VENLAFAXINE HYDROCHLORIDE 37.5 MG/1
37.5 CAPSULE, EXTENDED RELEASE ORAL DAILY
Qty: 90 CAPSULE | Refills: 1 | Status: SHIPPED | OUTPATIENT
Start: 2024-12-02

## 2024-12-02 RX ORDER — LISDEXAMFETAMINE DIMESYLATE 70 MG/1
70 CAPSULE ORAL DAILY
Qty: 30 CAPSULE | Refills: 0 | Status: SHIPPED | OUTPATIENT
Start: 2025-01-25 | End: 2025-02-24

## 2024-12-02 RX ORDER — LISDEXAMFETAMINE DIMESYLATE 70 MG/1
70 CAPSULE ORAL DAILY
Qty: 30 CAPSULE | Refills: 0 | Status: SHIPPED | OUTPATIENT
Start: 2025-02-25 | End: 2025-03-27

## 2024-12-02 RX ORDER — LISDEXAMFETAMINE DIMESYLATE 70 MG/1
70 CAPSULE ORAL DAILY
Qty: 30 CAPSULE | Refills: 0 | Status: SHIPPED | OUTPATIENT
Start: 2024-12-25 | End: 2025-01-24

## 2024-12-02 RX ORDER — OMEPRAZOLE 40 MG/1
40 CAPSULE, DELAYED RELEASE ORAL DAILY
Qty: 90 CAPSULE | Refills: 1 | Status: SHIPPED | OUTPATIENT
Start: 2024-12-02

## 2024-12-02 RX ORDER — BUSPIRONE HYDROCHLORIDE 30 MG/1
30 TABLET ORAL 2 TIMES DAILY
Qty: 180 TABLET | Refills: 1 | Status: SHIPPED | OUTPATIENT
Start: 2024-12-02

## 2024-12-02 RX ORDER — VENLAFAXINE HYDROCHLORIDE 75 MG/1
75 CAPSULE, EXTENDED RELEASE ORAL DAILY
Qty: 90 CAPSULE | Refills: 1 | Status: SHIPPED | OUTPATIENT
Start: 2024-12-02

## 2024-12-02 ASSESSMENT — ASTHMA QUESTIONNAIRES
QUESTION_2 LAST FOUR WEEKS HOW OFTEN HAVE YOU HAD SHORTNESS OF BREATH: NOT AT ALL
QUESTION_3 LAST FOUR WEEKS HOW OFTEN DID YOUR ASTHMA SYMPTOMS (WHEEZING, COUGHING, SHORTNESS OF BREATH, CHEST TIGHTNESS OR PAIN) WAKE YOU UP AT NIGHT OR EARLIER THAN USUAL IN THE MORNING: NOT AT ALL
QUESTION_1 LAST FOUR WEEKS HOW MUCH OF THE TIME DID YOUR ASTHMA KEEP YOU FROM GETTING AS MUCH DONE AT WORK, SCHOOL OR AT HOME: NONE OF THE TIME
QUESTION_4 LAST FOUR WEEKS HOW OFTEN HAVE YOU USED YOUR RESCUE INHALER OR NEBULIZER MEDICATION (SUCH AS ALBUTEROL): NOT AT ALL
ACT_TOTALSCORE: 25
ACT_TOTALSCORE: 25
QUESTION_5 LAST FOUR WEEKS HOW WOULD YOU RATE YOUR ASTHMA CONTROL: COMPLETELY CONTROLLED

## 2024-12-02 NOTE — PROGRESS NOTES
Nargis is a 29 year old who is being evaluated via a billable video visit.    How would you like to obtain your AVS? MyChart  If the video visit is dropped, the invitation should be resent by: Text to cell phone: [phone]  Will anyone else be joining your video visit? No      Assessment & Plan     Class 1 obesity due to excess calories without serious comorbidity with body mass index (BMI) of 33.0 to 33.9 in adult    Wt Readings from Last 4 Encounters:   08/28/24 73.1 kg (161 lb 3.2 oz)   06/23/24 79.4 kg (175 lb)   05/08/24 79.4 kg (175 lb)   04/25/24 79.6 kg (175 lb 6.4 oz)   Today's home weight: 131 lb    She has reached her goal weight fairly quickly with zepbound. She is currently taking 10 mg dose. We discussed strength training and increasing protein intake, would like to avoid continued weight loss. For keeping weight at this baseline, recommended next start back to 7.5 mg dose with consideration of staying at that dose vs down to 5 mg. Will have her mychart me with updates. See handout    - ZEPBOUND 7.5 MG/0.5ML prefilled pen; Inject 0.5 mLs (7.5 mg) subcutaneously every 7 days.    Current moderate episode of major depressive disorder without prior episode (H)  Symptoms much improved with restarting medications. She self increased buspar to 30 mg two times daily which is the max dose per uptodate. Would like to continue effexor 112.5 mg and buspar 30 mg two times daily.  - venlafaxine (EFFEXOR XR) 37.5 MG 24 hr capsule; Take 1 capsule (37.5 mg) by mouth daily. 37.5 mg + 75 mg effexor  - venlafaxine (EFFEXOR XR) 75 MG 24 hr capsule; Take 1 capsule (75 mg) by mouth daily. + 37.5 mg daily    Anxiety state  Symptoms much improved with restarting medications. She self increased buspar to 30 mg two times daily which is the max dose per uptodate. Would like to continue effexor 112.5 mg and buspar 30 mg two times daily.  - busPIRone HCl (BUSPAR) 30 MG tablet; Take 1 tablet (30 mg) by mouth 2 times daily.  -  "venlafaxine (EFFEXOR XR) 37.5 MG 24 hr capsule; Take 1 capsule (37.5 mg) by mouth daily. 37.5 mg + 75 mg effexor  - venlafaxine (EFFEXOR XR) 75 MG 24 hr capsule; Take 1 capsule (75 mg) by mouth daily. + 37.5 mg daily    Attention deficit hyperactivity disorder (ADHD), predominantly inattentive type  Well controlled with current medication.  - lisdexamfetamine (VYVANSE) 70 MG capsule; Take 1 capsule (70 mg) by mouth daily.  - lisdexamfetamine (VYVANSE) 70 MG capsule; Take 1 capsule (70 mg) by mouth daily.  - lisdexamfetamine (VYVANSE) 70 MG capsule; Take 1 capsule (70 mg) by mouth daily.    Eosinophilic esophagitis  Symptoms are much better controlled/resolved with restarting omeprazole and famotidine. She has [facility] GI appointment scheduled but will call [facility] to see if they have a sooner appointment. Also interested in repeat EGD prior to appointment.  - omeprazole (PRILOSEC) 40 MG DR capsule; Take 1 capsule (40 mg) by mouth daily.  - Adult GI  Referral - Consult Only; Future  - Adult GI  Referral - Procedure Only; Future    Gastroesophageal reflux disease without esophagitis  Continue medications, see plan above.  - famotidine (PEPCID) 20 MG tablet; Take 1 tablet (20 mg) by mouth 2 times daily.    Seizures (H)  Stable. On no medication. Saw neurologist when younger for seizures/syncope/?pseudoseizure  They couldn't figure out what it was, she was not put on medication.  Has not had seizure for many years     6 month medication check      BMI  Estimated body mass index is 30.47 kg/m  as calculated from the following:    Height as of 8/28/24: 1.549 m (5' 0.98\").    Weight as of 8/28/24: 73.1 kg (161 lb 3.2 oz).   Weight management plan: Discussed healthy diet and exercise guidelines    Opal Barillas is a 29 year old, presenting for the following health issues:  Recheck Medication        12/2/2024     3:24 PM   Additional Questions   Roomed by Renea   Accompanied by Self       Video Start " Time: 4:51 PM    hospitals   ADHD Follow-Up (Adult)  Concerns: None, things are going well on current dose  Changes since last visit: Stable  Taking controlled (daily) medications as prescribed: Yes  Sleep: no problems  Adult ADHD Self-Reporting form given to patient?:  No     Medication Benefits:   Controlled symptoms: Attention span and Distractability  Uncontrolled symptoms:  None    Medication Side Effects:  Reports:  none  Sleep Problems? no  ++++++++++++++++++++++++++++++++++++++++++++++++    She is doing well on the Zepbound, she states she is at her goal weight discuss what to do going forward.        5/20/2024     3:08 PM 8/28/2024    10:59 AM 12/2/2024     4:24 PM   PHQ   PHQ-9 Total Score 8 5 4    Q9: Thoughts of better off dead/self-harm past 2 weeks Not at all  Not at all  Not at all        Patient-reported          4/11/2024    10:39 AM 5/20/2024     3:08 PM 12/2/2024     4:25 PM   JERMAIN-7 SCORE   Total Score 0 (minimal anxiety) 10 (moderate anxiety) 5 (mild anxiety)   Total Score 0 10 5        Patient-reported                    Review of Systems  Constitutional, HEENT, cardiovascular, pulmonary, GI, , musculoskeletal, neuro, skin, endocrine and psych systems are negative, except as otherwise noted.      Objective           Vitals:  No vitals were obtained today due to virtual visit.    Physical Exam   GENERAL: alert and no distress  EYES: Eyes grossly normal to inspection.  No discharge or erythema, or obvious scleral/conjunctival abnormalities.  RESP: No audible wheeze, cough, or visible cyanosis.    SKIN: Visible skin clear. No significant rash, abnormal pigmentation or lesions.  NEURO: Cranial nerves grossly intact.  Mentation and speech appropriate for age.  PSYCH: Appropriate affect, tone, and pace of words      Video-Visit Details    Type of service:  Video Visit   Video End Time:5:06 PM  Originating Location (pt. Location): Home    Distant Location (provider location):  On-site  Platform used for  Video Visit: Rosalba  Signed Electronically by: Carol Jennings PA-C

## 2024-12-02 NOTE — PATIENT INSTRUCTIONS
Finish the zepbound 10 mg  Next dose back down to 7.5 mg  Mychart me in a few weeks -   Protein recommendations: at least 60 g per day  Strength training

## 2025-01-15 DIAGNOSIS — E66.811 CLASS 1 OBESITY DUE TO EXCESS CALORIES WITHOUT SERIOUS COMORBIDITY WITH BODY MASS INDEX (BMI) OF 33.0 TO 33.9 IN ADULT: ICD-10-CM

## 2025-01-15 DIAGNOSIS — E66.09 CLASS 1 OBESITY DUE TO EXCESS CALORIES WITHOUT SERIOUS COMORBIDITY WITH BODY MASS INDEX (BMI) OF 33.0 TO 33.9 IN ADULT: ICD-10-CM

## 2025-01-15 RX ORDER — TIRZEPATIDE 7.5 MG/.5ML
INJECTION, SOLUTION SUBCUTANEOUS
Qty: 2 ML | Refills: 0 | Status: SHIPPED | OUTPATIENT
Start: 2025-01-15

## 2025-03-04 DIAGNOSIS — E66.811 CLASS 1 OBESITY DUE TO EXCESS CALORIES WITHOUT SERIOUS COMORBIDITY WITH BODY MASS INDEX (BMI) OF 33.0 TO 33.9 IN ADULT: ICD-10-CM

## 2025-03-04 DIAGNOSIS — E66.09 CLASS 1 OBESITY DUE TO EXCESS CALORIES WITHOUT SERIOUS COMORBIDITY WITH BODY MASS INDEX (BMI) OF 33.0 TO 33.9 IN ADULT: ICD-10-CM

## 2025-03-05 RX ORDER — TIRZEPATIDE 7.5 MG/.5ML
INJECTION, SOLUTION SUBCUTANEOUS
Qty: 2 ML | Refills: 0 | Status: SHIPPED | OUTPATIENT
Start: 2025-03-05

## 2025-03-28 PROBLEM — E66.811 CLASS 1 OBESITY DUE TO EXCESS CALORIES WITHOUT SERIOUS COMORBIDITY WITH BODY MASS INDEX (BMI) OF 33.0 TO 33.9 IN ADULT: Status: ACTIVE | Noted: 2025-03-28

## 2025-03-28 PROBLEM — E66.09 CLASS 1 OBESITY DUE TO EXCESS CALORIES WITHOUT SERIOUS COMORBIDITY WITH BODY MASS INDEX (BMI) OF 33.0 TO 33.9 IN ADULT: Status: ACTIVE | Noted: 2025-03-28

## 2025-05-05 ENCOUNTER — PATIENT OUTREACH (OUTPATIENT)
Dept: CARE COORDINATION | Facility: CLINIC | Age: 30
End: 2025-05-05
Payer: COMMERCIAL

## 2025-06-19 ENCOUNTER — LAB (OUTPATIENT)
Dept: LAB | Facility: CLINIC | Age: 30
End: 2025-06-19
Payer: COMMERCIAL

## 2025-06-19 ENCOUNTER — RESULTS FOLLOW-UP (OUTPATIENT)
Dept: FAMILY MEDICINE | Facility: CLINIC | Age: 30
End: 2025-06-19

## 2025-06-19 ENCOUNTER — E-VISIT (OUTPATIENT)
Dept: FAMILY MEDICINE | Facility: CLINIC | Age: 30
End: 2025-06-19
Payer: COMMERCIAL

## 2025-06-19 DIAGNOSIS — R30.0 DYSURIA: ICD-10-CM

## 2025-06-19 DIAGNOSIS — R30.0 DYSURIA: Primary | ICD-10-CM

## 2025-06-19 DIAGNOSIS — N30.00 ACUTE CYSTITIS WITHOUT HEMATURIA: Primary | ICD-10-CM

## 2025-06-19 LAB
RBC #/AREA URNS AUTO: ABNORMAL /HPF
SQUAMOUS #/AREA URNS AUTO: ABNORMAL /LPF
WBC #/AREA URNS AUTO: ABNORMAL /HPF

## 2025-06-19 RX ORDER — NITROFURANTOIN 25; 75 MG/1; MG/1
100 CAPSULE ORAL 2 TIMES DAILY
Qty: 10 CAPSULE | Refills: 0 | Status: SHIPPED | OUTPATIENT
Start: 2025-06-19 | End: 2025-06-24

## 2025-06-19 NOTE — PATIENT INSTRUCTIONS
Dear Nargis,     After reviewing your responses, I would like you to come in for a urine test to make sure we treat you correctly. This urine test is to evaluate you for a possible urinary tract infection, and should be completed today or tomorrow. Schedule a Lab Only appointment here.     If a Lab appointment is not available, please check this site for lab locations and hours (many labs are closed evenings and weekends). You may walk into any Lab location during their operating hours without an appointment to complete your urine test. Please let the lab staff know that you have had an eVisit and your provider has ordered a urine test.     You will receive instructions with your results in [facility] once they are available.     If your symptoms worsen, you develop pain in your back or stomach, develop fevers, or are not improving in 5 days, please contact your primary care provider for an appointment or visit a Walk-in or Urgent Care Center to be seen.     Thanks again for choosing us as your health care partner,     SUDHEER Abernathy CNP

## 2025-06-21 LAB
BACTERIA UR CULT: ABNORMAL
BACTERIA UR CULT: ABNORMAL

## 2025-07-14 DIAGNOSIS — F41.1 ANXIETY STATE: ICD-10-CM

## 2025-07-14 RX ORDER — LORAZEPAM 0.5 MG/1
0.5 TABLET ORAL EVERY 6 HOURS
Qty: 10 TABLET | Refills: 1 | OUTPATIENT
Start: 2025-07-14

## 2025-07-14 NOTE — TELEPHONE ENCOUNTER
It doesn't look like this is intended to be an ongoing medication.   She was given it on 3/28/25 because of anxiety about a rash.   I will not refill this.     If she continues to have issues with anxiety, should come see a provider in clinic     HUAN Knox MD

## 2025-07-29 ENCOUNTER — PATIENT OUTREACH (OUTPATIENT)
Dept: CARE COORDINATION | Facility: CLINIC | Age: 30
End: 2025-07-29
Payer: COMMERCIAL

## 2025-08-05 DIAGNOSIS — E66.811 CLASS 1 OBESITY DUE TO EXCESS CALORIES WITHOUT SERIOUS COMORBIDITY WITH BODY MASS INDEX (BMI) OF 33.0 TO 33.9 IN ADULT: ICD-10-CM

## 2025-08-05 DIAGNOSIS — E66.09 CLASS 1 OBESITY DUE TO EXCESS CALORIES WITHOUT SERIOUS COMORBIDITY WITH BODY MASS INDEX (BMI) OF 33.0 TO 33.9 IN ADULT: ICD-10-CM

## 2025-08-06 RX ORDER — TIRZEPATIDE 5 MG/.5ML
INJECTION, SOLUTION SUBCUTANEOUS
Qty: 2 ML | Refills: 1 | OUTPATIENT
Start: 2025-08-06

## 2025-08-12 ENCOUNTER — PATIENT OUTREACH (OUTPATIENT)
Dept: CARE COORDINATION | Facility: CLINIC | Age: 30
End: 2025-08-12
Payer: COMMERCIAL

## 2025-08-12 ENCOUNTER — TELEPHONE (OUTPATIENT)
Dept: FAMILY MEDICINE | Facility: CLINIC | Age: 30
End: 2025-08-12
Payer: COMMERCIAL

## (undated) DEVICE — LINEN BABY BLANKET 5434

## (undated) DEVICE — SUCTION KIWI VAC  VAC-6000M

## (undated) DEVICE — SOL WATER IRRIG 500ML BOTTLE 2F7113

## (undated) DEVICE — SOL WATER IRRIG 1000ML BOTTLE 07139-09

## (undated) DEVICE — KIT ENDO TURNOVER/PROCEDURE CARRY-ON 101822

## (undated) DEVICE — STOCKING SLEEVE COMPRESSION CALF MED

## (undated) DEVICE — SUCTION MANIFOLD NEPTUNE 2 SYS 1 PORT 702-025-000

## (undated) DEVICE — ENDO FORCEP BX CAPTURA PRO SPIKE G50696

## (undated) DEVICE — GOWN LG DISP 9515

## (undated) DEVICE — SU VICRYL 0 CT-1 36" J946H

## (undated) DEVICE — SU SECURESEAL SKIN ADHESIVE 0.5ML CHSS-05

## (undated) DEVICE — SYR 30ML SLIP TIP W/O NDL 302833

## (undated) DEVICE — LABEL MEDICATION SYSTEM  3304

## (undated) DEVICE — GOWN IMPERVIOUS 2XL BLUE

## (undated) DEVICE — SU CHROMIC 0 CT 27" 802H

## (undated) DEVICE — Device

## (undated) DEVICE — CATH TRAY FOLEY SURESTEP 16FR W/URINE MTR STATLK LF A303416A

## (undated) DEVICE — GLOVE PROTEXIS W/NEU-THERA 7.0  2D73TE70

## (undated) DEVICE — GLOVE PROTEXIS BLUE W/NEU-THERA 7.0  2D73EB70

## (undated) DEVICE — GLOVE PROTEXIS BLUE W/NEU-THERA 6.5  2D73EB65

## (undated) DEVICE — GLOVE PROTEXIS W/NEU-THERA 6.5  2D73TE65

## (undated) DEVICE — SU WND CLOSURE VLOC 90 ABS 4-0 18" P-12 VLOCM0023

## (undated) DEVICE — PREP SKIN SCRUB TRAY 4461A

## (undated) DEVICE — PACK C-SECTION LF PL15OTA83B

## (undated) DEVICE — SUCTION CATH AIRLIFE TRI-FLO W/CONTROL PORT 14FR  T60C

## (undated) DEVICE — TUBING SUCTION 12"X1/4" N612

## (undated) DEVICE — ENDO BITE BLOCK ADULT OMNI-BLOC

## (undated) DEVICE — GLOVE EXAM NITRILE LG PF LATEX FREE 5064

## (undated) DEVICE — SPECIMEN CONTAINER 3OZ W/FORMALIN 59901

## (undated) RX ORDER — OXYTOCIN/0.9 % SODIUM CHLORIDE 30/500 ML
PLASTIC BAG, INJECTION (ML) INTRAVENOUS
Status: DISPENSED
Start: 2020-05-23

## (undated) RX ORDER — BUPIVACAINE HYDROCHLORIDE 2.5 MG/ML
INJECTION, SOLUTION EPIDURAL; INFILTRATION; INTRACAUDAL
Status: DISPENSED
Start: 2020-05-23

## (undated) RX ORDER — LIDOCAINE HCL/EPINEPHRINE/PF 2%-1:200K
VIAL (ML) INJECTION
Status: DISPENSED
Start: 2020-05-23

## (undated) RX ORDER — ONDANSETRON 2 MG/ML
INJECTION INTRAMUSCULAR; INTRAVENOUS
Status: DISPENSED
Start: 2020-05-23

## (undated) RX ORDER — METHYLERGONOVINE MALEATE 0.2 MG/ML
INJECTION INTRAVENOUS
Status: DISPENSED
Start: 2020-05-23

## (undated) RX ORDER — BUPIVACAINE HYDROCHLORIDE 2.5 MG/ML
INJECTION, SOLUTION EPIDURAL; INFILTRATION; INTRACAUDAL
Status: DISPENSED
Start: 2020-05-22

## (undated) RX ORDER — EPHEDRINE SULFATE 50 MG/ML
INJECTION, SOLUTION INTRAMUSCULAR; INTRAVENOUS; SUBCUTANEOUS
Status: DISPENSED
Start: 2020-05-23

## (undated) RX ORDER — DEXAMETHASONE SODIUM PHOSPHATE 4 MG/ML
INJECTION, SOLUTION INTRA-ARTICULAR; INTRALESIONAL; INTRAMUSCULAR; INTRAVENOUS; SOFT TISSUE
Status: DISPENSED
Start: 2020-05-23

## (undated) RX ORDER — LIDOCAINE HYDROCHLORIDE 10 MG/ML
INJECTION, SOLUTION EPIDURAL; INFILTRATION; INTRACAUDAL; PERINEURAL
Status: DISPENSED
Start: 2021-11-15

## (undated) RX ORDER — LIDOCAINE HYDROCHLORIDE 10 MG/ML
INJECTION, SOLUTION EPIDURAL; INFILTRATION; INTRACAUDAL; PERINEURAL
Status: DISPENSED
Start: 2020-05-22

## (undated) RX ORDER — PHENYLEPHRINE HCL IN 0.9% NACL 1 MG/10 ML
SYRINGE (ML) INTRAVENOUS
Status: DISPENSED
Start: 2020-05-23